# Patient Record
Sex: FEMALE | Race: WHITE | NOT HISPANIC OR LATINO | Employment: OTHER | URBAN - METROPOLITAN AREA
[De-identification: names, ages, dates, MRNs, and addresses within clinical notes are randomized per-mention and may not be internally consistent; named-entity substitution may affect disease eponyms.]

---

## 2017-01-03 DIAGNOSIS — J01.40 ACUTE PANSINUSITIS, RECURRENCE NOT SPECIFIED: ICD-10-CM

## 2017-01-03 NOTE — TELEPHONE ENCOUNTER
----- Message from Alejandro Cuba sent at 1/3/2017 10:45 AM CST -----  Contact: Pt [phone]  Patient would like to get medical advice.  Symptoms (please be specific):  Sore throat  How long has patient had these symptoms:  About 3 weeks  Pharmacy name and phone #:  [facility]  [phone] (Phone)  [phone] (Fax)  Any drug allergies:  none  Comments:

## 2017-01-04 NOTE — TELEPHONE ENCOUNTER
----- Message from Iqra Boogie sent at 1/4/2017 11:28 AM CST -----  Contact: self [phone] or cell [phone]  Pt spoke with Candis yesterday about a sore throat. She saw  2 weeks ago for this and asked if  would order meds but pharmacy said that nothing has been ordered yet. Please call pt. She has been advised that  is not in clinic today.

## 2017-01-05 RX ORDER — AZITHROMYCIN 250 MG/1
TABLET, FILM COATED ORAL
Qty: 6 TABLET | Refills: 0 | Status: SHIPPED | OUTPATIENT
Start: 2017-01-05 | End: 2017-01-16

## 2017-01-05 RX ORDER — PROMETHAZINE HYDROCHLORIDE AND DEXTROMETHORPHAN HYDROBROMIDE 6.25; 15 MG/5ML; MG/5ML
5 SYRUP ORAL EVERY 4 HOURS PRN
Qty: 240 ML | Refills: 0 | Status: SHIPPED | OUTPATIENT
Start: 2017-01-05 | End: 2017-01-15

## 2017-01-16 ENCOUNTER — OFFICE VISIT (OUTPATIENT)
Dept: OTOLARYNGOLOGY | Facility: CLINIC | Age: 67
End: 2017-01-16
Payer: MEDICARE

## 2017-01-16 VITALS — DIASTOLIC BLOOD PRESSURE: 61 MMHG | TEMPERATURE: 98 F | HEART RATE: 59 BPM | SYSTOLIC BLOOD PRESSURE: 125 MMHG

## 2017-01-16 DIAGNOSIS — Z87.19 HISTORY OF GASTROESOPHAGEAL REFLUX (GERD): ICD-10-CM

## 2017-01-16 DIAGNOSIS — R49.0 HOARSENESS: ICD-10-CM

## 2017-01-16 DIAGNOSIS — J02.9 SORE THROAT: Primary | ICD-10-CM

## 2017-01-16 PROCEDURE — 99213 OFFICE O/P EST LOW 20 MIN: CPT | Mod: PBBFAC | Performed by: OTOLARYNGOLOGY

## 2017-01-16 PROCEDURE — 31575 DIAGNOSTIC LARYNGOSCOPY: CPT | Mod: PBBFAC | Performed by: OTOLARYNGOLOGY

## 2017-01-16 PROCEDURE — 99999 PR PBB SHADOW E&M-EST. PATIENT-LVL III: CPT | Mod: PBBFAC,,, | Performed by: OTOLARYNGOLOGY

## 2017-01-16 PROCEDURE — 99213 OFFICE O/P EST LOW 20 MIN: CPT | Mod: 25,S$PBB,, | Performed by: OTOLARYNGOLOGY

## 2017-01-16 PROCEDURE — 31575 DIAGNOSTIC LARYNGOSCOPY: CPT | Mod: S$PBB,,, | Performed by: OTOLARYNGOLOGY

## 2017-01-16 NOTE — MR AVS SNAPSHOT
[facility] - Otorhinolaryngology  [address]  [facility] [address]  Phone: [phone]  Fax: [phone]                  Sheila Zarco   2017 4:00 PM   Office Visit    Description:  Female : 1950   Provider:  Hung Dumont III, MD   Department:  [facility] - Otorhinolaryngology           Diagnoses this Visit        Comments    Sore throat    -  Primary     History of gastroesophageal reflux (GERD)         Hoarseness                To Do List           Future Appointments        Provider Department Dept Phone    2017 3:00 PM Farhat Correa MD [place] - Internal Medicine [phone]      Goals (5 Years of Data)     None      [facility] On [facility]     [facility] On Call Nurse Care Line -  Assistance  Registered nurses in the [facility] On Call Center provide clinical advisement, health education, appointment booking, and other advisory services.  Call for this free service at [phone].             Medications           Message regarding Medications     Verify the changes and/or additions to your medication regime listed below are the same as discussed with your clinician today.  If any of these changes or additions are incorrect, please notify your healthcare provider.        STOP taking these medications     azithromycin (ZITHROMAX Z-LUPE) 250 MG tablet Take 2 tablets on day 1, then 1 tablet on days 2-5.           Verify that the below list of medications is an accurate representation of the medications you are currently taking.  If none reported, the list may be blank. If incorrect, please contact your healthcare provider. Carry this list with you in case of emergency.           Current Medications     albuterol (ACCUNEB) 0.63 mg/3 mL Nebu Take 3 mLs (0.63 mg total) by nebulization every 6 (six) hours as needed.    albuterol (VENTOLIN HFA) 90 mcg/actuation inhaler Inhale 2 puffs into the lungs every 6 (six) hours as needed for Wheezing.    alprazolam (XANAX) 0.25 MG tablet  Take 1 tablet (0.25 mg total) by mouth 2 (two) times daily. as needed for anxiety.    cyanocobalamin (VITAMIN B-12) 250 MCG tablet Take 250 mcg by mouth once daily.    desoximetasone (TOPICORT) 0.25 % cream Apply topically 2 (two) times daily.      escitalopram oxalate (LEXAPRO) 10 MG tablet Take 1 tablet (10 mg total) by mouth once daily. For anxiety.    fluticasone (FLONASE) 50 mcg/actuation nasal spray 2 sprays by Each Nare route once daily.    fluticasone-salmeterol 250-50 mcg/dose (ADVAIR) 250-50 mcg/dose diskus inhaler Inhale 1 puff into the lungs 2 (two) times daily.    ketoconazole (NIZORAL) 2 % cream AAA twice daily on nose prn flare    ketoconazole (NIZORAL) 2 % shampoo Wash hair with medicated shampoo at least 2x/week - let sit on scalp at least 5 minutes prior to rinsing    levothyroxine (SYNTHROID) 88 MCG tablet TAKE 1 TABLET BY MOUTH EVERY MORNING    naproxen (NAPROSYN) 500 MG tablet Take 1 tablet (500 mg total) by mouth 2 (two) times daily with meals.    pantoprazole (PROTONIX) 40 MG tablet TAKE 1 TABLET (40 MG TOTAL) BY MOUTH ONCE DAILY.    valacyclovir (VALTREX) 500 MG tablet TAKE ONE TABLET TWICE A DAY AS NEEDED FOR  OUTBREAK           Clinical Reference Information           Vital Signs - Last Recorded  Most recent update: 1/16/2017  4:26 PM by Violeta Balderas MA    BP Pulse Temp             125/61 (BP Location: Left arm, Patient Position: Sitting, BP Method: Automatic) (!) 59 97.6 °F (36.4 °C) (Tympanic)         Blood Pressure          Most Recent Value    BP  125/61      Allergies as of 1/16/2017     No Known Allergies      Immunizations Administered on Date of Encounter - 1/16/2017     None      Instructions    Endoscopy performed  Anti GERD measures may help  1 tsp Maalox + 1 tsp liquid benadryl swallowed slowly TID may help  Consider GI consultation pending course [phone]  Humidifier use during dry winter months may help  Consider laryngeal consultation with Dr. MOUNIKA Owen pending  course

## 2017-01-16 NOTE — PATIENT INSTRUCTIONS
Endoscopy performed  Anti GERD measures may help; PPI use encouraged  1 tsp Maalox + 1 tsp liquid benadryl swallowed slowly TID may help  Consider GI consultation pending course [phone]  Humidifier use during dry winter months may help  Consider laryngeal consultation with Dr. MOUNIKA Owen pending course

## 2017-02-03 RX ORDER — LEVOTHYROXINE SODIUM 88 UG/1
88 TABLET ORAL EVERY MORNING
Qty: 90 TABLET | Refills: 3 | Status: SHIPPED | OUTPATIENT
Start: 2017-02-03 | End: 2018-01-24 | Stop reason: SDUPTHER

## 2017-02-03 RX ORDER — ESCITALOPRAM OXALATE 10 MG/1
10 TABLET ORAL DAILY
Qty: 90 TABLET | Refills: 3 | Status: SHIPPED | OUTPATIENT
Start: 2017-02-03 | End: 2018-01-24 | Stop reason: SDUPTHER

## 2017-02-08 ENCOUNTER — TELEPHONE (OUTPATIENT)
Dept: INTERNAL MEDICINE | Facility: CLINIC | Age: 67
End: 2017-02-08

## 2017-02-08 DIAGNOSIS — E78.5 HYPERLIPIDEMIA, UNSPECIFIED HYPERLIPIDEMIA TYPE: ICD-10-CM

## 2017-02-08 DIAGNOSIS — I15.9 SECONDARY HYPERTENSION: Primary | ICD-10-CM

## 2017-02-08 DIAGNOSIS — E03.9 HYPOTHYROIDISM, UNSPECIFIED TYPE: ICD-10-CM

## 2017-02-08 NOTE — TELEPHONE ENCOUNTER
----- Message from Bettie Jimenez sent at 2/8/2017  8:58 AM CST -----  Contact: Self/[phone]  Pt would like to know if blood work is needed before her apt with  on 2/9. Please advise

## 2017-02-09 NOTE — TELEPHONE ENCOUNTER
----- Message from Carolin Leung sent at 2/9/2017  8:59 AM CST -----  Contact: Self. Call  [phone]  Doctor appointment and lab have been scheduled.  Please link lab orders to the lab appointment.  Date of doctor appointment:  2/21  Physical or EP:  Ep  Date of lab appointment:  2/14  Comments:

## 2017-02-14 ENCOUNTER — LAB VISIT (OUTPATIENT)
Dept: LAB | Facility: HOSPITAL | Age: 67
End: 2017-02-14
Attending: INTERNAL MEDICINE
Payer: MEDICARE

## 2017-02-14 DIAGNOSIS — I15.9 SECONDARY HYPERTENSION: ICD-10-CM

## 2017-02-14 DIAGNOSIS — E78.5 HYPERLIPIDEMIA, UNSPECIFIED HYPERLIPIDEMIA TYPE: ICD-10-CM

## 2017-02-14 DIAGNOSIS — E03.9 HYPOTHYROIDISM, UNSPECIFIED TYPE: ICD-10-CM

## 2017-02-14 LAB
ALBUMIN SERPL BCP-MCNC: 3.6 G/DL
ALP SERPL-CCNC: 70 U/L
ALT SERPL W/O P-5'-P-CCNC: 15 U/L
ANION GAP SERPL CALC-SCNC: 6 MMOL/L
AST SERPL-CCNC: 16 U/L
BASOPHILS # BLD AUTO: 0.04 K/UL
BASOPHILS NFR BLD: 0.6 %
BILIRUB SERPL-MCNC: 0.5 MG/DL
BUN SERPL-MCNC: 15 MG/DL
CALCIUM SERPL-MCNC: 9.2 MG/DL
CHLORIDE SERPL-SCNC: 111 MMOL/L
CHOLEST/HDLC SERPL: 2.6 {RATIO}
CO2 SERPL-SCNC: 24 MMOL/L
CREAT SERPL-MCNC: 0.8 MG/DL
DIFFERENTIAL METHOD: NORMAL
EOSINOPHIL # BLD AUTO: 0.3 K/UL
EOSINOPHIL NFR BLD: 4 %
ERYTHROCYTE [DISTWIDTH] IN BLOOD BY AUTOMATED COUNT: 13.7 %
EST. GFR  (AFRICAN AMERICAN): >60 ML/MIN/1.73 M^2
EST. GFR  (NON AFRICAN AMERICAN): >60 ML/MIN/1.73 M^2
GLUCOSE SERPL-MCNC: 112 MG/DL
HCT VFR BLD AUTO: 39.2 %
HDL/CHOLESTEROL RATIO: 37.8 %
HDLC SERPL-MCNC: 246 MG/DL
HDLC SERPL-MCNC: 93 MG/DL
HGB BLD-MCNC: 13 G/DL
LDLC SERPL CALC-MCNC: 131.8 MG/DL
LYMPHOCYTES # BLD AUTO: 2.4 K/UL
LYMPHOCYTES NFR BLD: 35.9 %
MCH RBC QN AUTO: 29.7 PG
MCHC RBC AUTO-ENTMCNC: 33.2 %
MCV RBC AUTO: 90 FL
MONOCYTES # BLD AUTO: 0.4 K/UL
MONOCYTES NFR BLD: 5.5 %
NEUTROPHILS # BLD AUTO: 3.6 K/UL
NEUTROPHILS NFR BLD: 53.9 %
NONHDLC SERPL-MCNC: 153 MG/DL
PLATELET # BLD AUTO: 339 K/UL
PMV BLD AUTO: 10 FL
POTASSIUM SERPL-SCNC: 4.5 MMOL/L
PROT SERPL-MCNC: 6.9 G/DL
RBC # BLD AUTO: 4.38 M/UL
SODIUM SERPL-SCNC: 141 MMOL/L
T4 FREE SERPL-MCNC: 1.15 NG/DL
TRIGL SERPL-MCNC: 106 MG/DL
TSH SERPL DL<=0.005 MIU/L-ACNC: 2.48 UIU/ML
WBC # BLD AUTO: 6.71 K/UL

## 2017-02-14 PROCEDURE — 80053 COMPREHEN METABOLIC PANEL: CPT

## 2017-02-14 PROCEDURE — 84439 ASSAY OF FREE THYROXINE: CPT

## 2017-02-14 PROCEDURE — 36415 COLL VENOUS BLD VENIPUNCTURE: CPT | Mod: PO

## 2017-02-14 PROCEDURE — 85025 COMPLETE CBC W/AUTO DIFF WBC: CPT

## 2017-02-14 PROCEDURE — 80061 LIPID PANEL: CPT

## 2017-02-14 PROCEDURE — 84443 ASSAY THYROID STIM HORMONE: CPT

## 2017-02-21 ENCOUNTER — OFFICE VISIT (OUTPATIENT)
Dept: INTERNAL MEDICINE | Facility: CLINIC | Age: 67
End: 2017-02-21
Payer: MEDICARE

## 2017-02-21 VITALS
BODY MASS INDEX: 26.25 KG/M2 | DIASTOLIC BLOOD PRESSURE: 70 MMHG | RESPIRATION RATE: 16 BRPM | TEMPERATURE: 98 F | HEART RATE: 64 BPM | SYSTOLIC BLOOD PRESSURE: 146 MMHG | WEIGHT: 165.13 LBS

## 2017-02-21 DIAGNOSIS — J45.20 MILD INTERMITTENT ASTHMA WITHOUT COMPLICATION: Chronic | ICD-10-CM

## 2017-02-21 DIAGNOSIS — K21.9 GASTROESOPHAGEAL REFLUX DISEASE, ESOPHAGITIS PRESENCE NOT SPECIFIED: Chronic | ICD-10-CM

## 2017-02-21 DIAGNOSIS — R10.11 RIGHT UPPER QUADRANT ABDOMINAL PAIN: Primary | ICD-10-CM

## 2017-02-21 DIAGNOSIS — E03.9 HYPOTHYROIDISM, UNSPECIFIED TYPE: Chronic | ICD-10-CM

## 2017-02-21 DIAGNOSIS — R73.09 ABNORMAL GLUCOSE: Chronic | ICD-10-CM

## 2017-02-21 DIAGNOSIS — F41.9 ANXIETY: Chronic | ICD-10-CM

## 2017-02-21 DIAGNOSIS — K76.0 NAFLD (NONALCOHOLIC FATTY LIVER DISEASE): Chronic | ICD-10-CM

## 2017-02-21 PROCEDURE — 99214 OFFICE O/P EST MOD 30 MIN: CPT | Mod: S$PBB,,, | Performed by: INTERNAL MEDICINE

## 2017-02-21 PROCEDURE — 99999 PR PBB SHADOW E&M-EST. PATIENT-LVL III: CPT | Mod: PBBFAC,,, | Performed by: INTERNAL MEDICINE

## 2017-02-21 PROCEDURE — 99213 OFFICE O/P EST LOW 20 MIN: CPT | Mod: PBBFAC,PO | Performed by: INTERNAL MEDICINE

## 2017-02-21 RX ORDER — VALACYCLOVIR HYDROCHLORIDE 500 MG/1
TABLET, FILM COATED ORAL
Qty: 12 TABLET | Refills: 3 | Status: SHIPPED | OUTPATIENT
Start: 2017-02-21 | End: 2019-03-21 | Stop reason: SDUPTHER

## 2017-02-21 RX ORDER — ALPRAZOLAM 0.25 MG/1
TABLET ORAL
Qty: 60 TABLET | Refills: 2 | Status: SHIPPED | OUTPATIENT
Start: 2017-02-21 | End: 2018-05-11

## 2017-02-21 NOTE — PROGRESS NOTES
Subjective:       Patient ID: Sheila Zarco is a 66 y.o. female.    Chief Complaint: Follow-up (6month ) and Abdominal Pain    HPI   The patient presents for follow-up of right upper quadrant abdominal pain and other medical conditions.  The patient has hypertension and reports blood pressure readings have been good on self-monitoring.  She has GERD which has been stable on Protonix therapy.  However, the patient continues to experience discomfort in the right upper quadrant and right flank area.  She describes it as a moderate aching pain.  The pain is not related to ingestion of food or physical activity.  She has not experienced any nausea, vomiting, melena, or gross rectal bleeding.  Bowel movements have been normal.  No dysuria or change in urinary frequency noted.  No gross hematuria is noted.  The patient has nonalcoholic fatty liver disease.  Review of Systems   Constitutional: Negative for activity change, appetite change, chills, fatigue, fever and unexpected weight change.   Respiratory: Negative for shortness of breath.    Cardiovascular: Negative for chest pain, palpitations and leg swelling.   Gastrointestinal: Positive for abdominal pain. Negative for abdominal distention, blood in stool and vomiting.   Genitourinary: Positive for flank pain. Negative for difficulty urinating, dysuria, frequency and hematuria.   Musculoskeletal: Negative for arthralgias and myalgias.   Skin: Negative for rash.   Neurological: Negative for dizziness and headaches.       Objective:      Physical Exam   Constitutional: She is oriented to person, place, and time. She appears well-developed and well-nourished. No distress.   HENT:   Head: Normocephalic and atraumatic.   Eyes: Conjunctivae are normal. No scleral icterus.   Neck: Normal range of motion. Neck supple.   Cardiovascular: Normal rate, regular rhythm, normal heart sounds and intact distal pulses.    No murmur heard.  Pulmonary/Chest: Effort normal and breath  sounds normal. No respiratory distress. She has no wheezes. She has no rales. She exhibits no tenderness.   Abdominal: Bowel sounds are normal. She exhibits no distension. There is no tenderness.   Musculoskeletal: Normal range of motion.   Negative straight leg raising test bilaterally.  No lumbar paraspinous muscle tenderness is present on palpation.  Hip range of motion is intact.  No CVA percussion tenderness.   Lymphadenopathy:     She has no cervical adenopathy.   Neurological: She is alert and oriented to person, place, and time. Coordination normal.   Skin: Skin is warm and dry. No rash noted.   Psychiatric: She has a normal mood and affect. Her behavior is normal.   Nursing note and vitals reviewed.      Assessment:       1. Right upper quadrant abdominal pain    2. Gastroesophageal reflux disease, esophagitis presence not specified    3. Mild intermittent asthma without complication    4. Hypothyroidism, unspecified type    5. Anxiety    6. Abnormal glucose    7. NAFLD (nonalcoholic fatty liver disease)        Plan:       Sheila was seen today for follow-up and abdominal pain.  CT scan of the abdomen with contrast will be obtained for further assessment of the liver, pancreas, and kidney in view of unexplained pain in the right upper quadrant and right flank areas.  Current therapy will be continued.  A routine follow-up in 6 months is recommended.    Diagnoses and all orders for this visit:    Right upper quadrant abdominal pain  -     CT Abdomen With Contrast; Future    Gastroesophageal reflux disease, esophagitis presence not specified    Mild intermittent asthma without complication    Hypothyroidism, unspecified type    Anxiety    Abnormal glucose    NAFLD (nonalcoholic fatty liver disease)    Other orders  -     valacyclovir (VALTREX) 500 MG tablet; TAKE ONE TABLET TWICE A DAY AS NEEDED FOR  OUTBREAK  -     alprazolam (XANAX) 0.25 MG tablet; Take 1-2 tabs po bid prn anxiety

## 2017-02-24 ENCOUNTER — HOSPITAL ENCOUNTER (OUTPATIENT)
Dept: RADIOLOGY | Facility: HOSPITAL | Age: 67
Discharge: HOME | End: 2017-02-24
Attending: INTERNAL MEDICINE
Payer: MEDICARE

## 2017-02-24 DIAGNOSIS — R10.11 RIGHT UPPER QUADRANT ABDOMINAL PAIN: ICD-10-CM

## 2017-02-24 PROCEDURE — 74160 CT ABDOMEN W/CONTRAST: CPT | Mod: 26,,, | Performed by: RADIOLOGY

## 2017-02-24 PROCEDURE — 74160 CT ABDOMEN W/CONTRAST: CPT | Mod: TC

## 2017-02-24 PROCEDURE — 25500020 PHARM REV CODE 255: Performed by: INTERNAL MEDICINE

## 2017-02-24 RX ADMIN — IOHEXOL 15 ML: 350 INJECTION, SOLUTION INTRAVENOUS at 10:02

## 2017-02-24 RX ADMIN — IOHEXOL 75 ML: 350 INJECTION, SOLUTION INTRAVENOUS at 11:02

## 2017-05-08 RX ORDER — ALBUTEROL SULFATE 90 UG/1
AEROSOL, METERED RESPIRATORY (INHALATION)
Qty: 18 INHALER | Refills: 1 | Status: SHIPPED | OUTPATIENT
Start: 2017-05-08 | End: 2018-09-17 | Stop reason: SDUPTHER

## 2017-05-24 ENCOUNTER — TELEPHONE (OUTPATIENT)
Dept: ORTHOPEDICS | Facility: CLINIC | Age: 67
End: 2017-05-24

## 2017-05-24 NOTE — TELEPHONE ENCOUNTER
----- Message from Servando Mathis sent at 5/24/2017 11:57 AM CDT -----  Contact: ANGELA BAPTISTE [[id number]]  X_  1st Request  _  2nd Request  _  3rd Request        Who: ANGELA BAPTISTE [[id number]]    Why: Patient's right shoulder is bothering her again. Patient would like to know if she can get another injection. Please call back to schedule.    What Number to Call Back: [phone] or [phone] (cell)    When to Expect a call back: (Before the end of the day)   -- if the call is after 12:00, the call back will be tomorrow.

## 2017-05-24 NOTE — TELEPHONE ENCOUNTER
I spoke with the patient and made her a follow up appointment. She was made aware of date, time and location.

## 2017-06-14 ENCOUNTER — OFFICE VISIT (OUTPATIENT)
Dept: ORTHOPEDICS | Facility: CLINIC | Age: 67
End: 2017-06-14
Attending: ORTHOPAEDIC SURGERY
Payer: MEDICARE

## 2017-06-14 VITALS — BODY MASS INDEX: 25.9 KG/M2 | HEIGHT: 67 IN | WEIGHT: 165 LBS

## 2017-06-14 DIAGNOSIS — M25.811 SHOULDER IMPINGEMENT, RIGHT: Primary | ICD-10-CM

## 2017-06-14 DIAGNOSIS — M75.41 ROTATOR CUFF IMPINGEMENT SYNDROME, RIGHT: ICD-10-CM

## 2017-06-14 PROCEDURE — 1125F AMNT PAIN NOTED PAIN PRSNT: CPT | Mod: ,,, | Performed by: ORTHOPAEDIC SURGERY

## 2017-06-14 PROCEDURE — 20610 DRAIN/INJ JOINT/BURSA W/O US: CPT | Mod: S$PBB,RT,, | Performed by: ORTHOPAEDIC SURGERY

## 2017-06-14 PROCEDURE — 99999 PR PBB SHADOW E&M-EST. PATIENT-LVL II: CPT | Mod: PBBFAC,,, | Performed by: ORTHOPAEDIC SURGERY

## 2017-06-14 PROCEDURE — 20610 DRAIN/INJ JOINT/BURSA W/O US: CPT | Mod: PBBFAC | Performed by: ORTHOPAEDIC SURGERY

## 2017-06-14 PROCEDURE — 99213 OFFICE O/P EST LOW 20 MIN: CPT | Mod: S$PBB,25,, | Performed by: ORTHOPAEDIC SURGERY

## 2017-06-14 PROCEDURE — 99212 OFFICE O/P EST SF 10 MIN: CPT | Mod: PBBFAC | Performed by: ORTHOPAEDIC SURGERY

## 2017-06-14 PROCEDURE — 1159F MED LIST DOCD IN RCRD: CPT | Mod: ,,, | Performed by: ORTHOPAEDIC SURGERY

## 2017-06-14 RX ORDER — TRAMADOL HYDROCHLORIDE 50 MG/1
50 TABLET ORAL EVERY 6 HOURS PRN
Qty: 30 TABLET | Refills: 1 | Status: SHIPPED | OUTPATIENT
Start: 2017-06-14 | End: 2017-06-24

## 2017-06-14 RX ORDER — TRIAMCINOLONE ACETONIDE 40 MG/ML
40 INJECTION, SUSPENSION INTRA-ARTICULAR; INTRAMUSCULAR
Status: COMPLETED | OUTPATIENT
Start: 2017-06-14 | End: 2017-06-14

## 2017-06-14 RX ADMIN — TRIAMCINOLONE ACETONIDE 40 MG: 40 INJECTION, SUSPENSION INTRA-ARTICULAR; INTRAMUSCULAR at 02:06

## 2017-06-14 NOTE — PROGRESS NOTES
HISTORY OF PRESENT ILLNESS:  Ms. Zarco in followup for a partial tear rotator   cuff, right shoulder, having a flare-up again.  Previously, we talked about   surgery, but this is not a good time, her  is having more serious medical   problems and she would like to wait a while.  The injections do help and she   would like to have another one today.    PHYSICAL EXAMINATION:  Right shoulder has a little bit of tenderness   anterolaterally.  Range of motion slightly decreased secondary to pain.    Positive impingement sign with abduction, internal rotation of the shoulder.  No   instability.  Rotator cuff strength is a little bit weak.  Sensation intact.    Neurologically intact.    IMPRESSION:  Partial tear rotator cuff, right shoulder.    PLAN:  After appropriate pause for timeout, right shoulder identified by the   patient.  Injection performed right shoulder with combination of Kenalog 40 mg,   2 mL Xylocaine, sterile technique.  No adverse reactions to the injection.  She   was instructed in light activities.  Advil or Motrin by mouth.  Follow up in 2-3   months or p.r.n.      JONNY  dd: 06/14/2017 14:39:46 (CDT)  td: 06/15/2017 11:58:49 (CDT)  Doc ID   #[id number]  Job ID #[id number]    CC:

## 2017-07-06 ENCOUNTER — OFFICE VISIT (OUTPATIENT)
Dept: PODIATRY | Facility: CLINIC | Age: 67
End: 2017-07-06
Payer: MEDICARE

## 2017-07-06 VITALS
HEART RATE: 73 BPM | HEIGHT: 67 IN | SYSTOLIC BLOOD PRESSURE: 136 MMHG | BODY MASS INDEX: 25.11 KG/M2 | RESPIRATION RATE: 18 BRPM | WEIGHT: 160 LBS | DIASTOLIC BLOOD PRESSURE: 82 MMHG

## 2017-07-06 DIAGNOSIS — L60.9 DISEASE OF NAIL: Primary | ICD-10-CM

## 2017-07-06 DIAGNOSIS — L60.1 ONYCHOLYSIS OF TOENAIL: ICD-10-CM

## 2017-07-06 LAB — KOH PREP SPEC: NORMAL

## 2017-07-06 PROCEDURE — 1159F MED LIST DOCD IN RCRD: CPT | Mod: ,,, | Performed by: PODIATRIST

## 2017-07-06 PROCEDURE — 1125F AMNT PAIN NOTED PAIN PRSNT: CPT | Mod: ,,, | Performed by: PODIATRIST

## 2017-07-06 PROCEDURE — 99999 PR PBB SHADOW E&M-EST. PATIENT-LVL III: CPT | Mod: PBBFAC,,, | Performed by: PODIATRIST

## 2017-07-06 PROCEDURE — 87107 FUNGI IDENTIFICATION MOLD: CPT

## 2017-07-06 PROCEDURE — 99213 OFFICE O/P EST LOW 20 MIN: CPT | Mod: PBBFAC | Performed by: PODIATRIST

## 2017-07-06 PROCEDURE — 87102 FUNGUS ISOLATION CULTURE: CPT

## 2017-07-06 PROCEDURE — 87210 SMEAR WET MOUNT SALINE/INK: CPT

## 2017-07-06 PROCEDURE — 99213 OFFICE O/P EST LOW 20 MIN: CPT | Mod: S$PBB,,, | Performed by: PODIATRIST

## 2017-07-06 NOTE — PROGRESS NOTES
"Subjective:      Patient ID: Sheila Zarco is a 66 y.o. female.    Chief Complaint: PCP (Farhat Correa MD 2/21/17); Nail Problem; and Toe Pain    Sheila is a 66 y.o. female who presents to the clinic complaining of painful loose toenail on the left foot.    Review of Systems   Constitution: Negative for chills, decreased appetite and fever.   Cardiovascular: Negative for leg swelling.   Skin: Positive for nail changes.   Musculoskeletal: Negative for arthritis, joint pain, joint swelling and myalgias.   Gastrointestinal: Negative for nausea and vomiting.   Neurological: Negative for loss of balance, numbness and paresthesias.           Objective:       Vitals:    07/06/17 1350   BP: 136/82   Pulse: 73   Resp: 18   Weight: 72.6 kg (160 lb)   Height: 5' 6.5" (1.689 m)   PainSc:   6   PainLoc: Toe        Physical Exam   Constitutional: She is oriented to person, place, and time. She appears well-developed and well-nourished.   Cardiovascular:   Pulses:       Dorsalis pedis pulses are 2+ on the right side, and 2+ on the left side.        Posterior tibial pulses are 2+ on the right side, and 2+ on the left side.   Musculoskeletal: She exhibits no edema or tenderness.        Right ankle: Normal.        Left ankle: Normal.        Right foot: There is no swelling, no crepitus and no deformity.        Left foot: There is no swelling, no crepitus and no deformity.   Adequate joint range of motion without pain, limitation, nor crepitation Bilateral feet and ankle joints. Muscle strength is 5/5 in all groups bilaterally.         Lymphadenopathy:   No palpable lymph nodes   Neurological: She is alert and oriented to person, place, and time. She has normal strength.   Skin: Skin is warm, dry and intact. No rash noted. No erythema. Nails show no clubbing.   Loosened L hallux nail distally No surrounding erythema, edema, malodor, nor drainage noted      Psychiatric: She has a normal mood and affect. Her behavior is " normal.             Assessment:       Encounter Diagnoses   Name Primary?    Disease of nail Yes    Onycholysis of toenail - Left Foot          Plan:       Sheila was seen today for pcp, nail problem and toe pain.    Diagnoses and all orders for this visit:    Disease of nail  -     CULTURE, FUNGUS  -     KOH prep    Onycholysis of toenail - Left Foot      I counseled the patient on her conditions, their implications and medical management.    Upon inspection, it was noted that the L hallux nail was distally detached from nail bed. With patients verbal permission this was trimmed back to the point of attachment at the  proximal nail margin w/o issues. No blood was drawn.  no open lesions noted to nail bed    The area was cleansed with alcohol prep pad. With patient's permission, the affected toenail was  aggressively reduced back to the proximal matrix region using sterile nail nippers to the soft tissue attachment to obtain nail specimen. Patient tolerated well. No blood was drawn. The specimen was placed in a sterile specimen container and appropriately label with patient confirmation

## 2017-07-31 RX ORDER — PANTOPRAZOLE SODIUM 40 MG/1
TABLET, DELAYED RELEASE ORAL
Qty: 90 TABLET | Refills: 3 | Status: SHIPPED | OUTPATIENT
Start: 2017-07-31 | End: 2018-08-02 | Stop reason: SDUPTHER

## 2017-08-09 LAB
FUNGUS SPEC CULT: NORMAL
FUNGUS SPEC CULT: NORMAL

## 2017-09-13 ENCOUNTER — OFFICE VISIT (OUTPATIENT)
Dept: ORTHOPEDICS | Facility: CLINIC | Age: 67
End: 2017-09-13
Attending: ORTHOPAEDIC SURGERY
Payer: MEDICARE

## 2017-09-13 VITALS — WEIGHT: 160 LBS | HEIGHT: 67 IN | BODY MASS INDEX: 25.11 KG/M2

## 2017-09-13 DIAGNOSIS — M75.41 ROTATOR CUFF IMPINGEMENT SYNDROME, RIGHT: Primary | ICD-10-CM

## 2017-09-13 DIAGNOSIS — M25.511 CHRONIC RIGHT SHOULDER PAIN: ICD-10-CM

## 2017-09-13 DIAGNOSIS — G89.29 CHRONIC RIGHT SHOULDER PAIN: ICD-10-CM

## 2017-09-13 PROCEDURE — 1159F MED LIST DOCD IN RCRD: CPT | Mod: ,,, | Performed by: ORTHOPAEDIC SURGERY

## 2017-09-13 PROCEDURE — 1126F AMNT PAIN NOTED NONE PRSNT: CPT | Mod: ,,, | Performed by: ORTHOPAEDIC SURGERY

## 2017-09-13 PROCEDURE — 20610 DRAIN/INJ JOINT/BURSA W/O US: CPT | Mod: PBBFAC | Performed by: ORTHOPAEDIC SURGERY

## 2017-09-13 PROCEDURE — 99213 OFFICE O/P EST LOW 20 MIN: CPT | Mod: PBBFAC | Performed by: ORTHOPAEDIC SURGERY

## 2017-09-13 PROCEDURE — 99999 PR PBB SHADOW E&M-EST. PATIENT-LVL III: CPT | Mod: PBBFAC,,, | Performed by: ORTHOPAEDIC SURGERY

## 2017-09-13 PROCEDURE — 99214 OFFICE O/P EST MOD 30 MIN: CPT | Mod: S$PBB,25,, | Performed by: ORTHOPAEDIC SURGERY

## 2017-09-13 PROCEDURE — 20610 DRAIN/INJ JOINT/BURSA W/O US: CPT | Mod: S$PBB,RT,, | Performed by: ORTHOPAEDIC SURGERY

## 2017-09-13 RX ORDER — NAPROXEN 500 MG/1
500 TABLET ORAL 2 TIMES DAILY WITH MEALS
Qty: 60 TABLET | Refills: 3 | Status: SHIPPED | OUTPATIENT
Start: 2017-09-13 | End: 2019-08-28 | Stop reason: SDUPTHER

## 2017-09-13 RX ORDER — TRIAMCINOLONE ACETONIDE 40 MG/ML
40 INJECTION, SUSPENSION INTRA-ARTICULAR; INTRAMUSCULAR
Status: COMPLETED | OUTPATIENT
Start: 2017-09-13 | End: 2017-09-13

## 2017-09-13 RX ADMIN — TRIAMCINOLONE ACETONIDE 40 MG: 40 INJECTION, SUSPENSION INTRA-ARTICULAR; INTRAMUSCULAR at 01:09

## 2017-09-13 NOTE — PROGRESS NOTES
CHIEF COMPLAINT:  Rotator cuff tear, right shoulder.    HISTORY OF PRESENT ILLNESS:  A 67-year-old female with ongoing symptoms of right   shoulder related to partial tear rotator cuff.  Her last MRI was a few years   ago and she has actually asked about having another MRI, but she wants to go   ahead and set up surgery in November to make sure she gets it in before the   holidays start, so I think we should set that up tentatively pending the results   of the MRI, although I do not think it will change the plan for repair.    PAST MEDICAL HISTORY:  Significant for asthma, cataracts, glaucoma and thyroid   disease.    PAST SURGICAL HISTORY:  Includes hysterectomy, gallbladder surgery, knee   surgery, bunionectomy and eye surgery.    FAMILY HISTORY:  Positive for liver disease, breast cancer and heart disease.    SOCIAL HISTORY:  The patient does not smoke, drinks about four beers per week.    REVIEW OF SYSTEMS:  Negative for fever, chills or rashes.    CURRENT MEDICATIONS:  Reviewed on chart.    ALLERGIES:  None.    PHYSICAL EXAMINATION:  GENERAL:  An elderly female, in no acute distress, alert and oriented x3.  HEENT:  Unremarkable.  LUNGS:  Clear to auscultation.  HEART:  Regular rate and rhythm.  ABDOMEN:  Soft and nontender.  EXTREMITIES:  Significant for the right shoulder demonstrating some tenderness   diffusely.  No bruising, no swelling.  Range of motion is limited secondary to   pain.  She does have a positive impingement sign, positive supraspinatus stress   test and some weakness of the rotator cuff.  No instability.  NEUROLOGIC:  Intact.    IMAGING:  Previous MRI reviewed demonstrates a partial tear of rotator cuff,   right shoulder.    IMPRESSION:  Partial versus complete tear of rotator cuff, right shoulder.    PLAN:  The patient would like to have an injection performed of the right   shoulder to get her through the next month or two.  After pause for timeout, she   identified the right shoulder, I  injected with a combination of Kenalog 40 mg   and 2 mL of Xylocaine, sterile technique.  She tolerated the procedure well.    We will also set up an MRI scan of the right shoulder and review this prior to   the surgery, which will tentatively be scheduled for November.  The risks and   benefits were explained to her today.  She understands.      JONNY  dd: 09/13/2017 13:43:04 (CDT)  td: 09/14/2017 00:42:27 (CDT)  Doc ID   #[id number]  Job ID #[id number]    CC:

## 2017-09-18 ENCOUNTER — HOSPITAL ENCOUNTER (OUTPATIENT)
Dept: RADIOLOGY | Facility: HOSPITAL | Age: 67
Discharge: HOME | End: 2017-09-18
Attending: ORTHOPAEDIC SURGERY
Payer: MEDICARE

## 2017-09-18 DIAGNOSIS — M75.41 ROTATOR CUFF IMPINGEMENT SYNDROME, RIGHT: ICD-10-CM

## 2017-09-18 PROCEDURE — 73221 MRI JOINT UPR EXTREM W/O DYE: CPT | Mod: TC,RT

## 2017-09-18 PROCEDURE — 73221 MRI JOINT UPR EXTREM W/O DYE: CPT | Mod: 26,RT,GC, | Performed by: RADIOLOGY

## 2017-09-27 ENCOUNTER — OFFICE VISIT (OUTPATIENT)
Dept: ORTHOPEDICS | Facility: CLINIC | Age: 67
End: 2017-09-27
Attending: ORTHOPAEDIC SURGERY
Payer: MEDICARE

## 2017-09-27 VITALS — BODY MASS INDEX: 25.11 KG/M2 | WEIGHT: 160 LBS | HEIGHT: 67 IN

## 2017-09-27 DIAGNOSIS — M75.41 ROTATOR CUFF IMPINGEMENT SYNDROME, RIGHT: Primary | ICD-10-CM

## 2017-09-27 PROCEDURE — 99999 PR PBB SHADOW E&M-EST. PATIENT-LVL III: CPT | Mod: PBBFAC,,, | Performed by: ORTHOPAEDIC SURGERY

## 2017-09-27 PROCEDURE — 99213 OFFICE O/P EST LOW 20 MIN: CPT | Mod: S$PBB,,, | Performed by: ORTHOPAEDIC SURGERY

## 2017-09-27 PROCEDURE — 1125F AMNT PAIN NOTED PAIN PRSNT: CPT | Mod: ,,, | Performed by: ORTHOPAEDIC SURGERY

## 2017-09-27 PROCEDURE — 1159F MED LIST DOCD IN RCRD: CPT | Mod: ,,, | Performed by: ORTHOPAEDIC SURGERY

## 2017-09-27 PROCEDURE — 99213 OFFICE O/P EST LOW 20 MIN: CPT | Mod: PBBFAC | Performed by: ORTHOPAEDIC SURGERY

## 2017-09-27 NOTE — PROGRESS NOTES
HISTORY OF PRESENT ILLNESS:  Ms. Zarco is in followup of right shoulder   impingement, recently had an MRI scan of the right shoulder.  The results of the   MRI showed that she does have a partial tear of the rotator cuff.  I went over   this with her today.    Clinically, she is better since the injection last visit.  We actually had   talked tentatively about surgery in November, but now since she is feeling   better, she would like to hold off on surgery, especially since she has some   other more pressing medical problems in the family.    PHYSICAL EXAMINATION:  RIGHT SHOULDER:  No tenderness, no swelling.  Range of motion good, mildly   positive impingement sign.    PLAN:  We will cancel surgery for now.  She would like to also consider therapy.    She had therapy a few years ago for the same problem.    I think this is reasonable.  I will order some therapy at [facility].  Follow up   in two months.      JONNY  dd: 09/27/2017 14:11:47 (CDT)  td: 09/28/2017 10:36:42 (CDT)  Doc ID   #[id number]  Job ID #[id number]    CC:

## 2017-10-11 RX ORDER — ALPRAZOLAM 0.5 MG/1
TABLET ORAL
Qty: 60 TABLET | Refills: 0 | Status: SHIPPED | OUTPATIENT
Start: 2017-10-11 | End: 2018-02-19 | Stop reason: SDUPTHER

## 2017-10-17 DIAGNOSIS — L65.9 ALOPECIA: ICD-10-CM

## 2017-10-18 RX ORDER — KETOCONAZOLE 20 MG/ML
SHAMPOO, SUSPENSION TOPICAL
Qty: 120 ML | Refills: 2 | Status: SHIPPED | OUTPATIENT
Start: 2017-10-18 | End: 2019-01-18 | Stop reason: SDUPTHER

## 2018-01-24 RX ORDER — LEVOTHYROXINE SODIUM 88 UG/1
88 TABLET ORAL EVERY MORNING
Qty: 90 TABLET | Refills: 1 | Status: SHIPPED | OUTPATIENT
Start: 2018-01-24 | End: 2018-08-02 | Stop reason: SDUPTHER

## 2018-01-24 RX ORDER — ESCITALOPRAM OXALATE 10 MG/1
10 TABLET ORAL DAILY
Qty: 90 TABLET | Refills: 1 | Status: SHIPPED | OUTPATIENT
Start: 2018-01-24 | End: 2018-05-11 | Stop reason: SDUPTHER

## 2018-02-15 ENCOUNTER — TELEPHONE (OUTPATIENT)
Dept: INTERNAL MEDICINE | Facility: CLINIC | Age: 68
End: 2018-02-15

## 2018-02-15 DIAGNOSIS — E03.8 OTHER SPECIFIED HYPOTHYROIDISM: ICD-10-CM

## 2018-02-15 DIAGNOSIS — E78.5 HYPERLIPIDEMIA, UNSPECIFIED HYPERLIPIDEMIA TYPE: ICD-10-CM

## 2018-02-15 DIAGNOSIS — R73.09 ABNORMAL GLUCOSE: ICD-10-CM

## 2018-02-15 DIAGNOSIS — Z12.39 BREAST CANCER SCREENING: Primary | ICD-10-CM

## 2018-02-15 NOTE — TELEPHONE ENCOUNTER
----- Message from Bridget Buenrostro sent at 2/15/2018  3:52 PM CST -----  Contact: [phone]  Patient would like to get a referral.  Does the patient already have the specialty clinic appointment scheduled:  no  If yes, what date is the appointment scheduled:     Referral to what specialty:  mammo  Reason (be specific):  annual  Does the patient want the referral with a specific physician:  no  Is this an [facility]:    Comments:

## 2018-02-16 ENCOUNTER — OFFICE VISIT (OUTPATIENT)
Dept: OPTOMETRY | Facility: CLINIC | Age: 68
End: 2018-02-16
Payer: MEDICARE

## 2018-02-16 DIAGNOSIS — H52.203 HYPEROPIA OF BOTH EYES WITH ASTIGMATISM AND PRESBYOPIA: ICD-10-CM

## 2018-02-16 DIAGNOSIS — Z98.890 S/P LASER IRIDOTOMY: ICD-10-CM

## 2018-02-16 DIAGNOSIS — H52.03 HYPEROPIA OF BOTH EYES WITH ASTIGMATISM AND PRESBYOPIA: ICD-10-CM

## 2018-02-16 DIAGNOSIS — H25.13 NUCLEAR SCLEROSIS OF BOTH EYES: Primary | ICD-10-CM

## 2018-02-16 DIAGNOSIS — H52.4 HYPEROPIA OF BOTH EYES WITH ASTIGMATISM AND PRESBYOPIA: ICD-10-CM

## 2018-02-16 PROCEDURE — 92015 DETERMINE REFRACTIVE STATE: CPT | Mod: ,,, | Performed by: OPTOMETRIST

## 2018-02-16 PROCEDURE — 99999 PR PBB SHADOW E&M-EST. PATIENT-LVL II: CPT | Mod: PBBFAC,,, | Performed by: OPTOMETRIST

## 2018-02-16 PROCEDURE — 92014 COMPRE OPH EXAM EST PT 1/>: CPT | Mod: S$PBB,,, | Performed by: OPTOMETRIST

## 2018-02-16 PROCEDURE — 99212 OFFICE O/P EST SF 10 MIN: CPT | Mod: PBBFAC,PO | Performed by: OPTOMETRIST

## 2018-02-16 NOTE — TELEPHONE ENCOUNTER
----- Message from Bridget Buenrostro sent at 2/15/2018  4:06 PM CST -----  Contact: patient  Doctor appointment and lab have been scheduled.  Please link lab orders to the lab appointment.  Date of doctor appointment:  4-30  Physical or EP:  physical  Date of lab appointment:  4-23  Comments:

## 2018-02-16 NOTE — PROGRESS NOTES
HPI     ISELA 10/2015  Distance and near not as clear. Glasses about 6 yrs. Old.    Had Rx from last time made, but didn't wear due to frame fit.    Last edited by Sheila Copeland on 2/16/2018  1:51 PM. (History)            Assessment /Plan     For exam results, see Encounter Report.    Nuclear sclerosis of both eyes    S/P laser iridotomy    Hyperopia of both eyes with astigmatism and presbyopia      1. Educated pt on presence of cataracts and effects on vision. No surgery at this time. Recheck in one year.  2. Patent OU, IOP fine.   3. Spec Rx given. Different lens options discussed with patient. RTC 1 year full exam.

## 2018-02-19 RX ORDER — ALPRAZOLAM 0.5 MG/1
TABLET ORAL
Qty: 60 TABLET | Refills: 2 | Status: SHIPPED | OUTPATIENT
Start: 2018-02-19 | End: 2019-03-29 | Stop reason: SDUPTHER

## 2018-04-23 ENCOUNTER — HOSPITAL ENCOUNTER (OUTPATIENT)
Dept: RADIOLOGY | Facility: HOSPITAL | Age: 68
Discharge: HOME | End: 2018-04-23
Attending: INTERNAL MEDICINE
Payer: MEDICARE

## 2018-04-23 VITALS — BODY MASS INDEX: 24.64 KG/M2 | WEIGHT: 157 LBS | HEIGHT: 67 IN

## 2018-04-23 DIAGNOSIS — Z12.39 BREAST CANCER SCREENING: ICD-10-CM

## 2018-04-23 PROCEDURE — 77063 BREAST TOMOSYNTHESIS BI: CPT | Mod: 26,,, | Performed by: RADIOLOGY

## 2018-04-23 PROCEDURE — 77067 SCR MAMMO BI INCL CAD: CPT | Mod: 26,,, | Performed by: RADIOLOGY

## 2018-04-23 PROCEDURE — 77067 SCR MAMMO BI INCL CAD: CPT | Mod: TC,PO

## 2018-04-25 ENCOUNTER — LAB VISIT (OUTPATIENT)
Dept: LAB | Facility: HOSPITAL | Age: 68
End: 2018-04-25
Attending: INTERNAL MEDICINE
Payer: MEDICARE

## 2018-04-25 DIAGNOSIS — R73.09 ABNORMAL GLUCOSE: ICD-10-CM

## 2018-04-25 DIAGNOSIS — E78.5 HYPERLIPIDEMIA, UNSPECIFIED HYPERLIPIDEMIA TYPE: ICD-10-CM

## 2018-04-25 DIAGNOSIS — E03.8 OTHER SPECIFIED HYPOTHYROIDISM: ICD-10-CM

## 2018-04-25 LAB
ALBUMIN SERPL BCP-MCNC: 3.8 G/DL
ALP SERPL-CCNC: 69 U/L
ALT SERPL W/O P-5'-P-CCNC: 11 U/L
ANION GAP SERPL CALC-SCNC: 8 MMOL/L
AST SERPL-CCNC: 16 U/L
BASOPHILS # BLD AUTO: 0.06 K/UL
BASOPHILS NFR BLD: 0.9 %
BILIRUB SERPL-MCNC: 0.9 MG/DL
BUN SERPL-MCNC: 12 MG/DL
CALCIUM SERPL-MCNC: 9.3 MG/DL
CHLORIDE SERPL-SCNC: 104 MMOL/L
CHOLEST SERPL-MCNC: 256 MG/DL
CHOLEST/HDLC SERPL: 2.5 {RATIO}
CO2 SERPL-SCNC: 28 MMOL/L
CREAT SERPL-MCNC: 0.9 MG/DL
DIFFERENTIAL METHOD: NORMAL
EOSINOPHIL # BLD AUTO: 0.2 K/UL
EOSINOPHIL NFR BLD: 3.5 %
ERYTHROCYTE [DISTWIDTH] IN BLOOD BY AUTOMATED COUNT: 13.3 %
EST. GFR  (AFRICAN AMERICAN): >60 ML/MIN/1.73 M^2
EST. GFR  (NON AFRICAN AMERICAN): >60 ML/MIN/1.73 M^2
GLUCOSE SERPL-MCNC: 97 MG/DL
HCT VFR BLD AUTO: 41.3 %
HDLC SERPL-MCNC: 103 MG/DL
HDLC SERPL: 40.2 %
HGB BLD-MCNC: 13.6 G/DL
IMM GRANULOCYTES # BLD AUTO: 0.01 K/UL
IMM GRANULOCYTES NFR BLD AUTO: 0.2 %
LDLC SERPL CALC-MCNC: 133.6 MG/DL
LYMPHOCYTES # BLD AUTO: 2.7 K/UL
LYMPHOCYTES NFR BLD: 41.8 %
MCH RBC QN AUTO: 29.4 PG
MCHC RBC AUTO-ENTMCNC: 32.9 G/DL
MCV RBC AUTO: 89 FL
MONOCYTES # BLD AUTO: 0.4 K/UL
MONOCYTES NFR BLD: 5.3 %
NEUTROPHILS # BLD AUTO: 3.2 K/UL
NEUTROPHILS NFR BLD: 48.3 %
NONHDLC SERPL-MCNC: 153 MG/DL
NRBC BLD-RTO: 0 /100 WBC
PLATELET # BLD AUTO: 345 K/UL
PMV BLD AUTO: 9.8 FL
POTASSIUM SERPL-SCNC: 4.6 MMOL/L
PROT SERPL-MCNC: 7.1 G/DL
RBC # BLD AUTO: 4.62 M/UL
SODIUM SERPL-SCNC: 140 MMOL/L
T4 FREE SERPL-MCNC: 1 NG/DL
TRIGL SERPL-MCNC: 97 MG/DL
TSH SERPL DL<=0.005 MIU/L-ACNC: 1.78 UIU/ML
WBC # BLD AUTO: 6.55 K/UL

## 2018-04-25 PROCEDURE — 80061 LIPID PANEL: CPT

## 2018-04-25 PROCEDURE — 84443 ASSAY THYROID STIM HORMONE: CPT

## 2018-04-25 PROCEDURE — 36415 COLL VENOUS BLD VENIPUNCTURE: CPT | Mod: PO

## 2018-04-25 PROCEDURE — 80053 COMPREHEN METABOLIC PANEL: CPT

## 2018-04-25 PROCEDURE — 84439 ASSAY OF FREE THYROXINE: CPT

## 2018-04-25 PROCEDURE — 85025 COMPLETE CBC W/AUTO DIFF WBC: CPT

## 2018-05-10 ENCOUNTER — OFFICE VISIT (OUTPATIENT)
Dept: DERMATOLOGY | Facility: CLINIC | Age: 68
End: 2018-05-10
Payer: MEDICARE

## 2018-05-10 DIAGNOSIS — D18.00 ANGIOMA: ICD-10-CM

## 2018-05-10 DIAGNOSIS — L73.8 SEBACEOUS GLAND HYPERPLASIA: ICD-10-CM

## 2018-05-10 DIAGNOSIS — L21.9 SEBORRHEIC DERMATITIS, UNSPECIFIED: Primary | ICD-10-CM

## 2018-05-10 DIAGNOSIS — L81.4 LENTIGO: ICD-10-CM

## 2018-05-10 DIAGNOSIS — L82.1 SK (SEBORRHEIC KERATOSIS): ICD-10-CM

## 2018-05-10 PROCEDURE — 99212 OFFICE O/P EST SF 10 MIN: CPT | Mod: PBBFAC,PO | Performed by: DERMATOLOGY

## 2018-05-10 PROCEDURE — 99214 OFFICE O/P EST MOD 30 MIN: CPT | Mod: S$PBB,,, | Performed by: DERMATOLOGY

## 2018-05-10 PROCEDURE — 99999 PR PBB SHADOW E&M-EST. PATIENT-LVL II: CPT | Mod: PBBFAC,,, | Performed by: DERMATOLOGY

## 2018-05-10 RX ORDER — FLUOCINONIDE TOPICAL SOLUTION USP, 0.05% 0.5 MG/ML
SOLUTION TOPICAL
Qty: 60 ML | Refills: 3 | Status: SHIPPED | OUTPATIENT
Start: 2018-05-10

## 2018-05-10 RX ORDER — TRIAMCINOLONE ACETONIDE 0.25 MG/G
CREAM TOPICAL
Qty: 45 G | Refills: 1 | Status: SHIPPED | OUTPATIENT
Start: 2018-05-10

## 2018-05-10 NOTE — PROGRESS NOTES
Subjective:       Patient ID:  Sheila Zarco is a 67 y.o. female who presents for   Chief Complaint   Patient presents with    Skin Check    Dry Skin    Lesion     Pt here today for a TBSE. Pt c/o dry and flaky skin on dace and scalp x a few years. Tx with ketoconazole cream and shampoo. Tx helps.  Pt c/o dark colored lesion on back x many years. No bleeding, pain or prev tx.       Dry Skin     Lesion         Review of Systems   Skin: Positive for itching, dry skin and activity-related sunscreen use. Negative for daily sunscreen use, tendency to form keloidal scars and recent sunburn.   Hematologic/Lymphatic: Bruises/bleeds easily.        Objective:    Physical Exam   Constitutional: She appears well-developed and well-nourished. No distress.   Neurological: She is alert and oriented to person, place, and time. She is not disoriented.   Psychiatric: She has a normal mood and affect.   Skin:   Areas Examined (abnormalities noted in diagram):   Scalp / Hair Palpated and Inspected  Head / Face Inspection Performed  Neck Inspection Performed  Chest / Axilla Inspection Performed  Abdomen Inspection Performed  Genitals / Buttocks / Groin Inspection Performed  Back Inspection Performed  RUE Inspected  LUE Inspection Performed  RLE Inspected  LLE Inspection Performed  Nails and Digits Inspection Performed                       Diagram Legend     Erythematous scaling macule/papule c/w actinic keratosis       Vascular papule c/w angioma      Pigmented verrucoid papule/plaque c/w seborrheic keratosis      Yellow umbilicated papule c/w sebaceous hyperplasia      Irregularly shaped tan macule c/w lentigo     1-2 mm smooth white papules consistent with Milia      Movable subcutaneous cyst with punctum c/w epidermal inclusion cyst      Subcutaneous movable cyst c/w pilar cyst      Firm pink to brown papule c/w dermatofibroma      Pedunculated fleshy papule(s) c/w skin tag(s)      Evenly pigmented macule c/w junctional  nevus     Mildly variegated pigmented, slightly irregular-bordered macule c/w mildly atypical nevus      Flesh colored to evenly pigmented papule c/w intradermal nevus       Pink pearly papule/plaque c/w basal cell carcinoma      Erythematous hyperkeratotic cursted plaque c/w SCC      Surgical scar with no sign of skin cancer recurrence      Open and closed comedones      Inflammatory papules and pustules      Verrucoid papule consistent consistent with wart     Erythematous eczematous patches and plaques     Dystrophic onycholytic nail with subungual debris c/w onychomycosis     Umbilicated papule    Erythematous-base heme-crusted tan verrucoid plaque consistent with inflamed seborrheic keratosis     Erythematous Silvery Scaling Plaque c/w Psoriasis     See annotation      Assessment / Plan:        Seborrheic dermatitis, unspecified  -     fluocinonide (LIDEX) 0.05 % external solution; AAA scalp qday prn itching, scaling  Dispense: 60 mL; Refill: 3  -     triamcinolone acetonide 0.025% (KENALOG) 0.025 % cream; AAA qd to face, ears prn flare  Dispense: 45 g; Refill: 1- mix with nizoral cream 2%   Ketoconazole 2% shampoo     Lentigo  This is a benign hyperpigmented sun induced lesion. Daily sun protection will reduce the number of new lesions. Treatment of these benign lesions are considered cosmetic.  The nature of sun-induced photo-aging and skin cancers is discussed.  Sun avoidance, protective clothing, and the use of 30-SPF sunscreens is advised. Observe closely for skin damage/changes, and call if such occurs.    SK (seborrheic keratosis)  These are benign inherited growths without a malignant potential. Reassurance given to patient. No treatment is necessary.     Angioma  These are benign vascular lesions that are inherited.  Treatment is not necessary.    Sebaceous gland hyperplasia  Reassurance given to patient. No treatment is necessary.   Treatment of benign, asymptomatic lesions may be considered  cosmetic.    Total body skin examination performed today including at least 12 points as noted in physical examination. No lesions suspicious for malignancy noted.             Follow-up in about 1 year (around 5/10/2019).

## 2018-05-11 ENCOUNTER — OFFICE VISIT (OUTPATIENT)
Dept: INTERNAL MEDICINE | Facility: CLINIC | Age: 68
End: 2018-05-11
Payer: MEDICARE

## 2018-05-11 VITALS
BODY MASS INDEX: 25.4 KG/M2 | WEIGHT: 161.81 LBS | TEMPERATURE: 98 F | OXYGEN SATURATION: 99 % | HEART RATE: 62 BPM | SYSTOLIC BLOOD PRESSURE: 123 MMHG | HEIGHT: 67 IN | DIASTOLIC BLOOD PRESSURE: 62 MMHG

## 2018-05-11 DIAGNOSIS — E78.5 HYPERLIPIDEMIA, UNSPECIFIED HYPERLIPIDEMIA TYPE: ICD-10-CM

## 2018-05-11 DIAGNOSIS — K76.0 NAFLD (NONALCOHOLIC FATTY LIVER DISEASE): Chronic | ICD-10-CM

## 2018-05-11 DIAGNOSIS — M75.41 ROTATOR CUFF IMPINGEMENT SYNDROME OF RIGHT SHOULDER: ICD-10-CM

## 2018-05-11 DIAGNOSIS — K21.9 GASTROESOPHAGEAL REFLUX DISEASE, ESOPHAGITIS PRESENCE NOT SPECIFIED: Chronic | ICD-10-CM

## 2018-05-11 DIAGNOSIS — J45.20 MILD INTERMITTENT ASTHMA WITHOUT COMPLICATION: Primary | Chronic | ICD-10-CM

## 2018-05-11 DIAGNOSIS — E03.9 HYPOTHYROIDISM, UNSPECIFIED TYPE: Chronic | ICD-10-CM

## 2018-05-11 DIAGNOSIS — F41.9 ANXIETY: Chronic | ICD-10-CM

## 2018-05-11 PROCEDURE — 99999 PR PBB SHADOW E&M-EST. PATIENT-LVL III: CPT | Mod: PBBFAC,,, | Performed by: INTERNAL MEDICINE

## 2018-05-11 PROCEDURE — 99213 OFFICE O/P EST LOW 20 MIN: CPT | Mod: PBBFAC,PO,25 | Performed by: INTERNAL MEDICINE

## 2018-05-11 PROCEDURE — 99214 OFFICE O/P EST MOD 30 MIN: CPT | Mod: S$PBB,,, | Performed by: INTERNAL MEDICINE

## 2018-05-11 PROCEDURE — G0009 ADMIN PNEUMOCOCCAL VACCINE: HCPCS | Mod: PBBFAC,PO

## 2018-05-11 RX ORDER — ESCITALOPRAM OXALATE 20 MG/1
20 TABLET ORAL DAILY
Qty: 90 TABLET | Refills: 3 | Status: SHIPPED | OUTPATIENT
Start: 2018-05-11 | End: 2019-05-18 | Stop reason: SDUPTHER

## 2018-05-11 NOTE — PROGRESS NOTES
Subjective:       Patient ID: Sheila Zarco is a 67 y.o. female.    Chief Complaint: Annual Exam    HPI   The patient presents for follow-up of multiple medical problems and annual physical examination.  Active medical conditions include asthma, hypothyroidism, GERD, fatty liver, and anxiety.  The patient's   in 2018.  She has had periods of anxiety and crying spells.  He is not having any suicidal thoughts.    Asthma has remained stable.  She is tolerating thyroid replacement therapy and remains asymptomatic.  Reflux symptoms have also remained stable.  She is known to have fatty liver.  We discussed obtaining an ultrasound of the abdomen for follow-up.  The patient is still noting right lateral flank pain and rib pain.  Review of Systems   Constitutional: Negative for activity change, appetite change, fatigue and unexpected weight change.   HENT: Positive for rhinorrhea.    Eyes: Negative for visual disturbance.   Respiratory: Negative for cough and shortness of breath.    Cardiovascular: Negative for chest pain, palpitations and leg swelling.   Gastrointestinal: Negative for abdominal pain, blood in stool, constipation and diarrhea.   Genitourinary: Negative for dysuria and hematuria.   Musculoskeletal: Positive for arthralgias. Negative for neck pain and neck stiffness.        Intermittent right shoulder pain secondary to a torn rotator cuff.  Transient shoulder bursitis has also been noted.   Skin: Negative for rash.   Neurological: Negative for dizziness, syncope and headaches.   Psychiatric/Behavioral: Positive for dysphoric mood. Negative for hallucinations, sleep disturbance and suicidal ideas. The patient is nervous/anxious.        Objective:      Physical Exam   Constitutional: She is oriented to person, place, and time. She appears well-developed and well-nourished. No distress.   HENT:   Head: Normocephalic and atraumatic.   Eyes: Conjunctivae and EOM are normal. No scleral  icterus.   Neck: Normal range of motion. Neck supple. No JVD present. No thyromegaly present.   Cardiovascular: Normal rate, regular rhythm, normal heart sounds and intact distal pulses.  Exam reveals no gallop and no friction rub.    No murmur heard.  Pulmonary/Chest: Effort normal and breath sounds normal. No respiratory distress. She has no wheezes. She has no rales.   Abdominal: Soft. Bowel sounds are normal. She exhibits no mass. There is no tenderness.   Musculoskeletal: Normal range of motion. She exhibits no tenderness.   Lymphadenopathy:     She has no cervical adenopathy.   Neurological: She is alert and oriented to person, place, and time.   Skin: Skin is warm and dry. No rash noted.   Psychiatric:   The patient becomes abdomen discussed with her 's death.  There is no suicidal or homicidal ideation.  No hallucinosis or delusions are noted.   Nursing note and vitals reviewed.      Results for orders placed or performed in visit on 04/25/18   CBC auto differential   Result Value Ref Range    WBC 6.55 3.90 - 12.70 K/uL    RBC 4.62 4.00 - 5.40 M/uL    Hemoglobin 13.6 12.0 - 16.0 g/dL    Hematocrit 41.3 37.0 - 48.5 %    MCV 89 82 - 98 fL    MCH 29.4 27.0 - 31.0 pg    MCHC 32.9 32.0 - 36.0 g/dL    RDW 13.3 11.5 - 14.5 %    Platelets 345 150 - 350 K/uL    MPV 9.8 9.2 - 12.9 fL    Immature Granulocytes 0.2 0.0 - 0.5 %    Gran # (ANC) 3.2 1.8 - 7.7 K/uL    Immature Grans (Abs) 0.01 0.00 - 0.04 K/uL    Lymph # 2.7 1.0 - 4.8 K/uL    Mono # 0.4 0.3 - 1.0 K/uL    Eos # 0.2 0.0 - 0.5 K/uL    Baso # 0.06 0.00 - 0.20 K/uL    nRBC 0 0 /100 WBC    Gran% 48.3 38.0 - 73.0 %    Lymph% 41.8 18.0 - 48.0 %    Mono% 5.3 4.0 - 15.0 %    Eosinophil% 3.5 0.0 - 8.0 %    Basophil% 0.9 0.0 - 1.9 %    Differential Method Automated    Comprehensive metabolic panel   Result Value Ref Range    Sodium 140 136 - 145 mmol/L    Potassium 4.6 3.5 - 5.1 mmol/L    Chloride 104 95 - 110 mmol/L    CO2 28 23 - 29 mmol/L    Glucose 97 70 -  110 mg/dL    BUN, Bld 12 8 - 23 mg/dL    Creatinine 0.9 0.5 - 1.4 mg/dL    Calcium 9.3 8.7 - 10.5 mg/dL    Total Protein 7.1 6.0 - 8.4 g/dL    Albumin 3.8 3.5 - 5.2 g/dL    Total Bilirubin 0.9 0.1 - 1.0 mg/dL    Alkaline Phosphatase 69 55 - 135 U/L    AST 16 10 - 40 U/L    ALT 11 10 - 44 U/L    Anion Gap 8 8 - 16 mmol/L    eGFR if African American >60.0 >60 mL/min/1.73 m^2    eGFR if non African American >60.0 >60 mL/min/1.73 m^2   Lipid panel   Result Value Ref Range    Cholesterol 256 (H) 120 - 199 mg/dL    Triglycerides 97 30 - 150 mg/dL     (H) 40 - 75 mg/dL    LDL Cholesterol 133.6 63.0 - 159.0 mg/dL    HDL/Chol Ratio 40.2 20.0 - 50.0 %    Total Cholesterol/HDL Ratio 2.5 2.0 - 5.0    Non-HDL Cholesterol 153 mg/dL   TSH   Result Value Ref Range    TSH 1.781 0.400 - 4.000 uIU/mL   T4, free   Result Value Ref Range    Free T4 1.00 0.71 - 1.51 ng/dL       Assessment:       1. Mild intermittent asthma without complication    2. Gastroesophageal reflux disease, esophagitis presence not specified    3. Hyperlipidemia, unspecified hyperlipidemia type    4. Hypothyroidism, unspecified type    5. NAFLD (nonalcoholic fatty liver disease)    6. Rotator cuff impingement syndrome of right shoulder    7. Anxiety        Plan:           Sheila was seen today for annual exam.  The dose of Lexapro will be increased to 20 mg daily for better control of mood changes.  Routine medications will be continued.  Prevnar-13 vaccine will be administered today.  An abdominal ultrasound will be ordered for follow-up of nonalcoholic fatty liver disease.  A prescription order for Shingrix will be given to the patient to take to her pharmacy.    Diagnoses and all orders for this visit:    Mild intermittent asthma without complication    Gastroesophageal reflux disease, esophagitis presence not specified    Hyperlipidemia, unspecified hyperlipidemia type    Hypothyroidism, unspecified type    NAFLD (nonalcoholic fatty liver  disease)  -     US Abdomen Complete; Future    Rotator cuff impingement syndrome of right shoulder    Anxiety    Other orders  -     escitalopram oxalate (LEXAPRO) 20 MG tablet; Take 1 tablet (20 mg total) by mouth once daily. For anxiety.  -     Pneumococcal Conjugate Vaccine (13 Valent) (IM)  -     varicella-zoster [id number], PF, (SHINGRIX, PF,) 50 mcg/0.5 mL injection; Inject 0.5 mLs into the muscle once.

## 2018-05-16 ENCOUNTER — HOSPITAL ENCOUNTER (OUTPATIENT)
Dept: RADIOLOGY | Facility: OTHER | Age: 68
Discharge: HOME | End: 2018-05-16
Attending: INTERNAL MEDICINE
Payer: MEDICARE

## 2018-05-16 DIAGNOSIS — K76.0 NAFLD (NONALCOHOLIC FATTY LIVER DISEASE): Chronic | ICD-10-CM

## 2018-05-16 PROCEDURE — 76700 US EXAM ABDOM COMPLETE: CPT | Mod: 26,,, | Performed by: INTERNAL MEDICINE

## 2018-05-16 PROCEDURE — 76700 US EXAM ABDOM COMPLETE: CPT | Mod: TC

## 2018-05-22 ENCOUNTER — TELEPHONE (OUTPATIENT)
Dept: INTERNAL MEDICINE | Facility: CLINIC | Age: 68
End: 2018-05-22

## 2018-05-22 NOTE — TELEPHONE ENCOUNTER
----- Message from Jenise Oconnell sent at 5/22/2018 11:54 AM CDT -----  Contact: self/[phone]  Patient would like to get test results    Name of test (lab, mammo, etc.):   ultrasound    Date of test:  05/16    Ordering provider: Dr Correa    Where was the test performed: [facility] ULTRASOUND OP     Would you prefer a response via [employer]?    Comments:

## 2018-05-23 NOTE — TELEPHONE ENCOUNTER
----- Message from Farhat Correa MD sent at 5/22/2018  6:41 PM CDT -----  The abdominal ultrasound still shows the presence of fatty liver.  No significant changes were noted when compared to prior studies from 2016 and 2017.  There is no evidence of liver tumors or cirrhosis.

## 2018-08-02 RX ORDER — PANTOPRAZOLE SODIUM 40 MG/1
TABLET, DELAYED RELEASE ORAL
Qty: 90 TABLET | Refills: 3 | Status: SHIPPED | OUTPATIENT
Start: 2018-08-02 | End: 2019-08-15 | Stop reason: SDUPTHER

## 2018-08-02 RX ORDER — ESCITALOPRAM OXALATE 10 MG/1
10 TABLET ORAL DAILY
Qty: 90 TABLET | Refills: 3 | Status: SHIPPED | OUTPATIENT
Start: 2018-08-02 | End: 2019-08-02

## 2018-08-02 RX ORDER — LEVOTHYROXINE SODIUM 88 UG/1
88 TABLET ORAL EVERY MORNING
Qty: 90 TABLET | Refills: 3 | Status: SHIPPED | OUTPATIENT
Start: 2018-08-02 | End: 2019-10-15 | Stop reason: SDUPTHER

## 2018-09-17 NOTE — TELEPHONE ENCOUNTER
"----- Message from Jessie Tej sent at 9/17/2018  3:50 PM CDT -----  Contact: call pt at [phone]    RX request - refill or new RX.  Is this a refill or new RX:  Refill   RX name and strength: albuterol (ACCUNEB) 0.63 mg/3 mL Nebu  Directions:   Is this a 30 day or 90 day RX:  30 day   Local pharmacy or mail order pharmacy:    Pharmacy name and phone # (DON'T enter "on file" or "in chart"): [id number] - [place], [place] - [address] [phone] (Phone)   Comments:        "

## 2018-09-19 RX ORDER — ALBUTEROL SULFATE 90 UG/1
AEROSOL, METERED RESPIRATORY (INHALATION)
Qty: 18 INHALER | Refills: 0 | Status: SHIPPED | OUTPATIENT
Start: 2018-09-19 | End: 2020-07-08 | Stop reason: CLARIF

## 2018-09-19 RX ORDER — ALBUTEROL SULFATE 0.63 MG/3ML
0.63 SOLUTION RESPIRATORY (INHALATION) EVERY 6 HOURS PRN
Qty: 100 VIAL | Refills: 2 | Status: SHIPPED | OUTPATIENT
Start: 2018-09-19 | End: 2018-09-19 | Stop reason: SDUPTHER

## 2018-09-19 RX ORDER — ALBUTEROL SULFATE 0.63 MG/3ML
0.63 SOLUTION RESPIRATORY (INHALATION) EVERY 6 HOURS PRN
Qty: 100 VIAL | Refills: 2 | Status: SHIPPED | OUTPATIENT
Start: 2018-09-19 | End: 2020-11-18 | Stop reason: SDUPTHER

## 2018-11-19 ENCOUNTER — PES CALL (OUTPATIENT)
Dept: ADMINISTRATIVE | Facility: CLINIC | Age: 68
End: 2018-11-19

## 2019-01-08 ENCOUNTER — PES CALL (OUTPATIENT)
Dept: ADMINISTRATIVE | Facility: CLINIC | Age: 69
End: 2019-01-08

## 2019-01-18 DIAGNOSIS — L65.9 ALOPECIA: ICD-10-CM

## 2019-01-23 RX ORDER — KETOCONAZOLE 20 MG/ML
SHAMPOO, SUSPENSION TOPICAL
Qty: 120 ML | Refills: 1 | Status: SHIPPED | OUTPATIENT
Start: 2019-01-23

## 2019-03-14 ENCOUNTER — TELEPHONE (OUTPATIENT)
Dept: INTERNAL MEDICINE | Facility: CLINIC | Age: 69
End: 2019-03-14

## 2019-03-14 DIAGNOSIS — E78.5 HYPERLIPIDEMIA, UNSPECIFIED HYPERLIPIDEMIA TYPE: Primary | ICD-10-CM

## 2019-03-14 DIAGNOSIS — E55.9 VITAMIN D DEFICIENCY: ICD-10-CM

## 2019-03-14 DIAGNOSIS — E03.9 HYPOTHYROIDISM, UNSPECIFIED TYPE: ICD-10-CM

## 2019-03-21 RX ORDER — VALACYCLOVIR HYDROCHLORIDE 500 MG/1
TABLET, FILM COATED ORAL
Qty: 12 TABLET | Refills: 3 | Status: SHIPPED | OUTPATIENT
Start: 2019-03-21 | End: 2020-09-03

## 2019-03-21 NOTE — TELEPHONE ENCOUNTER
"----- Message from Lydia Dee sent at 3/21/2019 10:38 AM CDT -----  Contact: Self [phone]  RX request - refill or new RX.  Is this a refill or new RX:  refill  RX name and strength: valacyclovir (VALTREX) 500 MG tablet  Directions:   Is this a 30 day or 90 day RX:  90 day   Local pharmacy or mail order pharmacy:  [facility]pharm[facility] #[id number] - [place] [address] [phone] (Phone)  [phone] (Fax)  Pharmacy name and phone # (DON'T enter "on file" or "in chart"):   Comments:  Pt states she is out of medication        "

## 2019-04-02 RX ORDER — ALPRAZOLAM 0.5 MG/1
TABLET ORAL
Qty: 60 TABLET | Refills: 2 | Status: SHIPPED | OUTPATIENT
Start: 2019-04-02 | End: 2020-01-06

## 2019-05-10 ENCOUNTER — TELEPHONE (OUTPATIENT)
Dept: INTERNAL MEDICINE | Facility: CLINIC | Age: 69
End: 2019-05-10

## 2019-05-10 DIAGNOSIS — Z12.39 BREAST CANCER SCREENING: Primary | ICD-10-CM

## 2019-05-10 NOTE — TELEPHONE ENCOUNTER
----- Message from Montserrat Rodriguez sent at 5/10/2019 12:04 PM CDT -----  Contact: self  home   Caller is requesting to schedule their annual screening mammogram appointment. Order is not listed in Epic.  Please enter order and contact patient to schedule.  Where would they like the mammogram performed?:  [facility]  Would the patient rather receive a phone call back or a response via [facility]?:   Call back  Additional information:

## 2019-05-14 ENCOUNTER — LAB VISIT (OUTPATIENT)
Dept: LAB | Facility: HOSPITAL | Age: 69
End: 2019-05-14
Attending: INTERNAL MEDICINE
Payer: MEDICARE

## 2019-05-14 DIAGNOSIS — E03.9 HYPOTHYROIDISM, UNSPECIFIED TYPE: ICD-10-CM

## 2019-05-14 DIAGNOSIS — E78.5 HYPERLIPIDEMIA, UNSPECIFIED HYPERLIPIDEMIA TYPE: ICD-10-CM

## 2019-05-14 DIAGNOSIS — E55.9 VITAMIN D DEFICIENCY: ICD-10-CM

## 2019-05-14 LAB
25(OH)D3+25(OH)D2 SERPL-MCNC: 21 NG/ML (ref 30–96)
ALBUMIN SERPL BCP-MCNC: 3.6 G/DL (ref 3.5–5.2)
ALP SERPL-CCNC: 71 U/L (ref 55–135)
ALT SERPL W/O P-5'-P-CCNC: 10 U/L (ref 10–44)
ANION GAP SERPL CALC-SCNC: 7 MMOL/L (ref 8–16)
AST SERPL-CCNC: 16 U/L (ref 10–40)
BASOPHILS # BLD AUTO: 0.08 K/UL (ref 0–0.2)
BASOPHILS NFR BLD: 1.2 % (ref 0–1.9)
BILIRUB SERPL-MCNC: 0.5 MG/DL (ref 0.1–1)
BUN SERPL-MCNC: 12 MG/DL (ref 8–23)
CALCIUM SERPL-MCNC: 10 MG/DL (ref 8.7–10.5)
CHLORIDE SERPL-SCNC: 109 MMOL/L (ref 95–110)
CHOLEST SERPL-MCNC: 278 MG/DL (ref 120–199)
CHOLEST/HDLC SERPL: 3.1 {RATIO} (ref 2–5)
CO2 SERPL-SCNC: 27 MMOL/L (ref 23–29)
CREAT SERPL-MCNC: 0.8 MG/DL (ref 0.5–1.4)
DIFFERENTIAL METHOD: ABNORMAL
EOSINOPHIL # BLD AUTO: 0.4 K/UL (ref 0–0.5)
EOSINOPHIL NFR BLD: 6.4 % (ref 0–8)
ERYTHROCYTE [DISTWIDTH] IN BLOOD BY AUTOMATED COUNT: 13.4 % (ref 11.5–14.5)
EST. GFR  (AFRICAN AMERICAN): >60 ML/MIN/1.73 M^2
EST. GFR  (NON AFRICAN AMERICAN): >60 ML/MIN/1.73 M^2
GLUCOSE SERPL-MCNC: 111 MG/DL (ref 70–110)
HCT VFR BLD AUTO: 41.8 % (ref 37–48.5)
HDLC SERPL-MCNC: 90 MG/DL (ref 40–75)
HDLC SERPL: 32.4 % (ref 20–50)
HGB BLD-MCNC: 13.2 G/DL (ref 12–16)
IMM GRANULOCYTES # BLD AUTO: 0.02 K/UL (ref 0–0.04)
IMM GRANULOCYTES NFR BLD AUTO: 0.3 % (ref 0–0.5)
LDLC SERPL CALC-MCNC: 157 MG/DL (ref 63–159)
LYMPHOCYTES # BLD AUTO: 2.3 K/UL (ref 1–4.8)
LYMPHOCYTES NFR BLD: 33.9 % (ref 18–48)
MCH RBC QN AUTO: 28.9 PG (ref 27–31)
MCHC RBC AUTO-ENTMCNC: 31.6 G/DL (ref 32–36)
MCV RBC AUTO: 92 FL (ref 82–98)
MONOCYTES # BLD AUTO: 0.4 K/UL (ref 0.3–1)
MONOCYTES NFR BLD: 5.4 % (ref 4–15)
NEUTROPHILS # BLD AUTO: 3.7 K/UL (ref 1.8–7.7)
NEUTROPHILS NFR BLD: 52.8 % (ref 38–73)
NONHDLC SERPL-MCNC: 188 MG/DL
NRBC BLD-RTO: 0 /100 WBC
PLATELET # BLD AUTO: 321 K/UL (ref 150–350)
PMV BLD AUTO: 9.9 FL (ref 9.2–12.9)
POTASSIUM SERPL-SCNC: 4.4 MMOL/L (ref 3.5–5.1)
PROT SERPL-MCNC: 7 G/DL (ref 6–8.4)
RBC # BLD AUTO: 4.57 M/UL (ref 4–5.4)
SODIUM SERPL-SCNC: 143 MMOL/L (ref 136–145)
T4 FREE SERPL-MCNC: 0.9 NG/DL (ref 0.71–1.51)
TRIGL SERPL-MCNC: 155 MG/DL (ref 30–150)
TSH SERPL DL<=0.005 MIU/L-ACNC: 2.67 UIU/ML (ref 0.4–4)
WBC # BLD AUTO: 6.9 K/UL (ref 3.9–12.7)

## 2019-05-14 PROCEDURE — 84439 ASSAY OF FREE THYROXINE: CPT

## 2019-05-14 PROCEDURE — 80061 LIPID PANEL: CPT

## 2019-05-14 PROCEDURE — 36415 COLL VENOUS BLD VENIPUNCTURE: CPT | Mod: PO

## 2019-05-14 PROCEDURE — 80053 COMPREHEN METABOLIC PANEL: CPT

## 2019-05-14 PROCEDURE — 84443 ASSAY THYROID STIM HORMONE: CPT

## 2019-05-14 PROCEDURE — 85025 COMPLETE CBC W/AUTO DIFF WBC: CPT

## 2019-05-14 PROCEDURE — 82306 VITAMIN D 25 HYDROXY: CPT

## 2019-05-19 RX ORDER — ESCITALOPRAM OXALATE 20 MG/1
20 TABLET ORAL DAILY
Qty: 90 TABLET | Refills: 2 | Status: SHIPPED | OUTPATIENT
Start: 2019-05-19 | End: 2020-02-17 | Stop reason: SDUPTHER

## 2019-05-21 ENCOUNTER — HOSPITAL ENCOUNTER (OUTPATIENT)
Dept: RADIOLOGY | Facility: HOSPITAL | Age: 69
Discharge: HOME | End: 2019-05-21
Attending: INTERNAL MEDICINE
Payer: MEDICARE

## 2019-05-21 ENCOUNTER — OFFICE VISIT (OUTPATIENT)
Dept: INTERNAL MEDICINE | Facility: CLINIC | Age: 69
End: 2019-05-21
Payer: MEDICARE

## 2019-05-21 ENCOUNTER — PES CALL (OUTPATIENT)
Dept: ADMINISTRATIVE | Facility: CLINIC | Age: 69
End: 2019-05-21

## 2019-05-21 VITALS — HEIGHT: 67 IN | BODY MASS INDEX: 25.11 KG/M2 | WEIGHT: 160 LBS

## 2019-05-21 VITALS
DIASTOLIC BLOOD PRESSURE: 60 MMHG | HEIGHT: 67 IN | TEMPERATURE: 98 F | HEART RATE: 61 BPM | RESPIRATION RATE: 19 BRPM | WEIGHT: 159.38 LBS | SYSTOLIC BLOOD PRESSURE: 104 MMHG | BODY MASS INDEX: 25.01 KG/M2

## 2019-05-21 DIAGNOSIS — F41.9 ANXIETY: Chronic | ICD-10-CM

## 2019-05-21 DIAGNOSIS — K21.9 GASTROESOPHAGEAL REFLUX DISEASE, ESOPHAGITIS PRESENCE NOT SPECIFIED: Chronic | ICD-10-CM

## 2019-05-21 DIAGNOSIS — K76.0 NAFLD (NONALCOHOLIC FATTY LIVER DISEASE): Chronic | ICD-10-CM

## 2019-05-21 DIAGNOSIS — J31.0 CHRONIC RHINITIS: ICD-10-CM

## 2019-05-21 DIAGNOSIS — E78.5 HYPERLIPIDEMIA, UNSPECIFIED HYPERLIPIDEMIA TYPE: ICD-10-CM

## 2019-05-21 DIAGNOSIS — E03.9 HYPOTHYROIDISM, UNSPECIFIED TYPE: Primary | Chronic | ICD-10-CM

## 2019-05-21 DIAGNOSIS — Z12.39 BREAST CANCER SCREENING: ICD-10-CM

## 2019-05-21 PROCEDURE — 99397 PER PM REEVAL EST PAT 65+ YR: CPT | Mod: S$PBB,,, | Performed by: INTERNAL MEDICINE

## 2019-05-21 PROCEDURE — 77063 BREAST TOMOSYNTHESIS BI: CPT | Mod: 26,,, | Performed by: RADIOLOGY

## 2019-05-21 PROCEDURE — G0009 ADMIN PNEUMOCOCCAL VACCINE: HCPCS | Mod: PBBFAC,PO

## 2019-05-21 PROCEDURE — 99397 PR PREVENTIVE VISIT,EST,65 & OVER: ICD-10-PCS | Mod: S$PBB,,, | Performed by: INTERNAL MEDICINE

## 2019-05-21 PROCEDURE — 77063 MAMMO DIGITAL SCREENING BILAT WITH TOMOSYNTHESIS_CAD: ICD-10-PCS | Mod: 26,,, | Performed by: RADIOLOGY

## 2019-05-21 PROCEDURE — 77067 SCR MAMMO BI INCL CAD: CPT | Mod: TC,PO

## 2019-05-21 PROCEDURE — 99999 PR PBB SHADOW E&M-EST. PATIENT-LVL III: CPT | Mod: PBBFAC,,, | Performed by: INTERNAL MEDICINE

## 2019-05-21 PROCEDURE — 77067 SCR MAMMO BI INCL CAD: CPT | Mod: 26,,, | Performed by: RADIOLOGY

## 2019-05-21 PROCEDURE — 77067 MAMMO DIGITAL SCREENING BILAT WITH TOMOSYNTHESIS_CAD: ICD-10-PCS | Mod: 26,,, | Performed by: RADIOLOGY

## 2019-05-21 PROCEDURE — 99213 OFFICE O/P EST LOW 20 MIN: CPT | Mod: PBBFAC,PO,25 | Performed by: INTERNAL MEDICINE

## 2019-05-21 PROCEDURE — 99999 PR PBB SHADOW E&M-EST. PATIENT-LVL III: ICD-10-PCS | Mod: PBBFAC,,, | Performed by: INTERNAL MEDICINE

## 2019-05-26 NOTE — PROGRESS NOTES
Subjective:       Patient ID: Sheila Zarco is a 68 y.o. female.    Chief Complaint: Annual Exam    HPI   The patient presents for annual physical examination.  She has generally been doing well.  Occasional allergy symptoms are noted manifested by rhinorrhea and itchy throat and ears.  She has been using Zyrtec without obtaining any relief.    Active medical conditions include dyslipidemia, hypothyroidism, abnormal blood glucose, vitamin-D deficiency, and chronic rhinitis.  She also has asthma, fatty liver, and anxiety.  She has not experienced any recent exacerbations of asthma.    No interval change in past medical history, family history, or social history since prior evaluations.    Immunization record was reviewed.  We discussed obtaining Pneumovax and the Shingrix shingles vaccine.    The patient had a colonoscopy on 11/14/2013.  Her examination was normal.  Follow-up screening examination in 10 years was recommended.    Review of Systems   Constitutional: Negative for fatigue, fever and unexpected weight change.   HENT: Positive for postnasal drip and rhinorrhea. Negative for congestion and sore throat.    Eyes: Negative for visual disturbance.   Respiratory: Negative for cough, chest tightness, shortness of breath and wheezing.    Cardiovascular: Negative for chest pain, palpitations and leg swelling.   Gastrointestinal: Negative for abdominal pain and blood in stool.   Genitourinary: Negative for dysuria, frequency and hematuria.   Musculoskeletal: Negative for arthralgias, back pain, joint swelling and myalgias.   Skin: Negative for rash.   Neurological: Negative for dizziness, syncope, weakness, numbness and headaches.   Psychiatric/Behavioral: Negative for sleep disturbance. The patient is not nervous/anxious.        Objective:      Physical Exam   Constitutional: She is oriented to person, place, and time. Vital signs are normal. She appears well-developed and well-nourished. No distress.   The  patient's weight has remained stable since 05/11/2018.   HENT:   Head: Normocephalic and atraumatic.   Right Ear: External ear normal.   Left Ear: External ear normal.   Nose: Nose normal.   Mouth/Throat: Oropharynx is clear and moist. No oropharyngeal exudate.   Eyes: Pupils are equal, round, and reactive to light. Conjunctivae and EOM are normal. No scleral icterus.   Neck: Normal range of motion. Neck supple. No JVD present. Carotid bruit is not present. No thyromegaly present.   Cardiovascular: Normal rate, regular rhythm, normal heart sounds, intact distal pulses and normal pulses. Exam reveals no gallop and no friction rub.   No murmur heard.  Pulmonary/Chest: Effort normal and breath sounds normal. No respiratory distress. She has no wheezes. She has no rales.   Abdominal: Soft. Bowel sounds are normal. She exhibits no abdominal bruit and no mass. There is no splenomegaly or hepatomegaly. There is no tenderness. No hernia.   Musculoskeletal: Normal range of motion. She exhibits no edema or tenderness.        Right shoulder: She exhibits no effusion and no deformity.   Lymphadenopathy:     She has no cervical adenopathy.     She has no axillary adenopathy.        Right: No supraclavicular adenopathy present.        Left: No supraclavicular adenopathy present.   Neurological: She is alert and oriented to person, place, and time. She has normal strength. No cranial nerve deficit.   Skin: Skin is warm and dry. No rash noted.   Psychiatric: She has a normal mood and affect. Her speech is normal and behavior is normal.   Nursing note and vitals reviewed.      Lab Visit on 05/14/2019   Component Date Value Ref Range Status    Specimen UA 05/14/2019 Urine, Clean Catch   Final    Color, UA 05/14/2019 Yellow  Yellow, Straw, Hilda Final    Appearance, UA 05/14/2019 Clear  Clear Final    pH, UA 05/14/2019 5.0  5.0 - 8.0 Final    Specific Gravity, UA 05/14/2019 1.015  1.005 - 1.030 Final    Protein, UA 05/14/2019  Negative  Negative Final    Comment: Recommend a 24 hour urine protein or a urine   protein/creatinine ratio if globulin induced proteinuria is  clinically suspected.      Glucose, UA 05/14/2019 Negative  Negative Final    Ketones, UA 05/14/2019 Negative  Negative Final    Bilirubin (UA) 05/14/2019 Negative  Negative Final    Occult Blood UA 05/14/2019 1+* Negative Final    Nitrite, UA 05/14/2019 Negative  Negative Final    Leukocytes, UA 05/14/2019 1+* Negative Final    RBC, UA 05/14/2019 8* 0 - 4 /hpf Final    WBC, UA 05/14/2019 2  0 - 5 /hpf Final    Bacteria 05/14/2019 Rare  None-Occ /hpf Final    Squam Epithel, UA 05/14/2019 5  /hpf Final    Microscopic Comment 05/14/2019 SEE COMMENT   Final    Comment: Other formed elements not mentioned in the report are not   present in the microscopic examination.      Lab Visit on 05/14/2019   Component Date Value Ref Range Status    WBC 05/14/2019 6.90  3.90 - 12.70 K/uL Final    RBC 05/14/2019 4.57  4.00 - 5.40 M/uL Final    Hemoglobin 05/14/2019 13.2  12.0 - 16.0 g/dL Final    Hematocrit 05/14/2019 41.8  37.0 - 48.5 % Final    Mean Corpuscular Volume 05/14/2019 92  82 - 98 fL Final    Mean Corpuscular Hemoglobin 05/14/2019 28.9  27.0 - 31.0 pg Final    Mean Corpuscular Hemoglobin Conc 05/14/2019 31.6* 32.0 - 36.0 g/dL Final    RDW 05/14/2019 13.4  11.5 - 14.5 % Final    Platelets 05/14/2019 321  150 - 350 K/uL Final    MPV 05/14/2019 9.9  9.2 - 12.9 fL Final    Immature Granulocytes 05/14/2019 0.3  0.0 - 0.5 % Final    Gran # (ANC) 05/14/2019 3.7  1.8 - 7.7 K/uL Final    Immature Grans (Abs) 05/14/2019 0.02  0.00 - 0.04 K/uL Final    Comment: Mild elevation in immature granulocytes is non specific and   can be seen in a variety of conditions including stress response,   acute inflammation, trauma and pregnancy. Correlation with other   laboratory and clinical findings is essential.      Lymph # 05/14/2019 2.3  1.0 - 4.8 K/uL Final    Mono #  05/14/2019 0.4  0.3 - 1.0 K/uL Final    Eos # 05/14/2019 0.4  0.0 - 0.5 K/uL Final    Baso # 05/14/2019 0.08  0.00 - 0.20 K/uL Final    nRBC 05/14/2019 0  0 /100 WBC Final    Gran% 05/14/2019 52.8  38.0 - 73.0 % Final    Lymph% 05/14/2019 33.9  18.0 - 48.0 % Final    Mono% 05/14/2019 5.4  4.0 - 15.0 % Final    Eosinophil% 05/14/2019 6.4  0.0 - 8.0 % Final    Basophil% 05/14/2019 1.2  0.0 - 1.9 % Final    Differential Method 05/14/2019 Automated   Final    Sodium 05/14/2019 143  136 - 145 mmol/L Final    Potassium 05/14/2019 4.4  3.5 - 5.1 mmol/L Final    Chloride 05/14/2019 109  95 - 110 mmol/L Final    CO2 05/14/2019 27  23 - 29 mmol/L Final    Glucose 05/14/2019 111* 70 - 110 mg/dL Final    BUN, Bld 05/14/2019 12  8 - 23 mg/dL Final    Creatinine 05/14/2019 0.8  0.5 - 1.4 mg/dL Final    Calcium 05/14/2019 10.0  8.7 - 10.5 mg/dL Final    Total Protein 05/14/2019 7.0  6.0 - 8.4 g/dL Final    Albumin 05/14/2019 3.6  3.5 - 5.2 g/dL Final    Total Bilirubin 05/14/2019 0.5  0.1 - 1.0 mg/dL Final    Comment: For infants and newborns, interpretation of results should be based  on gestational age, weight and in agreement with clinical  observations.  Premature Infant recommended reference ranges:  Up to 24 hours.............<8.0 mg/dL  Up to 48 hours............<12.0 mg/dL  3-5 days..................<15.0 mg/dL  6-29 days.................<15.0 mg/dL      Alkaline Phosphatase 05/14/2019 71  55 - 135 U/L Final    AST 05/14/2019 16  10 - 40 U/L Final    ALT 05/14/2019 10  10 - 44 U/L Final    Anion Gap 05/14/2019 7* 8 - 16 mmol/L Final    eGFR if African American 05/14/2019 >60.0  >60 mL/min/1.73 m^2 Final    eGFR if non African American 05/14/2019 >60.0  >60 mL/min/1.73 m^2 Final    Comment: Calculation used to obtain the estimated glomerular filtration  rate (eGFR) is the CKD-EPI equation.       Cholesterol 05/14/2019 278* 120 - 199 mg/dL Final    Comment: The National Cholesterol Education  Program (NCEP) has set the  following guidelines (reference ranges) for Cholesterol:  Optimal.....................<200 mg/dL  Borderline High.............200-239 mg/dL  High........................> or = 240 mg/dL      Triglycerides 05/14/2019 155* 30 - 150 mg/dL Final    Comment: The National Cholesterol Education Program (NCEP) has set the  following guidelines (reference values) for triglycerides:  Normal......................<150 mg/dL  Borderline High.............150-199 mg/dL  High........................200-499 mg/dL      HDL 05/14/2019 90* 40 - 75 mg/dL Final    Comment: The National Cholesterol Education Program (NCEP) has set the  following guidelines (reference values) for HDL Cholesterol:  Low...............<40 mg/dL  Optimal...........>60 mg/dL      LDL Cholesterol 05/14/2019 157.0  63.0 - 159.0 mg/dL Final    Comment: The National Cholesterol Education Program (NCEP) has set the  following guidelines (reference values) for LDL Cholesterol:  Optimal.......................<130 mg/dL  Borderline High...............130-159 mg/dL  High..........................160-189 mg/dL  Very High.....................>190 mg/dL      Hdl/Cholesterol Ratio 05/14/2019 32.4  20.0 - 50.0 % Final    Total Cholesterol/HDL Ratio 05/14/2019 3.1  2.0 - 5.0 Final    Non-HDL Cholesterol 05/14/2019 188  mg/dL Final    Comment: Risk category and Non-HDL cholesterol goals:  Coronary heart disease (CHD)or equivalent (10-year risk of CHD >20%):  Non-HDL cholesterol goal     <130 mg/dL  Two or more CHD risk factors and 10-year risk of CHD <= 20%:  Non-HDL cholesterol goal     <160 mg/dL  0 to 1 CHD risk factor:  Non-HDL cholesterol goal     <190 mg/dL      TSH 05/14/2019 2.674  0.400 - 4.000 uIU/mL Final    Free T4 05/14/2019 0.90  0.71 - 1.51 ng/dL Final    Vit D, 25-Hydroxy 05/14/2019 21* 30 - 96 ng/mL Final    Comment: Vitamin D deficiency.........<10 ng/mL                              Vitamin D insufficiency......10-29 ng/mL        Vitamin D sufficiency........> or equal to 30 ng/mL  Vitamin D toxicity............>100 ng/mL           Assessment:       1. Hypothyroidism, unspecified type    2. Hyperlipidemia, unspecified hyperlipidemia type    3. Anxiety    4. Gastroesophageal reflux disease, esophagitis presence not specified    5. NAFLD (nonalcoholic fatty liver disease)    6. Chronic rhinitis        Plan:       Sheila was seen today for annual exam.  Allegra was recommended for treatment of rhinitis symptoms.  Pneumovax will be administered today.  The patient was advised to take vitamin D and a dosage of 2000 units daily.  A bone density study will be obtained next year as discussed.  A prescription for the shingles vaccine was given to the patient to take to her pharmacy.  Annual follow-up is recommended.  Diagnoses and all orders for this visit:    Hypothyroidism, unspecified type    Hyperlipidemia, unspecified hyperlipidemia type    Anxiety    Gastroesophageal reflux disease, esophagitis presence not specified    NAFLD (nonalcoholic fatty liver disease)    Chronic rhinitis    Other orders  -     varicella-zoster [id number], PF, (SHINGRIX, PF,) 50 mcg/0.5 mL injection; Inject 0.5 mLs into the muscle once. for 1 dose  -     (In Office Administered) Pneumococcal Polysaccharide Vaccine (23 Valent) (SQ/IM)

## 2019-07-02 ENCOUNTER — OFFICE VISIT (OUTPATIENT)
Dept: OBSTETRICS AND GYNECOLOGY | Facility: CLINIC | Age: 69
End: 2019-07-02
Payer: MEDICARE

## 2019-07-02 VITALS
BODY MASS INDEX: 26.2 KG/M2 | SYSTOLIC BLOOD PRESSURE: 127 MMHG | DIASTOLIC BLOOD PRESSURE: 73 MMHG | WEIGHT: 163 LBS | HEIGHT: 66 IN

## 2019-07-02 DIAGNOSIS — Z01.419 ENCOUNTER FOR GYNECOLOGICAL EXAMINATION WITHOUT ABNORMAL FINDING: Primary | ICD-10-CM

## 2019-07-02 DIAGNOSIS — N90.60 VULVAR HYPERTROPHY: ICD-10-CM

## 2019-07-02 DIAGNOSIS — L29.2 VULVAR ITCHING: ICD-10-CM

## 2019-07-02 DIAGNOSIS — N95.2 VAGINAL ATROPHY: ICD-10-CM

## 2019-07-02 PROCEDURE — G0101 CA SCREEN;PELVIC/BREAST EXAM: HCPCS | Mod: S$PBB,,, | Performed by: OBSTETRICS & GYNECOLOGY

## 2019-07-02 PROCEDURE — G0101 PR CA SCREEN;PELVIC/BREAST EXAM: ICD-10-PCS | Mod: S$PBB,,, | Performed by: OBSTETRICS & GYNECOLOGY

## 2019-07-02 PROCEDURE — 99999 PR PBB SHADOW E&M-EST. PATIENT-LVL III: CPT | Mod: PBBFAC,,, | Performed by: OBSTETRICS & GYNECOLOGY

## 2019-07-02 PROCEDURE — 99213 OFFICE O/P EST LOW 20 MIN: CPT | Mod: PBBFAC,PN | Performed by: OBSTETRICS & GYNECOLOGY

## 2019-07-02 PROCEDURE — G0101 CA SCREEN;PELVIC/BREAST EXAM: HCPCS | Mod: PBBFAC,PN | Performed by: OBSTETRICS & GYNECOLOGY

## 2019-07-02 PROCEDURE — 99999 PR PBB SHADOW E&M-EST. PATIENT-LVL III: ICD-10-PCS | Mod: PBBFAC,,, | Performed by: OBSTETRICS & GYNECOLOGY

## 2019-07-02 RX ORDER — CLOBETASOL PROPIONATE 0.5 MG/G
CREAM TOPICAL 2 TIMES DAILY
Qty: 30 G | Refills: 3 | Status: SHIPPED | OUTPATIENT
Start: 2019-07-02 | End: 2020-11-06 | Stop reason: SDUPTHER

## 2019-07-02 NOTE — PROGRESS NOTES
CC: Well woman exam    Sheila Zarco is a 68 y.o. female  presents for a well woman exam. She has vulvar itching and a patch of white skin.       Past Medical History:   Diagnosis Date    Asthma     childhood    Baker's cyst     Cataract     Glaucoma     Thyroid disease      Past Surgical History:   Procedure Laterality Date    BUNIONECTOMY      COLONOSCOPY N/A 2013    Performed by Jeison Cavazos MD at [facility] (4TH FLR)    GALLBLADDER SURGERY      HYSTERECTOMY      KNEE CARTILAGE SURGERY      narrow angles eye surgery        Family History   Problem Relation Age of Onset    Heart disease Mother     Heart disease Father     Liver disease Brother     Breast cancer Sister     Breast cancer Daughter 43        bilateral mastectomy    No Known Problems Sister     No Known Problems Maternal Grandmother     No Known Problems Maternal Grandfather     No Known Problems Paternal Grandmother     No Known Problems Paternal Grandfather     No Known Problems Maternal Aunt     No Known Problems Maternal Uncle     No Known Problems Paternal Aunt     No Known Problems Paternal Uncle     Breast cancer Other     Colon cancer Neg Hx     Ovarian cancer Neg Hx     Melanoma Neg Hx     Lupus Neg Hx     Acne Neg Hx     Amblyopia Neg Hx     Blindness Neg Hx     Cancer Neg Hx     Cataracts Neg Hx     Diabetes Neg Hx     Glaucoma Neg Hx     Hypertension Neg Hx     Macular degeneration Neg Hx     Retinal detachment Neg Hx     Strabismus Neg Hx     Stroke Neg Hx     Thyroid disease Neg Hx      Social History     Tobacco Use    Smoking status: Never Smoker    Smokeless tobacco: Never Used   Substance Use Topics    Alcohol use: Yes     Alcohol/week: 4.8 oz     Types: 4 Glasses of wine, 4 Cans of beer per week     Comment: drinks 4 days a week- >1 drink    Drug use: No     OB History        3    Para   3    Term   3            AB        Living           SAB         "TAB        Ectopic        Multiple        Live Births                     /73   Ht 5' 6" (1.676 m)   Wt 73.9 kg (163 lb 0.5 oz)   BMI 26.31 kg/m²     ROS:  GENERAL: Denies weight gain or weight loss. Feeling well overall.   SKIN: Denies rash or lesions.   HEAD: Denies head injury or headache.   NODES: Denies enlarged lymph nodes.   CHEST: Denies chest pain or shortness of breath.   CARDIOVASCULAR: Denies palpitations or left sided chest pain.   ABDOMEN: No abdominal pain, constipation, diarrhea, nausea, vomiting or rectal bleeding.   URINARY: No frequency, dysuria, hematuria, or burning on urination.  REPRODUCTIVE: See HPI.   BREASTS: The patient performs breast self-examination and denies pain, lumps, or nipple discharge.   HEMATOLOGIC: No easy bruisability or excessive bleeding.   MUSCULOSKELETAL: Denies joint pain or swelling.   NEUROLOGIC: Denies syncope or weakness.   PSYCHIATRIC: Denies depression, anxiety or mood swings.    PE:   APPEARANCE: Well nourished, well developed, in no acute distress.  AFFECT: WNL, alert and oriented x 3.  SKIN: No acne or hirsutism.  NECK: Neck symmetric without masses or thyromegaly.  NODES: No inguinal, cervical, axillary or femoral lymph node enlargement.  CHEST: Good respiratory effort.   ABDOMEN: Soft. No tenderness or masses. No hepatosplenomegaly. No hernias.  BREASTS: Symmetrical, no skin changes or visible lesions. No palpable masses, nipple discharge bilaterally.  PELVIC: atrophic external female genitalia with patch of white around the superior vulvar and around the clitoral area.    Normal hair distribution. Adequate perineal body, normal urethral meatus. Vagina atrophic without lesions or discharge. No significant cystocele or rectocele. Bimanual exam shows uterus and cervix to be surgically absent. Adnexa without masses or tenderness.  RECTAL: Rectovaginal exam confirms above with normal sphincter tone, no masses.  EXTREMITIES: No edema.    Diagnosis      " ICD-10-CM ICD-9-CM    1. Encounter for gynecological examination without abnormal finding Z01.419 V72.31    2. Vulvar itching L29.2 698.1 clobetasol (TEMOVATE) 0.05 % cream   3. Vaginal atrophy N95.2 627.3 clobetasol (TEMOVATE) 0.05 % cream   4. Vulvar hypertrophy N90.60 624.3 clobetasol (TEMOVATE) 0.05 % cream           Patient was counseled today on A.C.S. Pap guidelines and recommendations for yearly pelvic exams, mammograms and monthly self breast exams; to see her PCP for other health maintenance.     Follow up if symptoms worsen or fail to improve.   Will follow up in a few weeks and consider vulvar bx if the area is still present.

## 2019-08-08 ENCOUNTER — PROCEDURE VISIT (OUTPATIENT)
Dept: OBSTETRICS AND GYNECOLOGY | Facility: CLINIC | Age: 69
End: 2019-08-08
Payer: MEDICARE

## 2019-08-08 VITALS
BODY MASS INDEX: 25.6 KG/M2 | SYSTOLIC BLOOD PRESSURE: 125 MMHG | HEIGHT: 66 IN | WEIGHT: 159.31 LBS | DIASTOLIC BLOOD PRESSURE: 76 MMHG

## 2019-08-08 DIAGNOSIS — L90.0 LICHEN SCLEROSUS: Primary | ICD-10-CM

## 2019-08-08 PROCEDURE — 99213 OFFICE O/P EST LOW 20 MIN: CPT | Mod: S$PBB,,, | Performed by: OBSTETRICS & GYNECOLOGY

## 2019-08-08 PROCEDURE — 99213 PR OFFICE/OUTPT VISIT, EST, LEVL III, 20-29 MIN: ICD-10-PCS | Mod: S$PBB,,, | Performed by: OBSTETRICS & GYNECOLOGY

## 2019-08-08 NOTE — PROCEDURES
Procedures     CC: follow up of vulvar lesion and possible BX    Sheila Zarco is a 69 y.o. female  presents for vulvar itching and vulvar skin white discolorization.   Since clobetasol the itching has improved and the sx are much better    Past Medical History:   Diagnosis Date    Asthma     childhood    Baker's cyst     Cataract     Glaucoma     Thyroid disease        Past Surgical History:   Procedure Laterality Date    BUNIONECTOMY      COLONOSCOPY N/A 2013    Performed by [facility] at [facility] (4TH FLR)    GALLBLADDER SURGERY      HYSTERECTOMY      KNEE CARTILAGE SURGERY      narrow angles eye surgery          OB History    Para Term  AB Living   3 3 3         SAB TAB Ectopic Multiple Live Births                  # Outcome Date GA Lbr Galo/2nd Weight Sex Delivery Anes PTL Lv   3 Term            2 Term            1 Term                Family History   Problem Relation Age of Onset    Heart disease Mother     Heart disease Father     Liver disease Brother     Breast cancer Sister     Breast cancer Daughter 43        bilateral mastectomy    No Known Problems Sister     No Known Problems Maternal Grandmother     No Known Problems Maternal Grandfather     No Known Problems Paternal Grandmother     No Known Problems Paternal Grandfather     No Known Problems Maternal Aunt     No Known Problems Maternal Uncle     No Known Problems Paternal Aunt     No Known Problems Paternal Uncle     Breast cancer Other     Colon cancer Neg Hx     Ovarian cancer Neg Hx     Melanoma Neg Hx     Lupus Neg Hx     Acne Neg Hx     Amblyopia Neg Hx     Blindness Neg Hx     Cancer Neg Hx     Cataracts Neg Hx     Diabetes Neg Hx     Glaucoma Neg Hx     Hypertension Neg Hx     Macular degeneration Neg Hx     Retinal detachment Neg Hx     Strabismus Neg Hx     Stroke Neg Hx     Thyroid disease Neg Hx        Social History     Tobacco Use    Smoking status:  "Never Smoker    Smokeless tobacco: Never Used   Substance Use Topics    Alcohol use: Yes     Alcohol/week: 4.8 oz     Types: 4 Glasses of wine, 4 Cans of beer per week     Comment: drinks 4 days a week- >1 drink    Drug use: No       /76   Ht 5' 6" (1.676 m)   Wt 72.3 kg (159 lb 4.5 oz)   BMI 25.71 kg/m²     ROS:  GENERAL: Denies weight gain or weight loss. Feeling well overall.   SKIN: Denies rash or lesions.   HEAD: Denies head injury or headache.   NODES: Denies enlarged lymph nodes.   CHEST: Denies chest pain or shortness of breath.   CARDIOVASCULAR: Denies palpitations or left sided chest pain.   ABDOMEN: No abdominal pain, constipation, diarrhea, nausea, vomiting or rectal bleeding.   URINARY: No frequency, dysuria, hematuria, or burning on urination.  REPRODUCTIVE: See HPI.   BREASTS: The patient performs breast self-examination and denies pain, lumps, or nipple discharge.   HEMATOLOGIC: No easy bruisability or excessive bleeding.  MUSCULOSKELETAL: Denies joint pain or swelling.   NEUROLOGIC: Denies syncope or weakness.   PSYCHIATRIC: Denies depression, anxiety or mood swings.    Physical Exam:    APPEARANCE: Well nourished, well developed, in no acute distress.  AFFECT: WNL, alert and oriented x 3  SKIN: No acne or hirsutism  NECK: Neck symmetric without masses or thyromegaly  NODES: No inguinal, cervical, axillary, or femoral lymph node enlargement  CHEST: Good respiratory effect  ABDOMEN: Soft.  No tenderness or masses.  No hepatosplenomegaly.  No hernias.  PELVIC: atrophic external genitalia with significant improvement of the white are around the superior vulvar and clitoris.  Minimal white skin. NONTENDER.  Normal hair distribution.  Adequate perineal body, normal urethral meatus.  Vagina dry  and well rugated without lesions or discharge.  Cervix removed.  No significant cystocele or rectocele.  Bimanual exam shows the intact vaginal cuff. Adnexa without masses or tenderness.  "   EXTREMITIES: No edema.      ASSESSMENT AND PLAN  1. Lichen sclerosus     CLINICAL improvement, no need for BX      Patient was counseled today on A.C.S. Pap guidelines and recommendations for yearly pelvic exams, mammograms and monthly self breast exams; to see her PCP for other health maintenance.     Precautions given  F/U PRN

## 2019-08-15 RX ORDER — PANTOPRAZOLE SODIUM 40 MG/1
TABLET, DELAYED RELEASE ORAL
Qty: 90 TABLET | Refills: 3 | Status: SHIPPED | OUTPATIENT
Start: 2019-08-15 | End: 2020-08-27

## 2019-08-20 ENCOUNTER — TELEPHONE (OUTPATIENT)
Dept: ORTHOPEDICS | Facility: CLINIC | Age: 69
End: 2019-08-20

## 2019-08-20 NOTE — TELEPHONE ENCOUNTER
Returned call, spoke with patient.  Offered an appointment for re-evaluation.  Last office visit was 9/2017.  Appointment scheduled for 8/27/19 at 1000 hours.  Accepted.  Confirmed date/time/location.

## 2019-08-20 NOTE — TELEPHONE ENCOUNTER
----- Message from Sadie Abarca sent at 2019  9:34 AM CDT -----  Contact: ANGELA BAPTISTE   Please refill the medication(s) listed below. The patient can be reached at this phone number once it is called into the pharmacy. [phone]    Medication #1: naproxen (NAPROSYN) 500 MG tablet    Medication #2    Preferred Pharmacy:   [facility]/PHARMACY #[id number] [place]  [address] [place] [address]    Additional: Pharmacy stated the Rx above , and tried to put in a request but have not received a response.

## 2019-08-27 ENCOUNTER — OFFICE VISIT (OUTPATIENT)
Dept: ORTHOPEDICS | Facility: CLINIC | Age: 69
End: 2019-08-27
Payer: MEDICARE

## 2019-08-27 ENCOUNTER — HOSPITAL ENCOUNTER (OUTPATIENT)
Dept: RADIOLOGY | Facility: HOSPITAL | Age: 69
Discharge: HOME | End: 2019-08-27
Attending: ORTHOPAEDIC SURGERY
Payer: MEDICARE

## 2019-08-27 VITALS — HEIGHT: 66 IN | BODY MASS INDEX: 25.61 KG/M2 | WEIGHT: 159.38 LBS

## 2019-08-27 DIAGNOSIS — S99.921A INJURY OF TOE ON RIGHT FOOT, INITIAL ENCOUNTER: ICD-10-CM

## 2019-08-27 DIAGNOSIS — S99.921A INJURY OF TOE ON RIGHT FOOT, INITIAL ENCOUNTER: Primary | ICD-10-CM

## 2019-08-27 DIAGNOSIS — M75.41 ROTATOR CUFF IMPINGEMENT SYNDROME OF RIGHT SHOULDER: ICD-10-CM

## 2019-08-27 PROCEDURE — 73620 XR FOOT 2 VIEW RIGHT: ICD-10-PCS | Mod: 26,RT,, | Performed by: RADIOLOGY

## 2019-08-27 PROCEDURE — 73620 X-RAY EXAM OF FOOT: CPT | Mod: TC,PN,RT

## 2019-08-27 PROCEDURE — 20610 PR DRAIN/INJECT LARGE JOINT/BURSA: ICD-10-PCS | Mod: S$PBB,RT,, | Performed by: ORTHOPAEDIC SURGERY

## 2019-08-27 PROCEDURE — 99999 PR PBB SHADOW E&M-EST. PATIENT-LVL III: ICD-10-PCS | Mod: PBBFAC,,, | Performed by: ORTHOPAEDIC SURGERY

## 2019-08-27 PROCEDURE — 99213 OFFICE O/P EST LOW 20 MIN: CPT | Mod: PBBFAC,25,PN | Performed by: ORTHOPAEDIC SURGERY

## 2019-08-27 PROCEDURE — 20610 DRAIN/INJ JOINT/BURSA W/O US: CPT | Mod: PBBFAC,PN | Performed by: ORTHOPAEDIC SURGERY

## 2019-08-27 PROCEDURE — 99213 PR OFFICE/OUTPT VISIT, EST, LEVL III, 20-29 MIN: ICD-10-PCS | Mod: S$PBB,25,, | Performed by: ORTHOPAEDIC SURGERY

## 2019-08-27 PROCEDURE — 99213 OFFICE O/P EST LOW 20 MIN: CPT | Mod: S$PBB,25,, | Performed by: ORTHOPAEDIC SURGERY

## 2019-08-27 PROCEDURE — 73620 X-RAY EXAM OF FOOT: CPT | Mod: 26,RT,, | Performed by: RADIOLOGY

## 2019-08-27 PROCEDURE — 20610 DRAIN/INJ JOINT/BURSA W/O US: CPT | Mod: S$PBB,RT,, | Performed by: ORTHOPAEDIC SURGERY

## 2019-08-27 PROCEDURE — 99999 PR PBB SHADOW E&M-EST. PATIENT-LVL III: CPT | Mod: PBBFAC,,, | Performed by: ORTHOPAEDIC SURGERY

## 2019-08-27 RX ORDER — TRIAMCINOLONE ACETONIDE 40 MG/ML
40 INJECTION, SUSPENSION INTRA-ARTICULAR; INTRAMUSCULAR
Status: COMPLETED | OUTPATIENT
Start: 2019-08-27 | End: 2019-08-27

## 2019-08-27 RX ADMIN — TRIAMCINOLONE ACETONIDE 40 MG: 40 INJECTION, SUSPENSION INTRA-ARTICULAR; INTRAMUSCULAR at 11:08

## 2019-08-27 NOTE — PROGRESS NOTES
Subjective:      Patient ID: Sheila Zarco is a 69 y.o. female.  Chief Complaint: Shoulder Pain (right rotator cuff)      HPI  Sheila Zarco is a  69 y.o. female presenting today for follow up of partial tear rotator cuff of the right shoulder.  She reports that she is having a flare-up again recently  Previously we had talked about surgery but she would like to reconsider surgery in the future but maybe have an injection today.  On an unrelated matter she jammed her right toe few days ago would like an x-ray on her right toe.    Review of patient's allergies indicates:  No Known Allergies      Current Outpatient Medications   Medication Sig Dispense Refill    albuterol (ACCUNEB) 0.63 mg/3 mL Nebu Take 3 mLs (0.63 mg total) by nebulization every 6 (six) hours as needed. 100 vial 2    albuterol (VENTOLIN HFA) 90 mcg/actuation inhaler Inhale 2 puffs into the lungs every 6 (six) hours as needed for Wheezing.      ALPRAZolam (XANAX) 0.5 MG tablet TAKE 1 TABLET BY MOUTH TWICE A DAY AS NEEDED FOR ANXIETY 60 tablet 2    clobetasol (TEMOVATE) 0.05 % cream Apply topically 2 (two) times daily. 30 g 3    cyanocobalamin (VITAMIN B-12) 250 MCG tablet Take 250 mcg by mouth once daily.      desoximetasone (TOPICORT) 0.25 % cream Apply topically 2 (two) times daily.        escitalopram oxalate (LEXAPRO) 20 MG tablet TAKE 1 TABLET (20 MG TOTAL) BY MOUTH ONCE DAILY. FOR ANXIETY. 90 tablet 2    fluocinonide (LIDEX) 0.05 % external solution AAA scalp qday prn itching, scaling 60 mL 3    ketoconazole (NIZORAL) 2 % cream AAA twice daily on nose prn flare 60 g 3    ketoconazole (NIZORAL) 2 % shampoo WASH HAIR W/ MEDICATED SHAMPOO AT LEAST 2X WEEK, LET SIT ON SCALP FOR ATLEAST 5MINUTES BEFORE RINSIN 120 mL 1    levothyroxine (SYNTHROID) 88 MCG tablet TAKE 1 TABLET (88 MCG TOTAL) BY MOUTH EVERY MORNING. 90 tablet 3    naproxen (NAPROSYN) 500 MG tablet Take 1 tablet (500 mg total) by mouth 2 (two) times daily with  "meals. 60 tablet 3    pantoprazole (PROTONIX) 40 MG tablet TAKE 1 TABLET (40 MG TOTAL) BY MOUTH ONCE DAILY. 90 tablet 3    triamcinolone acetonide 0.025% (KENALOG) 0.025 % cream AAA qd to face, ears prn flare 45 g 1    valACYclovir (VALTREX) 500 MG tablet TAKE ONE TABLET TWICE A DAY AS NEEDED FOR  OUTBREAK 12 tablet 3    VENTOLIN HFA 90 mcg/actuation inhaler INHALE 2 PUFFS INTO THE LUNGS EVERY 4 HOURS AS NEEDED FOR WHEEZING 18 Inhaler 0    fluticasone-salmeterol 250-50 mcg/dose (ADVAIR) 250-50 mcg/dose diskus inhaler Inhale 1 puff into the lungs 2 (two) times daily. 60 each 5     No current facility-administered medications for this visit.        Past Medical History:   Diagnosis Date    Asthma     childhood    Baker's cyst     Cataract     Glaucoma     Thyroid disease        Past Surgical History:   Procedure Laterality Date    BUNIONECTOMY      COLONOSCOPY N/A 11/14/2013    Performed [facility] MD at [facility] (4TH FLR)    GALLBLADDER SURGERY      HYSTERECTOMY      KNEE CARTILAGE SURGERY      narrow angles eye surgery          OBJECTIVE:   PHYSICAL EXAM:  Height: 5' 6" (167.6 cm) Weight: 72.3 kg (159 lb 6.3 oz)  Vitals:    08/27/19 1020   Weight: 72.3 kg (159 lb 6.3 oz)   Height: 5' 6" (1.676 m)   PainSc:   5   PainLoc: Shoulder     Ortho/SPM Exam  Examination right shoulder no tenderness no swelling  Full range of motion  Positive impingement sign  Positive supraspinatus stress test  Neurologic exam intact  Examination right foot demonstrates bruising swelling tenderness right 2nd toe clinical alignment looks good no open wound    RADIOGRAPHS:  AP and lateral x-ray right foot demonstrates with may be a hairline fracture through the distal tuft of the distal phalanx nondisplaced  Comments: I have personally reviewed the imaging and I agree with the above radiologist's report.    ASSESSMENT/PLAN:     IMPRESSION:  Partial tear rotator cuff right shoulder with flare up.  2.  Nondisplaced " fracture right 2nd toe    PLAN:  For the right 2nd toe routine bruise care including ice elevation and a hard-soled shoe Advil or Motrin by mouth  For the right shoulder she would like injection today.  After pause for time-out index injected right shoulder with combination Kenalog 40 mg 2 cc xylocaine sterile technique  She tolerated the procedure well without complication      FOLLOW UP:  2-3 months consider surgery in the future    Disclaimer: This note has been generated using voice-recognition software. There may be typographical errors that have been missed during proof-reading.

## 2019-08-28 ENCOUNTER — TELEPHONE (OUTPATIENT)
Dept: ORTHOPEDICS | Facility: CLINIC | Age: 69
End: 2019-08-28

## 2019-08-28 DIAGNOSIS — G89.29 CHRONIC RIGHT SHOULDER PAIN: ICD-10-CM

## 2019-08-28 DIAGNOSIS — M25.511 CHRONIC RIGHT SHOULDER PAIN: ICD-10-CM

## 2019-08-28 RX ORDER — HYDROCODONE BITARTRATE AND ACETAMINOPHEN 5; 325 MG/1; MG/1
1 TABLET ORAL EVERY 4 HOURS PRN
Qty: 20 TABLET | Refills: 0 | Status: SHIPPED | OUTPATIENT
Start: 2019-08-28 | End: 2019-09-07

## 2019-08-28 RX ORDER — NAPROXEN 500 MG/1
500 TABLET ORAL 2 TIMES DAILY WITH MEALS
Qty: 60 TABLET | Refills: 3 | Status: SHIPPED | OUTPATIENT
Start: 2019-08-28 | End: 2023-03-28 | Stop reason: ALTCHOICE

## 2019-08-28 NOTE — TELEPHONE ENCOUNTER
Spoke with patient.  Requesting pain medication due to injury or right 2nd toe.  Also requesting refill for naprosyn to be sent to pharmacy.  Information updated.

## 2019-08-28 NOTE — TELEPHONE ENCOUNTER
----- Message from Nataly Greenberg sent at 8/28/2019 12:50 PM CDT -----  No. [phone]   Please call in a pain medication and a refill on Naproxen 500mg.  [facility] at [address]

## 2019-10-15 RX ORDER — LEVOTHYROXINE SODIUM 88 UG/1
88 TABLET ORAL EVERY MORNING
Qty: 90 TABLET | Refills: 3 | Status: SHIPPED | OUTPATIENT
Start: 2019-10-15 | End: 2020-10-05

## 2019-12-19 ENCOUNTER — PATIENT MESSAGE (OUTPATIENT)
Dept: INTERNAL MEDICINE | Facility: CLINIC | Age: 69
End: 2019-12-19

## 2020-01-06 RX ORDER — ALPRAZOLAM 0.5 MG/1
TABLET ORAL
Qty: 60 TABLET | Refills: 0 | Status: SHIPPED | OUTPATIENT
Start: 2020-01-06 | End: 2020-04-07 | Stop reason: SDUPTHER

## 2020-01-15 ENCOUNTER — OFFICE VISIT (OUTPATIENT)
Dept: OPTOMETRY | Facility: CLINIC | Age: 70
End: 2020-01-15
Payer: MEDICARE

## 2020-01-15 DIAGNOSIS — H25.041 POSTERIOR SUBCAPSULAR AGE-RELATED CATARACT OF RIGHT EYE: ICD-10-CM

## 2020-01-15 DIAGNOSIS — Z98.890 S/P LASER IRIDOTOMY: ICD-10-CM

## 2020-01-15 DIAGNOSIS — H25.13 NUCLEAR SCLEROSIS OF BOTH EYES: Primary | ICD-10-CM

## 2020-01-15 PROCEDURE — 99212 OFFICE O/P EST SF 10 MIN: CPT | Mod: PBBFAC,PO | Performed by: OPTOMETRIST

## 2020-01-15 PROCEDURE — 99999 PR PBB SHADOW E&M-EST. PATIENT-LVL II: CPT | Mod: PBBFAC,,, | Performed by: OPTOMETRIST

## 2020-01-15 PROCEDURE — 92014 COMPRE OPH EXAM EST PT 1/>: CPT | Mod: S$PBB,,, | Performed by: OPTOMETRIST

## 2020-01-15 PROCEDURE — 99999 PR PBB SHADOW E&M-EST. PATIENT-LVL II: ICD-10-PCS | Mod: PBBFAC,,, | Performed by: OPTOMETRIST

## 2020-01-15 PROCEDURE — 92014 PR EYE EXAM, EST PATIENT,COMPREHESV: ICD-10-PCS | Mod: S$PBB,,, | Performed by: OPTOMETRIST

## 2020-01-15 NOTE — PROGRESS NOTES
HPI     ISELA 02/18  Glasses about 2 yrs. Old and distance is fuzzy.  Uses Systane   prn.    Last edited by Sheila Copeland on 1/15/2020  9:09 AM. (History)            Assessment /Plan     For exam results, see Encounter Report.    Nuclear sclerosis of both eyes  -     Ambulatory Referral to Ophthalmology    Posterior subcapsular age-related cataract of right eye    S/P laser iridotomy      1,2. Refer to Ophthalmology for cataract evaluation and possible removal. Pt requesting Dr. Jefferson. He did her laser PI.   3. IOP fine and PI patent.

## 2020-01-17 ENCOUNTER — OFFICE VISIT (OUTPATIENT)
Dept: INTERNAL MEDICINE | Facility: CLINIC | Age: 70
End: 2020-01-17
Payer: MEDICARE

## 2020-01-17 VITALS
HEIGHT: 66 IN | DIASTOLIC BLOOD PRESSURE: 73 MMHG | WEIGHT: 161.19 LBS | BODY MASS INDEX: 25.9 KG/M2 | HEART RATE: 68 BPM | RESPIRATION RATE: 20 BRPM | SYSTOLIC BLOOD PRESSURE: 134 MMHG | TEMPERATURE: 98 F

## 2020-01-17 DIAGNOSIS — B96.89 ACUTE BACTERIAL BRONCHITIS: Primary | ICD-10-CM

## 2020-01-17 DIAGNOSIS — J45.21 MILD INTERMITTENT ASTHMATIC BRONCHITIS WITH ACUTE EXACERBATION: ICD-10-CM

## 2020-01-17 DIAGNOSIS — J20.8 ACUTE BACTERIAL BRONCHITIS: Primary | ICD-10-CM

## 2020-01-17 PROCEDURE — 1159F PR MEDICATION LIST DOCUMENTED IN MEDICAL RECORD: ICD-10-PCS | Mod: ,,, | Performed by: INTERNAL MEDICINE

## 2020-01-17 PROCEDURE — 1126F AMNT PAIN NOTED NONE PRSNT: CPT | Mod: ,,, | Performed by: INTERNAL MEDICINE

## 2020-01-17 PROCEDURE — 99214 OFFICE O/P EST MOD 30 MIN: CPT | Mod: S$PBB,,, | Performed by: INTERNAL MEDICINE

## 2020-01-17 PROCEDURE — 1126F PR PAIN SEVERITY QUANTIFIED, NO PAIN PRESENT: ICD-10-PCS | Mod: ,,, | Performed by: INTERNAL MEDICINE

## 2020-01-17 PROCEDURE — 1159F MED LIST DOCD IN RCRD: CPT | Mod: ,,, | Performed by: INTERNAL MEDICINE

## 2020-01-17 PROCEDURE — 99214 PR OFFICE/OUTPT VISIT, EST, LEVL IV, 30-39 MIN: ICD-10-PCS | Mod: S$PBB,,, | Performed by: INTERNAL MEDICINE

## 2020-01-17 PROCEDURE — 99999 PR PBB SHADOW E&M-EST. PATIENT-LVL III: ICD-10-PCS | Mod: PBBFAC,,, | Performed by: INTERNAL MEDICINE

## 2020-01-17 PROCEDURE — 99213 OFFICE O/P EST LOW 20 MIN: CPT | Mod: PBBFAC,PO | Performed by: INTERNAL MEDICINE

## 2020-01-17 PROCEDURE — 99999 PR PBB SHADOW E&M-EST. PATIENT-LVL III: CPT | Mod: PBBFAC,,, | Performed by: INTERNAL MEDICINE

## 2020-01-17 RX ORDER — PREDNISONE 20 MG/1
TABLET ORAL
Qty: 10 TABLET | Refills: 0 | Status: SHIPPED | OUTPATIENT
Start: 2020-01-17 | End: 2020-03-02

## 2020-01-17 RX ORDER — AZITHROMYCIN 250 MG/1
TABLET, FILM COATED ORAL
Qty: 6 TABLET | Refills: 0 | Status: SHIPPED | OUTPATIENT
Start: 2020-01-17 | End: 2020-01-22

## 2020-01-17 NOTE — PROGRESS NOTES
History of present illness:  69-year-old lady patient of Dr. Correa with history of asthma, hypothyroidism in today with persistent respiratory symptomatology.  The patient has history of what sounds like mild intermittent asthma.  She states that about 6 weeks ago she developed URI and developed cough which has become productive.  Some wheezing.  No overt shortness of breath.  No fever no chills.  She is using her home albuterol nebulizer.  She does not use maintenance medication for her asthma.  No recent foreign travel.    Current medications:  Noted and reviewed in the electronic medical record medication list though as noted in HPI she does not use maintenance Advair inhaler.    Review of systems:  Constitutional:  No fever no chills no generalized body aches  HEENT:  Denies any sinus pressure or congestion.  No dysphagia.  Cardiovascular:  No chest pain or palpitations syncope.  Respiratory.  See HPI.  GI:  No nausea vomiting abdominal pain or diarrhea.    Past medical history, past surgical history, family medical history social history is are all noted and reviewed in the electronic medical record history sections.    Physical examination:  General:  Pleasant alert appropriately groomed lady in no acute distress.  Vital signs:  All noted and reviewed is normal.  HEENT:  Normocephalic.  Neck supple no masses no thyromegaly.  Lungs:  Clear to auscultation.  No use of accessory muscles of respiration and no significant prolongation of the expiratory phase.  Cardiovascular:  Regular rhythm no significant murmur.    Acute asthmatic bacterial bronchitis.    Plan:  She has used guaifenesin daily.  Prednisone 40 mg daily for 5 days.  Z-Douglas as prescribed.  Advised without return to baseline or any worsening symptoms.

## 2020-01-21 ENCOUNTER — TELEPHONE (OUTPATIENT)
Dept: OPHTHALMOLOGY | Facility: CLINIC | Age: 70
End: 2020-01-21

## 2020-01-21 NOTE — TELEPHONE ENCOUNTER
----- Message from Karlee Lees sent at 1/21/2020  1:06 PM CST -----  Pt believes the schedulers called to schedule an ppt for pt        Pt contact [phone] or [phone]

## 2020-02-11 ENCOUNTER — HOSPITAL ENCOUNTER (EMERGENCY)
Facility: HOSPITAL | Age: 70
Discharge: HOME | End: 2020-02-11
Attending: EMERGENCY MEDICINE
Payer: MEDICARE

## 2020-02-11 VITALS
TEMPERATURE: 98 F | WEIGHT: 159 LBS | SYSTOLIC BLOOD PRESSURE: 132 MMHG | OXYGEN SATURATION: 100 % | RESPIRATION RATE: 17 BRPM | BODY MASS INDEX: 25.55 KG/M2 | HEART RATE: 60 BPM | HEIGHT: 66 IN | DIASTOLIC BLOOD PRESSURE: 64 MMHG

## 2020-02-11 DIAGNOSIS — R42 DIZZINESS: ICD-10-CM

## 2020-02-11 LAB
ALBUMIN SERPL BCP-MCNC: 3.7 G/DL (ref 3.5–5.2)
ALP SERPL-CCNC: 73 U/L (ref 55–135)
ALT SERPL W/O P-5'-P-CCNC: 14 U/L (ref 10–44)
ANION GAP SERPL CALC-SCNC: 9 MMOL/L (ref 8–16)
AST SERPL-CCNC: 16 U/L (ref 10–40)
BASOPHILS # BLD AUTO: 0.08 K/UL (ref 0–0.2)
BASOPHILS NFR BLD: 1.3 % (ref 0–1.9)
BILIRUB SERPL-MCNC: 0.6 MG/DL (ref 0.1–1)
BUN SERPL-MCNC: 18 MG/DL (ref 8–23)
CALCIUM SERPL-MCNC: 9.5 MG/DL (ref 8.7–10.5)
CHLORIDE SERPL-SCNC: 112 MMOL/L (ref 95–110)
CO2 SERPL-SCNC: 19 MMOL/L (ref 23–29)
CREAT SERPL-MCNC: 0.7 MG/DL (ref 0.5–1.4)
DIFFERENTIAL METHOD: ABNORMAL
EOSINOPHIL # BLD AUTO: 0.4 K/UL (ref 0–0.5)
EOSINOPHIL NFR BLD: 5.5 % (ref 0–8)
ERYTHROCYTE [DISTWIDTH] IN BLOOD BY AUTOMATED COUNT: 12.9 % (ref 11.5–14.5)
EST. GFR  (AFRICAN AMERICAN): >60 ML/MIN/1.73 M^2
EST. GFR  (NON AFRICAN AMERICAN): >60 ML/MIN/1.73 M^2
GLUCOSE SERPL-MCNC: 98 MG/DL (ref 70–110)
HCT VFR BLD AUTO: 40.8 % (ref 37–48.5)
HGB BLD-MCNC: 12.9 G/DL (ref 12–16)
IMM GRANULOCYTES # BLD AUTO: 0.01 K/UL (ref 0–0.04)
IMM GRANULOCYTES NFR BLD AUTO: 0.2 % (ref 0–0.5)
LYMPHOCYTES # BLD AUTO: 2.2 K/UL (ref 1–4.8)
LYMPHOCYTES NFR BLD: 34.7 % (ref 18–48)
MCH RBC QN AUTO: 28.9 PG (ref 27–31)
MCHC RBC AUTO-ENTMCNC: 31.6 G/DL (ref 32–36)
MCV RBC AUTO: 92 FL (ref 82–98)
MONOCYTES # BLD AUTO: 0.4 K/UL (ref 0.3–1)
MONOCYTES NFR BLD: 6.6 % (ref 4–15)
NEUTROPHILS # BLD AUTO: 3.3 K/UL (ref 1.8–7.7)
NEUTROPHILS NFR BLD: 51.7 % (ref 38–73)
NRBC BLD-RTO: 0 /100 WBC
PLATELET # BLD AUTO: 270 K/UL (ref 150–350)
PMV BLD AUTO: 10.6 FL (ref 9.2–12.9)
POTASSIUM SERPL-SCNC: 4.4 MMOL/L (ref 3.5–5.1)
PROT SERPL-MCNC: 7.1 G/DL (ref 6–8.4)
RBC # BLD AUTO: 4.46 M/UL (ref 4–5.4)
SODIUM SERPL-SCNC: 140 MMOL/L (ref 136–145)
TSH SERPL DL<=0.005 MIU/L-ACNC: 1.82 UIU/ML (ref 0.4–4)
WBC # BLD AUTO: 6.39 K/UL (ref 3.9–12.7)

## 2020-02-11 PROCEDURE — 84443 ASSAY THYROID STIM HORMONE: CPT

## 2020-02-11 PROCEDURE — 96360 HYDRATION IV INFUSION INIT: CPT

## 2020-02-11 PROCEDURE — 63600175 PHARM REV CODE 636 W HCPCS: Performed by: PHYSICIAN ASSISTANT

## 2020-02-11 PROCEDURE — 80053 COMPREHEN METABOLIC PANEL: CPT

## 2020-02-11 PROCEDURE — 96361 HYDRATE IV INFUSION ADD-ON: CPT

## 2020-02-11 PROCEDURE — 93010 EKG 12-LEAD: ICD-10-PCS | Mod: ,,, | Performed by: INTERNAL MEDICINE

## 2020-02-11 PROCEDURE — 99284 EMERGENCY DEPT VISIT MOD MDM: CPT | Mod: 25

## 2020-02-11 PROCEDURE — 93005 ELECTROCARDIOGRAM TRACING: CPT

## 2020-02-11 PROCEDURE — 85025 COMPLETE CBC W/AUTO DIFF WBC: CPT

## 2020-02-11 PROCEDURE — 99285 EMERGENCY DEPT VISIT HI MDM: CPT | Mod: ,,, | Performed by: EMERGENCY MEDICINE

## 2020-02-11 PROCEDURE — 93010 ELECTROCARDIOGRAM REPORT: CPT | Mod: ,,, | Performed by: INTERNAL MEDICINE

## 2020-02-11 PROCEDURE — 99285 PR EMERGENCY DEPT VISIT,LEVEL V: ICD-10-PCS | Mod: ,,, | Performed by: EMERGENCY MEDICINE

## 2020-02-11 RX ORDER — MECLIZINE HYDROCHLORIDE 25 MG/1
25 TABLET ORAL 3 TIMES DAILY PRN
Qty: 15 TABLET | Refills: 0 | Status: SHIPPED | OUTPATIENT
Start: 2020-02-11

## 2020-02-11 RX ADMIN — SODIUM CHLORIDE 1000 ML: 0.9 INJECTION, SOLUTION INTRAVENOUS at 08:02

## 2020-02-11 NOTE — ED PROVIDER NOTES
"Encounter Date: 2/11/2020       History     Chief Complaint   Patient presents with    Dizziness     Pt c/o dizziness since yesterday.  States she had an episode of facial numbness yesterday and again today.       Patient is a 69 year old female with PMH of asthma, glaucoma, hypothyroidism who presents to the ED with complaints of episodes of dizziness and right sided facial tingling that began yesterday. She reports waking up yesterday morning around 9am and experiencing facial tingling to the right side of her cheek and jaw.  She reports when she tate to get out bed she felt dizzy, as though she was spinning.  She also endorses lightheadedness as well as brief episode of vision going "black." She denies LOC. This episode last about 5 minutes and she was at her baseline for the rest of the day. About 4:45 this morning, she felt a sharp right sided headache that woke her up from sleep. When she attempted to get out of bed, she again began to feel dizzy, as though she was spinning. She also endorses right sided facial tingling. She currently describes the headache as dull. She denies chest pain, visual changes, abdominal pain, N/V/D, dysuria. She reports chronic dyspnea on exertion however denies worsening SOB. Denies symptoms of this nature in the past. She reports only taking a xanax and aspirin this morning. This is the extent of the patient's complaints.         Review of patient's allergies indicates:  No Known Allergies  Past Medical History:   Diagnosis Date    Asthma     childhood    Baker's cyst     Cataract     Glaucoma     Thyroid disease      Past Surgical History:   Procedure Laterality Date    BUNIONECTOMY      GALLBLADDER SURGERY      HYSTERECTOMY      KNEE CARTILAGE SURGERY      narrow angles eye surgery        Family History   Problem Relation Age of Onset    Heart disease Mother     Heart disease Father     Liver disease Brother     Breast cancer Sister     Breast cancer Daughter 43 "        bilateral mastectomy    No Known Problems Sister     No Known Problems Maternal Grandmother     No Known Problems Maternal Grandfather     No Known Problems Paternal Grandmother     No Known Problems Paternal Grandfather     No Known Problems Maternal Aunt     No Known Problems Maternal Uncle     No Known Problems Paternal Aunt     No Known Problems Paternal Uncle     Breast cancer Other     Colon cancer Neg Hx     Ovarian cancer Neg Hx     Melanoma Neg Hx     Lupus Neg Hx     Acne Neg Hx     Amblyopia Neg Hx     Blindness Neg Hx     Cancer Neg Hx     Cataracts Neg Hx     Diabetes Neg Hx     Glaucoma Neg Hx     Hypertension Neg Hx     Macular degeneration Neg Hx     Retinal detachment Neg Hx     Strabismus Neg Hx     Stroke Neg Hx     Thyroid disease Neg Hx      Social History     Tobacco Use    Smoking status: Never Smoker    Smokeless tobacco: Never Used   Substance Use Topics    Alcohol use: Yes     Alcohol/week: 8.0 standard drinks     Types: 4 Glasses of wine, 4 Cans of beer per week     Comment: drinks 4 days a week- >1 drink    Drug use: No     Review of Systems   Constitutional: Negative for chills and fever.   HENT: Negative for congestion.    Eyes: Negative for pain.   Respiratory: Positive for shortness of breath.    Cardiovascular: Negative for chest pain.   Gastrointestinal: Negative for abdominal pain, diarrhea, nausea and vomiting.   Genitourinary: Negative for dysuria.   Musculoskeletal: Negative for neck pain.   Skin: Negative for rash.   Neurological: Positive for dizziness, light-headedness and headaches. Negative for weakness.       Physical Exam     Initial Vitals [02/11/20 0732]   BP Pulse Resp Temp SpO2   (!) 143/63 64 16 97.7 °F (36.5 °C) 95 %      MAP       --         Physical Exam    Nursing note and vitals reviewed.  Constitutional: She appears well-developed and well-nourished. She is not diaphoretic. No distress.   HENT:   Head: Normocephalic and  atraumatic.   Right Ear: External ear normal.   Left Ear: External ear normal.   Mouth/Throat: Oropharynx is clear and moist.   Eyes: Conjunctivae and EOM are normal. Pupils are equal, round, and reactive to light.   Neck: Normal range of motion. Neck supple.   Cardiovascular: Normal rate, regular rhythm, normal heart sounds and intact distal pulses. Exam reveals no gallop and no friction rub.    No murmur heard.  Pulmonary/Chest: Breath sounds normal. She has no wheezes. She has no rhonchi. She has no rales.   Abdominal: Soft. Bowel sounds are normal. There is no tenderness.   Musculoskeletal: Normal range of motion.   Neurological: She is alert and oriented to person, place, and time. She has normal strength. No cranial nerve deficit or sensory deficit. She displays a negative Romberg sign. Coordination normal. GCS score is 15. GCS eye subscore is 4. GCS verbal subscore is 5. GCS motor subscore is 6.   Negative finger to nose, rapid alternating movements.    Skin: Skin is warm and dry. Capillary refill takes less than 2 seconds.   Psychiatric: She has a normal mood and affect. Her behavior is normal. Judgment and thought content normal.         ED Course   Procedures  Labs Reviewed   CBC W/ AUTO DIFFERENTIAL - Abnormal; Notable for the following components:       Result Value    Mean Corpuscular Hemoglobin Conc 31.6 (*)     All other components within normal limits   COMPREHENSIVE METABOLIC PANEL - Abnormal; Notable for the following components:    Chloride 112 (*)     CO2 19 (*)     All other components within normal limits   TSH   TSH    Narrative:     Add on per Dr order# [id number] order# [id number] TSH      EKG Readings: (Independently Interpreted)   Initial Reading: No STEMI. Rhythm: Sinus Bradycardia. Heart Rate: 58. Axis: Normal.     ECG Results          EKG 12-lead (In process)  Result time 02/11/20 09:05:20    In process by Interface, Lab In [facility] (02/11/20 09:05:20)                 Narrative:    Test  Reason : R42,    Vent. Rate : 058 BPM     Atrial Rate : 058 BPM     P-R Int : 154 ms          QRS Dur : 080 ms      QT Int : 472 ms       P-R-T Axes : 056 056 051 degrees     QTc Int : 463 ms    Sinus bradycardia  Otherwise normal ECG  When compared with ECG of 28-OCT-2015 09:01,  Previous ECG has undetermined rhythm, needs review    Referred By: [email or web address]   SELF           Confirmed By:                             Imaging Results          MRI Brain Without Contrast (Final result)  Result time 02/11/20 12:11:58    Final result by Papito Patel MD (02/11/20 12:11:58)                 Impression:      No acute intracranial abnormality.      Electronically signed by: Papito Patel MD  Date:    02/11/2020  Time:    12:11             Narrative:    EXAMINATION:  MRI BRAIN WITHOUT CONTRAST    CLINICAL HISTORY:  Stroke;    TECHNIQUE:  Multiplanar multisequence MR imaging of the brain was performed without contrast.    COMPARISON:  None.    FINDINGS:  There is no midline shift, hydrocephalus or mass effect.  There are a few scattered foci of T2/FLAIR signal abnormality within the supratentorial white matter suggesting mild chronic microvascular ischemic changes.  Otherwise, the brain parenchyma is normal in signal and contour.  No evidence of a space-occupying mass or abnormal extra-axial fluid collection.  No evidence of intracranial hemorrhage.  No diffusion restriction to suggest recent infarction.  Structures at the craniocervical junction and sellar region show no significant abnormalities.  Mild mucosal membrane thickening in the ethmoid maxillary sinuses.  Mastoid air cells demonstrate trace fluid.  Major intracranial flow voids are present.  Orbits show no significant abnormalities.                                 Medical Decision Making:   History:   Old Medical Records: I decided to obtain old medical records.  Initial Assessment:   Emergent evaluation of a 69-year-old female who presents the emergency department  with complaints dizziness, right-sided facial tingling, lightheadedness, headache that began yesterday.  She describes 1 episode that occurred yesterday that lasted for about 5 min as well as an episode that occurred this morning.  Patient is afebrile, hemodynamically stable, and nontoxic appearing.  Will order labs, EKG, MRI, fluids, and continue to monitor.  Differential Diagnosis:   DDX includes but is not limited to TIA, CVA, SAH, anemia.   Independently Interpreted Test(s):   I have ordered and independently interpreted EKG Reading(s) - see prior notes  Clinical Tests:   Lab Tests: Ordered and Reviewed  Radiological Study: Ordered and Reviewed  Medical Tests: Ordered and Reviewed  ED Management:  She currently denies any dizziness while lying on exam bed.   CBC reveals no leukocytosis, H/H stable.  CMP reveals no severe electrolyte abnormalities.   TSH 1.819.   MRI brain reveals no acute intracranial abnormalities.   Ambulatory referral to Neurology placed. Patient is given clinic contact information as well as prescription for meclizine.   All questions are answered.   The patient was instructed to follow up with Neurology or to return to the emergency department for worsening symptoms. The treatment plan was discussed with the patient who demonstrated understanding and comfort with plan. The patient's history, physical exam, and plan of care was discussed with and agreed upon with my supervising physician.                 Attending Attestation:     Physician Attestation Statement for NP/PA:   I have conducted a face to face encounter with this patient in addition to the NP/PA, due to Medical Complexity    Other NP/PA Attestation Additions:      Medical Decision Making: Vague dizziness with some right facial numbness that does not follow an entire cranial nerve distribution.  MRI does not show any acute stroke.  Will discharge with Neurology follow-up                 ED Course as of Feb 11 1909 Tue Feb 11,  2020 0852 Hemoglobin: 12.9 [DC]   0853 WBC: 6.39 [DC]   0853 Platelets: 270 [DC]      ED Course User Index  [DC] Tj Infante MD                Clinical Impression:     1. Dizziness            Disposition:   Disposition: Discharged  Condition: Stable                     Krista Teague PA-C  02/11/20 1910       Tj Infante MD  02/12/20 7902

## 2020-02-11 NOTE — ED NOTES
PT resting comfortably watching TV. Daughter at bedside. PT remains on cardiac, bp and o2 monitor. RR 18 even and unlabored. PT aware still waiting for MRI.

## 2020-02-11 NOTE — ED NOTES
PT resting in bed on cardiac, bp and o2 monitor. RR 18 even and unlabored pt aware waiting for MRI. Daughter at bedside.

## 2020-02-11 NOTE — ED TRIAGE NOTES
Pt reports waking up yesterday around 9 am with dizziness and numbness to the right side of face that has resolved. Pt also reports she has a sharp pain that woke her out of sleep. Pt reports head pain resolved at this time. Pt reports she has slight numbness to right side of face.

## 2020-02-13 ENCOUNTER — CLINICAL SUPPORT (OUTPATIENT)
Dept: AUDIOLOGY | Facility: CLINIC | Age: 70
End: 2020-02-13
Payer: MEDICARE

## 2020-02-13 ENCOUNTER — OFFICE VISIT (OUTPATIENT)
Dept: OTOLARYNGOLOGY | Facility: CLINIC | Age: 70
End: 2020-02-13
Payer: MEDICARE

## 2020-02-13 VITALS
HEIGHT: 66 IN | BODY MASS INDEX: 25.54 KG/M2 | WEIGHT: 158.94 LBS | DIASTOLIC BLOOD PRESSURE: 67 MMHG | HEART RATE: 63 BPM | SYSTOLIC BLOOD PRESSURE: 149 MMHG

## 2020-02-13 DIAGNOSIS — H90.A22 SENSORINEURAL HEARING LOSS (SNHL) OF LEFT EAR WITH RESTRICTED HEARING OF RIGHT EAR: Primary | ICD-10-CM

## 2020-02-13 DIAGNOSIS — R60.9 PAROTID SWELLING: ICD-10-CM

## 2020-02-13 DIAGNOSIS — H69.92 NEGATIVE MIDDLE EAR PRESSURE OF LEFT EAR: Primary | ICD-10-CM

## 2020-02-13 DIAGNOSIS — H91.91 HIGH-FREQUENCY HEARING LOSS OF RIGHT EAR: ICD-10-CM

## 2020-02-13 DIAGNOSIS — H92.01 RIGHT EAR PAIN: ICD-10-CM

## 2020-02-13 DIAGNOSIS — R20.2 FACIAL TINGLING SENSATION: ICD-10-CM

## 2020-02-13 DIAGNOSIS — R42 DIZZINESS: ICD-10-CM

## 2020-02-13 PROCEDURE — 99999 PR PBB SHADOW E&M-EST. PATIENT-LVL IV: CPT | Mod: PBBFAC,,, | Performed by: OTOLARYNGOLOGY

## 2020-02-13 PROCEDURE — 92504 EAR MICROSCOPY EXAMINATION: CPT | Mod: PBBFAC | Performed by: OTOLARYNGOLOGY

## 2020-02-13 PROCEDURE — 99214 OFFICE O/P EST MOD 30 MIN: CPT | Mod: PBBFAC,25 | Performed by: OTOLARYNGOLOGY

## 2020-02-13 PROCEDURE — 92504 PR EAR MICROSCOPY EXAMINATION: ICD-10-PCS | Mod: S$PBB,,, | Performed by: OTOLARYNGOLOGY

## 2020-02-13 PROCEDURE — 92557 COMPREHENSIVE HEARING TEST: CPT | Mod: PBBFAC | Performed by: AUDIOLOGIST

## 2020-02-13 PROCEDURE — 99202 OFFICE O/P NEW SF 15 MIN: CPT | Mod: 25,S$PBB,, | Performed by: OTOLARYNGOLOGY

## 2020-02-13 PROCEDURE — 99202 PR OFFICE/OUTPT VISIT, NEW, LEVL II, 15-29 MIN: ICD-10-PCS | Mod: 25,S$PBB,, | Performed by: OTOLARYNGOLOGY

## 2020-02-13 PROCEDURE — 69210 REMOVE IMPACTED EAR WAX UNI: CPT | Mod: PBBFAC | Performed by: OTOLARYNGOLOGY

## 2020-02-13 PROCEDURE — 92567 TYMPANOMETRY: CPT | Mod: PBBFAC | Performed by: AUDIOLOGIST

## 2020-02-13 PROCEDURE — 92504 EAR MICROSCOPY EXAMINATION: CPT | Mod: S$PBB,,, | Performed by: OTOLARYNGOLOGY

## 2020-02-13 PROCEDURE — 99999 PR PBB SHADOW E&M-EST. PATIENT-LVL IV: ICD-10-PCS | Mod: PBBFAC,,, | Performed by: OTOLARYNGOLOGY

## 2020-02-13 NOTE — PROGRESS NOTES
Sheila Zarco was seen today in the clinic for an audiologic evaluation.  Patients main complaint was dizziness that started earlier this week.  Ms. Zarco reported that she woke up with numbness to her face on the right side in addition to dizziness on Tuesday and went to the ER where they were unable to determine the cause. Patient was referred to neurology but cannot be seen until June. Patient stated that yesterday she woke up with right ear pain and decided to make an ENT appointment. Ms. Zarco reported that typically lying on her right side will invoke her dizziness but denied true vertigo.    Tympanometry revealed Type A in the right ear and Type A in the left ear.  Audiogram results revealed normal hearing (250-4000 Hz) to a mild to moderate high frequency hearing loss in the right ear and normal hearing (250 Hz) to a mild sensorineural hearing loss (500-6000 Hz) sloping to a severe high frequency sensorineural hearing loss (8000 Hz) in the left ear.  Speech reception thresholds were noted at 15 dB in the right ear and 20 dB in the left ear.  Speech discrimination scores were 92% in the right ear and 92% in the left ear.    Recommendations:  1. Otologic evaluation  2. Annual audiogram  3. Noise protection when in noise

## 2020-02-13 NOTE — PATIENT INSTRUCTIONS
Audiometry reviewed; results compared to previous  Gentle middle ear insufflation techniques encouraged; E.T. Literature provided  [place] VNG to be completed; scheduled; vertigo literature provided  Written Rx for meclizine 25 mg # 25/ one refill; take q 6 hours prn vertigo  Neurology consultation encouraged  [phone]  Hydration encourage re: right parotid function

## 2020-02-13 NOTE — PROGRESS NOTES
Subjective:       Patient ID: Sheila Zarco is a 69 y.o. female.    Chief Complaint: No chief complaint on file.    HPI: Ms. Zarco is a 69-year-old  female was evaluated in treated in our ED 02/11/2020 for dizziness episodes and right-sided facial tingling which had begun the day before.  She reported waking up around 9:00 a.m. experiencing facial tingling to the right side of her cheek and jaw.  She tate out of bed and felt dizzy;  She described a  spinning sensation and severe imbalance.  She denied loss of consciousness.   She subsequently felt a sharp right-sided headache that woke her from sleep when she attempted to get out of bed she was spinning ( according to EMR notes) .  She reported chronic dyspnea on exertion.    An EKG indicated sinus bradycardia.  An MRI scan of the brain without contrast indicated mild mucosal membrane thickening in the ethmoid and maxillary sinuses with trace fluid in the mastoid air cells.    She was diagnosed with dizziness she was encouraged to complete an neurology consultation which was scheduled many months from now( June?) . She gained an appointment with me in 48 hr.  She was prescribed #15  Meclizine pills, 25 mg.  She has taken a few of these with some improvement in symptoms.    She is worried she will run out of the meclizine pills prior to her neurology consultation appointment.  She thought she fell better initially; however,  Yesterday, she started to feel worse with overall dizziness symptoms, necessitating the visit today.  She indicates dissipation of her original right otalgia symptoms and right facial tingling symptoms; she now indicates swelling and pain/discomfort of the right parotid area which was noticed by her sister.      Past Medical History:   Diagnosis Date    Asthma     childhood    Baker's cyst     Cataract     Glaucoma     Thyroid disease      Past Surgical History:   Procedure Laterality Date    BUNIONECTOMY      GALLBLADDER  SURGERY      HYSTERECTOMY      KNEE CARTILAGE SURGERY      narrow angles eye surgery        Current Outpatient Medications on File Prior to Visit   Medication Sig Dispense Refill    albuterol (ACCUNEB) 0.63 mg/3 mL Nebu Take 3 mLs (0.63 mg total) by nebulization every 6 (six) hours as needed. 100 vial 2    albuterol (VENTOLIN HFA) 90 mcg/actuation inhaler Inhale 2 puffs into the lungs every 6 (six) hours as needed for Wheezing.      ALPRAZolam (XANAX) 0.5 MG tablet TAKE 1 TABLET BY MOUTH TWICE A DAY AS NEEDED FOR ANXIETY 60 tablet 0    clobetasol (TEMOVATE) 0.05 % cream Apply topically 2 (two) times daily. 30 g 3    cyanocobalamin (VITAMIN B-12) 250 MCG tablet Take 250 mcg by mouth once daily.      desoximetasone (TOPICORT) 0.25 % cream Apply topically 2 (two) times daily.        escitalopram oxalate (LEXAPRO) 20 MG tablet TAKE 1 TABLET (20 MG TOTAL) BY MOUTH ONCE DAILY. FOR ANXIETY. 90 tablet 2    fluocinonide (LIDEX) 0.05 % external solution AAA scalp qday prn itching, scaling 60 mL 3    FLUZONE HIGH-DOSE 2019-20, PF, 180 mcg/0.5 mL Syrg TO BE ADMINISTERED BY PHARMACIST FOR IMMUNIZATION      ketoconazole (NIZORAL) 2 % cream AAA twice daily on nose prn flare 60 g 3    ketoconazole (NIZORAL) 2 % shampoo WASH HAIR W/ MEDICATED SHAMPOO AT LEAST 2X WEEK, LET SIT ON SCALP FOR ATLEAST 5MINUTES BEFORE RINSIN 120 mL 1    levothyroxine (SYNTHROID) 88 MCG tablet TAKE 1 TABLET (88 MCG TOTAL) BY MOUTH EVERY MORNING. 90 tablet 3    meclizine (ANTIVERT) 25 mg tablet Take 1 tablet (25 mg total) by mouth 3 (three) times daily as needed for Dizziness. 15 tablet 0    naproxen (NAPROSYN) 500 MG tablet Take 1 tablet (500 mg total) by mouth 2 (two) times daily with meals. 60 tablet 3    pantoprazole (PROTONIX) 40 MG tablet TAKE 1 TABLET (40 MG TOTAL) BY MOUTH ONCE DAILY. 90 tablet 3    predniSONE (DELTASONE) 20 MG tablet 2 po qd 10 tablet 0    triamcinolone acetonide 0.025% (KENALOG) 0.025 % cream AAA qd to face,  ears prn flare 45 g 1    valACYclovir (VALTREX) 500 MG tablet TAKE ONE TABLET TWICE A DAY AS NEEDED FOR  OUTBREAK 12 tablet 3    VENTOLIN HFA 90 mcg/actuation inhaler INHALE 2 PUFFS INTO THE LUNGS EVERY 4 HOURS AS NEEDED FOR WHEEZING 18 Inhaler 0    fluticasone-salmeterol 250-50 mcg/dose (ADVAIR) 250-50 mcg/dose diskus inhaler Inhale 1 puff into the lungs 2 (two) times daily. 60 each 5     No current facility-administered medications on file prior to visit.      Review of Systems      The patient completed an audiometric study performed by the [facility] audiology service today.  The study is duplicated below and the results are reviewed with her in detail and compared to those of a previous study.  Objective:     she  Blood pressure 149/67 pulse 63 weight 158 lb  General:  Alert and oriented lady in no acute distress; there is some mild swelling of the right sided tail of  parotid area without skin erythema, rash, cellulitis or abscess.  Both ears were examined under the microscope in the micro procedure room  Physical Exam   HENT:   Head:       Ears:        Assessment:       1. Negative middle ear pressure of left ear    2. Dizziness    3. Parotid swelling, right tail area    4. Facial tingling sensation    5. Right ear pain        Plan:     Audiometry reviewed; results compared to previous  Gentle middle ear insufflation techniques encouraged; E.T. Literature provided  [place] VNG to be completed; scheduled; non-localizing vestibular abnormality which is incompletely compensated  Consider [place] VNG testing pending course; call to schedule prn  Trial of [place] head exercises may help  Consider vestibular therapy pending course; call to schedule prn  Vertigo literature provided  Written Rx for meclizine 25 mg # 25/ one refill; take q 6 hours prn vertigo  Neurology consultation encouraged  [phone]  Hydration encourage re: right parotid function

## 2020-02-17 RX ORDER — ESCITALOPRAM OXALATE 20 MG/1
20 TABLET ORAL DAILY
Qty: 90 TABLET | Refills: 2 | Status: SHIPPED | OUTPATIENT
Start: 2020-02-17 | End: 2020-12-22

## 2020-02-18 ENCOUNTER — TELEPHONE (OUTPATIENT)
Dept: NEUROLOGY | Facility: CLINIC | Age: 70
End: 2020-02-18

## 2020-02-18 NOTE — TELEPHONE ENCOUNTER
----- Message from Priscilla Hernandez sent at 2/18/2020 11:03 AM CST -----  Contact: Self [phone] or [phone]  Pt states can she be seen sooner than Nisha states she feeling really bad she is currently on the waiting list. Please call back to discuss

## 2020-02-19 ENCOUNTER — PATIENT MESSAGE (OUTPATIENT)
Dept: INTERNAL MEDICINE | Facility: CLINIC | Age: 70
End: 2020-02-19

## 2020-02-19 ENCOUNTER — OFFICE VISIT (OUTPATIENT)
Dept: INTERNAL MEDICINE | Facility: CLINIC | Age: 70
End: 2020-02-19
Payer: MEDICARE

## 2020-02-19 VITALS
WEIGHT: 158.94 LBS | RESPIRATION RATE: 18 BRPM | TEMPERATURE: 98 F | DIASTOLIC BLOOD PRESSURE: 60 MMHG | BODY MASS INDEX: 25.54 KG/M2 | HEART RATE: 74 BPM | SYSTOLIC BLOOD PRESSURE: 124 MMHG | HEIGHT: 66 IN

## 2020-02-19 DIAGNOSIS — R42 DIZZINESS: ICD-10-CM

## 2020-02-19 DIAGNOSIS — R42 VERTIGO: Primary | ICD-10-CM

## 2020-02-19 DIAGNOSIS — I77.89 OTHER SPECIFIED DISORDERS OF ARTERIES AND ARTERIOLES: ICD-10-CM

## 2020-02-19 PROCEDURE — 99214 OFFICE O/P EST MOD 30 MIN: CPT | Mod: S$PBB,,, | Performed by: INTERNAL MEDICINE

## 2020-02-19 PROCEDURE — 99999 PR PBB SHADOW E&M-EST. PATIENT-LVL III: CPT | Mod: PBBFAC,,, | Performed by: INTERNAL MEDICINE

## 2020-02-19 PROCEDURE — 99999 PR PBB SHADOW E&M-EST. PATIENT-LVL III: ICD-10-PCS | Mod: PBBFAC,,, | Performed by: INTERNAL MEDICINE

## 2020-02-19 PROCEDURE — 99214 PR OFFICE/OUTPT VISIT, EST, LEVL IV, 30-39 MIN: ICD-10-PCS | Mod: S$PBB,,, | Performed by: INTERNAL MEDICINE

## 2020-02-19 PROCEDURE — 99213 OFFICE O/P EST LOW 20 MIN: CPT | Mod: PBBFAC,PO | Performed by: INTERNAL MEDICINE

## 2020-02-19 NOTE — PROGRESS NOTES
Subjective:       Patient ID: Sheila Zarco is a 69 y.o. female.    Chief Complaint: Follow-up (ED, numbess to right side of face ); Dizziness; and Headache    HPI   Pt here for ED f/u on 2/11/20 for dizziness a/w facial numbness. MRI of the brain came back normal and labs/EKG were stable. Her facial numbness has resolved but she is still experiencing intermittent dizziness like the room is spinning which is slowly improving. Pt has already been evaluated by ENT and started on Meclizine which has not helped much. It was recommended for her to see Neuro.   Review of Systems   Constitutional: Negative for activity change, appetite change, chills, diaphoresis, fatigue, fever and unexpected weight change.   HENT: Negative for postnasal drip, rhinorrhea, sinus pressure, sneezing, sore throat, trouble swallowing and voice change.    Respiratory: Negative for cough, shortness of breath and wheezing.    Cardiovascular: Negative for chest pain, palpitations and leg swelling.   Gastrointestinal: Negative for abdominal pain, blood in stool, constipation, diarrhea, nausea and vomiting.   Genitourinary: Negative for dysuria.   Musculoskeletal: Negative for arthralgias and myalgias.   Skin: Negative for rash and wound.   Allergic/Immunologic: Negative for environmental allergies and food allergies.   Neurological: Positive for dizziness. Negative for tremors, seizures, syncope, facial asymmetry, speech difficulty, weakness, light-headedness, numbness and headaches.   Hematological: Negative for adenopathy. Does not bruise/bleed easily.       Objective:      Physical Exam   Constitutional: She is oriented to person, place, and time. She appears well-developed and well-nourished. No distress.   HENT:   Head: Normocephalic and atraumatic.   Right Ear: External ear normal.   Left Ear: External ear normal.   Nose: Nose normal.   Mouth/Throat: Oropharynx is clear and moist. No oropharyngeal exudate.   Eyes: Pupils are equal,  round, and reactive to light. Conjunctivae and EOM are normal. Right eye exhibits no discharge. Left eye exhibits no discharge. No scleral icterus.   Neck: Neck supple. No JVD present.   Cardiovascular: Normal rate, regular rhythm, normal heart sounds and intact distal pulses.   Pulmonary/Chest: Effort normal and breath sounds normal. No respiratory distress. She has no wheezes. She has no rales.   Abdominal: Soft. Bowel sounds are normal. There is no tenderness.   Musculoskeletal: She exhibits no edema.   Lymphadenopathy:     She has no cervical adenopathy.   Neurological: She is alert and oriented to person, place, and time.   Skin: Skin is warm and dry. No rash noted. She is not diaphoretic. No pallor.       Assessment:       1. Vertigo    2. Dizziness    3. Other specified disorders of arteries and arterioles         Plan:    1. Urgent referral to Neuro       Check Carotid US        Continue Meclizine PRN

## 2020-02-19 NOTE — TELEPHONE ENCOUNTER
ELIZABET  See portal message    Spoke with pt.  Offered appt today with Dr Aguirre.    She will call back to confirm.  Needs to get transportation.

## 2020-02-20 ENCOUNTER — CLINICAL SUPPORT (OUTPATIENT)
Dept: CARDIOLOGY | Facility: CLINIC | Age: 70
End: 2020-02-20
Attending: INTERNAL MEDICINE
Payer: MEDICARE

## 2020-02-20 DIAGNOSIS — R42 DIZZINESS: ICD-10-CM

## 2020-02-20 DIAGNOSIS — R42 VERTIGO: ICD-10-CM

## 2020-02-20 DIAGNOSIS — I77.89 OTHER SPECIFIED DISORDERS OF ARTERIES AND ARTERIOLES: ICD-10-CM

## 2020-02-20 LAB
LEFT ARM DIASTOLIC BLOOD PRESSURE: 50 MMHG
LEFT ARM SYSTOLIC BLOOD PRESSURE: 115 MMHG
LEFT CBA DIAS: 6 CM/S
LEFT CBA SYS: 51 CM/S
LEFT CCA DIST DIAS: 9 CM/S
LEFT CCA DIST SYS: 60 CM/S
LEFT CCA MID DIAS: 9 CM/S
LEFT CCA MID SYS: 68 CM/S
LEFT CCA PROX DIAS: 10 CM/S
LEFT CCA PROX SYS: 75 CM/S
LEFT ECA DIAS: 4 CM/S
LEFT ECA SYS: 50 CM/S
LEFT ICA DIST DIAS: 16 CM/S
LEFT ICA DIST SYS: 87 CM/S
LEFT ICA MID DIAS: 12 CM/S
LEFT ICA MID SYS: 72 CM/S
LEFT ICA PROX DIAS: 7 CM/S
LEFT ICA PROX SYS: 47 CM/S
LEFT VERTEBRAL DIAS: 8 CM/S
LEFT VERTEBRAL SYS: 44 CM/S
OHS CV CAROTID RIGHT ICA EDV HIGHEST: 16
OHS CV CAROTID ULTRASOUND LEFT ICA/CCA RATIO: 1.16
OHS CV CAROTID ULTRASOUND RIGHT ICA/CCA RATIO: 1.69
OHS CV PV CAROTID LEFT HIGHEST CCA: 75
OHS CV PV CAROTID LEFT HIGHEST ICA: 87
OHS CV PV CAROTID RIGHT HIGHEST CCA: 72
OHS CV PV CAROTID RIGHT HIGHEST ICA: 122
OHS CV US CAROTID LEFT HIGHEST EDV: 16
RIGHT ARM DIASTOLIC BLOOD PRESSURE: 50 MMHG
RIGHT ARM SYSTOLIC BLOOD PRESSURE: 120 MMHG
RIGHT CBA DIAS: 5 CM/S
RIGHT CBA SYS: 39 CM/S
RIGHT CCA DIST DIAS: 7 CM/S
RIGHT CCA DIST SYS: 41 CM/S
RIGHT CCA MID DIAS: 7 CM/S
RIGHT CCA MID SYS: 63 CM/S
RIGHT CCA PROX DIAS: 7 CM/S
RIGHT CCA PROX SYS: 72 CM/S
RIGHT ECA DIAS: 8 CM/S
RIGHT ECA SYS: 72 CM/S
RIGHT ICA DIST DIAS: 16 CM/S
RIGHT ICA DIST SYS: 121 CM/S
RIGHT ICA MID DIAS: 13 CM/S
RIGHT ICA MID SYS: 122 CM/S
RIGHT ICA PROX DIAS: 6 CM/S
RIGHT ICA PROX SYS: 45 CM/S
RIGHT VERTEBRAL DIAS: 7 CM/S
RIGHT VERTEBRAL SYS: 31 CM/S

## 2020-02-20 PROCEDURE — 93880 EXTRACRANIAL BILAT STUDY: CPT | Mod: PBBFAC,PO | Performed by: INTERNAL MEDICINE

## 2020-02-20 PROCEDURE — 93880 CV US DOPPLER CAROTID (CUPID ONLY): ICD-10-PCS | Mod: 26,S$PBB,, | Performed by: INTERNAL MEDICINE

## 2020-03-02 ENCOUNTER — LAB VISIT (OUTPATIENT)
Dept: LAB | Facility: HOSPITAL | Age: 70
End: 2020-03-02
Attending: PSYCHIATRY & NEUROLOGY
Payer: MEDICARE

## 2020-03-02 ENCOUNTER — OFFICE VISIT (OUTPATIENT)
Dept: NEUROLOGY | Facility: CLINIC | Age: 70
End: 2020-03-02
Payer: MEDICARE

## 2020-03-02 VITALS
HEIGHT: 66 IN | DIASTOLIC BLOOD PRESSURE: 59 MMHG | BODY MASS INDEX: 25.79 KG/M2 | SYSTOLIC BLOOD PRESSURE: 137 MMHG | HEART RATE: 65 BPM | WEIGHT: 160.5 LBS

## 2020-03-02 DIAGNOSIS — R42 LIGHTHEADEDNESS: ICD-10-CM

## 2020-03-02 DIAGNOSIS — R42 LIGHTHEADEDNESS: Primary | ICD-10-CM

## 2020-03-02 DIAGNOSIS — R42 DIZZINESS: ICD-10-CM

## 2020-03-02 LAB
CREAT SERPL-MCNC: 0.9 MG/DL (ref 0.5–1.4)
CRP SERPL-MCNC: 2.3 MG/L (ref 0–8.2)
ERYTHROCYTE [SEDIMENTATION RATE] IN BLOOD BY WESTERGREN METHOD: 9 MM/HR (ref 0–20)
EST. GFR  (AFRICAN AMERICAN): >60 ML/MIN/1.73 M^2
EST. GFR  (NON AFRICAN AMERICAN): >60 ML/MIN/1.73 M^2

## 2020-03-02 PROCEDURE — 36415 COLL VENOUS BLD VENIPUNCTURE: CPT

## 2020-03-02 PROCEDURE — 86140 C-REACTIVE PROTEIN: CPT

## 2020-03-02 PROCEDURE — 99204 OFFICE O/P NEW MOD 45 MIN: CPT | Mod: S$PBB,,, | Performed by: PSYCHIATRY & NEUROLOGY

## 2020-03-02 PROCEDURE — 99999 PR PBB SHADOW E&M-EST. PATIENT-LVL V: ICD-10-PCS | Mod: PBBFAC,,, | Performed by: PSYCHIATRY & NEUROLOGY

## 2020-03-02 PROCEDURE — 82565 ASSAY OF CREATININE: CPT

## 2020-03-02 PROCEDURE — 99999 PR PBB SHADOW E&M-EST. PATIENT-LVL V: CPT | Mod: PBBFAC,,, | Performed by: PSYCHIATRY & NEUROLOGY

## 2020-03-02 PROCEDURE — 99215 OFFICE O/P EST HI 40 MIN: CPT | Mod: PBBFAC | Performed by: PSYCHIATRY & NEUROLOGY

## 2020-03-02 PROCEDURE — 99204 PR OFFICE/OUTPT VISIT, NEW, LEVL IV, 45-59 MIN: ICD-10-PCS | Mod: S$PBB,,, | Performed by: PSYCHIATRY & NEUROLOGY

## 2020-03-02 PROCEDURE — 85652 RBC SED RATE AUTOMATED: CPT

## 2020-03-02 NOTE — PROGRESS NOTES
Neurology Consult Note    Chief Complaint: dizziness    HPI:   Sheila Zarco is a 69 y.o. female with medical conditions as outlined below who presents for further evaluation of lightheadedness. She states 3 weeks ago, she got up to use the bathroom and suddenly felt lightheadedness and off balance. She denies change in speech, change in vision, focal weakness or inability to walk on her own. She went to the ED at that time and an MRI brain was done which was negative for infarct. She states the lightheadedness and feeling off balance is worse with walking, but she feels it when sitting also. She states the lightheadedness is constant and she feels lightheaded and off balance when walking at times. She denies bowel or bladder problems or recent falls. She denies weakness in her extremities. She states she has a 5/10 aching headache. She denies associated photophobia, phonophobia, nausea or vomiting. She admits to recent history of bronchitis and nasal congestion. She denies numbness or tingling in her hands or feet. She has no further complaints.    Past Medical History:  Past Medical History:   Diagnosis Date    Asthma     childhood    Baker's cyst     Cataract     Glaucoma     Thyroid disease        Past Surgical History:  Past Surgical History:   Procedure Laterality Date    BUNIONECTOMY      GALLBLADDER SURGERY      HYSTERECTOMY      KNEE CARTILAGE SURGERY      narrow angles eye surgery          Social History:  Social History     Socioeconomic History    Marital status:      Spouse name: Not on file    Number of children: Not on file    Years of education: Not on file    Highest education level: Not on file   Occupational History    Not on file   Social Needs    Financial resource strain: Not on file    Food insecurity:     Worry: Not on file     Inability: Not on file    Transportation needs:     Medical: Not on file     Non-medical: Not on file   Tobacco Use    Smoking status:  Never Smoker    Smokeless tobacco: Never Used   Substance and Sexual Activity    Alcohol use: Yes     Alcohol/week: 8.0 standard drinks     Types: 4 Glasses of wine, 4 Cans of beer per week     Comment: drinks 4 days a week- >1 drink    Drug use: No    Sexual activity: Yes   Lifestyle    Physical activity:     Days per week: Not on file     Minutes per session: Not on file    Stress: Not on file   Relationships    Social connections:     Talks on phone: Not on file     Gets together: Not on file     Attends Restorationist service: Not on file     Active member of club or organization: Not on file     Attends meetings of clubs or organizations: Not on file     Relationship status: Not on file   Other Topics Concern    Are you pregnant or think you may be? No    Breast-feeding No   Social History Narrative    Not on file       Family History:  Family History   Problem Relation Age of Onset    Heart disease Mother     Heart disease Father     Liver disease Brother     Breast cancer Sister     Breast cancer Daughter 43        bilateral mastectomy    No Known Problems Sister     No Known Problems Maternal Grandmother     No Known Problems Maternal Grandfather     No Known Problems Paternal Grandmother     No Known Problems Paternal Grandfather     No Known Problems Maternal Aunt     No Known Problems Maternal Uncle     No Known Problems Paternal Aunt     No Known Problems Paternal Uncle     Breast cancer Other     Colon cancer Neg Hx     Ovarian cancer Neg Hx     Melanoma Neg Hx     Lupus Neg Hx     Acne Neg Hx     Amblyopia Neg Hx     Blindness Neg Hx     Cancer Neg Hx     Cataracts Neg Hx     Diabetes Neg Hx     Glaucoma Neg Hx     Hypertension Neg Hx     Macular degeneration Neg Hx     Retinal detachment Neg Hx     Strabismus Neg Hx     Stroke Neg Hx     Thyroid disease Neg Hx        Medications:  Current Outpatient Medications   Medication Sig Dispense Refill    albuterol  (ACCUNEB) 0.63 mg/3 mL Nebu Take 3 mLs (0.63 mg total) by nebulization every 6 (six) hours as needed. 100 vial 2    albuterol (VENTOLIN HFA) 90 mcg/actuation inhaler Inhale 2 puffs into the lungs every 6 (six) hours as needed for Wheezing.      ALPRAZolam (XANAX) 0.5 MG tablet TAKE 1 TABLET BY MOUTH TWICE A DAY AS NEEDED FOR ANXIETY 60 tablet 0    clobetasol (TEMOVATE) 0.05 % cream Apply topically 2 (two) times daily. 30 g 3    desoximetasone (TOPICORT) 0.25 % cream Apply topically 2 (two) times daily.        escitalopram oxalate (LEXAPRO) 20 MG tablet Take 1 tablet (20 mg total) by mouth once daily. For anxiety. 90 tablet 2    fluocinonide (LIDEX) 0.05 % external solution AAA scalp qday prn itching, scaling 60 mL 3    FLUZONE HIGH-DOSE 2019-20, PF, 180 mcg/0.5 mL Syrg TO BE ADMINISTERED BY PHARMACIST FOR IMMUNIZATION      ketoconazole (NIZORAL) 2 % cream AAA twice daily on nose prn flare 60 g 3    ketoconazole (NIZORAL) 2 % shampoo WASH HAIR W/ MEDICATED SHAMPOO AT LEAST 2X WEEK, LET SIT ON SCALP FOR ATLEAST 5MINUTES BEFORE RINSIN 120 mL 1    levothyroxine (SYNTHROID) 88 MCG tablet TAKE 1 TABLET (88 MCG TOTAL) BY MOUTH EVERY MORNING. 90 tablet 3    meclizine (ANTIVERT) 25 mg tablet Take 1 tablet (25 mg total) by mouth 3 (three) times daily as needed for Dizziness. 15 tablet 0    naproxen (NAPROSYN) 500 MG tablet Take 1 tablet (500 mg total) by mouth 2 (two) times daily with meals. 60 tablet 3    pantoprazole (PROTONIX) 40 MG tablet TAKE 1 TABLET (40 MG TOTAL) BY MOUTH ONCE DAILY. 90 tablet 3    triamcinolone acetonide 0.025% (KENALOG) 0.025 % cream AAA qd to face, ears prn flare 45 g 1    valACYclovir (VALTREX) 500 MG tablet TAKE ONE TABLET TWICE A DAY AS NEEDED FOR  OUTBREAK 12 tablet 3    VENTOLIN HFA 90 mcg/actuation inhaler INHALE 2 PUFFS INTO THE LUNGS EVERY 4 HOURS AS NEEDED FOR WHEEZING 18 Inhaler 0    fluticasone-salmeterol 250-50 mcg/dose (ADVAIR) 250-50 mcg/dose diskus inhaler Inhale 1  puff into the lungs 2 (two) times daily. 60 each 5     No current facility-administered medications for this visit.        Allergies:  Review of patient's allergies indicates:  No Known Allergies    ROS:  A 12 point review of system was negative aside from pertinent positives and negatives as outlined above.    Physical Exam  Vitals:    03/02/20 0932   BP: (!) 137/59   Pulse: 65       General: well nourished, well developed  Eyes: no scleral icterus   Nose: nasal turbinates intact  Neck: supple, ROM intact  Skin: no rash or ecchymosis  Joints: no swelling or erythema  Cardiac: regular rate and rhythm  Lungs: clear to auscultation bilaterally    Neuro:  Mental status: AAO x 3, no dysarthria, no aphasia, communicating appropriately  CN: PERRL, EOMI, VFF, V1-V3 sensation intact, no facial asymmetry, hearing grossly intact, tongue midline  Motor:   RUE 5/5  RLE 5/5  LUE 5/5  LLE 5/5    Normal bulk and tone    Reflexes:1+ throughout, toes equivocal bilaterally  Sensory: intact to light touch throughout  Coordination: no dysmetria on FTN  Gait: hesitant, but steady    Prior Imaging/Labs:  Reviewed      Assessment and Plan:    69 y.o. female with lightheadedness of unclear etiology, given mild to moderate headache with nasal congestion could be related to sinuses. Will obtain further workup to help identify cause. She denies loss of consciousness      1. Dizziness  - CTA Head and Neck (xpd); Future  Continue to follow with ENT, lightheadedness and headache possibly related to sinuses    2. Lightheadedness  - Tilt table; Future  - Ambulatory referral/consult to Cardiology; Future  Will check esr and crp given temporal throbbing headache, but low clinical suspicion for temporal arteritis at this time          Patient was advised to notify me for worsening symptoms. I will see patient back in 1 month or sooner if necessary.     Thank you for this consultation.    Ashley Alaniz DO  [facility]  [address] [facility]  [facility] [address]  [place] [address]  [phone]

## 2020-03-03 ENCOUNTER — CLINICAL SUPPORT (OUTPATIENT)
Dept: AUDIOLOGY | Facility: CLINIC | Age: 70
End: 2020-03-03
Payer: MEDICARE

## 2020-03-03 DIAGNOSIS — H81.8X9 OTHER DISORDERS OF VESTIBULAR FUNCTION, UNSPECIFIED EAR: Primary | ICD-10-CM

## 2020-03-03 PROCEDURE — 92542 PR POSITIONAL NYSTAGMUS TEST: ICD-10-PCS | Mod: 26,59,S$PBB, | Performed by: AUDIOLOGIST

## 2020-03-03 PROCEDURE — 92541 SPONTANEOUS NYSTAGMUS TEST: CPT | Mod: 26,S$PBB,, | Performed by: AUDIOLOGIST

## 2020-03-03 PROCEDURE — 92541 PR SPONTANEOUS NYSTAGMUS TEST: ICD-10-PCS | Mod: 26,S$PBB,, | Performed by: AUDIOLOGIST

## 2020-03-03 PROCEDURE — 92541 SPONTANEOUS NYSTAGMUS TEST: CPT | Mod: PBBFAC,59 | Performed by: AUDIOLOGIST

## 2020-03-03 PROCEDURE — 92542 POSITIONAL NYSTAGMUS TEST: CPT | Mod: PBBFAC | Performed by: AUDIOLOGIST

## 2020-03-03 PROCEDURE — 92542 POSITIONAL NYSTAGMUS TEST: CPT | Mod: 26,59,S$PBB, | Performed by: AUDIOLOGIST

## 2020-03-03 NOTE — PROGRESS NOTES
[place]-Hallpike Evaluation    Referring physician:  Dr. Dumont    69 y.o. female complains of dizziness, lightheadedness and imbalance.  Symptoms are provoked by quick head turns, arising from bed, reclining into bed, rolling over in bed, bending over, looking up, and rising from a sitting to a standing position and have been recurring over the past 3 weeks.  Ms. Zarco stated that when she has an episode she typically feels off-balance all day.      Spontaneous nystagmus was absent.    The head-hanging left Hallpike revealed <clinically signficant, non-fatiguing> nystagmus: 4 d/s right-beating in the supine position.    The head-hanging right Hallpike revealed <clinically significant, non-fatiguing> nystagmus: 4 d/s left-beating in the supine position.    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    Recommend: 1. A trial with formal vestibular/balance rehab due to complaint of constant imbalance.  2. A trial with Cawthorne exercises to help reduce subjective symptoms.  A printed copy of these exercises was provided to Ms. Zarco at today's appointment.  3. Consider complete VNG testing if symptoms persist.

## 2020-03-03 NOTE — Clinical Note
Hi Dr. Dumont,Please see the results of today's [facility] evaluation.  Please let me know if I can provide any additional information.Pierre,Ricki Scott, CCC-A

## 2020-03-04 ENCOUNTER — PATIENT MESSAGE (OUTPATIENT)
Dept: ELECTROPHYSIOLOGY | Facility: CLINIC | Age: 70
End: 2020-03-04

## 2020-03-04 ENCOUNTER — DOCUMENTATION ONLY (OUTPATIENT)
Dept: ELECTROPHYSIOLOGY | Facility: CLINIC | Age: 70
End: 2020-03-04

## 2020-03-04 ENCOUNTER — TELEPHONE (OUTPATIENT)
Dept: ELECTROPHYSIOLOGY | Facility: CLINIC | Age: 70
End: 2020-03-04

## 2020-03-04 NOTE — TELEPHONE ENCOUNTER
Scheduled----- Message from Nica Nunn RN sent at 3/2/2020  2:37 PM CST -----      ----- Message -----  From: Shakira Nettles MA  Sent: 3/2/2020   1:03 PM CST  To: Nica Nunn RN    Patient needs Tilt table can you please set her up.    KAYCEE Rosenberg

## 2020-03-04 NOTE — TELEPHONE ENCOUNTER
TILT TABLE TEST EDUCATION CHECKLIST    3/27/2020 @ 0930 am  REPORT TO CARDIOLOGY WAITING ROOM ON 3RD FLOOR OF HOSPITAL  (DO NOT REPORT TO CLINIC)  If you park in the Parking Garage:  Take elevators to the 2nd floor  Walk up ramp and turn right by [address] Ele[address]  Take elevator to the 3rd floor  Upon exiting the elevator, turn away from the clinic areas  Walk long ridley around to front of hospital to area with windows overlooking [facility]  Check in at Reception Desk    DO NOT EAT OR DRINK ANYTHING AFTER MIDNIGHT ON THE NIGHT BEFORE YOUR TEST    YOU SHOULD TAKE YOUR USUAL MORNING MEDICATIONS WITH A SIP OF WATER    YOU WILL NEED SOMEONE TO DRIVE YOU HOME AFTER YOUR TEST    THE ABOVE INSTRUCTIONS WERE GIVEN TO THE PATIENT VERBALLY AND THEY VERBALIZED UNDERSTANDING. THEY DO NOT REQUIRE ANY SPECIAL NEEDS AND DO NOT HAVE ANY LEARNING BARRIERS.     Any need to reschedule or cancel procedures, or any questions regarding your procedures should be addressed directly with the Arrhythmia Department Nurses at the following phone number: [phone]

## 2020-03-05 ENCOUNTER — PATIENT MESSAGE (OUTPATIENT)
Dept: NEUROLOGY | Facility: CLINIC | Age: 70
End: 2020-03-05

## 2020-03-05 ENCOUNTER — HOSPITAL ENCOUNTER (OUTPATIENT)
Dept: RADIOLOGY | Facility: HOSPITAL | Age: 70
Discharge: HOME | End: 2020-03-05
Attending: PSYCHIATRY & NEUROLOGY
Payer: MEDICARE

## 2020-03-05 ENCOUNTER — OFFICE VISIT (OUTPATIENT)
Dept: CARDIOLOGY | Facility: CLINIC | Age: 70
End: 2020-03-05
Payer: MEDICARE

## 2020-03-05 VITALS
DIASTOLIC BLOOD PRESSURE: 63 MMHG | HEIGHT: 66 IN | SYSTOLIC BLOOD PRESSURE: 134 MMHG | WEIGHT: 164 LBS | BODY MASS INDEX: 26.36 KG/M2 | HEART RATE: 69 BPM

## 2020-03-05 DIAGNOSIS — R42 LIGHTHEADEDNESS: ICD-10-CM

## 2020-03-05 DIAGNOSIS — R42 DIZZINESS: ICD-10-CM

## 2020-03-05 PROCEDURE — 70496 CT ANGIOGRAPHY HEAD: CPT | Mod: TC

## 2020-03-05 PROCEDURE — 99999 PR PBB SHADOW E&M-EST. PATIENT-LVL V: CPT | Mod: PBBFAC,GC,, | Performed by: INTERNAL MEDICINE

## 2020-03-05 PROCEDURE — 70496 CT ANGIOGRAPHY HEAD: CPT | Mod: 26,,, | Performed by: RADIOLOGY

## 2020-03-05 PROCEDURE — 99215 OFFICE O/P EST HI 40 MIN: CPT | Mod: PBBFAC,25 | Performed by: INTERNAL MEDICINE

## 2020-03-05 PROCEDURE — 70498 CTA HEAD AND NECK (XPD): ICD-10-PCS | Mod: 26,,, | Performed by: RADIOLOGY

## 2020-03-05 PROCEDURE — 99204 OFFICE O/P NEW MOD 45 MIN: CPT | Mod: S$PBB,GC,, | Performed by: INTERNAL MEDICINE

## 2020-03-05 PROCEDURE — 99999 PR PBB SHADOW E&M-EST. PATIENT-LVL V: ICD-10-PCS | Mod: PBBFAC,GC,, | Performed by: INTERNAL MEDICINE

## 2020-03-05 PROCEDURE — 70498 CT ANGIOGRAPHY NECK: CPT | Mod: 26,,, | Performed by: RADIOLOGY

## 2020-03-05 PROCEDURE — 25500020 PHARM REV CODE 255: Performed by: PSYCHIATRY & NEUROLOGY

## 2020-03-05 PROCEDURE — 70496 CTA HEAD AND NECK (XPD): ICD-10-PCS | Mod: 26,,, | Performed by: RADIOLOGY

## 2020-03-05 PROCEDURE — 99204 PR OFFICE/OUTPT VISIT, NEW, LEVL IV, 45-59 MIN: ICD-10-PCS | Mod: S$PBB,GC,, | Performed by: INTERNAL MEDICINE

## 2020-03-05 RX ADMIN — IOHEXOL 100 ML: 350 INJECTION, SOLUTION INTRAVENOUS at 02:03

## 2020-03-05 NOTE — PROGRESS NOTES
I have personally taken the history and examined this patient and agree with the Fellow's note as stated above.    Her sx are now CV related.

## 2020-03-05 NOTE — PROGRESS NOTES
PCP - Farhat Correa MD  Subjective:     Sheila Zarco is a 69 y.o. y.o. female who is here today for dizziness evaluation.    PMHx:  - Hypothyroidism  - HLD  - Anxiety  - GERD  - NAFLD    Patient was seen by neurology a few days prior for dizziness. Tilt table test and CTA of H/N ordered and referred to cardiology for further evaluation. CTA head negative, CTA neck mild stenosis of origin of left vertebral artery, and <50% lesion of proximal ICA. Patient reports dizziness for the past month that occurs mostly with certain head movements, but unable to clearly elaborate. Denies syncope. Occasional palpitations with anxiety that improves with Xanax. She was seen by ENT earlier today with plans for Sabine Hallpike maneuvers and conservative measures for now.    Cardiac studies:  SPECT 2015: no evidence of ischemia  Carotid u/s 2020: no obstructive lesions  ECG 2/2020: sinus bradycardia. Otherwise, normal ECG.      History:     Social History     Tobacco Use    Smoking status: Never Smoker    Smokeless tobacco: Never Used   Substance Use Topics    Alcohol use: Yes     Alcohol/week: 8.0 standard drinks     Types: 4 Glasses of wine, 4 Cans of beer per week     Comment: drinks 4 days a week- >1 drink     Family History   Problem Relation Age of Onset    Heart disease Mother     Heart disease Father     Liver disease Brother     Breast cancer Sister     Breast cancer Daughter 43        bilateral mastectomy    No Known Problems Sister     No Known Problems Maternal Grandmother     No Known Problems Maternal Grandfather     No Known Problems Paternal Grandmother     No Known Problems Paternal Grandfather     No Known Problems Maternal Aunt     No Known Problems Maternal Uncle     No Known Problems Paternal Aunt     No Known Problems Paternal Uncle     Breast cancer Other     Colon cancer Neg Hx     Ovarian cancer Neg Hx     Melanoma Neg Hx     Lupus Neg Hx     Acne Neg Hx     Amblyopia Neg Hx      Blindness Neg Hx     Cancer Neg Hx     Cataracts Neg Hx     Diabetes Neg Hx     Glaucoma Neg Hx     Hypertension Neg Hx     Macular degeneration Neg Hx     Retinal detachment Neg Hx     Strabismus Neg Hx     Stroke Neg Hx     Thyroid disease Neg Hx        Meds:   Review of patient's allergies indicates:  No Known Allergies    Current Outpatient Medications:     albuterol (ACCUNEB) 0.63 mg/3 mL Nebu, Take 3 mLs (0.63 mg total) by nebulization every 6 (six) hours as needed., Disp: 100 vial, Rfl: 2    albuterol (VENTOLIN HFA) 90 mcg/actuation inhaler, Inhale 2 puffs into the lungs every 6 (six) hours as needed for Wheezing., Disp: , Rfl:     ALPRAZolam (XANAX) 0.5 MG tablet, TAKE 1 TABLET BY MOUTH TWICE A DAY AS NEEDED FOR ANXIETY, Disp: 60 tablet, Rfl: 0    clobetasol (TEMOVATE) 0.05 % cream, Apply topically 2 (two) times daily., Disp: 30 g, Rfl: 3    desoximetasone (TOPICORT) 0.25 % cream, Apply topically 2 (two) times daily.  , Disp: , Rfl:     escitalopram oxalate (LEXAPRO) 20 MG tablet, Take 1 tablet (20 mg total) by mouth once daily. For anxiety., Disp: 90 tablet, Rfl: 2    fluocinonide (LIDEX) 0.05 % external solution, AAA scalp qday prn itching, scaling, Disp: 60 mL, Rfl: 3    fluticasone-salmeterol 250-50 mcg/dose (ADVAIR) 250-50 mcg/dose diskus inhaler, Inhale 1 puff into the lungs 2 (two) times daily., Disp: 60 each, Rfl: 5    FLUZONE HIGH-DOSE 2019-20, PF, 180 mcg/0.5 mL Syrg, TO BE ADMINISTERED BY PHARMACIST FOR IMMUNIZATION, Disp: , Rfl:     ketoconazole (NIZORAL) 2 % cream, AAA twice daily on nose prn flare, Disp: 60 g, Rfl: 3    ketoconazole (NIZORAL) 2 % shampoo, WASH HAIR W/ MEDICATED SHAMPOO AT LEAST 2X WEEK, LET SIT ON SCALP FOR ATLEAST 5MINUTES BEFORE RINSIN, Disp: 120 mL, Rfl: 1    levothyroxine (SYNTHROID) 88 MCG tablet, TAKE 1 TABLET (88 MCG TOTAL) BY MOUTH EVERY MORNING., Disp: 90 tablet, Rfl: 3    meclizine (ANTIVERT) 25 mg tablet, Take 1 tablet (25 mg total) by  mouth 3 (three) times daily as needed for Dizziness., Disp: 15 tablet, Rfl: 0    naproxen (NAPROSYN) 500 MG tablet, Take 1 tablet (500 mg total) by mouth 2 (two) times daily with meals., Disp: 60 tablet, Rfl: 3    pantoprazole (PROTONIX) 40 MG tablet, TAKE 1 TABLET (40 MG TOTAL) BY MOUTH ONCE DAILY., Disp: 90 tablet, Rfl: 3    triamcinolone acetonide 0.025% (KENALOG) 0.025 % cream, AAA qd to face, ears prn flare, Disp: 45 g, Rfl: 1    valACYclovir (VALTREX) 500 MG tablet, TAKE ONE TABLET TWICE A DAY AS NEEDED FOR  OUTBREAK, Disp: 12 tablet, Rfl: 3    VENTOLIN HFA 90 mcg/actuation inhaler, INHALE 2 PUFFS INTO THE LUNGS EVERY 4 HOURS AS NEEDED FOR WHEEZING, Disp: 18 Inhaler, Rfl: 0    Constitution: Negative for fever or chills. Negative for weight loss or gain.   HENT: Negative for sore throat or headaches. Negative for rhinorrhea.  Eyes: Negative for blurred or double vision.   Cardiovascular: See above  Pulmonary: Negative for SOB. Negative for cough.   Gastrointestinal: Negative for abdominal pain. Negative for nausea/ vomiting. Negative for diarrhea.   : Negative for dysuria.   Neurological: Negative for focal weakness or sensory changes.    Objective:   There were no vitals taken for this visit.    General: NAD. AAO.  HENT: No scleral icterus. Extraocular movements intact.  Neck: No JVD  Cardiovascular: Regular heart rate and rhythm. S1/S2 appreciated. No gallops, rubs, or murmurs. 2+ carotid/radial/femoral/DP/PT pulses bilaterally.  Abdomen: Nontender, Nondistended. No hepatosplenomegaly appreciated.  Respiratory: CTAB. No increased work of breathing.  Extremities: Warm. No edema.  Skin: no ulceration or wounds present    Labs:     Lab Results   Component Value Date     02/11/2020    K 4.4 02/11/2020     (H) 02/11/2020    CO2 19 (L) 02/11/2020    BUN 18 02/11/2020    CREATININE 0.9 03/02/2020    ANIONGAP 9 02/11/2020     Lab Results   Component Value Date    HGBA1C 5.4 01/09/2015     No  results found for: BNP, BNPTRIAGEBLO    Lab Results   Component Value Date    WBC 6.39 02/11/2020    HGB 12.9 02/11/2020    HCT 40.8 02/11/2020     02/11/2020    GRAN 3.3 02/11/2020    GRAN 51.7 02/11/2020     Lab Results   Component Value Date    CHOL 278 (H) 05/14/2019    HDL 90 (H) 05/14/2019    LDLCALC 157.0 05/14/2019    TRIG 155 (H) 05/14/2019       Lab Results   Component Value Date     02/11/2020    K 4.4 02/11/2020     (H) 02/11/2020    CO2 19 (L) 02/11/2020    BUN 18 02/11/2020    CREATININE 0.9 03/02/2020    ANIONGAP 9 02/11/2020     Lab Results   Component Value Date    HGBA1C 5.4 01/09/2015     No results found for: BNP, BNPTRIAGEBLO Lab Results   Component Value Date    WBC 6.39 02/11/2020    HGB 12.9 02/11/2020    HCT 40.8 02/11/2020     02/11/2020    GRAN 3.3 02/11/2020    GRAN 51.7 02/11/2020     Lab Results   Component Value Date    CHOL 278 (H) 05/14/2019    HDL 90 (H) 05/14/2019    LDLCALC 157.0 05/14/2019    TRIG 155 (H) 05/14/2019            Assessment & Plan:     Sheila Zarco is a 69 y.o. y.o. female who is here today for dizziness which is clinically most consistent with vertigo. Orthostatics negative although patient denies symptoms during orthostatic testing. Imagining without evidence of major narrowing of great vessels or branches. Agree with tilt table testing to assess for provocation of symptoms. No further cardiovascular workup indicated otherwise at this time.     Follow up SONIA Farr M.D.  Pager #: [phone]  [facility] PGY-IV

## 2020-03-09 ENCOUNTER — PATIENT MESSAGE (OUTPATIENT)
Dept: NEUROLOGY | Facility: CLINIC | Age: 70
End: 2020-03-09

## 2020-03-10 DIAGNOSIS — I65.09 VERTEBRAL ARTERY STENOSIS, UNSPECIFIED LATERALITY: Primary | ICD-10-CM

## 2020-03-17 ENCOUNTER — PATIENT MESSAGE (OUTPATIENT)
Dept: INTERNAL MEDICINE | Facility: CLINIC | Age: 70
End: 2020-03-17

## 2020-03-18 ENCOUNTER — PATIENT MESSAGE (OUTPATIENT)
Dept: INTERNAL MEDICINE | Facility: CLINIC | Age: 70
End: 2020-03-18

## 2020-03-18 ENCOUNTER — TELEPHONE (OUTPATIENT)
Dept: ELECTROPHYSIOLOGY | Facility: CLINIC | Age: 70
End: 2020-03-18

## 2020-03-18 RX ORDER — AZITHROMYCIN 250 MG/1
TABLET, FILM COATED ORAL
Qty: 6 TABLET | Refills: 0 | Status: SHIPPED | OUTPATIENT
Start: 2020-03-18 | End: 2020-05-22 | Stop reason: ALTCHOICE

## 2020-03-18 RX ORDER — PREDNISONE 20 MG/1
TABLET ORAL
Qty: 30 TABLET | Refills: 0 | Status: SHIPPED | OUTPATIENT
Start: 2020-03-18 | End: 2020-05-22 | Stop reason: ALTCHOICE

## 2020-03-18 NOTE — TELEPHONE ENCOUNTER
Spoke with patient to cancel tilt table test scheduled on 3/27/2020. I will call back when schedule reopens. Patient verbalizes understanding.

## 2020-03-31 ENCOUNTER — TELEPHONE (OUTPATIENT)
Dept: NEUROLOGY | Facility: CLINIC | Age: 70
End: 2020-03-31

## 2020-04-03 ENCOUNTER — TELEPHONE (OUTPATIENT)
Dept: NEUROLOGY | Facility: CLINIC | Age: 70
End: 2020-04-03

## 2020-04-06 ENCOUNTER — PATIENT MESSAGE (OUTPATIENT)
Dept: NEUROLOGY | Facility: CLINIC | Age: 70
End: 2020-04-06

## 2020-04-06 ENCOUNTER — OFFICE VISIT (OUTPATIENT)
Dept: NEUROLOGY | Facility: CLINIC | Age: 70
End: 2020-04-06
Payer: MEDICARE

## 2020-04-06 DIAGNOSIS — R42 LIGHTHEADEDNESS: Primary | ICD-10-CM

## 2020-04-06 DIAGNOSIS — I77.1 SUBCLAVIAN ARTERY STENOSIS: ICD-10-CM

## 2020-04-06 PROCEDURE — 99214 PR OFFICE/OUTPT VISIT, EST, LEVL IV, 30-39 MIN: ICD-10-PCS | Mod: 95,,, | Performed by: PSYCHIATRY & NEUROLOGY

## 2020-04-06 PROCEDURE — 99214 OFFICE O/P EST MOD 30 MIN: CPT | Mod: 95,,, | Performed by: PSYCHIATRY & NEUROLOGY

## 2020-04-06 NOTE — PROGRESS NOTES
Neurology Follow Up Note    Chief Complaint: follow up lightheadedness    Interval History:  Since last visit, patient states her lightheadedness has improved and resolved. She also states her headaches have greatly improved. She was seen by ENT and cardiology. Cardiology agreed with tilt table test, but this has been canceled for the time being. She had a CTA head and neck which showed atherosclerotic plaquing of the arch and the origin the great vessels as well as mild irregularity and narrowing of the left vertebral artery at the origin with subclavian artery concerning for mild stenosis. She has been referred to vascular surgery for this, but has not had an appointment set up as of yet. She denies pain in her arm. Her CTA head and neck also showed a few nodules and she was advised to confer with her PCP regarding further management. She has no further complaints.    Initial Visit 3/2/20:   Sheila Zarco is a 69 y.o. female with medical conditions as outlined below who presents for further evaluation of lightheadedness. She states 3 weeks ago, she got up to use the bathroom and suddenly felt lightheadedness and off balance. She denies change in speech, change in vision, focal weakness or inability to walk on her own. She went to the ED at that time and an MRI brain was done which was negative for infarct. She states the lightheadedness and feeling off balance is worse with walking, but she feels it when sitting also. She states the lightheadedness is constant and she feels lightheaded and off balance when walking at times. She denies bowel or bladder problems or recent falls. She denies weakness in her extremities. She states she has a 5/10 aching headache. She denies associated photophobia, phonophobia, nausea or vomiting. She admits to recent history of bronchitis and nasal congestion. She denies numbness or tingling in her hands or feet. She has no further complaints.     Past Medical History:  Past  Medical History:   Diagnosis Date    Asthma     childhood    Baker's cyst     Cataract     Glaucoma     Thyroid disease        Past Surgical History:  Past Surgical History:   Procedure Laterality Date    BUNIONECTOMY      GALLBLADDER SURGERY      HYSTERECTOMY      KNEE CARTILAGE SURGERY      narrow angles eye surgery          Social History:  Social History     Socioeconomic History    Marital status:      Spouse name: Not on file    Number of children: Not on file    Years of education: Not on file    Highest education level: Not on file   Occupational History    Not on file   Social Needs    Financial resource strain: Not on file    Food insecurity:     Worry: Not on file     Inability: Not on file    Transportation needs:     Medical: Not on file     Non-medical: Not on file   Tobacco Use    Smoking status: Never Smoker    Smokeless tobacco: Never Used   Substance and Sexual Activity    Alcohol use: Yes     Alcohol/week: 8.0 standard drinks     Types: 4 Glasses of wine, 4 Cans of beer per week     Comment: drinks 4 days a week- >1 drink    Drug use: No    Sexual activity: Yes   Lifestyle    Physical activity:     Days per week: Not on file     Minutes per session: Not on file    Stress: Not on file   Relationships    Social connections:     Talks on phone: Not on file     Gets together: Not on file     Attends Anabaptism service: Not on file     Active member of club or organization: Not on file     Attends meetings of clubs or organizations: Not on file     Relationship status: Not on file   Other Topics Concern    Are you pregnant or think you may be? No    Breast-feeding No   Social History Narrative    Not on file       Family History:  Family History   Problem Relation Age of Onset    Heart disease Mother     Heart disease Father     Liver disease Brother     Breast cancer Sister     Breast cancer Daughter 43        bilateral mastectomy    No Known Problems Sister      No Known Problems Maternal Grandmother     No Known Problems Maternal Grandfather     No Known Problems Paternal Grandmother     No Known Problems Paternal Grandfather     No Known Problems Maternal Aunt     No Known Problems Maternal Uncle     No Known Problems Paternal Aunt     No Known Problems Paternal Uncle     Breast cancer Other     Colon cancer Neg Hx     Ovarian cancer Neg Hx     Melanoma Neg Hx     Lupus Neg Hx     Acne Neg Hx     Amblyopia Neg Hx     Blindness Neg Hx     Cancer Neg Hx     Cataracts Neg Hx     Diabetes Neg Hx     Glaucoma Neg Hx     Hypertension Neg Hx     Macular degeneration Neg Hx     Retinal detachment Neg Hx     Strabismus Neg Hx     Stroke Neg Hx     Thyroid disease Neg Hx        Medications:  Current Outpatient Medications   Medication Sig Dispense Refill    albuterol (ACCUNEB) 0.63 mg/3 mL Nebu Take 3 mLs (0.63 mg total) by nebulization every 6 (six) hours as needed. 100 vial 2    albuterol (VENTOLIN HFA) 90 mcg/actuation inhaler Inhale 2 puffs into the lungs every 6 (six) hours as needed for Wheezing.      ALPRAZolam (XANAX) 0.5 MG tablet TAKE 1 TABLET BY MOUTH TWICE A DAY AS NEEDED FOR ANXIETY 60 tablet 0    azithromycin (ZITHROMAX Z-LUPE) 250 MG tablet Take 2 tabs po on day 1; then 1 po daily until completed. 6 tablet 0    clobetasol (TEMOVATE) 0.05 % cream Apply topically 2 (two) times daily. 30 g 3    desoximetasone (TOPICORT) 0.25 % cream Apply topically 2 (two) times daily.        escitalopram oxalate (LEXAPRO) 20 MG tablet Take 1 tablet (20 mg total) by mouth once daily. For anxiety. 90 tablet 2    fluocinonide (LIDEX) 0.05 % external solution AAA scalp qday prn itching, scaling 60 mL 3    fluticasone-salmeterol 250-50 mcg/dose (ADVAIR) 250-50 mcg/dose diskus inhaler Inhale 1 puff into the lungs 2 (two) times daily. 60 each 5    FLUZONE HIGH-DOSE 2019-20, PF, 180 mcg/0.5 mL Syrg TO BE ADMINISTERED BY PHARMACIST FOR IMMUNIZATION       ketoconazole (NIZORAL) 2 % cream AAA twice daily on nose prn flare 60 g 3    ketoconazole (NIZORAL) 2 % shampoo WASH HAIR W/ MEDICATED SHAMPOO AT LEAST 2X WEEK, LET SIT ON SCALP FOR ATLEAST 5MINUTES BEFORE RINSIN 120 mL 1    levothyroxine (SYNTHROID) 88 MCG tablet TAKE 1 TABLET (88 MCG TOTAL) BY MOUTH EVERY MORNING. 90 tablet 3    meclizine (ANTIVERT) 25 mg tablet Take 1 tablet (25 mg total) by mouth 3 (three) times daily as needed for Dizziness. 15 tablet 0    naproxen (NAPROSYN) 500 MG tablet Take 1 tablet (500 mg total) by mouth 2 (two) times daily with meals. 60 tablet 3    pantoprazole (PROTONIX) 40 MG tablet TAKE 1 TABLET (40 MG TOTAL) BY MOUTH ONCE DAILY. 90 tablet 3    predniSONE (DELTASONE) 20 MG tablet Take 3 tabs daily x 5 days; 2 tabs daily x 5 days; the 1 tab daily until completed. 30 tablet 0    triamcinolone acetonide 0.025% (KENALOG) 0.025 % cream AAA qd to face, ears prn flare 45 g 1    valACYclovir (VALTREX) 500 MG tablet TAKE ONE TABLET TWICE A DAY AS NEEDED FOR  OUTBREAK 12 tablet 3    VENTOLIN HFA 90 mcg/actuation inhaler INHALE 2 PUFFS INTO THE LUNGS EVERY 4 HOURS AS NEEDED FOR WHEEZING 18 Inhaler 0     No current facility-administered medications for this visit.        Allergies:  Review of patient's allergies indicates:  No Known Allergies    ROS:  A 12 point review of system was negative aside from pertinent positives and negatives as outlined above.    Physical Exam  General: well nourished, well developed  Eyes: no scleral icterus   Neck: ROM intact  Skin: no obvious rashes     Neuro:  Limited exam as patient seen via virtual visit  Neuro:  Mental status: AAO x 3, no dysarthria, no aphasia, communicating appropriately  CN: EOMI, VFF, no gross facial asymmetry, hearing grossly intact, tongue midline  Motor:   Moves all extremities at least 4/5  Coordination: no dysmetria on FTN  Sensation: exam limited due to virtual visit  Gait: steady    Prior  Imaging/Labs:  Reviewed      Assessment and Plan:    69 y.o. female with lightheadedness resolved since last visit, possibly due to BPPV vs sinus problem. Headaches have also improved.    1. Lightheadedness  Resolved  Will pursue tilt table if symptoms return    2. Subclavian stenosis  Mild stenosis seen on CTA   Referral to vascular surgery, will have staff set this up for patient            Patient was advised to notify me for worsening symptoms. I will see patient back in 4-6 weeks or sooner if necessary.     The patient location is: home  The chief complaint leading to consultation is: follow up lightheadedness  Visit type: Virtual visit with synchronous audio and video  Total time spent with patient: 15 minutes  Each patient to whom he or she provides medical services by telemedicine is:  (1) informed of the relationship between the physician and patient and the respective role of any other health care provider with respect to management of the patient; and (2) notified that he or she may decline to receive medical services by telemedicine and may withdraw from such care at any time.      Ashley Alaniz DO  [facility]  [address] [facility] [address]  [place] [id number] [phone]

## 2020-04-07 ENCOUNTER — PATIENT MESSAGE (OUTPATIENT)
Dept: INTERNAL MEDICINE | Facility: CLINIC | Age: 70
End: 2020-04-07

## 2020-04-08 RX ORDER — ALPRAZOLAM 0.5 MG/1
0.5 TABLET ORAL 2 TIMES DAILY
Qty: 60 TABLET | Refills: 0 | Status: SHIPPED | OUTPATIENT
Start: 2020-04-08 | End: 2020-10-21

## 2020-05-01 ENCOUNTER — TELEPHONE (OUTPATIENT)
Dept: INTERNAL MEDICINE | Facility: CLINIC | Age: 70
End: 2020-05-01

## 2020-05-01 DIAGNOSIS — R79.9 ABNORMAL FINDING OF BLOOD CHEMISTRY, UNSPECIFIED: ICD-10-CM

## 2020-05-01 DIAGNOSIS — E55.9 VITAMIN D DEFICIENCY: ICD-10-CM

## 2020-05-01 DIAGNOSIS — Z12.31 BREAST CANCER SCREENING BY MAMMOGRAM: ICD-10-CM

## 2020-05-01 DIAGNOSIS — E78.5 HYPERLIPIDEMIA, UNSPECIFIED HYPERLIPIDEMIA TYPE: ICD-10-CM

## 2020-05-01 DIAGNOSIS — E03.9 HYPOTHYROIDISM, UNSPECIFIED TYPE: Primary | ICD-10-CM

## 2020-05-01 NOTE — TELEPHONE ENCOUNTER
----- Message from Bettie Jimenez sent at 5/1/2020  2:01 PM CDT -----  Doctor appointment and lab have been scheduled.  Please link lab orders to the lab appointment.  Date of doctor appointment:  5/19  Date of lab appointment:  5/11  Physical or EP: epp  Comments:

## 2020-05-11 ENCOUNTER — TELEPHONE (OUTPATIENT)
Dept: NEUROLOGY | Facility: CLINIC | Age: 70
End: 2020-05-11

## 2020-05-12 ENCOUNTER — LAB VISIT (OUTPATIENT)
Dept: LAB | Facility: HOSPITAL | Age: 70
End: 2020-05-12
Attending: INTERNAL MEDICINE
Payer: MEDICARE

## 2020-05-12 DIAGNOSIS — E55.9 VITAMIN D DEFICIENCY: ICD-10-CM

## 2020-05-12 DIAGNOSIS — E03.9 HYPOTHYROIDISM, UNSPECIFIED TYPE: ICD-10-CM

## 2020-05-12 DIAGNOSIS — R79.9 ABNORMAL FINDING OF BLOOD CHEMISTRY, UNSPECIFIED: ICD-10-CM

## 2020-05-12 DIAGNOSIS — E78.5 HYPERLIPIDEMIA, UNSPECIFIED HYPERLIPIDEMIA TYPE: ICD-10-CM

## 2020-05-12 LAB
ALBUMIN SERPL BCP-MCNC: 4 G/DL (ref 3.5–5.2)
ALP SERPL-CCNC: 65 U/L (ref 55–135)
ALT SERPL W/O P-5'-P-CCNC: 12 U/L (ref 10–44)
ANION GAP SERPL CALC-SCNC: 8 MMOL/L (ref 8–16)
AST SERPL-CCNC: 18 U/L (ref 10–40)
BASOPHILS # BLD AUTO: 0.08 K/UL (ref 0–0.2)
BASOPHILS NFR BLD: 1.1 % (ref 0–1.9)
BILIRUB SERPL-MCNC: 0.6 MG/DL (ref 0.1–1)
BUN SERPL-MCNC: 17 MG/DL (ref 8–23)
CALCIUM SERPL-MCNC: 9.8 MG/DL (ref 8.7–10.5)
CHLORIDE SERPL-SCNC: 105 MMOL/L (ref 95–110)
CHOLEST SERPL-MCNC: 270 MG/DL (ref 120–199)
CHOLEST/HDLC SERPL: 2.6 {RATIO} (ref 2–5)
CO2 SERPL-SCNC: 27 MMOL/L (ref 23–29)
CREAT SERPL-MCNC: 0.9 MG/DL (ref 0.5–1.4)
DIFFERENTIAL METHOD: ABNORMAL
EOSINOPHIL # BLD AUTO: 0.3 K/UL (ref 0–0.5)
EOSINOPHIL NFR BLD: 3.7 % (ref 0–8)
ERYTHROCYTE [DISTWIDTH] IN BLOOD BY AUTOMATED COUNT: 13.6 % (ref 11.5–14.5)
EST. GFR  (AFRICAN AMERICAN): >60 ML/MIN/1.73 M^2
EST. GFR  (NON AFRICAN AMERICAN): >60 ML/MIN/1.73 M^2
GLUCOSE SERPL-MCNC: 96 MG/DL (ref 70–110)
HCT VFR BLD AUTO: 41.4 % (ref 37–48.5)
HDLC SERPL-MCNC: 104 MG/DL (ref 40–75)
HDLC SERPL: 38.5 % (ref 20–50)
HGB BLD-MCNC: 12.7 G/DL (ref 12–16)
IMM GRANULOCYTES # BLD AUTO: 0.03 K/UL (ref 0–0.04)
IMM GRANULOCYTES NFR BLD AUTO: 0.4 % (ref 0–0.5)
LDLC SERPL CALC-MCNC: 135 MG/DL (ref 63–159)
LYMPHOCYTES # BLD AUTO: 2.8 K/UL (ref 1–4.8)
LYMPHOCYTES NFR BLD: 36.3 % (ref 18–48)
MCH RBC QN AUTO: 28.9 PG (ref 27–31)
MCHC RBC AUTO-ENTMCNC: 30.7 G/DL (ref 32–36)
MCV RBC AUTO: 94 FL (ref 82–98)
MONOCYTES # BLD AUTO: 0.4 K/UL (ref 0.3–1)
MONOCYTES NFR BLD: 5.5 % (ref 4–15)
NEUTROPHILS # BLD AUTO: 4 K/UL (ref 1.8–7.7)
NEUTROPHILS NFR BLD: 53 % (ref 38–73)
NONHDLC SERPL-MCNC: 166 MG/DL
NRBC BLD-RTO: 0 /100 WBC
PLATELET # BLD AUTO: 319 K/UL (ref 150–350)
PMV BLD AUTO: 10 FL (ref 9.2–12.9)
POTASSIUM SERPL-SCNC: 4.1 MMOL/L (ref 3.5–5.1)
PROT SERPL-MCNC: 7.3 G/DL (ref 6–8.4)
RBC # BLD AUTO: 4.39 M/UL (ref 4–5.4)
SODIUM SERPL-SCNC: 140 MMOL/L (ref 136–145)
T4 FREE SERPL-MCNC: 1 NG/DL (ref 0.71–1.51)
TRIGL SERPL-MCNC: 155 MG/DL (ref 30–150)
TSH SERPL DL<=0.005 MIU/L-ACNC: 3.03 UIU/ML (ref 0.4–4)
WBC # BLD AUTO: 7.57 K/UL (ref 3.9–12.7)

## 2020-05-12 PROCEDURE — 36415 COLL VENOUS BLD VENIPUNCTURE: CPT | Mod: PO

## 2020-05-12 PROCEDURE — 84443 ASSAY THYROID STIM HORMONE: CPT

## 2020-05-12 PROCEDURE — 80053 COMPREHEN METABOLIC PANEL: CPT

## 2020-05-12 PROCEDURE — 84439 ASSAY OF FREE THYROXINE: CPT

## 2020-05-12 PROCEDURE — 85025 COMPLETE CBC W/AUTO DIFF WBC: CPT

## 2020-05-12 PROCEDURE — 82306 VITAMIN D 25 HYDROXY: CPT

## 2020-05-12 PROCEDURE — 83036 HEMOGLOBIN GLYCOSYLATED A1C: CPT

## 2020-05-12 PROCEDURE — 80061 LIPID PANEL: CPT

## 2020-05-13 LAB
25(OH)D3+25(OH)D2 SERPL-MCNC: 40 NG/ML (ref 30–96)
ESTIMATED AVG GLUCOSE: 105 MG/DL (ref 68–131)
HBA1C MFR BLD HPLC: 5.3 % (ref 4–5.6)

## 2020-05-14 ENCOUNTER — INITIAL CONSULT (OUTPATIENT)
Dept: VASCULAR SURGERY | Facility: CLINIC | Age: 70
End: 2020-05-14
Payer: MEDICARE

## 2020-05-14 VITALS
DIASTOLIC BLOOD PRESSURE: 64 MMHG | SYSTOLIC BLOOD PRESSURE: 110 MMHG | HEIGHT: 66 IN | WEIGHT: 161.38 LBS | BODY MASS INDEX: 25.94 KG/M2

## 2020-05-14 DIAGNOSIS — I65.09 VERTEBRAL ARTERY STENOSIS, UNSPECIFIED LATERALITY: Primary | ICD-10-CM

## 2020-05-14 DIAGNOSIS — I65.22 CAROTID STENOSIS, ASYMPTOMATIC, LEFT: ICD-10-CM

## 2020-05-14 PROCEDURE — 99999 PR PBB SHADOW E&M-EST. PATIENT-LVL III: ICD-10-PCS | Mod: PBBFAC,,, | Performed by: SURGERY

## 2020-05-14 PROCEDURE — 99204 PR OFFICE/OUTPT VISIT, NEW, LEVL IV, 45-59 MIN: ICD-10-PCS | Mod: S$PBB,,, | Performed by: SURGERY

## 2020-05-14 PROCEDURE — 99213 OFFICE O/P EST LOW 20 MIN: CPT | Mod: PBBFAC | Performed by: SURGERY

## 2020-05-14 PROCEDURE — 99999 PR PBB SHADOW E&M-EST. PATIENT-LVL III: CPT | Mod: PBBFAC,,, | Performed by: SURGERY

## 2020-05-14 PROCEDURE — 99204 OFFICE O/P NEW MOD 45 MIN: CPT | Mod: S$PBB,,, | Performed by: SURGERY

## 2020-05-14 NOTE — LETTER
May 14, 2020      Ashley Alaniz, DO  [address] [facility]  [address]  [facility] [address]           [facility]  [address]  [facility] [address]  Phone: [phone]  Fax: [phone]          Patient: Sheila Zarco   MR Number: [id number]   YOB: 1950   Date of Visit: 5/14/2020       Dear Dr. Ashley Alaniz:    Thank you for referring Sheila Zarco to me for evaluation. Attached you will find relevant portions of my assessment and plan of care.    If you have questions, please do not hesitate to call me. I look forward to following Sheila Zarco along with you.    Sincerely,    Jeison Sweeney MD    Enclosure  CC:  No Recipients    If you would like to receive this communication electronically, please contact [email or web address] or [phone] to request more information on [facility] access.    For providers and/or their staff who would like to refer a patient to [facility], please contact us through our one-stop-shop provider referral line, [facility], at [phone].    If you feel you have received this communication in error or would no longer like to receive these types of communications, please e-mail [email or web address]

## 2020-05-14 NOTE — PATIENT INSTRUCTIONS
Carotid Artery Problems: Blockage     A healthy carotid artery lets blood flow easily to the brain.   The blood carries oxygen and nutrients throughout the body. The carotid arteries are large blood vessels that carry blood to the brain. Certain health problems can make the inside of the carotid arteries narrow and rough. Over time, this damage increases the chances of having a stroke. A stroke is a sudden loss of brain function.   Open carotid arteries  In a healthy carotid artery, the inside of the artery is open. The lining of the artery wall is also smooth. This lets blood flow freely from the heart to the brain. The brain gets all the oxygen and nutrients it needs to function well.  Narrowed carotid arteries  Health factors, such as high blood pressure, high cholesterol, smoking, and diabetes, can damage artery walls and make them rough. This allows cholesterol and other particles in the blood to stick to the artery walls and form plaque or fatty deposits. As the plaque builds up, it can narrow the artery. Blood may also collect on the plaque and form blood clots.  How a stroke happens     Emboli can enter the bloodstream and travel to the brain. Brain tissue is damaged when emboli block arteries in the brain.   A stroke can happen when plaque in the carotid artery ruptures. This can allow small pieces of plaque to break off into the bloodstream. At the same time, rupture can make more blood clots. The pieces of plaque and tiny blood clots or emboli can flow in the blood until they get stuck in a small blood vessel in the brain. This blocks blood flow to part of the brain and causes a stroke.  Date Last Reviewed: 6/8/2015  © 6726-4760 [employer]. [address] [place] [address], [place] [address]. All rights reserved. This information is not intended as a substitute for professional medical care. Always follow your healthcare professional's instructions.

## 2020-05-14 NOTE — PROGRESS NOTES
Jeison Sweeney MD, RPVI                       [facility]                     05/14/2020    HPI:  Sheila Zarco is a 69 y.o. female with   Patient Active Problem List   Diagnosis    Rotator cuff impingement syndrome    Residual stage angle-closure glaucoma    Senile nuclear sclerosis - Both Eyes    GERD (gastroesophageal reflux disease)    Hypothyroidism    Anxiety    Hyperlipidemia    NAFLD (nonalcoholic fatty liver disease)    being managed by PCP and specialists who is here today for evaluation of vertebral artery occlusive disease.  Patient has been previously diagnosed with mild 20-39% carotid stenosis.  States 2/2020 she developed dizziness when standing to go to restroom and dizziness persisted for 3 weeks constantly, lightheaded.  Room spun for 1 day.  R face numbness for 2 days.  No history of TIA.  No history of CVA.  No amaurosis fugax.  No other lateralizing symptoms.  Symptoms resolved March 2020.  She took Meclizine around this time also.  No prior carotid surgery or stenting.  No prior neck radiation or neck surgery.  Does ambulate on own without functional limitation.  No chest pain and shortness of breath.  She is not on daily Aspirin.  No Plavix.   No statin.  BP is well controlled.  Dizziness not due to use of extremity.    no MI  Tobacco use: no  Anticoagulation: no    Past Medical History:   Diagnosis Date    Asthma     childhood    Baker's cyst     Cataract     Glaucoma     Thyroid disease      Past Surgical History:   Procedure Laterality Date    BUNIONECTOMY      GALLBLADDER SURGERY      HYSTERECTOMY      KNEE CARTILAGE SURGERY      narrow angles eye surgery        Family History   Problem Relation Age of Onset    Heart disease Mother     Heart disease Father     Liver disease Brother     Breast cancer Sister     Breast cancer Daughter 43        bilateral mastectomy    No Known Problems Sister     No Known Problems Maternal  Grandmother     No Known Problems Maternal Grandfather     No Known Problems Paternal Grandmother     No Known Problems Paternal Grandfather     No Known Problems Maternal Aunt     No Known Problems Maternal Uncle     No Known Problems Paternal Aunt     No Known Problems Paternal Uncle     Breast cancer Other     Colon cancer Neg Hx     Ovarian cancer Neg Hx     Melanoma Neg Hx     Lupus Neg Hx     Acne Neg Hx     Amblyopia Neg Hx     Blindness Neg Hx     Cancer Neg Hx     Cataracts Neg Hx     Diabetes Neg Hx     Glaucoma Neg Hx     Hypertension Neg Hx     Macular degeneration Neg Hx     Retinal detachment Neg Hx     Strabismus Neg Hx     Stroke Neg Hx     Thyroid disease Neg Hx      Social History     Socioeconomic History    Marital status:      Spouse name: Not on file    Number of children: Not on file    Years of education: Not on file    Highest education level: Not on file   Occupational History    Not on file   Social Needs    Financial resource strain: Not on file    Food insecurity:     Worry: Not on file     Inability: Not on file    Transportation needs:     Medical: Not on file     Non-medical: Not on file   Tobacco Use    Smoking status: Never Smoker    Smokeless tobacco: Never Used   Substance and Sexual Activity    Alcohol use: Yes     Alcohol/week: 8.0 standard drinks     Types: 4 Glasses of wine, 4 Cans of beer per week     Comment: drinks 4 days a week- >1 drink    Drug use: No    Sexual activity: Yes   Lifestyle    Physical activity:     Days per week: Not on file     Minutes per session: Not on file    Stress: Not on file   Relationships    Social connections:     Talks on phone: Not on file     Gets together: Not on file     Attends Hoahaoism service: Not on file     Active member of club or organization: Not on file     Attends meetings of clubs or organizations: Not on file     Relationship status: Not on file   Other Topics Concern    Are you  pregnant or think you may be? No    Breast-feeding No   Social History Narrative    Not on file       Current Outpatient Medications:     escitalopram oxalate (LEXAPRO) 20 MG tablet, Take 1 tablet (20 mg total) by mouth once daily. For anxiety., Disp: 90 tablet, Rfl: 2    levothyroxine (SYNTHROID) 88 MCG tablet, TAKE 1 TABLET (88 MCG TOTAL) BY MOUTH EVERY MORNING., Disp: 90 tablet, Rfl: 3    pantoprazole (PROTONIX) 40 MG tablet, TAKE 1 TABLET (40 MG TOTAL) BY MOUTH ONCE DAILY., Disp: 90 tablet, Rfl: 3    albuterol (ACCUNEB) 0.63 mg/3 mL Nebu, Take 3 mLs (0.63 mg total) by nebulization every 6 (six) hours as needed. (Patient not taking: Reported on 5/14/2020), Disp: 100 vial, Rfl: 2    albuterol (VENTOLIN HFA) 90 mcg/actuation inhaler, Inhale 2 puffs into the lungs every 6 (six) hours as needed for Wheezing., Disp: , Rfl:     ALPRAZolam (XANAX) 0.5 MG tablet, Take 1 tablet (0.5 mg total) by mouth 2 (two) times daily. (Patient not taking: Reported on 5/14/2020), Disp: 60 tablet, Rfl: 0    azithromycin (ZITHROMAX Z-LUPE) 250 MG tablet, Take 2 tabs po on day 1; then 1 po daily until completed. (Patient not taking: Reported on 5/14/2020), Disp: 6 tablet, Rfl: 0    clobetasol (TEMOVATE) 0.05 % cream, Apply topically 2 (two) times daily. (Patient not taking: Reported on 5/14/2020), Disp: 30 g, Rfl: 3    desoximetasone (TOPICORT) 0.25 % cream, Apply topically 2 (two) times daily.  , Disp: , Rfl:     fluocinonide (LIDEX) 0.05 % external solution, AAA scalp qday prn itching, scaling (Patient not taking: Reported on 5/14/2020), Disp: 60 mL, Rfl: 3    fluticasone-salmeterol 250-50 mcg/dose (ADVAIR) 250-50 mcg/dose diskus inhaler, Inhale 1 puff into the lungs 2 (two) times daily., Disp: 60 each, Rfl: 5    FLUZONE HIGH-DOSE 2019-20, PF, 180 mcg/0.5 mL Syrg, TO BE ADMINISTERED BY PHARMACIST FOR IMMUNIZATION, Disp: , Rfl:     ketoconazole (NIZORAL) 2 % cream, AAA twice daily on nose prn flare (Patient not taking:  Reported on 5/14/2020), Disp: 60 g, Rfl: 3    ketoconazole (NIZORAL) 2 % shampoo, WASH HAIR W/ MEDICATED SHAMPOO AT LEAST 2X WEEK, LET SIT ON SCALP FOR ATLEAST 5MINUTES BEFORE RINSIN (Patient not taking: Reported on 5/14/2020), Disp: 120 mL, Rfl: 1    meclizine (ANTIVERT) 25 mg tablet, Take 1 tablet (25 mg total) by mouth 3 (three) times daily as needed for Dizziness. (Patient not taking: Reported on 5/14/2020), Disp: 15 tablet, Rfl: 0    naproxen (NAPROSYN) 500 MG tablet, Take 1 tablet (500 mg total) by mouth 2 (two) times daily with meals. (Patient not taking: Reported on 5/14/2020), Disp: 60 tablet, Rfl: 3    predniSONE (DELTASONE) 20 MG tablet, Take 3 tabs daily x 5 days; 2 tabs daily x 5 days; the 1 tab daily until completed. (Patient not taking: Reported on 5/14/2020), Disp: 30 tablet, Rfl: 0    triamcinolone acetonide 0.025% (KENALOG) 0.025 % cream, AAA qd to face, ears prn flare (Patient not taking: Reported on 5/14/2020), Disp: 45 g, Rfl: 1    valACYclovir (VALTREX) 500 MG tablet, TAKE ONE TABLET TWICE A DAY AS NEEDED FOR  OUTBREAK (Patient not taking: Reported on 5/14/2020), Disp: 12 tablet, Rfl: 3    VENTOLIN HFA 90 mcg/actuation inhaler, INHALE 2 PUFFS INTO THE LUNGS EVERY 4 HOURS AS NEEDED FOR WHEEZING (Patient not taking: Reported on 5/14/2020), Disp: 18 Inhaler, Rfl: 0    REVIEW OF SYSTEMS:  General: No fevers or chills; ENT: No sore throat; Allergy and Immunology: no persistent infections; Hematological and Lymphatic: No history of bleeding or easy bruising; Endocrine: negative; Respiratory: no cough, shortness of breath, or wheezing; Cardiovascular: no chest pain or dyspnea on exertion; Gastrointestinal: no abdominal pain/back, change in bowel habits, or bloody stools; Genito-Urinary: no dysuria, trouble voiding, or hematuria; Musculoskeletal: negative; Neurological: No TIA or stroke symptoms; Psychiatric: no nervousness, anxiety or depression.    PHYSICAL EXAM:                General  appearance:  Alert, well-appearing, and in no distress.  Oriented to person, place, and time                    Neurological: Normal speech, no focal findings noted; CN II - XII grossly intact. All extremities with sensation to light touch.  Tongue  midline. EOMI           Musculoskeletal: Digits/nail without cyanosis/clubbing.  Strength 5/5 all extremities.                    Neck: Supple, no significant adenopathy, no carotid bruit can be auscultated                  Chest:  Clear to auscultation, no wheezes, rales or rhonchi, symmetric air entry. No use of accessory muscles               Cardiac: Normal rate and regular rhythm, S1 and S2 normal            Abdomen: Soft, nontender, nondistended, no masses or organomegaly, no hernia     No rebound tenderness noted; bowel sounds normal     Pulsatile aortic mass is non palpable.     No groin adenopathy      Extremities:2+ radial bilaterally     no extremity edema    Skin: no tissue loss    LAB RESULTS:  No results found for: CBC  Lab Results   Component Value Date    LABPROT 9.6 09/08/2010    INR 0.9 09/08/2010     Lab Results   Component Value Date     05/12/2020    K 4.1 05/12/2020     05/12/2020    CO2 27 05/12/2020    GLU 96 05/12/2020    BUN 17 05/12/2020    CREATININE 0.9 05/12/2020    CALCIUM 9.8 05/12/2020    ANIONGAP 8 05/12/2020    EGFRNONAA >60.0 05/12/2020     Lab Results   Component Value Date    WBC 7.57 05/12/2020    RBC 4.39 05/12/2020    HGB 12.7 05/12/2020    HCT 41.4 05/12/2020    MCV 94 05/12/2020    MCH 28.9 05/12/2020    MCHC 30.7 (L) 05/12/2020    RDW 13.6 05/12/2020     05/12/2020    MPV 10.0 05/12/2020    GRAN 4.0 05/12/2020    GRAN 53.0 05/12/2020    LYMPH 2.8 05/12/2020    LYMPH 36.3 05/12/2020    MONO 0.4 05/12/2020    MONO 5.5 05/12/2020    EOS 0.3 05/12/2020    BASO 0.08 05/12/2020    EOSINOPHIL 3.7 05/12/2020    BASOPHIL 1.1 05/12/2020    DIFFMETHOD Automated 05/12/2020     .  Lab Results   Component Value Date     HGBA1C 5.3 05/12/2020       IMAGING:  All pertinent imaging has been reviewed and interpreted independently.    Carotid US 2/2020:   1. Right carotid ultrasound shows 20-39% internal carotid artery stenosis.  Antegrade vertebral artery flow.  2. Left carotid ultrasound shows 0-19% internal carotid artery stenosis.  Antegrade vertebral artery flow.    IMP/PLAN:  69 y.o. female with   Patient Active Problem List   Diagnosis    Rotator cuff impingement syndrome    Residual stage angle-closure glaucoma    Senile nuclear sclerosis - Both Eyes    GERD (gastroesophageal reflux disease)    Hypothyroidism    Anxiety    Hyperlipidemia    NAFLD (nonalcoholic fatty liver disease)    being managed by PCP and specialists who is here today for evaluation of vertebral and carotid artery stenosis.    -Left vertebral artery mild stenosis on CTA, antegrade flow on US without elevated velocity, subclavian artery patent without significant stenosis - rec daily ASA  -Cholesterol mgmt by Dr. Correa  -Mild R carotid artery stenosis - recommend best medical therapy with ASA, statin and BP control  -Heart healthy lifestyle  -Exercise  -Meclizine prn per ENT recs  -RTC 1 year for routine surveillance with carotid US     I spent 15 minutes evaluating this patient and greater than 50% of the time was spent counseling, coordinator care and discussing the plan of care.  All questions were answered and patient stated understanding with agreement with the above treatment plan.    Jeison Sweeney MD [facility]  Vascular and Endovascular Surgery

## 2020-05-15 ENCOUNTER — OFFICE VISIT (OUTPATIENT)
Dept: NEUROLOGY | Facility: CLINIC | Age: 70
End: 2020-05-15
Payer: MEDICARE

## 2020-05-15 DIAGNOSIS — R42 LIGHTHEADEDNESS: Primary | ICD-10-CM

## 2020-05-15 PROCEDURE — 99213 PR OFFICE/OUTPT VISIT, EST, LEVL III, 20-29 MIN: ICD-10-PCS | Mod: 95,,, | Performed by: PSYCHIATRY & NEUROLOGY

## 2020-05-15 PROCEDURE — 99213 OFFICE O/P EST LOW 20 MIN: CPT | Mod: 95,,, | Performed by: PSYCHIATRY & NEUROLOGY

## 2020-05-17 NOTE — PROGRESS NOTES
Neurology Follow Up Note    Chief Complaint: follow up lightheadedness    Interval History:  Since last visit, patient states she has no further lightheadedness or headaches. 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 She has no further complaints.    Last Visit 4/6/20:  Since last visit, patient states her lightheadedness has improved and resolved. She also states her headaches have greatly improved. She was seen by ENT and cardiology. Cardiology agreed with tilt table test, but this has been canceled for the time being. She had a CTA head and neck which showed atherosclerotic plaquing of the arch and the origin the great vessels as well as mild irregularity and narrowing of the left vertebral artery at the origin with subclavian artery concerning for mild stenosis. She has been referred to vascular surgery for this, but has not had an appointment set up as of yet. She denies pain in her arm. Her CTA head and neck also showed a few nodules and she was advised to confer with her PCP regarding further management. She has no further complaints.     Initial Visit 3/2/20:   Sheila Zarco is a 69 y.o. female with medical conditions as outlined below who presents for further evaluation of lightheadedness. She states 3 weeks ago, she got up to use the bathroom and suddenly felt lightheadedness and off balance. She denies change in speech, change in vision, focal weakness or inability to walk on her own. She went to the ED at that time and an MRI brain was done which was negative for infarct. She states the lightheadedness and feeling off balance is worse with walking, but she feels it when sitting also. She states the lightheadedness is constant and she feels lightheaded and  off balance when walking at times. She denies bowel or bladder problems or recent falls. She denies weakness in her extremities. She states she has a 5/10 aching headache. She denies associated photophobia, phonophobia, nausea or vomiting. She admits to recent history of bronchitis and nasal congestion. She denies numbness or tingling in her hands or feet. She has no further complaints.    Past Medical History:  Past Medical History:   Diagnosis Date    Asthma     childhood    Baker's cyst     Cataract     Glaucoma     Thyroid disease        Past Surgical History:  Past Surgical History:   Procedure Laterality Date    BUNIONECTOMY      GALLBLADDER SURGERY      HYSTERECTOMY      KNEE CARTILAGE SURGERY      narrow angles eye surgery          Social History:  Social History     Socioeconomic History    Marital status:      Spouse name: Not on file    Number of children: Not on file    Years of education: Not on file    Highest education level: Not on file   Occupational History    Not on file   Social Needs    Financial resource strain: Not on file    Food insecurity:     Worry: Not on file     Inability: Not on file    Transportation needs:     Medical: Not on file     Non-medical: Not on file   Tobacco Use    Smoking status: Never Smoker    Smokeless tobacco: Never Used   Substance and Sexual Activity    Alcohol use: Yes     Alcohol/week: 8.0 standard drinks     Types: 4 Glasses of wine, 4 Cans of beer per week     Comment: drinks 4 days a week- >1 drink    Drug use: No    Sexual activity: Yes   Lifestyle    Physical activity:     Days per week: Not on file     Minutes per session: Not on file    Stress: Not on file   Relationships    Social connections:     Talks on phone: Not on file     Gets together: Not on file     Attends Jewish service: Not on file     Active member of club or organization: Not on file     Attends meetings of clubs or organizations: Not on file      Relationship status: Not on file   Other Topics Concern    Are you pregnant or think you may be? No    Breast-feeding No   Social History Narrative    Not on file       Family History:  Family History   Problem Relation Age of Onset    Heart disease Mother     Heart disease Father     Liver disease Brother     Breast cancer Sister     Breast cancer Daughter 43        bilateral mastectomy    No Known Problems Sister     No Known Problems Maternal Grandmother     No Known Problems Maternal Grandfather     No Known Problems Paternal Grandmother     No Known Problems Paternal Grandfather     No Known Problems Maternal Aunt     No Known Problems Maternal Uncle     No Known Problems Paternal Aunt     No Known Problems Paternal Uncle     Breast cancer Other     Colon cancer Neg Hx     Ovarian cancer Neg Hx     Melanoma Neg Hx     Lupus Neg Hx     Acne Neg Hx     Amblyopia Neg Hx     Blindness Neg Hx     Cancer Neg Hx     Cataracts Neg Hx     Diabetes Neg Hx     Glaucoma Neg Hx     Hypertension Neg Hx     Macular degeneration Neg Hx     Retinal detachment Neg Hx     Strabismus Neg Hx     Stroke Neg Hx     Thyroid disease Neg Hx        Medications:  Current Outpatient Medications   Medication Sig Dispense Refill    albuterol (ACCUNEB) 0.63 mg/3 mL Nebu Take 3 mLs (0.63 mg total) by nebulization every 6 (six) hours as needed. (Patient not taking: Reported on 5/14/2020) 100 vial 2    albuterol (VENTOLIN HFA) 90 mcg/actuation inhaler Inhale 2 puffs into the lungs every 6 (six) hours as needed for Wheezing.      ALPRAZolam (XANAX) 0.5 MG tablet Take 1 tablet (0.5 mg total) by mouth 2 (two) times daily. (Patient not taking: Reported on 5/14/2020) 60 tablet 0    azithromycin (ZITHROMAX Z-LUPE) 250 MG tablet Take 2 tabs po on day 1; then 1 po daily until completed. (Patient not taking: Reported on 5/14/2020) 6 tablet 0    clobetasol (TEMOVATE) 0.05 % cream Apply topically 2 (two) times daily.  (Patient not taking: Reported on 5/14/2020) 30 g 3    desoximetasone (TOPICORT) 0.25 % cream Apply topically 2 (two) times daily.        escitalopram oxalate (LEXAPRO) 20 MG tablet Take 1 tablet (20 mg total) by mouth once daily. For anxiety. 90 tablet 2    fluocinonide (LIDEX) 0.05 % external solution AAA scalp qday prn itching, scaling (Patient not taking: Reported on 5/14/2020) 60 mL 3    fluticasone-salmeterol 250-50 mcg/dose (ADVAIR) 250-50 mcg/dose diskus inhaler Inhale 1 puff into the lungs 2 (two) times daily. 60 each 5    FLUZONE HIGH-DOSE 2019-20, PF, 180 mcg/0.5 mL Syrg TO BE ADMINISTERED BY PHARMACIST FOR IMMUNIZATION      ketoconazole (NIZORAL) 2 % cream AAA twice daily on nose prn flare (Patient not taking: Reported on 5/14/2020) 60 g 3    ketoconazole (NIZORAL) 2 % shampoo WASH HAIR W/ MEDICATED SHAMPOO AT LEAST 2X WEEK, LET SIT ON SCALP FOR ATLEAST 5MINUTES BEFORE RINSIN (Patient not taking: Reported on 5/14/2020) 120 mL 1    levothyroxine (SYNTHROID) 88 MCG tablet TAKE 1 TABLET (88 MCG TOTAL) BY MOUTH EVERY MORNING. 90 tablet 3    meclizine (ANTIVERT) 25 mg tablet Take 1 tablet (25 mg total) by mouth 3 (three) times daily as needed for Dizziness. (Patient not taking: Reported on 5/14/2020) 15 tablet 0    naproxen (NAPROSYN) 500 MG tablet Take 1 tablet (500 mg total) by mouth 2 (two) times daily with meals. (Patient not taking: Reported on 5/14/2020) 60 tablet 3    pantoprazole (PROTONIX) 40 MG tablet TAKE 1 TABLET (40 MG TOTAL) BY MOUTH ONCE DAILY. 90 tablet 3    predniSONE (DELTASONE) 20 MG tablet Take 3 tabs daily x 5 days; 2 tabs daily x 5 days; the 1 tab daily until completed. (Patient not taking: Reported on 5/14/2020) 30 tablet 0    triamcinolone acetonide 0.025% (KENALOG) 0.025 % cream AAA qd to face, ears prn flare (Patient not taking: Reported on 5/14/2020) 45 g 1    valACYclovir (VALTREX) 500 MG tablet TAKE ONE TABLET TWICE A DAY AS NEEDED FOR  OUTBREAK (Patient not taking:  Reported on 5/14/2020) 12 tablet 3    VENTOLIN HFA 90 mcg/actuation inhaler INHALE 2 PUFFS INTO THE LUNGS EVERY 4 HOURS AS NEEDED FOR WHEEZING (Patient not taking: Reported on 5/14/2020) 18 Inhaler 0     No current facility-administered medications for this visit.        Allergies:  Review of patient's allergies indicates:  No Known Allergies    ROS:  A 12 point review of system was negative aside from pertinent positives and negatives as outlined above.    Physical Exam  General: well nourished, well developed  Eyes: no scleral icterus   Neck: ROM intact  Skin: no obvious rashes     Neuro:  Limited exam as patient seen via virtual visit  Neuro:  Mental status: AAO x 3, no dysarthria, no aphasia, communicating appropriately  CN: EOMI, VFF, no gross facial asymmetry, hearing grossly intact, tongue midline  Motor:   Moves all extremities at least 4/5  Coordination: no dysmetria on FTN  Sensation: exam limited due to virtual visit  Gait: steady    Prior Imaging/Labs:  Reviewed      Assessment and Plan:    69 y.o. female with lightheadedness and headaches resolved since last visit.    1. Lightheadedness  Resolved  Can consider tilt table test in future if symptoms recur    She was made aware that I will be moving out of state later this month. She was advised to notify our office for worsening symptoms. Will have staff set patient up to see Dr. Sanchez in 3-4 months for reevaluation as she would like to establish care with a neurologist closer to her home.      The patient location is: home  The chief complaint leading to consultation is: follow up lightheadedness  Visit type: Virtual visit with synchronous audio and video  Total time spent with patient: 15 minutes  Each patient to whom he or she provides medical services by telemedicine is:  (1) informed of the relationship between the physician and patient and the respective role of any other health care provider with respect to management of the patient; and (2)  notified that he or she may decline to receive medical services by telemedicine and may withdraw from such care at any time.      Ashley Alaniz DO  [facility]  [address] [facility] [address]  [place], [place] [id number] [phone]

## 2020-05-19 ENCOUNTER — OFFICE VISIT (OUTPATIENT)
Dept: INTERNAL MEDICINE | Facility: CLINIC | Age: 70
End: 2020-05-19
Payer: MEDICARE

## 2020-05-19 VITALS
WEIGHT: 156.06 LBS | DIASTOLIC BLOOD PRESSURE: 66 MMHG | OXYGEN SATURATION: 95 % | HEIGHT: 66 IN | BODY MASS INDEX: 25.08 KG/M2 | TEMPERATURE: 98 F | RESPIRATION RATE: 14 BRPM | SYSTOLIC BLOOD PRESSURE: 124 MMHG | HEART RATE: 68 BPM

## 2020-05-19 DIAGNOSIS — J45.21 MILD INTERMITTENT ASTHMATIC BRONCHITIS WITH ACUTE EXACERBATION: Primary | ICD-10-CM

## 2020-05-19 DIAGNOSIS — K76.0 NAFLD (NONALCOHOLIC FATTY LIVER DISEASE): ICD-10-CM

## 2020-05-19 DIAGNOSIS — E03.9 HYPOTHYROIDISM, UNSPECIFIED TYPE: ICD-10-CM

## 2020-05-19 DIAGNOSIS — K21.9 GASTROESOPHAGEAL REFLUX DISEASE, ESOPHAGITIS PRESENCE NOT SPECIFIED: ICD-10-CM

## 2020-05-19 DIAGNOSIS — E55.9 VITAMIN D DEFICIENCY: ICD-10-CM

## 2020-05-19 DIAGNOSIS — J31.0 CHRONIC RHINITIS: ICD-10-CM

## 2020-05-19 DIAGNOSIS — R91.8 MULTIPLE LUNG NODULES ON CT: ICD-10-CM

## 2020-05-19 DIAGNOSIS — F41.9 ANXIETY: ICD-10-CM

## 2020-05-19 DIAGNOSIS — E78.5 HYPERLIPIDEMIA, UNSPECIFIED HYPERLIPIDEMIA TYPE: ICD-10-CM

## 2020-05-19 DIAGNOSIS — R91.1 LUNG NODULE: ICD-10-CM

## 2020-05-19 PROCEDURE — 99214 OFFICE O/P EST MOD 30 MIN: CPT | Mod: PBBFAC,PO | Performed by: INTERNAL MEDICINE

## 2020-05-19 PROCEDURE — 99214 OFFICE O/P EST MOD 30 MIN: CPT | Mod: S$PBB,,, | Performed by: INTERNAL MEDICINE

## 2020-05-19 PROCEDURE — 99214 PR OFFICE/OUTPT VISIT, EST, LEVL IV, 30-39 MIN: ICD-10-PCS | Mod: S$PBB,,, | Performed by: INTERNAL MEDICINE

## 2020-05-19 PROCEDURE — 99999 PR PBB SHADOW E&M-EST. PATIENT-LVL IV: CPT | Mod: PBBFAC,,, | Performed by: INTERNAL MEDICINE

## 2020-05-19 PROCEDURE — 99999 PR PBB SHADOW E&M-EST. PATIENT-LVL IV: ICD-10-PCS | Mod: PBBFAC,,, | Performed by: INTERNAL MEDICINE

## 2020-05-19 RX ORDER — ATORVASTATIN CALCIUM 20 MG/1
20 TABLET, FILM COATED ORAL DAILY
Qty: 90 TABLET | Refills: 3 | Status: SHIPPED | OUTPATIENT
Start: 2020-05-19 | End: 2021-05-21

## 2020-05-19 NOTE — PROGRESS NOTES
Subjective:       Patient ID: Sheila Zarco is a 69 y.o. female.    Chief Complaint: Annual Exam    HPI   The patient presents for annual physical examination.  Active medical conditions vertebral artery stenosis hypothyroidism GERD, anxiety, asthma, glaucoma, and pulmonary nodules.  In 02/2020 the patient experienced an episode of dizziness facial numbness which ultimately resolved.  She was evaluated her neurologist Dr. Dubose.  The patient was diagnosed to have mild vertebral artery stenosis.  Vascular Surgery  consultation was obtained.  Observation was recommended in the patient will follow-up in 1 year.  She states she was noted to have a 30% right carotid artery stenosis.  Incidental note was made of subcentimeter lung nodules at the apex of the left lung.  A follow-up CT in 12 months is recommended.    The patient reports her asthma has been stable.  Reflux symptoms have been controlled.  Occasional symptoms are present.  Low-dose Xanax has been helpful.    Immunization record was reviewed.    Screening tests were reviewed.  The patient will be due for next colonoscopy in 2023.    No interval change in past medical history, family history, or social history since prior evaluations.    Review of Systems   Constitutional: Negative for fatigue, fever and unexpected weight change.   HENT: Negative for congestion, postnasal drip, rhinorrhea and sore throat.    Eyes: Negative for visual disturbance.   Respiratory: Negative for cough, chest tightness, shortness of breath and wheezing.    Cardiovascular: Negative for chest pain, palpitations and leg swelling.   Gastrointestinal: Negative for abdominal pain and blood in stool.   Genitourinary: Negative for dysuria, frequency and hematuria.   Musculoskeletal: Negative for arthralgias, back pain, joint swelling and myalgias.   Skin: Negative for rash.   Neurological: Negative for dizziness, syncope, weakness, numbness and headaches.   Psychiatric/Behavioral:  Negative for sleep disturbance. The patient is nervous/anxious.        Objective:      Physical Exam   Constitutional: She is oriented to person, place, and time. Vital signs are normal. She appears well-developed and well-nourished. No distress.   HENT:   Head: Normocephalic and atraumatic.   Right Ear: External ear normal.   Left Ear: External ear normal.   Nose: Nose normal.   Mouth/Throat: Oropharynx is clear and moist. No oropharyngeal exudate.   Eyes: Pupils are equal, round, and reactive to light. Conjunctivae and EOM are normal. No scleral icterus.   Neck: Normal range of motion. Neck supple. No JVD present. Carotid bruit is not present. No thyromegaly present.   Cardiovascular: Normal rate, regular rhythm, normal heart sounds, intact distal pulses and normal pulses. Exam reveals no gallop and no friction rub.   No murmur heard.  Pulmonary/Chest: Effort normal and breath sounds normal. No respiratory distress. She has no wheezes. She has no rales.   Abdominal: Soft. Bowel sounds are normal. She exhibits no abdominal bruit and no mass. There is no splenomegaly or hepatomegaly. There is no tenderness. No hernia.   Musculoskeletal: Normal range of motion. She exhibits no edema or tenderness.        Right shoulder: She exhibits no effusion and no deformity.   Lymphadenopathy:     She has no cervical adenopathy.     She has no axillary adenopathy.        Right: No supraclavicular adenopathy present.        Left: No supraclavicular adenopathy present.   Neurological: She is alert and oriented to person, place, and time. She has normal strength. No cranial nerve deficit.   Skin: Skin is warm and dry. No rash noted.   Psychiatric: She has a normal mood and affect. Her speech is normal and behavior is normal.   Nursing note and vitals reviewed.      Results for orders placed or performed in visit on 05/12/20   Urinalysis   Result Value Ref Range    Specimen UA Urine, Unspecified     Color, UA Yellow Yellow, Straw,  Hilda    Appearance, UA Clear Clear    pH, UA 5.0 5.0 - 8.0    Specific Gravity, UA 1.015 1.005 - 1.030    Protein, UA Negative Negative    Glucose, UA Negative Negative    Ketones, UA Negative Negative    Bilirubin (UA) Negative Negative    Occult Blood UA 1+ (A) Negative    Nitrite, UA Negative Negative    Leukocytes, UA Trace (A) Negative   Urinalysis Microscopic   Result Value Ref Range    RBC, UA 1 0 - 4 /hpf    WBC, UA 1 0 - 5 /hpf    Bacteria Rare None-Occ /hpf    Squam Epithel, UA 1 /hpf    Microscopic Comment SEE COMMENT      Lab Visit on 05/12/2020   Component Date Value Ref Range Status    Specimen UA 05/12/2020 Urine, Unspecified   Final    Color, UA 05/12/2020 Yellow  Yellow, Straw, Hilda Final    Appearance, UA 05/12/2020 Clear  Clear Final    pH, UA 05/12/2020 5.0  5.0 - 8.0 Final    Specific Gravity, UA 05/12/2020 1.015  1.005 - 1.030 Final    Protein, UA 05/12/2020 Negative  Negative Final    Comment: Recommend a 24 hour urine protein or a urine   protein/creatinine ratio if globulin induced proteinuria is  clinically suspected.      Glucose, UA 05/12/2020 Negative  Negative Final    Ketones, UA 05/12/2020 Negative  Negative Final    Bilirubin (UA) 05/12/2020 Negative  Negative Final    Occult Blood UA 05/12/2020 1+* Negative Final    Nitrite, UA 05/12/2020 Negative  Negative Final    Leukocytes, UA 05/12/2020 Trace* Negative Final    RBC, UA 05/12/2020 1  0 - 4 /hpf Final    WBC, UA 05/12/2020 1  0 - 5 /hpf Final    Bacteria 05/12/2020 Rare  None-Occ /hpf Final    Squam Epithel, UA 05/12/2020 1  /hpf Final    Microscopic Comment 05/12/2020 SEE COMMENT   Final    Comment: Other formed elements not mentioned in the report are not   present in the microscopic examination.      Lab Visit on 05/12/2020   Component Date Value Ref Range Status    Sodium 05/12/2020 140  136 - 145 mmol/L Final    Potassium 05/12/2020 4.1  3.5 - 5.1 mmol/L Final    Chloride 05/12/2020 105  95 - 110 mmol/L  Final    CO2 05/12/2020 27  23 - 29 mmol/L Final    Glucose 05/12/2020 96  70 - 110 mg/dL Final    BUN, Bld 05/12/2020 17  8 - 23 mg/dL Final    Creatinine 05/12/2020 0.9  0.5 - 1.4 mg/dL Final    Calcium 05/12/2020 9.8  8.7 - 10.5 mg/dL Final    Total Protein 05/12/2020 7.3  6.0 - 8.4 g/dL Final    Albumin 05/12/2020 4.0  3.5 - 5.2 g/dL Final    Total Bilirubin 05/12/2020 0.6  0.1 - 1.0 mg/dL Final    Comment: For infants and newborns, interpretation of results should be based  on gestational age, weight and in agreement with clinical  observations.  Premature Infant recommended reference ranges:  Up to 24 hours.............<8.0 mg/dL  Up to 48 hours............<12.0 mg/dL  3-5 days..................<15.0 mg/dL  6-29 days.................<15.0 mg/dL      Alkaline Phosphatase 05/12/2020 65  55 - 135 U/L Final    AST 05/12/2020 18  10 - 40 U/L Final    ALT 05/12/2020 12  10 - 44 U/L Final    Anion Gap 05/12/2020 8  8 - 16 mmol/L Final    eGFR if African American 05/12/2020 >60.0  >60 mL/min/1.73 m^2 Final    eGFR if non African American 05/12/2020 >60.0  >60 mL/min/1.73 m^2 Final    Comment: Calculation used to obtain the estimated glomerular filtration  rate (eGFR) is the CKD-EPI equation.       Cholesterol 05/12/2020 270* 120 - 199 mg/dL Final    Comment: The National Cholesterol Education Program (NCEP) has set the  following guidelines (reference ranges) for Cholesterol:  Optimal.....................<200 mg/dL  Borderline High.............200-239 mg/dL  High........................> or = 240 mg/dL      Triglycerides 05/12/2020 155* 30 - 150 mg/dL Final    Comment: The National Cholesterol Education Program (NCEP) has set the  following guidelines (reference values) for triglycerides:  Normal......................<150 mg/dL  Borderline High.............150-199 mg/dL  High........................200-499 mg/dL      HDL 05/12/2020 104* 40 - 75 mg/dL Final    Comment: The National Cholesterol  Education Program (NCEP) has set the  following guidelines (reference values) for HDL Cholesterol:  Low...............<40 mg/dL  Optimal...........>60 mg/dL      LDL Cholesterol 05/12/2020 135.0  63.0 - 159.0 mg/dL Final    Comment: The National Cholesterol Education Program (NCEP) has set the  following guidelines (reference values) for LDL Cholesterol:  Optimal.......................<130 mg/dL  Borderline High...............130-159 mg/dL  High..........................160-189 mg/dL  Very High.....................>190 mg/dL      Hdl/Cholesterol Ratio 05/12/2020 38.5  20.0 - 50.0 % Final    Total Cholesterol/HDL Ratio 05/12/2020 2.6  2.0 - 5.0 Final    Non-HDL Cholesterol 05/12/2020 166  mg/dL Final    Comment: Risk category and Non-HDL cholesterol goals:  Coronary heart disease (CHD)or equivalent (10-year risk of CHD >20%):  Non-HDL cholesterol goal     <130 mg/dL  Two or more CHD risk factors and 10-year risk of CHD <= 20%:  Non-HDL cholesterol goal     <160 mg/dL  0 to 1 CHD risk factor:  Non-HDL cholesterol goal     <190 mg/dL      WBC 05/12/2020 7.57  3.90 - 12.70 K/uL Final    RBC 05/12/2020 4.39  4.00 - 5.40 M/uL Final    Hemoglobin 05/12/2020 12.7  12.0 - 16.0 g/dL Final    Hematocrit 05/12/2020 41.4  37.0 - 48.5 % Final    Mean Corpuscular Volume 05/12/2020 94  82 - 98 fL Final    Mean Corpuscular Hemoglobin 05/12/2020 28.9  27.0 - 31.0 pg Final    Mean Corpuscular Hemoglobin Conc 05/12/2020 30.7* 32.0 - 36.0 g/dL Final    RDW 05/12/2020 13.6  11.5 - 14.5 % Final    Platelets 05/12/2020 319  150 - 350 K/uL Final    MPV 05/12/2020 10.0  9.2 - 12.9 fL Final    Immature Granulocytes 05/12/2020 0.4  0.0 - 0.5 % Final    Gran # (ANC) 05/12/2020 4.0  1.8 - 7.7 K/uL Final    Immature Grans (Abs) 05/12/2020 0.03  0.00 - 0.04 K/uL Final    Comment: Mild elevation in immature granulocytes is non specific and   can be seen in a variety of conditions including stress response,   acute inflammation,  trauma and pregnancy. Correlation with other   laboratory and clinical findings is essential.      Lymph # 05/12/2020 2.8  1.0 - 4.8 K/uL Final    Mono # 05/12/2020 0.4  0.3 - 1.0 K/uL Final    Eos # 05/12/2020 0.3  0.0 - 0.5 K/uL Final    Baso # 05/12/2020 0.08  0.00 - 0.20 K/uL Final    nRBC 05/12/2020 0  0 /100 WBC Final    Gran% 05/12/2020 53.0  38.0 - 73.0 % Final    Lymph% 05/12/2020 36.3  18.0 - 48.0 % Final    Mono% 05/12/2020 5.5  4.0 - 15.0 % Final    Eosinophil% 05/12/2020 3.7  0.0 - 8.0 % Final    Basophil% 05/12/2020 1.1  0.0 - 1.9 % Final    Differential Method 05/12/2020 Automated   Final    Free T4 05/12/2020 1.00  0.71 - 1.51 ng/dL Final    TSH 05/12/2020 3.031  0.400 - 4.000 uIU/mL Final    Vit D, 25-Hydroxy 05/12/2020 40  30 - 96 ng/mL Final    Comment: Vitamin D deficiency.........<10 ng/mL                              Vitamin D insufficiency......10-29 ng/mL       Vitamin D sufficiency........> or equal to 30 ng/mL  Vitamin D toxicity............>100 ng/mL      Hemoglobin A1C 05/12/2020 5.3  4.0 - 5.6 % Final    Comment: ADA Screening Guidelines:  5.7-6.4%  Consistent with prediabetes  >or=6.5%  Consistent with diabetes  High levels of fetal hemoglobin interfere with the HbA1C  assay. Heterozygous hemoglobin variants (HbS, HgC, etc)do  not significantly interfere with this assay.   However, presence of multiple variants may affect accuracy.      Estimated Avg Glucose 05/12/2020 105  68 - 131 mg/dL Final         Assessment:       1. Mild intermittent asthmatic bronchitis with acute exacerbation    2. Hypothyroidism, unspecified type    3. Hyperlipidemia, unspecified hyperlipidemia type    4. Vitamin D deficiency    5. Anxiety    6. Gastroesophageal reflux disease, esophagitis presence not specified    7. NAFLD (nonalcoholic fatty liver disease)    8. Chronic rhinitis        Plan:       Sheila was seen today for annual exam.  Atorvastatin will be ordered.  Sent X will be  renewed.  Prescription order for the shingles vaccine will be given to the patient to take to her pharmacy.  Bone density study is recommended.  The patient will decide when she wishes to take that.  A noncontrast CT scan of the chest will be obtained in March 2021 for follow-up of left upper lung nodules.  Laboratory studies will be repeated in 3 months.    Diagnoses and all orders for this visit:    Mild intermittent asthmatic bronchitis with acute exacerbation    Hypothyroidism, unspecified type    Hyperlipidemia, unspecified hyperlipidemia type  -     Comprehensive metabolic panel; Future  -     Lipid Panel; Future    Vitamin D deficiency    Anxiety    Gastroesophageal reflux disease, esophagitis presence not specified    NAFLD (nonalcoholic fatty liver disease)    Chronic rhinitis    Multiple lung nodules on CT  -     CT Chest Without Contrast; Future    Lung nodule  -     CT Chest Without Contrast; Future    Other orders  -     atorvastatin (LIPITOR) 20 MG tablet; Take 1 tablet (20 mg total) by mouth once daily. For cholesterol control.  -     varicella-zoster [id number], PF, (SHINGRIX, PF,) 50 mcg/0.5 mL injection; Inject 0.5 mLs into the muscle once. for 1 dose

## 2020-05-22 ENCOUNTER — TELEPHONE (OUTPATIENT)
Dept: INTERNAL MEDICINE | Facility: CLINIC | Age: 70
End: 2020-05-22

## 2020-05-22 ENCOUNTER — HOSPITAL ENCOUNTER (OUTPATIENT)
Dept: RADIOLOGY | Facility: HOSPITAL | Age: 70
Discharge: HOME | End: 2020-05-22
Attending: INTERNAL MEDICINE
Payer: MEDICARE

## 2020-05-22 DIAGNOSIS — Z12.31 BREAST CANCER SCREENING BY MAMMOGRAM: ICD-10-CM

## 2020-05-22 PROCEDURE — 77067 SCR MAMMO BI INCL CAD: CPT | Mod: 26,,, | Performed by: RADIOLOGY

## 2020-05-22 PROCEDURE — 77067 SCR MAMMO BI INCL CAD: CPT | Mod: TC,PO

## 2020-05-22 PROCEDURE — 77067 MAMMO DIGITAL SCREENING BILAT WITH TOMOSYNTHESIS_CAD: ICD-10-PCS | Mod: 26,,, | Performed by: RADIOLOGY

## 2020-05-22 PROCEDURE — 77063 BREAST TOMOSYNTHESIS BI: CPT | Mod: 26,,, | Performed by: RADIOLOGY

## 2020-05-22 PROCEDURE — 77063 MAMMO DIGITAL SCREENING BILAT WITH TOMOSYNTHESIS_CAD: ICD-10-PCS | Mod: 26,,, | Performed by: RADIOLOGY

## 2020-05-22 RX ORDER — LEVOCETIRIZINE DIHYDROCHLORIDE 5 MG/1
5 TABLET, FILM COATED ORAL NIGHTLY
Qty: 30 TABLET | Refills: 4 | Status: SHIPPED | OUTPATIENT
Start: 2020-05-22 | End: 2021-09-17

## 2020-05-22 RX ORDER — METHYLPREDNISOLONE 4 MG/1
TABLET ORAL
Qty: 1 PACKAGE | Refills: 0 | Status: SHIPPED | OUTPATIENT
Start: 2020-05-22 | End: 2020-06-15

## 2020-05-22 RX ORDER — FLUTICASONE PROPIONATE 50 MCG
2 SPRAY, SUSPENSION (ML) NASAL DAILY
Qty: 16 G | Refills: 4 | Status: SHIPPED | OUTPATIENT
Start: 2020-05-22 | End: 2020-06-21

## 2020-05-22 NOTE — TELEPHONE ENCOUNTER
----- Message from Raine Chandler sent at 5/22/2020 10:04 AM CDT -----  Contact: self  [phone]   Would like to get medical advice.  Symptoms (please be specific): sinus with pain/pressure under her eyes and pain in her teeth  How long has patient had these symptoms:  since 5/20  Pharmacy name and phone #:  [id number] - [place], [place] - [address] [phone] (Phone)  [phone] (Fax)  Any drug allergies (copy from chart):   See chart   Would the patient rather a call back or a response via [facility]?:  Call back  Comments:

## 2020-05-25 ENCOUNTER — TELEPHONE (OUTPATIENT)
Dept: RADIOLOGY | Facility: HOSPITAL | Age: 70
End: 2020-05-25

## 2020-05-25 NOTE — TELEPHONE ENCOUNTER
Spoke with patient and explained mammogram findings.Patient expressed understanding of results. Patient scheduled abnormal mammogram follow up appointment at [facility] [place] on 5/29/2020.

## 2020-05-29 ENCOUNTER — HOSPITAL ENCOUNTER (OUTPATIENT)
Dept: RADIOLOGY | Facility: HOSPITAL | Age: 70
Discharge: HOME | End: 2020-05-29
Attending: INTERNAL MEDICINE
Payer: MEDICARE

## 2020-05-29 DIAGNOSIS — R92.8 ABNORMAL MAMMOGRAM: ICD-10-CM

## 2020-05-29 PROCEDURE — 77061 MAMMO DIGITAL DIAGNOSTIC LEFT WITH TOMOSYNTHESIS_CAD: ICD-10-PCS | Mod: 26,LT,, | Performed by: RADIOLOGY

## 2020-05-29 PROCEDURE — 77065 DX MAMMO INCL CAD UNI: CPT | Mod: 26,LT,, | Performed by: RADIOLOGY

## 2020-05-29 PROCEDURE — 77061 BREAST TOMOSYNTHESIS UNI: CPT | Mod: 26,LT,, | Performed by: RADIOLOGY

## 2020-05-29 PROCEDURE — 77065 DX MAMMO INCL CAD UNI: CPT | Mod: TC,PO,LT

## 2020-05-29 PROCEDURE — 77061 BREAST TOMOSYNTHESIS UNI: CPT | Mod: TC,PO,LT

## 2020-05-29 PROCEDURE — 77065 MAMMO DIGITAL DIAGNOSTIC LEFT WITH TOMOSYNTHESIS_CAD: ICD-10-PCS | Mod: 26,LT,, | Performed by: RADIOLOGY

## 2020-06-01 ENCOUNTER — TELEPHONE (OUTPATIENT)
Dept: RADIOLOGY | Facility: HOSPITAL | Age: 70
End: 2020-06-01

## 2020-06-01 NOTE — TELEPHONE ENCOUNTER
Spoke with patient. Reviewed breast biopsy procedure and reviewed instructions for breast biopsy. Patient expressed understanding and all questions were answered. Provided patient with my phone number to call for any further concerns or questions.   Patient scheduled breast biopsy at the [facility] on 6/8/2020.

## 2020-06-08 ENCOUNTER — HOSPITAL ENCOUNTER (OUTPATIENT)
Dept: RADIOLOGY | Facility: HOSPITAL | Age: 70
Discharge: HOME | End: 2020-06-08
Attending: INTERNAL MEDICINE
Payer: MEDICARE

## 2020-06-08 DIAGNOSIS — R92.8 ABNORMAL MAMMOGRAM: ICD-10-CM

## 2020-06-08 PROCEDURE — 88341 IMHCHEM/IMCYTCHM EA ADD ANTB: CPT | Mod: 26,,, | Performed by: PATHOLOGY

## 2020-06-08 PROCEDURE — 19081 MAMMO BREAST STEREOTACTIC BREAST BIOPSY LEFT: ICD-10-PCS | Mod: LT,,, | Performed by: RADIOLOGY

## 2020-06-08 PROCEDURE — 88342 CHG IMMUNOCYTOCHEMISTRY: ICD-10-PCS | Mod: 26,,, | Performed by: PATHOLOGY

## 2020-06-08 PROCEDURE — 19081 BX BREAST 1ST LESION STRTCTC: CPT | Mod: LT,,, | Performed by: RADIOLOGY

## 2020-06-08 PROCEDURE — 88341 IMHCHEM/IMCYTCHM EA ADD ANTB: CPT | Performed by: PATHOLOGY

## 2020-06-08 PROCEDURE — 88342 IMHCHEM/IMCYTCHM 1ST ANTB: CPT | Mod: 26,,, | Performed by: PATHOLOGY

## 2020-06-08 PROCEDURE — 88307 PR  SURG PATH,LEVEL V: ICD-10-PCS | Mod: 26,,, | Performed by: PATHOLOGY

## 2020-06-08 PROCEDURE — 88342 IMHCHEM/IMCYTCHM 1ST ANTB: CPT | Mod: 59 | Performed by: PATHOLOGY

## 2020-06-08 PROCEDURE — 88305 TISSUE EXAM BY PATHOLOGIST: CPT | Performed by: PATHOLOGY

## 2020-06-08 PROCEDURE — 25000003 PHARM REV CODE 250: Mod: PO | Performed by: INTERNAL MEDICINE

## 2020-06-08 PROCEDURE — 88360 TUMOR IMMUNOHISTOCHEM/MANUAL: CPT | Performed by: PATHOLOGY

## 2020-06-08 PROCEDURE — 88307 TISSUE EXAM BY PATHOLOGIST: CPT | Mod: 26,,, | Performed by: PATHOLOGY

## 2020-06-08 PROCEDURE — 27200939 MAMMO BREAST STEREOTACTIC BREAST BIOPSY LEFT: Mod: PO

## 2020-06-08 PROCEDURE — 88341 PR IHC OR ICC EACH ADD'L SINGLE ANTIBODY  STAINPR: ICD-10-PCS | Mod: 26,,, | Performed by: PATHOLOGY

## 2020-06-08 RX ORDER — LIDOCAINE HYDROCHLORIDE AND EPINEPHRINE 20; 10 MG/ML; UG/ML
20 INJECTION, SOLUTION INFILTRATION; PERINEURAL ONCE
Status: COMPLETED | OUTPATIENT
Start: 2020-06-08 | End: 2020-06-08

## 2020-06-08 RX ORDER — LIDOCAINE HYDROCHLORIDE 10 MG/ML
5 INJECTION, SOLUTION EPIDURAL; INFILTRATION; INTRACAUDAL; PERINEURAL ONCE
Status: COMPLETED | OUTPATIENT
Start: 2020-06-08 | End: 2020-06-08

## 2020-06-08 RX ORDER — LIDOCAINE HYDROCHLORIDE 10 MG/ML
1 INJECTION, SOLUTION EPIDURAL; INFILTRATION; INTRACAUDAL; PERINEURAL ONCE
Status: DISCONTINUED | OUTPATIENT
Start: 2020-06-08 | End: 2020-06-09 | Stop reason: HOSPADM

## 2020-06-08 RX ORDER — LIDOCAINE HYDROCHLORIDE AND EPINEPHRINE 20; 10 MG/ML; UG/ML
20 INJECTION, SOLUTION INFILTRATION; PERINEURAL ONCE
Status: DISCONTINUED | OUTPATIENT
Start: 2020-06-08 | End: 2020-06-09 | Stop reason: HOSPADM

## 2020-06-08 RX ADMIN — LIDOCAINE HYDROCHLORIDE 3 ML: 10 INJECTION, SOLUTION EPIDURAL; INFILTRATION; INTRACAUDAL; PERINEURAL at 03:06

## 2020-06-08 RX ADMIN — LIDOCAINE HYDROCHLORIDE,EPINEPHRINE BITARTRATE 20 ML: 20; .01 INJECTION, SOLUTION INFILTRATION; PERINEURAL at 03:06

## 2020-06-12 ENCOUNTER — TELEPHONE (OUTPATIENT)
Dept: SURGERY | Facility: CLINIC | Age: 70
End: 2020-06-12

## 2020-06-12 ENCOUNTER — DOCUMENTATION ONLY (OUTPATIENT)
Dept: SURGERY | Facility: CLINIC | Age: 70
End: 2020-06-12

## 2020-06-12 LAB
FINAL PATHOLOGIC DIAGNOSIS: NORMAL
GROSS: NORMAL
Lab: NORMAL

## 2020-06-12 NOTE — NURSING
Called Patient with results of breast biopsy from 6/8/2020.  Explained that the biopsy showed DCIS . Discussed what this means and that the next step is to meet with a breast surgeon. An appt was made for 6/15/2020 with Dr. Alvarez.  Reviewed location of breast center. Patient verbalized understanding.  E mailed appt and educational information to patient.  Oncology Navigation   Intake  Date of Diagnosis: 06/08/20  Cancer Type: Breast  MD Assigned: Dr. Alvarez  Internal / External Referral: Internal  Initial Nurse Navigator Contact: 06/12/20  Diagnosis to Initial Contact Timeline (days): 4 days  Contact Method: Phone  Date Worked: 06/12/20  First Appointment Available: 06/15/20  Appointment Date: 06/15/20  Schedule to Appointment Timeline (days): 3  First Available Date vs. Scheduled Date (days): 0     Treatment        Procedures: Biopsy  Biopsy Schedule Date: 06/08/20       ER: Negative  [place]: Negative     Acuity      Follow Up  No follow-ups on file.

## 2020-06-14 ENCOUNTER — PATIENT OUTREACH (OUTPATIENT)
Dept: ADMINISTRATIVE | Facility: OTHER | Age: 70
End: 2020-06-14

## 2020-06-15 ENCOUNTER — DOCUMENTATION ONLY (OUTPATIENT)
Dept: HEMATOLOGY/ONCOLOGY | Facility: CLINIC | Age: 70
End: 2020-06-15

## 2020-06-15 ENCOUNTER — OFFICE VISIT (OUTPATIENT)
Dept: SURGERY | Facility: CLINIC | Age: 70
End: 2020-06-15
Payer: MEDICARE

## 2020-06-15 VITALS
SYSTOLIC BLOOD PRESSURE: 165 MMHG | HEART RATE: 61 BPM | BODY MASS INDEX: 24.49 KG/M2 | WEIGHT: 156.06 LBS | HEIGHT: 67 IN | DIASTOLIC BLOOD PRESSURE: 74 MMHG

## 2020-06-15 DIAGNOSIS — D05.12 DUCTAL CARCINOMA IN SITU (DCIS) OF LEFT BREAST: Primary | ICD-10-CM

## 2020-06-15 DIAGNOSIS — Z01.818 PREOP TESTING: ICD-10-CM

## 2020-06-15 DIAGNOSIS — Z17.1 MALIGNANT NEOPLASM OF LEFT BREAST IN FEMALE, ESTROGEN RECEPTOR NEGATIVE, UNSPECIFIED SITE OF BREAST: ICD-10-CM

## 2020-06-15 DIAGNOSIS — C50.912 MALIGNANT NEOPLASM OF LEFT BREAST IN FEMALE, ESTROGEN RECEPTOR NEGATIVE, UNSPECIFIED SITE OF BREAST: ICD-10-CM

## 2020-06-15 PROCEDURE — 99205 PR OFFICE/OUTPT VISIT, NEW, LEVL V, 60-74 MIN: ICD-10-PCS | Mod: S$PBB,,, | Performed by: SURGERY

## 2020-06-15 PROCEDURE — 99999 PR PBB SHADOW E&M-EST. PATIENT-LVL V: ICD-10-PCS | Mod: PBBFAC,,, | Performed by: SURGERY

## 2020-06-15 PROCEDURE — 99215 OFFICE O/P EST HI 40 MIN: CPT | Mod: PBBFAC | Performed by: SURGERY

## 2020-06-15 PROCEDURE — 99205 OFFICE O/P NEW HI 60 MIN: CPT | Mod: S$PBB,,, | Performed by: SURGERY

## 2020-06-15 PROCEDURE — 99999 PR PBB SHADOW E&M-EST. PATIENT-LVL V: CPT | Mod: PBBFAC,,, | Performed by: SURGERY

## 2020-06-15 NOTE — LETTER
June 24, 2020      Brigitte Lowery MD  [address] [facility] [address]  [address]  [facility] [address]           [facility]  [address], [facility] [address]  [facility] [address]  Phone: [phone]  Fax: [phone]          Patient: Sheila Zarco   MR Number: [id number]   YOB: 1950   Date of Visit: 6/15/2020       Dear Dr. Brigitte Lowery:    Thank you for referring Sheila Zarco to me for evaluation. Attached you will find relevant portions of my assessment and plan of care.    If you have questions, please do not hesitate to call me. I look forward to following Sheila Zarco along with you.    Sincerely,    Kayleen Alvarez MD    Enclosure  CC:  No Recipients    If you would like to receive this communication electronically, please contact [email or web address] or [phone] to request more information on [facility] access.    For providers and/or their staff who would like to refer a patient to [facility], please contact us through our one-stop-shop provider referral line, [facility], at [phone].    If you feel you have received this communication in error or would no longer like to receive these types of communications, please e-mail [email or web address]

## 2020-06-15 NOTE — NURSING
Oncology Navigation   Intake  Date of Diagnosis: 06/08/20  Cancer Type: Breast  MD Assigned: Dr. Alvarez  Internal / External Referral: Internal  Initial Nurse Navigator Contact: 06/12/20  Diagnosis to Initial Contact Timeline (days): 4 days  Contact Method: Phone  Date Worked: 06/12/20  First Appointment Available: 06/15/20  Appointment Date: 06/15/20  Schedule to Appointment Timeline (days): 3  First Available Date vs. Scheduled Date (days): 0     Treatment  Current Status: Staging work-up       Procedures: MRI  Biopsy Schedule Date: 06/08/20  MRI Schedule Date: 06/18/20       ER: Negative  AR: Negative     Acuity      Follow Up  No follow-ups on file.

## 2020-06-15 NOTE — PROGRESS NOTES
New Breast Cancer  History and Physical  [facility]    REFERRING PROVIDER: No referring provider defined for this encounter.    CHIEF COMPLAINT: left breast DCIS    Subjective:      Sheila Zarco is a 69 y.o. postmenopausal female referred for evaluation of recently diagnosed carcinoma of the left breast. The patient was initially underwent screening mammogram, which identified calcifications. Follow-up diagnostic mammogram showed 4 mm of calcifications at 6 OC position. 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   She denies bilateral breast pain, lumps, discharge, discoloration, dimpling, or axillary lump.     Patient does routinely do self breast exams.  Patient denies a personal history of breast cancer.    Findings at that time were the following:   Tumor size: 4 mm  Estrogen Receptor: -   Progesterone Receptor: -     GYN History:  Age of menarche was 16. Age of menopause was 54. Patient denies hormonal therapy. Patient is . Age of first live birth was 20. Patient did not breast feed.    FAMILY History:  Daughter - breast cancer (dx at 44, ILC, s/p B mastectomy)  Sister 1- breast cancer (dx in late 40s, DCIS, s/p lumpectomy)   Sister 2 - lung cancer  Sister 3 - lymphoma  Father - renal mass (unknown type)    Past Medical History:   Diagnosis Date    Asthma     childhood    Baker's cyst     Cataract     Glaucoma     Thyroid disease      Past Surgical History:   Procedure Laterality Date    BUNIONECTOMY      GALLBLADDER SURGERY      HYSTERECTOMY      KNEE CARTILAGE SURGERY      narrow angles eye surgery        Current Outpatient Medications on File Prior to Visit   Medication Sig Dispense Refill    albuterol (ACCUNEB) 0.63 mg/3 mL Nebu Take 3 mLs (0.63 mg total) by nebulization every 6 (six) hours as needed. 100 vial 2    albuterol (VENTOLIN HFA) 90 mcg/actuation inhaler Inhale 2 puffs into the lungs every 6  (six) hours as needed for Wheezing.      ALPRAZolam (XANAX) 0.5 MG tablet Take 1 tablet (0.5 mg total) by mouth 2 (two) times daily. 60 tablet 0    atorvastatin (LIPITOR) 20 MG tablet Take 1 tablet (20 mg total) by mouth once daily. For cholesterol control. 90 tablet 3    clobetasol (TEMOVATE) 0.05 % cream Apply topically 2 (two) times daily. 30 g 3    desoximetasone (TOPICORT) 0.25 % cream Apply topically 2 (two) times daily.        escitalopram oxalate (LEXAPRO) 20 MG tablet Take 1 tablet (20 mg total) by mouth once daily. For anxiety. 90 tablet 2    fluocinonide (LIDEX) 0.05 % external solution AAA scalp qday prn itching, scaling 60 mL 3    fluticasone propionate (FLONASE) 50 mcg/actuation nasal spray 2 sprays (100 mcg total) by Each Nostril route once daily. 16 g 4    fluticasone-salmeterol 250-50 mcg/dose (ADVAIR) 250-50 mcg/dose diskus inhaler Inhale 1 puff into the lungs 2 (two) times daily. 60 each 5    FLUZONE HIGH-DOSE 2019-20, PF, 180 mcg/0.5 mL Syrg TO BE ADMINISTERED BY PHARMACIST FOR IMMUNIZATION      ketoconazole (NIZORAL) 2 % cream AAA twice daily on nose prn flare 60 g 3    ketoconazole (NIZORAL) 2 % shampoo WASH HAIR W/ MEDICATED SHAMPOO AT LEAST 2X WEEK, LET SIT ON SCALP FOR ATLEAST 5MINUTES BEFORE RINSIN 120 mL 1    levocetirizine (XYZAL) 5 MG tablet Take 1 tablet (5 mg total) by mouth every evening. For sinus allergy symptoms 30 tablet 4    levothyroxine (SYNTHROID) 88 MCG tablet TAKE 1 TABLET (88 MCG TOTAL) BY MOUTH EVERY MORNING. 90 tablet 3    meclizine (ANTIVERT) 25 mg tablet Take 1 tablet (25 mg total) by mouth 3 (three) times daily as needed for Dizziness. 15 tablet 0    methylPREDNISolone (MEDROL DOSEPACK) 4 mg tablet use as directed 1 Package 0    naproxen (NAPROSYN) 500 MG tablet Take 1 tablet (500 mg total) by mouth 2 (two) times daily with meals. 60 tablet 3    pantoprazole (PROTONIX) 40 MG tablet TAKE 1 TABLET (40 MG TOTAL) BY MOUTH ONCE DAILY. 90 tablet 3     triamcinolone acetonide 0.025% (KENALOG) 0.025 % cream AAA qd to face, ears prn flare 45 g 1    valACYclovir (VALTREX) 500 MG tablet TAKE ONE TABLET TWICE A DAY AS NEEDED FOR  OUTBREAK 12 tablet 3    VENTOLIN HFA 90 mcg/actuation inhaler INHALE 2 PUFFS INTO THE LUNGS EVERY 4 HOURS AS NEEDED FOR WHEEZING 18 Inhaler 0     No current facility-administered medications on file prior to visit.      Social History     Socioeconomic History    Marital status:      Spouse name: Not on file    Number of children: Not on file    Years of education: Not on file    Highest education level: Not on file   Occupational History    Not on file   Social Needs    Financial resource strain: Not very hard    Food insecurity     Worry: Never true     Inability: Never true    Transportation needs     Medical: No     Non-medical: No   Tobacco Use    Smoking status: Never Smoker    Smokeless tobacco: Never Used   Substance and Sexual Activity    Alcohol use: Yes     Alcohol/week: 8.0 standard drinks     Types: 4 Glasses of wine, 4 Cans of beer per week     Frequency: 2-4 times a month     Drinks per session: 3 or 4     Binge frequency: Less than monthly     Comment: drinks 4 days a week- >1 drink    Drug use: No    Sexual activity: Yes   Lifestyle    Physical activity     Days per week: 0 days     Minutes per session: Not on file    Stress: Only a little   Relationships    Social connections     Talks on phone: More than three times a week     Gets together: Once a week     Attends Druze service: Not on file     Active member of club or organization: No     Attends meetings of clubs or organizations: Never     Relationship status:    Other Topics Concern    Are you pregnant or think you may be? No    Breast-feeding No   Social History Narrative    Not on file     Family History   Problem Relation Age of Onset    Heart disease Mother     Heart disease Father     Liver disease Brother     Breast  cancer Sister     Breast cancer Daughter 43        bilateral mastectomy    No Known Problems Sister     No Known Problems Maternal Grandmother     No Known Problems Maternal Grandfather     No Known Problems Paternal Grandmother     No Known Problems Paternal Grandfather     No Known Problems Maternal Aunt     No Known Problems Maternal Uncle     No Known Problems Paternal Aunt     No Known Problems Paternal Uncle     Breast cancer Other     Colon cancer Neg Hx     Ovarian cancer Neg Hx     Melanoma Neg Hx     Lupus Neg Hx     Acne Neg Hx     Amblyopia Neg Hx     Blindness Neg Hx     Cancer Neg Hx     Cataracts Neg Hx     Diabetes Neg Hx     Glaucoma Neg Hx     Hypertension Neg Hx     Macular degeneration Neg Hx     Retinal detachment Neg Hx     Strabismus Neg Hx     Stroke Neg Hx     Thyroid disease Neg Hx         Review of Systems  Review of Systems   Constitutional: Negative for chills and fever.   HENT: Negative for hearing loss and voice change.    Eyes: Negative for discharge and redness.   Respiratory: Negative for cough and shortness of breath.    Cardiovascular: Negative for chest pain.   Gastrointestinal: Negative for abdominal pain, diarrhea, nausea and vomiting.   Genitourinary: Negative for difficulty urinating and hematuria.   Musculoskeletal: Negative for gait problem.   Skin: Negative for pallor.   Neurological: Positive for dizziness (dx with L vertebral a stenosis). Negative for weakness.   Psychiatric/Behavioral: Negative for behavioral problems.        Objective:   PHYSICAL EXAM:  There were no vitals taken for this visit.    Physical Exam   Constitutional: She is oriented to person, place, and time. She appears well-developed and well-nourished. No distress.   HENT:   Head: Normocephalic and atraumatic.   Eyes: Conjunctivae and EOM are normal.   Neck: Normal range of motion.   Cardiovascular: Normal rate and regular rhythm.    Pulmonary/Chest: No respiratory distress.  Right breast exhibits no inverted nipple, no mass, no nipple discharge, no skin change and no tenderness. Left breast exhibits skin change and tenderness. Left breast exhibits no inverted nipple, no mass and no nipple discharge. Breasts are symmetrical.   Moderately amount of ecchymosis inferior to nipple L breast, soft  Mild tenderness to palpation  No axillary LAD   Abdominal: Soft. She exhibits no distension. There is no abdominal tenderness.   Musculoskeletal: Normal range of motion.   Lymphadenopathy:     She has no cervical adenopathy.   Neurological: She is alert and oriented to person, place, and time.   Skin: Skin is warm and dry.     Psychiatric: She has a normal mood and affect. Her behavior is normal.         Radiology review: Images personally reviewed by me in the clinic.   Screening MMG 5/22/20  Findings:  This procedure was performed using tomosynthesis. Computer-aided detection was utilized in the interpretation of this examination.  The breasts are heterogeneously dense, which may obscure small masses.      Left  There are amorphous calcifications in a grouped distribution seen in the left breast at 6 o'clock in the middle depth.      Right  There is no evidence of suspicious masses, calcifications, or other abnormal findings in the right breast.     Impression:  Left  Calcifications: Left breast calcifications at the middle 6 o'clock position. Assessment: 0 - Incomplete. Special Views: Magnification View is recommended.      Right  There is no mammographic evidence of malignancy in the right breast.     BI-RADS Category:   Overall: 0 - Incomplete: Needs Additional Imaging Evaluation     Recommendation:  Diagnostic mammogram including magnification views is recommended.     Your estimated lifetime risk of breast cancer (to age 85) based on Tyrer-Cuzick risk assessment model is Tyrer-Cuzick: 16.65 %. According to the American Cancer Society, patients with a lifetime breast cancer risk of 20% or  higher might benefit from supplemental screening tests.    Diagnostic MMG 5/29/20  Findings:  This procedure was performed using tomosynthesis. Computer-aided detection was utilized in the interpretation of this examination.  The left breast is heterogeneously dense, which may obscure small masses.     There are amorphous calcifications in a grouped distribution seen in the left breast at 6 o'clock in the middle depth. Maximum diameter of cluster = 4 mm.     Impression:  Left  Calcifications: Left breast 4 mm calcifications at the middle 6 o'clock position. Assessment: 4 - Suspicious finding. Biopsy is recommended.      BI-RADS Category:   Overall: 4 - Suspicious    Pathology  1. Left breast with calcifications (middle 6:00 position), stereotactic biopsy:  - Ductal carcinoma in situ (DCIS) with apocrine features: High-grade, cribriform with central necrosis  and associated microcalcifications  - 4 mm in greatest linear dimension  - Negative for evidence of invasive carcinoma    Assessment:      Sheila Zarco is a 69 y.o. postmenopausal female with recently diagnosed ductal carcinoma in situ of the left breast.      Plan:    Options for management were discussed with the patient and her family. We reviewed the existing data noting the equivalency of breast conserving surgery with radiation therapy and mastectomy. We also reviewed the guidelines of the National Comprehensive Cancer Network for DCIS.  We discussed the need for lumpectomy margins to be negative for carcinoma and the necessity for postoperative radiation therapy after breast conservation in most cases. In the setting of mastectomy, delayed or immediate reconstruction options are available and were discussed.  We discussed the chance of upstaging to an invasive carcinoma at the time of surgery, potentially requiring more surgery (such as SLNB) if this occurs.    The need for SLNB depends on tumor biology as well as size and location of the DCIS.  If  in the upper outer quadrant, it is difficult to map to the axillary nodes so SLNB is usually performed. The possibility of a failed or false negative sentinel lymph node biopsy and the potential need for complete lymphadenectomy for a failed or positive sentinel lymph node biopsy were fully discussed.    In the setting of lumpectomy, radiation therapy would be recommended majority of the time.  The duration and treatment side effects were discussed with the patient.  This will coordinated with the radiation oncologist pending final pathology.    We also discussed the role of systemic therapy in the treatment of DCIS with endocrine therapy if the DCIS is ER and/or AK positive.  We discussed that this is based on tumor biology and kayleen status and will be determined based on final pathology.  We discussed that if the DCIS is hormone positive, endocrine therapy may be recommended and its use can reduce the risk of recurrence. Side effects of treatment were briefly discussed. We also discussed that chemotherapy is not recommended in the setting of DCIS.     - Order B Breast MRI  - Order genetics  - Plan for L breast partial mastectomy with seed (no SLNB)  - F/u information will be provided postop    Patient was educated on DCIS, receptors, wire localization lumpectomy, mastectomy, sentinel lymph node mapping and biopsy, axillary lymph node dissection, reconstruction, breast prosthesis with post-mastectomy bra and radiation therapy. Patient [facility]  All her questions were answered.    Total time spent with the patient: 60 minutes.  45 minutes of face to face consultation and 15 minutes of chart review and coordination of care.

## 2020-06-15 NOTE — PROGRESS NOTES
Nurse Navigator Note:     Met with patient during her consult with Dr. Alvarez.  Patient and I reviewed the information she discussed with Dr. Alvarez, including treatment options, diagnosis, and future plans for workup. Patient and I went through the new patient binder, explained some of the information and why it is provided.     Also offered patient consults with our other specialty clinics: Dr. Horn for gynecological health during treatment, our breast physical therapy department for pre-op and post-operative assessments, Dr. Bejarano for psychological support, and Angelique Reina for nutritional counseling. Explained to patient that all of these support services are completely optional. Discussed that physical therapy may call patient to offer pre-op appt, and what that appt would entail.     Patient was given a copy of her appointments, Dr. Alvarez's card, and my card. Encouraged her to call me if she has any questions or concerns or would like to schedule any additional appointments. Verbalized understanding of all information.

## 2020-06-15 NOTE — H&P (VIEW-ONLY)
New Breast Cancer  History and Physical  [facility]    REFERRING PROVIDER: No referring provider defined for this encounter.    CHIEF COMPLAINT: left breast DCIS    Subjective:      Sheila Zarco is a 69 y.o. postmenopausal female referred for evaluation of recently diagnosed carcinoma of the left breast. The patient was initially underwent screening mammogram, which identified calcifications. Follow-up diagnostic mammogram showed 4 mm of calcifications at 6 OC position. 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   She denies bilateral breast pain, lumps, discharge, discoloration, dimpling, or axillary lump.     Patient does routinely do self breast exams.  Patient denies a personal history of breast cancer.    Findings at that time were the following:   Tumor size: 4 mm  Estrogen Receptor: -   Progesterone Receptor: -     GYN History:  Age of menarche was 16. Age of menopause was 54. Patient denies hormonal therapy. Patient is . Age of first live birth was 20. Patient did not breast feed.    FAMILY History:  Daughter - breast cancer (dx at 44, ILC, s/p B mastectomy)  Sister 1- breast cancer (dx in late 40s, DCIS, s/p lumpectomy)   Sister 2 - lung cancer  Sister 3 - lymphoma  Father - renal mass (unknown type)    Past Medical History:   Diagnosis Date    Asthma     childhood    Baker's cyst     Cataract     Glaucoma     Thyroid disease      Past Surgical History:   Procedure Laterality Date    BUNIONECTOMY      GALLBLADDER SURGERY      HYSTERECTOMY      KNEE CARTILAGE SURGERY      narrow angles eye surgery        Current Outpatient Medications on File Prior to Visit   Medication Sig Dispense Refill    albuterol (ACCUNEB) 0.63 mg/3 mL Nebu Take 3 mLs (0.63 mg total) by nebulization every 6 (six) hours as needed. 100 vial 2    albuterol (VENTOLIN HFA) 90 mcg/actuation inhaler Inhale 2 puffs into the lungs every 6  (six) hours as needed for Wheezing.      ALPRAZolam (XANAX) 0.5 MG tablet Take 1 tablet (0.5 mg total) by mouth 2 (two) times daily. 60 tablet 0    atorvastatin (LIPITOR) 20 MG tablet Take 1 tablet (20 mg total) by mouth once daily. For cholesterol control. 90 tablet 3    clobetasol (TEMOVATE) 0.05 % cream Apply topically 2 (two) times daily. 30 g 3    desoximetasone (TOPICORT) 0.25 % cream Apply topically 2 (two) times daily.        escitalopram oxalate (LEXAPRO) 20 MG tablet Take 1 tablet (20 mg total) by mouth once daily. For anxiety. 90 tablet 2    fluocinonide (LIDEX) 0.05 % external solution AAA scalp qday prn itching, scaling 60 mL 3    fluticasone propionate (FLONASE) 50 mcg/actuation nasal spray 2 sprays (100 mcg total) by Each Nostril route once daily. 16 g 4    fluticasone-salmeterol 250-50 mcg/dose (ADVAIR) 250-50 mcg/dose diskus inhaler Inhale 1 puff into the lungs 2 (two) times daily. 60 each 5    FLUZONE HIGH-DOSE 2019-20, PF, 180 mcg/0.5 mL Syrg TO BE ADMINISTERED BY PHARMACIST FOR IMMUNIZATION      ketoconazole (NIZORAL) 2 % cream AAA twice daily on nose prn flare 60 g 3    ketoconazole (NIZORAL) 2 % shampoo WASH HAIR W/ MEDICATED SHAMPOO AT LEAST 2X WEEK, LET SIT ON SCALP FOR ATLEAST 5MINUTES BEFORE RINSIN 120 mL 1    levocetirizine (XYZAL) 5 MG tablet Take 1 tablet (5 mg total) by mouth every evening. For sinus allergy symptoms 30 tablet 4    levothyroxine (SYNTHROID) 88 MCG tablet TAKE 1 TABLET (88 MCG TOTAL) BY MOUTH EVERY MORNING. 90 tablet 3    meclizine (ANTIVERT) 25 mg tablet Take 1 tablet (25 mg total) by mouth 3 (three) times daily as needed for Dizziness. 15 tablet 0    methylPREDNISolone (MEDROL DOSEPACK) 4 mg tablet use as directed 1 Package 0    naproxen (NAPROSYN) 500 MG tablet Take 1 tablet (500 mg total) by mouth 2 (two) times daily with meals. 60 tablet 3    pantoprazole (PROTONIX) 40 MG tablet TAKE 1 TABLET (40 MG TOTAL) BY MOUTH ONCE DAILY. 90 tablet 3     triamcinolone acetonide 0.025% (KENALOG) 0.025 % cream AAA qd to face, ears prn flare 45 g 1    valACYclovir (VALTREX) 500 MG tablet TAKE ONE TABLET TWICE A DAY AS NEEDED FOR  OUTBREAK 12 tablet 3    VENTOLIN HFA 90 mcg/actuation inhaler INHALE 2 PUFFS INTO THE LUNGS EVERY 4 HOURS AS NEEDED FOR WHEEZING 18 Inhaler 0     No current facility-administered medications on file prior to visit.      Social History     Socioeconomic History    Marital status:      Spouse name: Not on file    Number of children: Not on file    Years of education: Not on file    Highest education level: Not on file   Occupational History    Not on file   Social Needs    Financial resource strain: Not very hard    Food insecurity     Worry: Never true     Inability: Never true    Transportation needs     Medical: No     Non-medical: No   Tobacco Use    Smoking status: Never Smoker    Smokeless tobacco: Never Used   Substance and Sexual Activity    Alcohol use: Yes     Alcohol/week: 8.0 standard drinks     Types: 4 Glasses of wine, 4 Cans of beer per week     Frequency: 2-4 times a month     Drinks per session: 3 or 4     Binge frequency: Less than monthly     Comment: drinks 4 days a week- >1 drink    Drug use: No    Sexual activity: Yes   Lifestyle    Physical activity     Days per week: 0 days     Minutes per session: Not on file    Stress: Only a little   Relationships    Social connections     Talks on phone: More than three times a week     Gets together: Once a week     Attends Alevism service: Not on file     Active member of club or organization: No     Attends meetings of clubs or organizations: Never     Relationship status:    Other Topics Concern    Are you pregnant or think you may be? No    Breast-feeding No   Social History Narrative    Not on file     Family History   Problem Relation Age of Onset    Heart disease Mother     Heart disease Father     Liver disease Brother     Breast  cancer Sister     Breast cancer Daughter 43        bilateral mastectomy    No Known Problems Sister     No Known Problems Maternal Grandmother     No Known Problems Maternal Grandfather     No Known Problems Paternal Grandmother     No Known Problems Paternal Grandfather     No Known Problems Maternal Aunt     No Known Problems Maternal Uncle     No Known Problems Paternal Aunt     No Known Problems Paternal Uncle     Breast cancer Other     Colon cancer Neg Hx     Ovarian cancer Neg Hx     Melanoma Neg Hx     Lupus Neg Hx     Acne Neg Hx     Amblyopia Neg Hx     Blindness Neg Hx     Cancer Neg Hx     Cataracts Neg Hx     Diabetes Neg Hx     Glaucoma Neg Hx     Hypertension Neg Hx     Macular degeneration Neg Hx     Retinal detachment Neg Hx     Strabismus Neg Hx     Stroke Neg Hx     Thyroid disease Neg Hx         Review of Systems  Review of Systems   Constitutional: Negative for chills and fever.   HENT: Negative for hearing loss and voice change.    Eyes: Negative for discharge and redness.   Respiratory: Negative for cough and shortness of breath.    Cardiovascular: Negative for chest pain.   Gastrointestinal: Negative for abdominal pain, diarrhea, nausea and vomiting.   Genitourinary: Negative for difficulty urinating and hematuria.   Musculoskeletal: Negative for gait problem.   Skin: Negative for pallor.   Neurological: Positive for dizziness (dx with L vertebral a stenosis). Negative for weakness.   Psychiatric/Behavioral: Negative for behavioral problems.        Objective:   PHYSICAL EXAM:  There were no vitals taken for this visit.    Physical Exam   Constitutional: She is oriented to person, place, and time. She appears well-developed and well-nourished. No distress.   HENT:   Head: Normocephalic and atraumatic.   Eyes: Conjunctivae and EOM are normal.   Neck: Normal range of motion.   Cardiovascular: Normal rate and regular rhythm.    Pulmonary/Chest: No respiratory distress.  Right breast exhibits no inverted nipple, no mass, no nipple discharge, no skin change and no tenderness. Left breast exhibits skin change and tenderness. Left breast exhibits no inverted nipple, no mass and no nipple discharge. Breasts are symmetrical.   Moderately amount of ecchymosis inferior to nipple L breast, soft  Mild tenderness to palpation  No axillary LAD   Abdominal: Soft. She exhibits no distension. There is no abdominal tenderness.   Musculoskeletal: Normal range of motion.   Lymphadenopathy:     She has no cervical adenopathy.   Neurological: She is alert and oriented to person, place, and time.   Skin: Skin is warm and dry.     Psychiatric: She has a normal mood and affect. Her behavior is normal.         Radiology review: Images personally reviewed by me in the clinic.   Screening MMG 5/22/20  Findings:  This procedure was performed using tomosynthesis. Computer-aided detection was utilized in the interpretation of this examination.  The breasts are heterogeneously dense, which may obscure small masses.      Left  There are amorphous calcifications in a grouped distribution seen in the left breast at 6 o'clock in the middle depth.      Right  There is no evidence of suspicious masses, calcifications, or other abnormal findings in the right breast.     Impression:  Left  Calcifications: Left breast calcifications at the middle 6 o'clock position. Assessment: 0 - Incomplete. Special Views: Magnification View is recommended.      Right  There is no mammographic evidence of malignancy in the right breast.     BI-RADS Category:   Overall: 0 - Incomplete: Needs Additional Imaging Evaluation     Recommendation:  Diagnostic mammogram including magnification views is recommended.     Your estimated lifetime risk of breast cancer (to age 85) based on Tyrer-Cuzick risk assessment model is Tyrer-Cuzick: 16.65 %. According to the American Cancer Society, patients with a lifetime breast cancer risk of 20% or  higher might benefit from supplemental screening tests.    Diagnostic MMG 5/29/20  Findings:  This procedure was performed using tomosynthesis. Computer-aided detection was utilized in the interpretation of this examination.  The left breast is heterogeneously dense, which may obscure small masses.     There are amorphous calcifications in a grouped distribution seen in the left breast at 6 o'clock in the middle depth. Maximum diameter of cluster = 4 mm.     Impression:  Left  Calcifications: Left breast 4 mm calcifications at the middle 6 o'clock position. Assessment: 4 - Suspicious finding. Biopsy is recommended.      BI-RADS Category:   Overall: 4 - Suspicious    Pathology  1. Left breast with calcifications (middle 6:00 position), stereotactic biopsy:  - Ductal carcinoma in situ (DCIS) with apocrine features: High-grade, cribriform with central necrosis  and associated microcalcifications  - 4 mm in greatest linear dimension  - Negative for evidence of invasive carcinoma    Assessment:      Sheila Zarco is a 69 y.o. postmenopausal female with recently diagnosed ductal carcinoma in situ of the left breast.      Plan:    Options for management were discussed with the patient and her family. We reviewed the existing data noting the equivalency of breast conserving surgery with radiation therapy and mastectomy. We also reviewed the guidelines of the National Comprehensive Cancer Network for DCIS.  We discussed the need for lumpectomy margins to be negative for carcinoma and the necessity for postoperative radiation therapy after breast conservation in most cases. In the setting of mastectomy, delayed or immediate reconstruction options are available and were discussed.  We discussed the chance of upstaging to an invasive carcinoma at the time of surgery, potentially requiring more surgery (such as SLNB) if this occurs.    The need for SLNB depends on tumor biology as well as size and location of the DCIS.  If  in the upper outer quadrant, it is difficult to map to the axillary nodes so SLNB is usually performed. The possibility of a failed or false negative sentinel lymph node biopsy and the potential need for complete lymphadenectomy for a failed or positive sentinel lymph node biopsy were fully discussed.    In the setting of lumpectomy, radiation therapy would be recommended majority of the time.  The duration and treatment side effects were discussed with the patient.  This will coordinated with the radiation oncologist pending final pathology.    We also discussed the role of systemic therapy in the treatment of DCIS with endocrine therapy if the DCIS is ER and/or KS positive.  We discussed that this is based on tumor biology and kayleen status and will be determined based on final pathology.  We discussed that if the DCIS is hormone positive, endocrine therapy may be recommended and its use can reduce the risk of recurrence. Side effects of treatment were briefly discussed. We also discussed that chemotherapy is not recommended in the setting of DCIS.     - Order B Breast MRI  - Order genetics  - Plan for L breast partial mastectomy with seed (no SLNB)  - F/u information will be provided postop    Patient was educated on DCIS, receptors, wire localization lumpectomy, mastectomy, sentinel lymph node mapping and biopsy, axillary lymph node dissection, reconstruction, breast prosthesis with post-mastectomy bra and radiation therapy. Patient was given [facility] information [facility].  All her questions were answered.    Total time spent with the patient: 60 minutes.  45 minutes of face to face consultation and 15 minutes of chart review and coordination of care.

## 2020-06-17 ENCOUNTER — TELEPHONE (OUTPATIENT)
Dept: OBSTETRICS AND GYNECOLOGY | Facility: CLINIC | Age: 70
End: 2020-06-17

## 2020-06-17 NOTE — TELEPHONE ENCOUNTER
Called patient. No answer. Left voice message for patient to call the office.     Patient does not have a cervix.

## 2020-06-17 NOTE — TELEPHONE ENCOUNTER
----- Message from Dm Perez LPN sent at 6/16/2020  5:22 PM CDT -----    ----- Message -----  From: Marcela Bernard  Sent: 6/16/2020   2:43 PM CDT  To: [facility] Staff    Name of Who is Calling:ANGELA BAPTISTE       What is the request in detail: Would like to speak to staff in regards to having a hysterectomy in 2010 and wanted to know if she still has her cervix and and should still have a cervical cancer screening since she just was diagnosed with breat cancer. Please advise.       Can the clinic reply by [facility]: No      What Number to Call Back if not in [facility]: [phone]

## 2020-06-18 ENCOUNTER — HOSPITAL ENCOUNTER (OUTPATIENT)
Dept: RADIOLOGY | Facility: HOSPITAL | Age: 70
Discharge: HOME | End: 2020-06-18
Attending: SURGERY
Payer: MEDICARE

## 2020-06-18 DIAGNOSIS — D05.12 DUCTAL CARCINOMA IN SITU (DCIS) OF LEFT BREAST: ICD-10-CM

## 2020-06-18 PROCEDURE — 77049 MRI BREAST W/WO CONTRAST, W/CAD, BILATERAL: ICD-10-PCS | Mod: 26,,, | Performed by: RADIOLOGY

## 2020-06-18 PROCEDURE — A9577 INJ MULTIHANCE: HCPCS | Performed by: SURGERY

## 2020-06-18 PROCEDURE — 25500020 PHARM REV CODE 255: Performed by: SURGERY

## 2020-06-18 PROCEDURE — 77049 MRI BREAST C-+ W/CAD BI: CPT | Mod: 26,,, | Performed by: RADIOLOGY

## 2020-06-18 PROCEDURE — C8937 CAD BREAST MRI: HCPCS | Mod: TC

## 2020-06-18 RX ADMIN — GADOBENATE DIMEGLUMINE 15 ML: 529 INJECTION, SOLUTION INTRAVENOUS at 03:06

## 2020-06-24 ENCOUNTER — OFFICE VISIT (OUTPATIENT)
Dept: OPHTHALMOLOGY | Facility: CLINIC | Age: 70
End: 2020-06-24
Payer: MEDICARE

## 2020-06-24 ENCOUNTER — TELEPHONE (OUTPATIENT)
Dept: OPHTHALMOLOGY | Facility: CLINIC | Age: 70
End: 2020-06-24

## 2020-06-24 DIAGNOSIS — H25.041 POSTERIOR SUBCAPSULAR AGE-RELATED CATARACT OF RIGHT EYE: ICD-10-CM

## 2020-06-24 DIAGNOSIS — H40.249 RESIDUAL STAGE ANGLE-CLOSURE GLAUCOMA, UNSPECIFIED LATERALITY: ICD-10-CM

## 2020-06-24 DIAGNOSIS — H25.10 NUCLEAR SENILE CATARACT, UNSPECIFIED LATERALITY: Primary | ICD-10-CM

## 2020-06-24 DIAGNOSIS — H25.11 NUCLEAR SCLEROTIC CATARACT OF RIGHT EYE: Primary | ICD-10-CM

## 2020-06-24 DIAGNOSIS — Z13.9 SCREENING PROCEDURE: ICD-10-CM

## 2020-06-24 PROCEDURE — 92136 OPHTHALMIC BIOMETRY: CPT | Mod: PBBFAC,RT | Performed by: OPHTHALMOLOGY

## 2020-06-24 PROCEDURE — 92014 PR EYE EXAM, EST PATIENT,COMPREHESV: ICD-10-PCS | Mod: S$PBB,,, | Performed by: OPHTHALMOLOGY

## 2020-06-24 PROCEDURE — 99213 OFFICE O/P EST LOW 20 MIN: CPT | Mod: PBBFAC | Performed by: OPHTHALMOLOGY

## 2020-06-24 PROCEDURE — 99999 PR PBB SHADOW E&M-EST. PATIENT-LVL III: CPT | Mod: PBBFAC,,, | Performed by: OPHTHALMOLOGY

## 2020-06-24 PROCEDURE — 92136 IOL MASTER - OD - RIGHT EYE: ICD-10-PCS | Mod: 26,S$PBB,RT, | Performed by: OPHTHALMOLOGY

## 2020-06-24 PROCEDURE — 99999 PR PBB SHADOW E&M-EST. PATIENT-LVL III: ICD-10-PCS | Mod: PBBFAC,,, | Performed by: OPHTHALMOLOGY

## 2020-06-24 PROCEDURE — 92014 COMPRE OPH EXAM EST PT 1/>: CPT | Mod: S$PBB,,, | Performed by: OPHTHALMOLOGY

## 2020-06-24 NOTE — PROGRESS NOTES
HPI     68 yo female here for continued eye care as well as an evaluation of   cataracts OU.     Pt c/o blurred VA OU- OD>OS for the past 7 months. No pain.     Ocular Meds:   Systane PRN OU    Last edited by Amanda Paulino on 2020  1:50 PM. (History)            Assessment /Plan     For exam results, see Encounter Report.    Nuclear senile cataract, unspecified laterality  -     IOL Master - OD - Right Eye    Residual stage angle-closure glaucoma, unspecified laterality    Posterior subcapsular age-related cataract of right eye          Dr Rushing      Hx Narrow Angles  SP LPI OU JDN      NSC OU  PSC OD    Cataract right and left Eyes: Discussed options including observation, glasses & surgery with patient with good understanding. Patient wishes to proceed with:  observation Glasses Surgery Right then consider Left     Cataract Surgery Consent: Patient with a visually significant cataract with difficulties of ADLs, reading, driving, night vision, glare.  Discussed with patient options, risks and benefits, expectations of cataract surgery with questions and answers to facilitate discussion.  Discussed lens options and patient understands that glasses may be required for optimal vision for distance and/or near vision after cataract surgery.  The patient  voice good understanding and patient wishes to proceed with surgery.  The patient will likely benefit from surgery and patient signed consent for Right Eye.    IOL choice:       AL   22.95 OD         22.95 OS     PCB00  119.3  24.0 -0.54  23.5 -0.26    Triple drops      -->   3/2018 / Grieving  --> Recent Breast Ca --> planning lumpectomy end       Plan  RTC for CE IOL OD then consider CE IOL OS  RTC sooner PRN with good understanding

## 2020-06-29 NOTE — PROGRESS NOTES
Assessment /Plan     For exam results, see Encounter Report.    Status post cataract extraction and insertion of intraocular lens of right eye    Residual stage angle-closure glaucoma, unspecified laterality    Nuclear sclerotic cataract of left eye          Dr Rushing      Hx Narrow Angles  SP LPI OU JDN      NSC OS  CE PRN  Glare       Cataract right and left Eyes: Discussed options including observation, glasses & surgery with patient with good understanding. Patient wishes to proceed with:  observation Glasses Surgery Right then consider Left         Post-op cataract surgery right Eye, POD 1: Patient doing well, reviewed post-op instructions handout with limited activity & eye care instructions, eye drop therapy and endophthalmitis precautions. Will continue topical antibiotic eye drops 4x  a day and steroid eye drops 4x  a day in the surgery eye. The patient voice good understanding and will return as scheduled or sooner as needed.    Vig QID  PF 1% QID      IOL choice:       AL   22.95 OD         22.95 OS     PCB00  119.3  24.0 -0.54  23.5 -0.26      -->   3/2018 / Grieving  --> Recent Breast Ca --> planning lumpectomy end         Plan  RTC 1 week p CE IOL OD then consider CE IOL OS --> glare OS  RTC sooner PRN with good understanding

## 2020-07-01 ENCOUNTER — TELEPHONE (OUTPATIENT)
Dept: SURGERY | Facility: CLINIC | Age: 70
End: 2020-07-01

## 2020-07-01 NOTE — TELEPHONE ENCOUNTER
Spoke with patient regarding results of [facility] genetic testing, results negative, patient verbalized understanding, all questions answered at this time, copy of results mailed to patient, pt to call [facility] counselor at [phone] with any further questions.

## 2020-07-06 ENCOUNTER — CLINICAL SUPPORT (OUTPATIENT)
Dept: URGENT CARE | Facility: CLINIC | Age: 70
End: 2020-07-06
Payer: MEDICARE

## 2020-07-06 VITALS — TEMPERATURE: 98 F | HEART RATE: 74 BPM | OXYGEN SATURATION: 98 %

## 2020-07-06 DIAGNOSIS — Z13.9 SCREENING PROCEDURE: ICD-10-CM

## 2020-07-06 PROCEDURE — U0003 INFECTIOUS AGENT DETECTION BY NUCLEIC ACID (DNA OR RNA); SEVERE ACUTE RESPIRATORY SYNDROME CORONAVIRUS 2 (SARS-COV-2) (CORONAVIRUS DISEASE [COVID-19]), AMPLIFIED PROBE TECHNIQUE, MAKING USE OF HIGH THROUGHPUT TECHNOLOGIES AS DESCRIBED BY CMS-2020-01-R: HCPCS

## 2020-07-07 LAB — SARS-COV-2 RNA RESP QL NAA+PROBE: NOT DETECTED

## 2020-07-08 RX ORDER — ASPIRIN 81 MG/1
81 TABLET ORAL EVERY MORNING
COMMUNITY

## 2020-07-08 RX ORDER — CHOLECALCIFEROL (VITAMIN D3) 25 MCG
1000 TABLET ORAL EVERY MORNING
COMMUNITY
End: 2020-07-21

## 2020-07-08 NOTE — PRE-PROCEDURE INSTRUCTIONS
PREOP INSTRUCTIONS:No solid food ,milk or milk products for 8 hours prior to procedure.Clear liquids are allowed up to 2 hours before procedure.Clear liquids are:water,apple juice,gatorade & powerade.Shower instructions as well as directions to the [facility] were given.Patient encouraged to wear loose fitting,comfortable clothing.Medication instructions for pm prior to and am of procedure reviewed.Instructed patient to avoid taking vitamins,supplements,aspirin and ibuprofen the morning of surgery. Patient stated an understanding.Patient instructed to take temperature the night before surgery as well as the morning of surgery and to notify [facility] at [phone] if it is 100.4 or above.Patient also informed of the current visitor policy and advised patient that one visitor may accompany them into the hospital and wait (socially distanced) until a member of the medical team provides an update at the conclusion of the procedure.When they enter the hospital both patient and visitor will have their temperature checked.All visitors are asked to arrive with a mask and to keep their mask on throughout the visit.    Covid test completed 7/6/2020-Negative    Patient denies any side effects or issues with anesthesia or sedation.

## 2020-07-09 ENCOUNTER — HOSPITAL ENCOUNTER (OUTPATIENT)
Facility: HOSPITAL | Age: 70
Discharge: HOME | End: 2020-07-09
Attending: OPHTHALMOLOGY | Admitting: OPHTHALMOLOGY
Payer: MEDICARE

## 2020-07-09 ENCOUNTER — ANESTHESIA (OUTPATIENT)
Dept: SURGERY | Facility: HOSPITAL | Age: 70
End: 2020-07-09
Payer: MEDICARE

## 2020-07-09 ENCOUNTER — ANESTHESIA EVENT (OUTPATIENT)
Dept: SURGERY | Facility: HOSPITAL | Age: 70
End: 2020-07-09
Payer: MEDICARE

## 2020-07-09 VITALS
SYSTOLIC BLOOD PRESSURE: 110 MMHG | HEIGHT: 67 IN | OXYGEN SATURATION: 95 % | RESPIRATION RATE: 16 BRPM | HEART RATE: 50 BPM | TEMPERATURE: 97 F | WEIGHT: 159 LBS | DIASTOLIC BLOOD PRESSURE: 55 MMHG | BODY MASS INDEX: 24.96 KG/M2

## 2020-07-09 DIAGNOSIS — H25.11 NUCLEAR SENILE CATARACT OF RIGHT EYE: Primary | ICD-10-CM

## 2020-07-09 DIAGNOSIS — H25.10 SENILE NUCLEAR SCLEROSIS: ICD-10-CM

## 2020-07-09 PROCEDURE — V2632 POST CHMBR INTRAOCULAR LENS: HCPCS | Performed by: OPHTHALMOLOGY

## 2020-07-09 PROCEDURE — 37000008 HC ANESTHESIA 1ST 15 MINUTES: Performed by: OPHTHALMOLOGY

## 2020-07-09 PROCEDURE — 25000003 PHARM REV CODE 250: Performed by: OPHTHALMOLOGY

## 2020-07-09 PROCEDURE — 66984 XCAPSL CTRC RMVL W/O ECP: CPT | Mod: RT,,, | Performed by: OPHTHALMOLOGY

## 2020-07-09 PROCEDURE — 71000015 HC POSTOP RECOV 1ST HR: Performed by: OPHTHALMOLOGY

## 2020-07-09 PROCEDURE — 25000003 PHARM REV CODE 250

## 2020-07-09 PROCEDURE — D9220A PRA ANESTHESIA: ICD-10-PCS | Mod: ANES,,, | Performed by: ANESTHESIOLOGY

## 2020-07-09 PROCEDURE — D9220A PRA ANESTHESIA: Mod: ANES,,, | Performed by: ANESTHESIOLOGY

## 2020-07-09 PROCEDURE — D9220A PRA ANESTHESIA: Mod: CRNA,,, | Performed by: NURSE ANESTHETIST, CERTIFIED REGISTERED

## 2020-07-09 PROCEDURE — D9220A PRA ANESTHESIA: ICD-10-PCS | Mod: CRNA,,, | Performed by: NURSE ANESTHETIST, CERTIFIED REGISTERED

## 2020-07-09 PROCEDURE — 63600175 PHARM REV CODE 636 W HCPCS: Performed by: NURSE ANESTHETIST, CERTIFIED REGISTERED

## 2020-07-09 PROCEDURE — 71000044 HC DOSC ROUTINE RECOVERY FIRST HOUR: Performed by: OPHTHALMOLOGY

## 2020-07-09 PROCEDURE — 37000009 HC ANESTHESIA EA ADD 15 MINS: Performed by: OPHTHALMOLOGY

## 2020-07-09 PROCEDURE — 66984 PR REMOVAL, CATARACT, W/INSRT INTRAOC LENS, W/O ENDO CYCLO: ICD-10-PCS | Mod: RT,,, | Performed by: OPHTHALMOLOGY

## 2020-07-09 PROCEDURE — 36000707: Performed by: OPHTHALMOLOGY

## 2020-07-09 PROCEDURE — 63600175 PHARM REV CODE 636 W HCPCS: Performed by: OPHTHALMOLOGY

## 2020-07-09 PROCEDURE — 36000706: Performed by: OPHTHALMOLOGY

## 2020-07-09 DEVICE — LENS IOL ITEC PRELOAD 24.0D: Type: IMPLANTABLE DEVICE | Site: EYE | Status: FUNCTIONAL

## 2020-07-09 RX ORDER — TROPICAMIDE 10 MG/ML
1 SOLUTION/ DROPS OPHTHALMIC
Status: DISPENSED | OUTPATIENT
Start: 2020-07-09

## 2020-07-09 RX ORDER — MOXIFLOXACIN 5 MG/ML
SOLUTION/ DROPS OPHTHALMIC
Status: DISCONTINUED
Start: 2020-07-09 | End: 2020-07-09 | Stop reason: HOSPADM

## 2020-07-09 RX ORDER — CYCLOPENTOLATE HYDROCHLORIDE 10 MG/ML
1 SOLUTION/ DROPS OPHTHALMIC
Status: DISPENSED | OUTPATIENT
Start: 2020-07-09

## 2020-07-09 RX ORDER — MOXIFLOXACIN 5 MG/ML
SOLUTION/ DROPS OPHTHALMIC
Status: DISCONTINUED | OUTPATIENT
Start: 2020-07-09 | End: 2020-07-09 | Stop reason: HOSPADM

## 2020-07-09 RX ORDER — SODIUM CHLORIDE 0.9 % (FLUSH) 0.9 %
10 SYRINGE (ML) INJECTION
Status: DISCONTINUED | OUTPATIENT
Start: 2020-07-09 | End: 2020-07-09 | Stop reason: HOSPADM

## 2020-07-09 RX ORDER — LIDOCAINE HYDROCHLORIDE 10 MG/ML
INJECTION, SOLUTION EPIDURAL; INFILTRATION; INTRACAUDAL; PERINEURAL
Status: DISCONTINUED
Start: 2020-07-09 | End: 2020-07-09 | Stop reason: HOSPADM

## 2020-07-09 RX ORDER — FENTANYL CITRATE 50 UG/ML
INJECTION, SOLUTION INTRAMUSCULAR; INTRAVENOUS
Status: DISCONTINUED | OUTPATIENT
Start: 2020-07-09 | End: 2020-07-09

## 2020-07-09 RX ORDER — LIDOCAINE HYDROCHLORIDE 40 MG/ML
1 INJECTION, SOLUTION RETROBULBAR
Status: ACTIVE | OUTPATIENT
Start: 2020-07-09 | End: 2020-07-09

## 2020-07-09 RX ORDER — PREDNISOLONE ACETATE 10 MG/ML
SUSPENSION/ DROPS OPHTHALMIC
Status: DISCONTINUED
Start: 2020-07-09 | End: 2020-07-09 | Stop reason: HOSPADM

## 2020-07-09 RX ORDER — PREDNISOLONE ACETATE 10 MG/ML
SUSPENSION/ DROPS OPHTHALMIC
Status: DISCONTINUED | OUTPATIENT
Start: 2020-07-09 | End: 2020-07-09 | Stop reason: HOSPADM

## 2020-07-09 RX ORDER — LIDOCAINE HYDROCHLORIDE 40 MG/ML
INJECTION, SOLUTION RETROBULBAR
Status: DISCONTINUED | OUTPATIENT
Start: 2020-07-09 | End: 2020-07-09 | Stop reason: HOSPADM

## 2020-07-09 RX ORDER — MIDAZOLAM HYDROCHLORIDE 1 MG/ML
INJECTION, SOLUTION INTRAMUSCULAR; INTRAVENOUS
Status: DISCONTINUED | OUTPATIENT
Start: 2020-07-09 | End: 2020-07-09

## 2020-07-09 RX ORDER — PHENYLEPHRINE HYDROCHLORIDE 25 MG/ML
1 SOLUTION/ DROPS OPHTHALMIC
Status: DISPENSED | OUTPATIENT
Start: 2020-07-09

## 2020-07-09 RX ORDER — LIDOCAINE HYDROCHLORIDE 40 MG/ML
INJECTION, SOLUTION RETROBULBAR
Status: DISCONTINUED
Start: 2020-07-09 | End: 2020-07-09 | Stop reason: HOSPADM

## 2020-07-09 RX ORDER — LIDOCAINE HYDROCHLORIDE 10 MG/ML
INJECTION, SOLUTION EPIDURAL; INFILTRATION; INTRACAUDAL; PERINEURAL
Status: DISCONTINUED | OUTPATIENT
Start: 2020-07-09 | End: 2020-07-09 | Stop reason: HOSPADM

## 2020-07-09 RX ORDER — SODIUM CHLORIDE 9 MG/ML
INJECTION, SOLUTION INTRAVENOUS CONTINUOUS
Status: ACTIVE | OUTPATIENT
Start: 2020-07-09

## 2020-07-09 RX ORDER — ACETAMINOPHEN 325 MG/1
650 TABLET ORAL EVERY 4 HOURS PRN
Status: DISCONTINUED | OUTPATIENT
Start: 2020-07-09 | End: 2020-07-09 | Stop reason: HOSPADM

## 2020-07-09 RX ADMIN — CYCLOPENTOLATE HYDROCHLORIDE 1 DROP: 10 SOLUTION/ DROPS OPHTHALMIC at 06:07

## 2020-07-09 RX ADMIN — MIDAZOLAM HYDROCHLORIDE 2 MG: 1 INJECTION, SOLUTION INTRAMUSCULAR; INTRAVENOUS at 07:07

## 2020-07-09 RX ADMIN — TROPICAMIDE 1 DROP: 10 SOLUTION/ DROPS OPHTHALMIC at 06:07

## 2020-07-09 RX ADMIN — SODIUM CHLORIDE 1000 ML: 0.9 INJECTION, SOLUTION INTRAVENOUS at 06:07

## 2020-07-09 RX ADMIN — FENTANYL CITRATE 25 MCG: 50 INJECTION, SOLUTION INTRAMUSCULAR; INTRAVENOUS at 07:07

## 2020-07-09 RX ADMIN — PHENYLEPHRINE HYDROCHLORIDE 1 DROP: 25 SOLUTION/ DROPS OPHTHALMIC at 06:07

## 2020-07-09 NOTE — TRANSFER OF CARE
"Anesthesia Transfer of Care Note    Patient: Sheila Zarco    Procedure(s) Performed: Procedure(s) (LRB):  PHACOEMULSIFICATION, CATARACT (Right)  INSERTION, IOL PROSTHESIS (Right)    Patient location: PACU    Anesthesia Type: general    Transport from OR: Transported from OR on room air with adequate spontaneous ventilation    Post pain: adequate analgesia    Post assessment: no apparent anesthetic complications and tolerated procedure well    Post vital signs: stable    Level of consciousness: awake, alert and oriented    Nausea/Vomiting: no nausea/vomiting    Complications: none    Transfer of care protocol was followed      Last vitals:   Visit Vitals  /60 (BP Location: Left arm, Patient Position: Lying)   Pulse 60   Temp 36.7 °C (98.1 °F) (Oral)   Resp 16   Ht 5' 6.5" (1.689 m)   Wt 72.1 kg (159 lb)   SpO2 98%   BMI 25.28 kg/m²     "

## 2020-07-09 NOTE — DISCHARGE INSTRUCTIONS
Discharge Instructions for Cataract Surgery  A surgeon removed the cloudy lens in your eye and replaced it with a clear man-made lens. Be sure to have an adult family member or friend drive you home after surgery. Heres what you can expect following surgery and tips for a healthy recovery.  It is normal to have the following:  · Bruised or bloodshot eye for 7 days  · Itching and mild discomfort for several days  · Some fluid discharge  · Sensitivity to light  · Scratchy, sandlike feeling in the eye for 2 weeks  · Feeling tired, especially during the first 24 hours  Activity level  · Do not drive for 2 days or as instructed by your doctor.  · Do not drink alcohol for at least 24 hours.  · Avoid bending at the waist to  objects or lifting anything heavy for 2 days.  · Relax for the first 24 hours after surgery. Watching TV and reading are OK and wont harm your eye.  Eye protection  · Do not rub or press on your eye.  · Sleep on your back or on your unoperated side for 2 nights.  · If instructed, wear a bandage over your eye for 2 days and 2 nights.  · If instructed, wear a shield to protect your eye for 2 days and 2 nights.  Using eyedrops  You may be given special eyedrops or ointment. Here is one way to use eyedrops:  · Tilt your head back.  · Pull your bottom eyelid down.  · Squeeze one drop into your eye. Do not touch your eye with the bottle tip.  · Close your eyes for a few seconds.  · If you need more than one drop, wait a few minutes before adding the next one.   Call your doctor right away if you have any of the following:  · Bleeding or discharge from the eye  · Your vision suddenly becomes worse  · Pain medicine you are told to take does not ease your pain  · Nausea or vomiting  · Chills or fever over 100.4°F (39.1°C)   Date Last Reviewed: 5/30/2015  © 2900-9498 [employer]. [address] [place] [address], [place] [address]. All rights reserved. This information is not intended as a  substitute for professional medical care. Always follow your healthcare professional's instructions.

## 2020-07-09 NOTE — ANESTHESIA PREPROCEDURE EVALUATION
07/09/2020  Sheila Zarco is a 69 y.o., female.  Patient Active Problem List   Diagnosis    Rotator cuff impingement syndrome    Residual stage angle-closure glaucoma    Senile nuclear sclerosis - Both Eyes    GERD (gastroesophageal reflux disease)    Hypothyroidism    Anxiety    Hyperlipidemia    NAFLD (nonalcoholic fatty liver disease)    Posterior subcapsular age-related cataract of right eye     No current facility-administered medications on file prior to encounter.      Current Outpatient Medications on File Prior to Encounter   Medication Sig Dispense Refill    aspirin (ECOTRIN) 81 MG EC tablet Take 81 mg by mouth every morning.      atorvastatin (LIPITOR) 20 MG tablet Take 1 tablet (20 mg total) by mouth once daily. For cholesterol control. (Patient taking differently: Take 20 mg by mouth every morning. For cholesterol control.) 90 tablet 3    escitalopram oxalate (LEXAPRO) 20 MG tablet Take 1 tablet (20 mg total) by mouth once daily. For anxiety. (Patient taking differently: Take 20 mg by mouth every morning. For anxiety.) 90 tablet 2    levothyroxine (SYNTHROID) 88 MCG tablet TAKE 1 TABLET (88 MCG TOTAL) BY MOUTH EVERY MORNING. 90 tablet 3    pantoprazole (PROTONIX) 40 MG tablet TAKE 1 TABLET (40 MG TOTAL) BY MOUTH ONCE DAILY. (Patient taking differently: Take 40 mg by mouth every morning. ) 90 tablet 3    vitamin D (VITAMIN D3) 1000 units Tab Take 1,000 Units by mouth every morning. Patient takes 2000 units every morning      albuterol (ACCUNEB) 0.63 mg/3 mL Nebu Take 3 mLs (0.63 mg total) by nebulization every 6 (six) hours as needed. 100 vial 2    albuterol (VENTOLIN HFA) 90 mcg/actuation inhaler Inhale 2 puffs into the lungs every 6 (six) hours as needed for Wheezing.      ALPRAZolam (XANAX) 0.5 MG tablet Take 1 tablet (0.5 mg total) by mouth 2 (two) times daily. (Patient  taking differently: Take 0.5 mg by mouth 2 (two) times daily as needed. ) 60 tablet 0    clobetasol (TEMOVATE) 0.05 % cream Apply topically 2 (two) times daily. 30 g 3    desoximetasone (TOPICORT) 0.25 % cream Apply topically 2 (two) times daily.        fluocinonide (LIDEX) 0.05 % external solution AAA scalp qday prn itching, scaling 60 mL 3    fluticasone-salmeterol 250-50 mcg/dose (ADVAIR) 250-50 mcg/dose diskus inhaler Inhale 1 puff into the lungs 2 (two) times daily. 60 each 5    FLUZONE HIGH-DOSE 2019-20, PF, 180 mcg/0.5 mL Syrg TO BE ADMINISTERED BY PHARMACIST FOR IMMUNIZATION      ketoconazole (NIZORAL) 2 % cream AAA twice daily on nose prn flare 60 g 3    ketoconazole (NIZORAL) 2 % shampoo WASH HAIR W/ MEDICATED SHAMPOO AT LEAST 2X WEEK, LET SIT ON SCALP FOR ATLEAST 5MINUTES BEFORE RINSIN 120 mL 1    levocetirizine (XYZAL) 5 MG tablet Take 1 tablet (5 mg total) by mouth every evening. For sinus allergy symptoms 30 tablet 4    meclizine (ANTIVERT) 25 mg tablet Take 1 tablet (25 mg total) by mouth 3 (three) times daily as needed for Dizziness. 15 tablet 0    naproxen (NAPROSYN) 500 MG tablet Take 1 tablet (500 mg total) by mouth 2 (two) times daily with meals. 60 tablet 3    triamcinolone acetonide 0.025% (KENALOG) 0.025 % cream AAA qd to face, ears prn flare 45 g 1    valACYclovir (VALTREX) 500 MG tablet TAKE ONE TABLET TWICE A DAY AS NEEDED FOR  OUTBREAK 12 tablet 3       Anesthesia Evaluation    I have reviewed the Patient Summary Reports.    I have reviewed the Nursing Notes.    I have reviewed the Medications.     Review of Systems  Anesthesia Hx:  Denies Family Hx of Anesthesia complications.   Denies Personal Hx of Anesthesia complications.   Cardiovascular:  Cardiovascular Normal Exercise tolerance: good     Pulmonary:   Asthma    Renal/:  Renal/ Normal     Hepatic/GI:   GERD Liver Disease,    Musculoskeletal:  Musculoskeletal Normal    Neurological:  Neurology Normal    Endocrine:    Hypothyroidism        Physical Exam  General:  Well nourished    Airway/Jaw/Neck:  Airway Findings: Mouth Opening: Normal Tongue: Normal  General Airway Assessment: Adult  Mallampati: III  TM Distance: Normal, at least 6 cm        Eyes/Ears/Nose:  EYES/EARS/NOSE FINDINGS: Normal   Dental:  DENTAL FINDINGS: Normal   Chest/Lungs:  Chest/Lungs Clear    Heart/Vascular:  Heart Findings: Normal    Abdomen:  Abdomen Findings: Normal    Musculoskeletal:  Musculoskeletal Findings: Normal    Mental Status:  Mental Status Findings: Normal        Anesthesia Plan  Type of Anesthesia, risks & benefits discussed:  Anesthesia Type:  MAC  Patient's Preference:   Intra-op Monitoring Plan: standard ASA monitors  Intra-op Monitoring Plan Comments:   Post Op Pain Control Plan: IV/PO Opioids PRN  Post Op Pain Control Plan Comments:   Induction:   IV  Beta Blocker:  Patient is not currently on a Beta-Blocker (No further documentation required).       Informed Consent: Patient understands risks and agrees with Anesthesia plan.  Questions answered. Anesthesia consent signed with patient.  ASA Score: 2     Day of Surgery Review of History & Physical: I have interviewed and examined the patient. I have reviewed the patient's H&P dated:  There are no significant changes.          Ready For Surgery From Anesthesia Perspective.

## 2020-07-09 NOTE — OP NOTE
Operative Date:  07/09/2020    Discharge Date:  07/09/2020    Discharge Patient Home      Report Title: Operative Note      SURGEON: Lucas Jefferson MD    ASSISTANT: None    PREOPERATIVE DIAGNOSIS: Visually significant NSC / PSC cataract, Right eye      POSTOPERATIVE DIAGNOSIS: Visually-significant NSC / PSC cataract, Right eye      PROCEDURE PERFORMED: Phacoemulsification of the cataract with posterior chamber intraocular lens Right eye       ANESTHESIA: Topical with MAC    COMPLICATIONS: None.    ESTIMATED BLOOD LOSS: Minimal.    INDICATIONS FOR PROCEDURE:   The patient is a pleasant 69 year old  woman with increasing difficulties with activities of daily living secondary to a dense visually significant cataract in the Right eye.  Discussions have been carried out with this patient concerning the options to surgery, risks, benefits and expectations.  The patient voiced good understanding and wished to proceed with the above procedure.    PROCEDURE IN DETAIL: The patient was brought to the operating room and received topical anesthetic to the eye and then was prepped and draped in the usual sterile fashion.  The Right eye was first entered at the 10:30 oclock paracentesis site, followed by intracameral lidocaine and Viscoat material.  The eye was then reentered at the primary surgical site followed by continuous capsulotomy, hydrodissection and phacoemulsification of the cataract.  Residual cortical material was removed using automated irrigation-aspiration technique.  Healon was injected into the posterior chamber and an JOSE GUADALUPE PCB00 24.0 diopter lens was placed in the bag without difficulty.  Residual Healon was removed using automated irrigation-aspiration technique.  The eye was re-pressurized using BSS solution and both the paracentesis site and the primary surgical site were demonstrated to be watertight at the end of the case with Weck--Elizabeth manipulation. A collagen shield soaked in Vigamox and Pred  Forte eye drops was placed on the cornea.  The eye was closed, patched and a Hopkins shield placed. The patient was taken to the recovery room in good and stable condition.  The patient tolerated the procedure well.  The patient was instructed to refrain from any heavy lifting, bending, stooping or straining activities, discharged home and to follow-up in the morning for routine postoperative care with Dr. Lucas Jefferson.

## 2020-07-09 NOTE — ANESTHESIA POSTPROCEDURE EVALUATION
Anesthesia Post Evaluation    Patient: Sheila Zarco    Procedure(s) Performed: Procedure(s) (LRB):  PHACOEMULSIFICATION, CATARACT (Right)  INSERTION, IOL PROSTHESIS (Right)    Final Anesthesia Type: MAC    Patient location during evaluation: PACU  Patient participation: Yes- Able to Participate  Level of consciousness: awake and alert  Post-procedure vital signs: reviewed and stable  Pain management: adequate  Airway patency: patent    PONV status at discharge: No PONV  Anesthetic complications: no      Cardiovascular status: stable  Respiratory status: spontaneous ventilation and room air  Hydration status: euvolemic  Follow-up not needed.          Vitals Value Taken Time   /59 07/09/20 0756   Temp 36.5 °C (97.7 °F) 07/09/20 0755   Pulse 57 07/09/20 0759   Resp 16 07/09/20 0755   SpO2 100 % 07/09/20 0759   Vitals shown include unvalidated device data.      No case tracking events are documented in the log.      Pain/Paddy Score: No data recorded

## 2020-07-10 ENCOUNTER — OFFICE VISIT (OUTPATIENT)
Dept: OPHTHALMOLOGY | Facility: CLINIC | Age: 70
End: 2020-07-10
Payer: MEDICARE

## 2020-07-10 DIAGNOSIS — H40.249 RESIDUAL STAGE ANGLE-CLOSURE GLAUCOMA, UNSPECIFIED LATERALITY: ICD-10-CM

## 2020-07-10 DIAGNOSIS — Z98.41 STATUS POST CATARACT EXTRACTION AND INSERTION OF INTRAOCULAR LENS OF RIGHT EYE: Primary | ICD-10-CM

## 2020-07-10 DIAGNOSIS — Z96.1 STATUS POST CATARACT EXTRACTION AND INSERTION OF INTRAOCULAR LENS OF RIGHT EYE: Primary | ICD-10-CM

## 2020-07-10 DIAGNOSIS — H25.12 NUCLEAR SCLEROTIC CATARACT OF LEFT EYE: ICD-10-CM

## 2020-07-10 PROBLEM — H25.041 POSTERIOR SUBCAPSULAR AGE-RELATED CATARACT OF RIGHT EYE: Status: RESOLVED | Noted: 2020-06-24 | Resolved: 2020-07-10

## 2020-07-10 PROCEDURE — 99213 OFFICE O/P EST LOW 20 MIN: CPT | Mod: PBBFAC | Performed by: OPHTHALMOLOGY

## 2020-07-10 PROCEDURE — 99024 PR POST-OP FOLLOW-UP VISIT: ICD-10-PCS | Mod: POP,,, | Performed by: OPHTHALMOLOGY

## 2020-07-10 PROCEDURE — 99024 POSTOP FOLLOW-UP VISIT: CPT | Mod: POP,,, | Performed by: OPHTHALMOLOGY

## 2020-07-10 PROCEDURE — 99999 PR PBB SHADOW E&M-EST. PATIENT-LVL III: ICD-10-PCS | Mod: PBBFAC,,, | Performed by: OPHTHALMOLOGY

## 2020-07-10 PROCEDURE — 99999 PR PBB SHADOW E&M-EST. PATIENT-LVL III: CPT | Mod: PBBFAC,,, | Performed by: OPHTHALMOLOGY

## 2020-07-14 NOTE — PROGRESS NOTES
Assessment /Plan     For exam results, see Encounter Report.    Status post cataract extraction and insertion of intraocular lens of right eye    Residual stage angle-closure glaucoma, unspecified laterality    Nuclear sclerotic cataract of left eye          Dr Rushing      Hx Narrow Angles  SP LPI OU JDN      NSC OS  CE PRN  Glare       Cataract right and left Eyes: Discussed options including observation, glasses & surgery with patient with good understanding. Patient wishes to proceed with:  observation Glasses Surgery Right then consider Left         Post-op cataract surgery right Eye, POW 1: Patient doing well, reviewed post-op instructions handout & eye care instructions, eye drop therapy and endophthalmitis precautions. OK to stop topical antibiotic eye drops.  She also describes symptoms of eye itching, of which the OD improved with steroid use. The patient voice good understanding and will return as scheduled or sooner as needed.    Stop Vig QID  PF 1% QID for 1 more week then taper weekly      IOL choice:       AL   22.95 OD         22.95 OS     PCB00  119.3  24.0 -0.54  23.5 -0.26      -->   3/2018 / Grieving  --> Recent Breast Ca --> planning lumpectomy end of July        Plan  RTC 4 week p CE IOL OD with MR/glare/cat eval OS  Start pataday qd OU for allergic conjunctivitis   With bothersome disparity between the two eyes, consider CE IOL OS --> glare OS  RTC sooner PRN with good understanding

## 2020-07-17 ENCOUNTER — OFFICE VISIT (OUTPATIENT)
Dept: OPHTHALMOLOGY | Facility: CLINIC | Age: 70
End: 2020-07-17
Payer: MEDICARE

## 2020-07-17 DIAGNOSIS — Z98.41 STATUS POST CATARACT EXTRACTION AND INSERTION OF INTRAOCULAR LENS OF RIGHT EYE: Primary | ICD-10-CM

## 2020-07-17 DIAGNOSIS — H25.12 NUCLEAR SCLEROTIC CATARACT OF LEFT EYE: ICD-10-CM

## 2020-07-17 DIAGNOSIS — H40.249 RESIDUAL STAGE ANGLE-CLOSURE GLAUCOMA, UNSPECIFIED LATERALITY: ICD-10-CM

## 2020-07-17 DIAGNOSIS — H57.9 ITCHY EYES: ICD-10-CM

## 2020-07-17 DIAGNOSIS — Z96.1 STATUS POST CATARACT EXTRACTION AND INSERTION OF INTRAOCULAR LENS OF RIGHT EYE: Primary | ICD-10-CM

## 2020-07-17 PROCEDURE — 99214 OFFICE O/P EST MOD 30 MIN: CPT | Mod: PBBFAC | Performed by: OPHTHALMOLOGY

## 2020-07-17 PROCEDURE — 99999 PR PBB SHADOW E&M-EST. PATIENT-LVL IV: ICD-10-PCS | Mod: PBBFAC,,, | Performed by: OPHTHALMOLOGY

## 2020-07-17 PROCEDURE — 99999 PR PBB SHADOW E&M-EST. PATIENT-LVL IV: CPT | Mod: PBBFAC,,, | Performed by: OPHTHALMOLOGY

## 2020-07-17 PROCEDURE — 99024 POSTOP FOLLOW-UP VISIT: CPT | Mod: POP,,, | Performed by: OPHTHALMOLOGY

## 2020-07-17 PROCEDURE — 99024 PR POST-OP FOLLOW-UP VISIT: ICD-10-PCS | Mod: POP,,, | Performed by: OPHTHALMOLOGY

## 2020-07-17 RX ORDER — OLOPATADINE HYDROCHLORIDE 1 MG/ML
1 SOLUTION/ DROPS OPHTHALMIC 2 TIMES DAILY
COMMUNITY
End: 2020-07-17 | Stop reason: SDUPTHER

## 2020-07-17 RX ORDER — PREDNISOLONE ACETATE 10 MG/ML
1 SUSPENSION/ DROPS OPHTHALMIC 4 TIMES DAILY
COMMUNITY
End: 2020-07-17 | Stop reason: SDUPTHER

## 2020-07-17 RX ORDER — OLOPATADINE HYDROCHLORIDE 1 MG/ML
1 SOLUTION/ DROPS OPHTHALMIC 2 TIMES DAILY
Qty: 5 ML | Refills: 4 | Status: SHIPPED | OUTPATIENT
Start: 2020-07-17 | End: 2023-05-11

## 2020-07-17 RX ORDER — PREDNISOLONE ACETATE 10 MG/ML
1 SUSPENSION/ DROPS OPHTHALMIC 4 TIMES DAILY
Qty: 10 ML | Refills: 1 | Status: SHIPPED | OUTPATIENT
Start: 2020-07-17 | End: 2021-01-04 | Stop reason: SDUPTHER

## 2020-07-21 ENCOUNTER — HOSPITAL ENCOUNTER (OUTPATIENT)
Dept: PREADMISSION TESTING | Facility: OTHER | Age: 70
Discharge: HOME | End: 2020-07-21
Attending: SURGERY
Payer: MEDICARE

## 2020-07-21 ENCOUNTER — ANESTHESIA EVENT (OUTPATIENT)
Dept: SURGERY | Facility: OTHER | Age: 70
End: 2020-07-21
Payer: MEDICARE

## 2020-07-21 VITALS
DIASTOLIC BLOOD PRESSURE: 88 MMHG | HEART RATE: 78 BPM | HEIGHT: 66 IN | BODY MASS INDEX: 25.55 KG/M2 | TEMPERATURE: 98 F | WEIGHT: 159 LBS | SYSTOLIC BLOOD PRESSURE: 120 MMHG | OXYGEN SATURATION: 99 %

## 2020-07-21 DIAGNOSIS — Z01.818 PREOP TESTING: ICD-10-CM

## 2020-07-21 PROCEDURE — U0003 INFECTIOUS AGENT DETECTION BY NUCLEIC ACID (DNA OR RNA); SEVERE ACUTE RESPIRATORY SYNDROME CORONAVIRUS 2 (SARS-COV-2) (CORONAVIRUS DISEASE [COVID-19]), AMPLIFIED PROBE TECHNIQUE, MAKING USE OF HIGH THROUGHPUT TECHNOLOGIES AS DESCRIBED BY CMS-2020-01-R: HCPCS

## 2020-07-21 RX ORDER — ACETAMINOPHEN 500 MG
1000 TABLET ORAL
Status: CANCELLED | OUTPATIENT
Start: 2020-07-21 | End: 2020-07-21

## 2020-07-21 RX ORDER — SODIUM CHLORIDE, SODIUM LACTATE, POTASSIUM CHLORIDE, CALCIUM CHLORIDE 600; 310; 30; 20 MG/100ML; MG/100ML; MG/100ML; MG/100ML
INJECTION, SOLUTION INTRAVENOUS CONTINUOUS
Status: CANCELLED | OUTPATIENT
Start: 2020-07-21

## 2020-07-21 RX ORDER — ALBUTEROL SULFATE 0.83 MG/ML
2.5 SOLUTION RESPIRATORY (INHALATION)
Status: CANCELLED | OUTPATIENT
Start: 2020-07-21 | End: 2020-07-21

## 2020-07-21 RX ORDER — LIDOCAINE HYDROCHLORIDE 10 MG/ML
0.5 INJECTION, SOLUTION EPIDURAL; INFILTRATION; INTRACAUDAL; PERINEURAL ONCE
Status: CANCELLED | OUTPATIENT
Start: 2020-07-21 | End: 2020-07-21

## 2020-07-21 RX ORDER — CHOLECALCIFEROL (VITAMIN D3) 25 MCG
TABLET ORAL DAILY
COMMUNITY

## 2020-07-21 NOTE — DISCHARGE INSTRUCTIONS
Information to Prepare you for your Surgery    PRE-ADMIT TESTING -  [phone]    [address]  [place] [facility]          Your surgery has been scheduled at [facility]. We are pleased to have the opportunity to serve you. For Further Information please call [phone].    On the day of surgery please report to the Information Desk on the 1st floor.    · CONTACT YOUR PHYSICIAN'S OFFICE THE DAY PRIOR TO YOUR SURGERY TO OBTAIN YOUR ARRIVAL TIME.     · The evening before surgery do not eat anything after 9 p.m. ( this includes hard candy, chewing gum and mints).  You may only have GATORADE, POWERADE AND WATER  from 9 p.m. until you leave your home.   DO NOT DRINK ANY LIQUIDS ON THE WAY TO THE HOSPITAL.      SPECIAL MEDICATION INSTRUCTIONS: TAKE medications checked off by the Anesthesiologist on your Medication List.    Angiogram Patients: Take medications as instructed by your physician, including aspirin.     Surgery Patients:    If you take ASPIRIN - Your PHYSICIAN/SURGEON will need to inform you IF/OR when you need to stop taking aspirin prior to your surgery.     Do Not take any medications containing IBUPROFEN.  Do Not Wear any make-up or dark nail polish   (especially eye make-up) to surgery. If you come to surgery with makeup on you will be required to remove the makeup or nail polish.    Do not shave your surgical area at least 5 days prior to your surgery. The surgical prep will be performed at the hospital according to Infection Control regulations.    Leave all valuables at home.   Do Not wear any jewelry or watches, including any metal in body piercings. Jewelry must be removed prior to coming to the hospital.  There is a possibility that rings that are unable to be removed may be cut off if they are on the surgical extremity.    Contact Lens must be removed before surgery. Either do not wear the contact lens or bring a case and solution for  storage.  Please bring a container for eyeglasses or dentures as required.  Bring any paperwork your physician has provided, such as consent forms,  history and physicals, doctor's orders, etc.   Bring comfortable clothes that are loose fitting to wear upon discharge. Take into consideration the type of surgery being performed.  Maintain your diet as advised per your physician the day prior to surgery.      Adequate rest the night before surgery is advised.   Park in the Parking lot behind the hospital or in the [facility] across the street from the parking lot. Parking is complimentary.  If you will be discharged the same day as your procedure, please arrange for a responsible adult to drive you home or to accompany you if traveling by taxi.   YOU WILL NOT BE PERMITTED TO DRIVE OR TO LEAVE THE HOSPITAL ALONE AFTER SURGERY.   If you are being discharged the same day, it is strongly recommended that you arrange for someone to remain with you for the first 24 hrs following your surgery.    The Surgeon will speak to your family/visitor after your surgery regarding the outcome of your surgery and post op care.  The Surgeon may speak to you after your surgery, but there is a possibility you may not remember the details.  Please check with your family members regarding the conversation with the Surgeon.    We strongly recommend whoever is bringing you home be present for discharge instructions.  This will ensure a thorough understanding for your post op home care.    ALL CHILDREN MUST ALWAYS BE ACCOMPANIED BY AN ADULT.    Visitors-Refer to current Visitor policy handouts.    Thank you for your cooperation.  The Staff of [facility].                Bathing Instructions with Hibiclens     Shower the evening before and morning of your procedure with Hibiclens:   Wash your face with water and your regular face wash/soap   Apply Hibiclens directly on your skin or on a wet washcloth and wash  gently. When showering: Move away from the shower stream when applying Hibiclens to avoid rinsing off too soon.   Rinse thoroughly with warm water   Do not dilute Hibiclens         Dry off as usual, do not use any deodorant, powder, body lotions, perfume, after shave or cologne.

## 2020-07-21 NOTE — ANESTHESIA PREPROCEDURE EVALUATION
07/21/2020  Sheila Zarco is a 69 y.o., female.    Anesthesia Evaluation    I have reviewed the Patient Summary Reports.    I have reviewed the Nursing Notes. I have reviewed the NPO Status.   I have reviewed the Medications.     Review of Systems  Anesthesia Hx:  Denies Family Hx of Anesthesia complications.   Denies Personal Hx of Anesthesia complications.   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, started on lipitor and baby asa   Endocrine:   Hypothyroidism    Dermatological:  Skin Normal    Psych:  Psychiatric Normal           Physical Exam  General:  Well nourished    Airway/Jaw/Neck:  Airway Findings: Mouth Opening: Normal Tongue: Normal  General Airway Assessment: Adult  Mallampati: III  TM Distance: Normal, at least 6 cm         Dental:  DENTAL FINDINGS: Normal        Mental Status:  Mental Status Findings:  Cooperative, Alert and Oriented         Anesthesia Plan  Type of Anesthesia, risks & benefits discussed:  Anesthesia Type:  general  Patient's Preference:   Intra-op Monitoring Plan: standard ASA monitors  Intra-op Monitoring Plan Comments:   Post Op Pain Control Plan: per primary service following discharge from PACU and multimodal analgesia  Post Op Pain Control Plan Comments:   Induction:   IV  Beta Blocker:         Informed Consent: Patient understands risks and agrees with Anesthesia plan.  Questions answered. Anesthesia consent signed with patient.  ASA Score: 3     Day of Surgery  Review of History & Physical:    H&P update referred to the surgeon.     Anesthesia Plan Notes: IV propofol for local injection by surgeon    Recent labs in epic reviewed, WNL, EKG SB 58        Ready For Surgery From Anesthesia Perspective.

## 2020-07-22 ENCOUNTER — HOSPITAL ENCOUNTER (OUTPATIENT)
Dept: RADIOLOGY | Facility: HOSPITAL | Age: 70
Discharge: HOME | End: 2020-07-22
Attending: SURGERY
Payer: MEDICARE

## 2020-07-22 DIAGNOSIS — D05.12 DUCTAL CARCINOMA IN SITU (DCIS) OF LEFT BREAST: ICD-10-CM

## 2020-07-22 LAB — SARS-COV-2 RNA RESP QL NAA+PROBE: NOT DETECTED

## 2020-07-22 PROCEDURE — 25000003 PHARM REV CODE 250: Mod: PO | Performed by: SURGERY

## 2020-07-22 PROCEDURE — 19281 PERQ DEVICE BREAST 1ST IMAG: CPT | Mod: LT,,, | Performed by: RADIOLOGY

## 2020-07-22 PROCEDURE — A4648 IMPLANTABLE TISSUE MARKER: HCPCS | Mod: PO

## 2020-07-22 PROCEDURE — 19281 MAMMO BREAST RADAR REFLECTOR LOC W/MAMMO GUIDANCE, 1ST LESION, LEFT: ICD-10-PCS | Mod: LT,,, | Performed by: RADIOLOGY

## 2020-07-22 RX ORDER — LIDOCAINE HYDROCHLORIDE 20 MG/ML
10 INJECTION, SOLUTION INFILTRATION; PERINEURAL ONCE
Status: COMPLETED | OUTPATIENT
Start: 2020-07-22 | End: 2020-07-22

## 2020-07-22 RX ADMIN — LIDOCAINE HYDROCHLORIDE 10 ML: 20 INJECTION, SOLUTION INFILTRATION; PERINEURAL at 09:07

## 2020-07-23 ENCOUNTER — TELEPHONE (OUTPATIENT)
Dept: SURGERY | Facility: CLINIC | Age: 70
End: 2020-07-23

## 2020-07-23 DIAGNOSIS — D05.12 DUCTAL CARCINOMA IN SITU (DCIS) OF LEFT BREAST: Primary | ICD-10-CM

## 2020-07-23 DIAGNOSIS — D05.12 BREAST NEOPLASM, TIS (DCIS), LEFT: ICD-10-CM

## 2020-07-23 RX ORDER — SODIUM CHLORIDE 9 MG/ML
INJECTION, SOLUTION INTRAVENOUS CONTINUOUS
Status: CANCELLED | OUTPATIENT
Start: 2020-07-23

## 2020-07-23 NOTE — TELEPHONE ENCOUNTER
Spoke with pt regarding surgery, pt advised to arrive to [facility] at 0530 for 0700 surgery, pt verbalized understanding, pre-op education reinforced, all questions answered at this time, pt given reassurance

## 2020-07-24 ENCOUNTER — HOSPITAL ENCOUNTER (OUTPATIENT)
Dept: RADIOLOGY | Facility: OTHER | Age: 70
Discharge: HOME | End: 2020-07-24
Attending: SURGERY
Payer: MEDICARE

## 2020-07-24 ENCOUNTER — ANESTHESIA (OUTPATIENT)
Dept: SURGERY | Facility: OTHER | Age: 70
End: 2020-07-24
Payer: MEDICARE

## 2020-07-24 ENCOUNTER — HOSPITAL ENCOUNTER (OUTPATIENT)
Facility: OTHER | Age: 70
Discharge: HOME | End: 2020-07-24
Attending: SURGERY | Admitting: SURGERY
Payer: MEDICARE

## 2020-07-24 VITALS
DIASTOLIC BLOOD PRESSURE: 56 MMHG | RESPIRATION RATE: 18 BRPM | HEART RATE: 62 BPM | WEIGHT: 159 LBS | SYSTOLIC BLOOD PRESSURE: 119 MMHG | OXYGEN SATURATION: 94 % | TEMPERATURE: 98 F | BODY MASS INDEX: 25.66 KG/M2

## 2020-07-24 DIAGNOSIS — D05.12 DUCTAL CARCINOMA IN SITU (DCIS) OF LEFT BREAST: ICD-10-CM

## 2020-07-24 DIAGNOSIS — D05.12 BREAST NEOPLASM, TIS (DCIS), LEFT: ICD-10-CM

## 2020-07-24 PROCEDURE — 88341 PR IHC OR ICC EACH ADD'L SINGLE ANTIBODY  STAINPR: ICD-10-PCS | Mod: 26,,, | Performed by: PATHOLOGY

## 2020-07-24 PROCEDURE — C1894 INTRO/SHEATH, NON-LASER: HCPCS | Performed by: SURGERY

## 2020-07-24 PROCEDURE — 19301 PR MASTECTOMY, PARTIAL: ICD-10-PCS | Mod: LT,,, | Performed by: SURGERY

## 2020-07-24 PROCEDURE — 63600175 PHARM REV CODE 636 W HCPCS: Performed by: ANESTHESIOLOGY

## 2020-07-24 PROCEDURE — 76098 X-RAY EXAM SURGICAL SPECIMEN: CPT | Mod: TC

## 2020-07-24 PROCEDURE — 25000003 PHARM REV CODE 250: Performed by: SPECIALIST

## 2020-07-24 PROCEDURE — 63600175 PHARM REV CODE 636 W HCPCS: Performed by: SURGERY

## 2020-07-24 PROCEDURE — 88341 IMHCHEM/IMCYTCHM EA ADD ANTB: CPT | Mod: 26,,, | Performed by: PATHOLOGY

## 2020-07-24 PROCEDURE — 37000009 HC ANESTHESIA EA ADD 15 MINS: Performed by: SURGERY

## 2020-07-24 PROCEDURE — 88307 PR  SURG PATH,LEVEL V: ICD-10-PCS | Mod: 26,,, | Performed by: PATHOLOGY

## 2020-07-24 PROCEDURE — 25000003 PHARM REV CODE 250: Performed by: ANESTHESIOLOGY

## 2020-07-24 PROCEDURE — 88342 IMHCHEM/IMCYTCHM 1ST ANTB: CPT | Mod: 26,,, | Performed by: PATHOLOGY

## 2020-07-24 PROCEDURE — 76098 X-RAY EXAM SURGICAL SPECIMEN: CPT | Mod: 26,,, | Performed by: RADIOLOGY

## 2020-07-24 PROCEDURE — 88307 TISSUE EXAM BY PATHOLOGIST: CPT | Mod: 26,,, | Performed by: PATHOLOGY

## 2020-07-24 PROCEDURE — 36000707: Performed by: SURGERY

## 2020-07-24 PROCEDURE — 25000242 PHARM REV CODE 250 ALT 637 W/ HCPCS: Performed by: ANESTHESIOLOGY

## 2020-07-24 PROCEDURE — 94640 AIRWAY INHALATION TREATMENT: CPT

## 2020-07-24 PROCEDURE — 88307 TISSUE EXAM BY PATHOLOGIST: CPT | Mod: 59 | Performed by: PATHOLOGY

## 2020-07-24 PROCEDURE — 37000008 HC ANESTHESIA 1ST 15 MINUTES: Performed by: SURGERY

## 2020-07-24 PROCEDURE — 76098 MAMMO BREAST SPECIMEN: ICD-10-PCS | Mod: 26,,, | Performed by: RADIOLOGY

## 2020-07-24 PROCEDURE — 25000003 PHARM REV CODE 250: Performed by: NURSE ANESTHETIST, CERTIFIED REGISTERED

## 2020-07-24 PROCEDURE — 63600175 PHARM REV CODE 636 W HCPCS: Performed by: NURSE ANESTHETIST, CERTIFIED REGISTERED

## 2020-07-24 PROCEDURE — 71000016 HC POSTOP RECOV ADDL HR: Performed by: SURGERY

## 2020-07-24 PROCEDURE — 71000015 HC POSTOP RECOV 1ST HR: Performed by: SURGERY

## 2020-07-24 PROCEDURE — 88342 CHG IMMUNOCYTOCHEMISTRY: ICD-10-PCS | Mod: 26,,, | Performed by: PATHOLOGY

## 2020-07-24 PROCEDURE — 36000706: Performed by: SURGERY

## 2020-07-24 PROCEDURE — 94761 N-INVAS EAR/PLS OXIMETRY MLT: CPT

## 2020-07-24 PROCEDURE — 19301 PARTIAL MASTECTOMY: CPT | Mod: LT,,, | Performed by: SURGERY

## 2020-07-24 PROCEDURE — 25000003 PHARM REV CODE 250: Performed by: SURGERY

## 2020-07-24 RX ORDER — CEFAZOLIN SODIUM 1 G/3ML
2 INJECTION, POWDER, FOR SOLUTION INTRAMUSCULAR; INTRAVENOUS
Status: COMPLETED | OUTPATIENT
Start: 2020-07-24 | End: 2020-07-24

## 2020-07-24 RX ORDER — HYDROCODONE BITARTRATE AND ACETAMINOPHEN 5; 325 MG/1; MG/1
1 TABLET ORAL EVERY 4 HOURS PRN
Status: DISCONTINUED | OUTPATIENT
Start: 2020-07-24 | End: 2020-07-24 | Stop reason: HOSPADM

## 2020-07-24 RX ORDER — SODIUM CHLORIDE 9 MG/ML
INJECTION, SOLUTION INTRAVENOUS CONTINUOUS
Status: DISCONTINUED | OUTPATIENT
Start: 2020-07-24 | End: 2020-07-24 | Stop reason: HOSPADM

## 2020-07-24 RX ORDER — LIDOCAINE HCL/PF 100 MG/5ML
SYRINGE (ML) INTRAVENOUS
Status: DISCONTINUED | OUTPATIENT
Start: 2020-07-24 | End: 2020-07-24

## 2020-07-24 RX ORDER — OXYCODONE HYDROCHLORIDE 5 MG/1
5 TABLET ORAL ONCE
Status: COMPLETED | OUTPATIENT
Start: 2020-07-24 | End: 2020-07-24

## 2020-07-24 RX ORDER — BUPIVACAINE HYDROCHLORIDE 2.5 MG/ML
INJECTION, SOLUTION EPIDURAL; INFILTRATION; INTRACAUDAL
Status: DISCONTINUED | OUTPATIENT
Start: 2020-07-24 | End: 2020-07-24 | Stop reason: HOSPADM

## 2020-07-24 RX ORDER — HYDROCODONE BITARTRATE AND ACETAMINOPHEN 5; 325 MG/1; MG/1
1 TABLET ORAL EVERY 6 HOURS PRN
Qty: 5 TABLET | Refills: 0 | Status: SHIPPED | OUTPATIENT
Start: 2020-07-24 | End: 2022-03-25

## 2020-07-24 RX ORDER — ALBUTEROL SULFATE 0.83 MG/ML
2.5 SOLUTION RESPIRATORY (INHALATION)
Status: COMPLETED | OUTPATIENT
Start: 2020-07-24 | End: 2020-07-24

## 2020-07-24 RX ORDER — LIDOCAINE HYDROCHLORIDE 10 MG/ML
0.5 INJECTION, SOLUTION EPIDURAL; INFILTRATION; INTRACAUDAL; PERINEURAL ONCE
Status: DISCONTINUED | OUTPATIENT
Start: 2020-07-24 | End: 2020-07-24 | Stop reason: HOSPADM

## 2020-07-24 RX ORDER — MIDAZOLAM HYDROCHLORIDE 1 MG/ML
INJECTION INTRAMUSCULAR; INTRAVENOUS
Status: DISCONTINUED | OUTPATIENT
Start: 2020-07-24 | End: 2020-07-24

## 2020-07-24 RX ORDER — SODIUM CHLORIDE, SODIUM LACTATE, POTASSIUM CHLORIDE, CALCIUM CHLORIDE 600; 310; 30; 20 MG/100ML; MG/100ML; MG/100ML; MG/100ML
INJECTION, SOLUTION INTRAVENOUS CONTINUOUS
Status: DISCONTINUED | OUTPATIENT
Start: 2020-07-24 | End: 2020-07-24 | Stop reason: HOSPADM

## 2020-07-24 RX ORDER — ONDANSETRON HYDROCHLORIDE 2 MG/ML
INJECTION, SOLUTION INTRAMUSCULAR; INTRAVENOUS
Status: DISCONTINUED | OUTPATIENT
Start: 2020-07-24 | End: 2020-07-24

## 2020-07-24 RX ORDER — PROPOFOL 10 MG/ML
VIAL (ML) INTRAVENOUS
Status: DISCONTINUED | OUTPATIENT
Start: 2020-07-24 | End: 2020-07-24

## 2020-07-24 RX ORDER — PHENYLEPHRINE HYDROCHLORIDE 10 MG/ML
INJECTION INTRAVENOUS
Status: DISCONTINUED | OUTPATIENT
Start: 2020-07-24 | End: 2020-07-24

## 2020-07-24 RX ORDER — FENTANYL CITRATE 50 UG/ML
INJECTION, SOLUTION INTRAMUSCULAR; INTRAVENOUS
Status: DISCONTINUED | OUTPATIENT
Start: 2020-07-24 | End: 2020-07-24

## 2020-07-24 RX ORDER — ACETAMINOPHEN 500 MG
1000 TABLET ORAL
Status: COMPLETED | OUTPATIENT
Start: 2020-07-24 | End: 2020-07-24

## 2020-07-24 RX ORDER — PROPOFOL 10 MG/ML
VIAL (ML) INTRAVENOUS CONTINUOUS PRN
Status: DISCONTINUED | OUTPATIENT
Start: 2020-07-24 | End: 2020-07-24

## 2020-07-24 RX ORDER — ONDANSETRON 8 MG/1
8 TABLET, ORALLY DISINTEGRATING ORAL EVERY 8 HOURS PRN
Status: DISCONTINUED | OUTPATIENT
Start: 2020-07-24 | End: 2020-07-24 | Stop reason: HOSPADM

## 2020-07-24 RX ADMIN — CEFAZOLIN 2 G: 330 INJECTION, POWDER, FOR SOLUTION INTRAMUSCULAR; INTRAVENOUS at 07:07

## 2020-07-24 RX ADMIN — ONDANSETRON 4 MG: 2 INJECTION, SOLUTION INTRAMUSCULAR; INTRAVENOUS at 07:07

## 2020-07-24 RX ADMIN — MIDAZOLAM HYDROCHLORIDE 2 MG: 1 INJECTION, SOLUTION INTRAMUSCULAR; INTRAVENOUS at 06:07

## 2020-07-24 RX ADMIN — OXYCODONE 5 MG: 5 TABLET ORAL at 08:07

## 2020-07-24 RX ADMIN — PROPOFOL 75 MCG/KG/MIN: 10 INJECTION, EMULSION INTRAVENOUS at 07:07

## 2020-07-24 RX ADMIN — PROPOFOL 50 MG: 10 INJECTION, EMULSION INTRAVENOUS at 07:07

## 2020-07-24 RX ADMIN — FENTANYL CITRATE 50 MCG: 50 INJECTION, SOLUTION INTRAMUSCULAR; INTRAVENOUS at 07:07

## 2020-07-24 RX ADMIN — SODIUM CHLORIDE, SODIUM LACTATE, POTASSIUM CHLORIDE, AND CALCIUM CHLORIDE: 600; 310; 30; 20 INJECTION, SOLUTION INTRAVENOUS at 06:07

## 2020-07-24 RX ADMIN — LIDOCAINE HYDROCHLORIDE 50 MG: 20 INJECTION, SOLUTION INTRAVENOUS at 07:07

## 2020-07-24 RX ADMIN — PHENYLEPHRINE HYDROCHLORIDE 100 MCG: 10 INJECTION INTRAVENOUS at 07:07

## 2020-07-24 RX ADMIN — ACETAMINOPHEN 1000 MG: 500 TABLET, FILM COATED ORAL at 06:07

## 2020-07-24 RX ADMIN — ALBUTEROL SULFATE 2.5 MG: 2.5 SOLUTION RESPIRATORY (INHALATION) at 05:07

## 2020-07-24 NOTE — ANESTHESIA POSTPROCEDURE EVALUATION
Anesthesia Post Evaluation    Patient: Sheila Zarco    Procedure(s) Performed: Procedure(s) (LRB):  MASTECTOMY, PARTIAL-Left with Radiological Marker (Left)    Final Anesthesia Type: general    Patient location during evaluation: [facility]  Patient participation: Yes- Able to Participate  Level of consciousness: awake and alert and awake  Post-procedure vital signs: reviewed and stable  Pain management: adequate  Airway patency: patent    PONV status at discharge: No PONV  Anesthetic complications: no      Cardiovascular status: blood pressure returned to baseline, hemodynamically stable and stable  Respiratory status: spontaneous ventilation, unassisted and room air  Hydration status: euvolemic  Follow-up not needed.          Vitals Value Taken Time   /60 07/24/20 0629   Temp 36.6 °C (97.9 °F) 07/24/20 0628   Pulse 67 07/24/20 0628   Resp 18 07/24/20 0628   SpO2 97 % 07/24/20 0628         No case tracking events are documented in the log.      Pain/Paddy Score: Pain Rating Prior to Med Admin: 0 (7/24/2020  6:19 AM)

## 2020-07-24 NOTE — PLAN OF CARE
Sheila Zarco has met all discharge criteria from Phase II. Vital Signs are stable, ambulating  without difficulty. Discharge instructions given, patient verbalized understanding. Discharged from facility via wheelchair in stable condition.

## 2020-07-24 NOTE — OP NOTE
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  The reflector was placed in the lower outer quadrant of the left breast. The localization films were reviewed.    She was then brought to the Operating Room and placed in the supine position. Anesthesia with local/monitored anesthesia care with sedation was administered.  The left breast, anterior chest, arm and axilla were then prepped and draped in a sterile fashion.     Next, we turned our attention to the left breast. Local anesthetic was injected into the area.  An incision was made in the periareolar position of the left breast. The specimen was dissected circumferentially around the cancer using the reflector and probe as a guide.  We did not  dissect all the way down to the underlying pectoralis fascia. The localization lumpectomy specimen was inked on the back table using green ink inferiorly, blue ink superiorly, orange ink laterally, yellow ink anteriorly, black ink posteriorly, and the red ink medially.  It was then fixed with acetic acid and submitted for specimen radiograph, which confirmed the reflector, clip and area of interest within the specimen. Given the appearance and location of the lesion, additional margins were taken including medial and inferior/posterior.       Within the lumpectomy cavity, hemostasis was achieved with cautery. The wound was irrigated until clear. There was no evidence of bleeding. It was closed in multiple layers with deep dermal and subcutaneous interrupted Vicryl sutures and a running 4-0 Monocryl subcuticular skin  closure.    Dermabond was applied. Sterile fluff gauze was placed and a post-procedure bra was placed. She tolerated the procedure well without complication and was turned over to Anesthesia for transport to the recovery area in a satisfactory condition. All specimens were sent to Pathology for permanent sectioning.    ESTIMATED BLOOD LOSS: 5ml    COMPLICATIONS: none    DISPOSITION:  PACU--hemodynamically stable    ATTESTATION:  I performed the procedure.

## 2020-07-24 NOTE — DISCHARGE INSTRUCTIONS
POSTOPERATIVE INSTRUCTIONS FOLLOWING BIOPSY OR LUMPECTOMY    The following are post-operative instructions that will help you to recover from your surgery.  Please read over these instructions carefully and contact us if we can answer any of your questions or concerns.    Dressing/breast binder (surgi-bra)  A surgical bra may be placed around your chest after your surgery.  If you are given the bra, please wear it as close to 24 hours a day as possible until your post-operative clinic appointment.  If the elastic around the bra irritates your skin, you may wear a soft t-shirt underneath the bra.  You may go without wearing the bra long enough to bath, to launder and dry the bra.  If the bra is extremely uncomfortable, you may wear a supportive sports bra instead after 2 days.  You may shower after immediately.  Do not take a tub bath and do not soak the surgical site. Please do not remove the dermabond (glue) that is overlying the incision.    Activity   You should be able to return to your regular activities 2 days after your surgery.  However, do not engage in strenuous activities in which you use your upper body such as:  golf, tennis, aerobics, washing windows, raking the yard, mopping, vacuuming, heavy lifting (e.g children) until you are seen for your follow-up appointment in clinic.    Medication for pain  You may find that over the counter pain medications may be sufficient for your pain.  You will be given a prescription for pain medication for more severe pain.  You should not drive or operate machinery while taking these.  Please take narcotics with food.  Narcotics can cause, or worse, constipation.  You will need to increase your fluid intake, eat high fiber foods (such as fruits and bran) and make sure that you are up and walking. You may need to take an over the counter stool softener for constipation.    Please report the following:   Temperature greater than 101 degrees   Discharge or bad odor  from the wound   Excessive bleeding, such as bloody dressing or extreme bruising   Redness at incision and/or drain sites   Swelling or buildup of fluid around incision     Additional information  I will see you approximately 2 weeks following your surgery.  If this follow-up appointment has not been made, please call the office.    If you have any questions or problems, please call my office or my nurse.    Dr. Kayleen Angulo, RN  [phone]    After hours and on weekends, you may call the main [facility] line at [phone] and ask to have the general surgery resident paged or have me paged.      Anesthesia: Monitored Anesthesia Care (MAC)    Anesthesia Safety  · Have an adult family member or friend drive you home after the procedure.  · For the first 24 hours after your surgery:  ¨ Do not drive or use heavy equipment.  ¨ Do not make important decisions or sign documents.  ¨ Avoid alcohol.  ¨ Have someone stay with you, if possible. They can watch for problems and help keep you safe.

## 2020-07-24 NOTE — H&P
New Breast Cancer  History and Physical  [facility]     REFERRING PROVIDER: No referring provider defined for this encounter.     CHIEF COMPLAINT: left breast DCIS     Subjective:       Sheila Zarco is a 69 y.o. postmenopausal female referred for evaluation of recently diagnosed carcinoma of the left breast. The patient was initially underwent screening mammogram, which identified calcifications. Follow-up diagnostic mammogram showed 4 mm of calcifications at 6 OC position. 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   She denies bilateral breast pain, lumps, discharge, discoloration, dimpling, or axillary lump.      Patient does routinely do self breast exams.  Patient denies a personal history of breast cancer.     Findings at that time were the following:   Tumor size: 4 mm  Estrogen Receptor: -   Progesterone Receptor: -      GYN History:  Age of menarche was 16. Age of menopause was 54. Patient denies hormonal therapy. Patient is . Age of first live birth was 20. Patient did not breast feed.     FAMILY History:  Daughter - breast cancer (dx at 44, ILC, s/p B mastectomy)  Sister 1- breast cancer (dx in late 40s, DCIS, s/p lumpectomy)   Sister 2 - lung cancer  Sister 3 - lymphoma  Father - renal mass (unknown type)          Past Medical History:   Diagnosis Date    Asthma       childhood    Baker's cyst      Cataract      Glaucoma      Thyroid disease             Past Surgical History:   Procedure Laterality Date    BUNIONECTOMY        GALLBLADDER SURGERY        HYSTERECTOMY        KNEE CARTILAGE SURGERY        narrow angles eye surgery                 Current Outpatient Medications on File Prior to Visit   Medication Sig Dispense Refill    albuterol (ACCUNEB) 0.63 mg/3 mL Nebu Take 3 mLs (0.63 mg total) by nebulization every 6 (six) hours as needed. 100 vial 2    albuterol (VENTOLIN HFA) 90 mcg/actuation inhaler  Inhale 2 puffs into the lungs every 6 (six) hours as needed for Wheezing.        ALPRAZolam (XANAX) 0.5 MG tablet Take 1 tablet (0.5 mg total) by mouth 2 (two) times daily. 60 tablet 0    atorvastatin (LIPITOR) 20 MG tablet Take 1 tablet (20 mg total) by mouth once daily. For cholesterol control. 90 tablet 3    clobetasol (TEMOVATE) 0.05 % cream Apply topically 2 (two) times daily. 30 g 3    desoximetasone (TOPICORT) 0.25 % cream Apply topically 2 (two) times daily.          escitalopram oxalate (LEXAPRO) 20 MG tablet Take 1 tablet (20 mg total) by mouth once daily. For anxiety. 90 tablet 2    fluocinonide (LIDEX) 0.05 % external solution AAA scalp qday prn itching, scaling 60 mL 3    fluticasone propionate (FLONASE) 50 mcg/actuation nasal spray 2 sprays (100 mcg total) by Each Nostril route once daily. 16 g 4    fluticasone-salmeterol 250-50 mcg/dose (ADVAIR) 250-50 mcg/dose diskus inhaler Inhale 1 puff into the lungs 2 (two) times daily. 60 each 5    FLUZONE HIGH-DOSE 2019-20, PF, 180 mcg/0.5 mL Syrg TO BE ADMINISTERED BY PHARMACIST FOR IMMUNIZATION        ketoconazole (NIZORAL) 2 % cream AAA twice daily on nose prn flare 60 g 3    ketoconazole (NIZORAL) 2 % shampoo WASH HAIR W/ MEDICATED SHAMPOO AT LEAST 2X WEEK, LET SIT ON SCALP FOR ATLEAST 5MINUTES BEFORE RINSIN 120 mL 1    levocetirizine (XYZAL) 5 MG tablet Take 1 tablet (5 mg total) by mouth every evening. For sinus allergy symptoms 30 tablet 4    levothyroxine (SYNTHROID) 88 MCG tablet TAKE 1 TABLET (88 MCG TOTAL) BY MOUTH EVERY MORNING. 90 tablet 3    meclizine (ANTIVERT) 25 mg tablet Take 1 tablet (25 mg total) by mouth 3 (three) times daily as needed for Dizziness. 15 tablet 0    methylPREDNISolone (MEDROL DOSEPACK) 4 mg tablet use as directed 1 Package 0    naproxen (NAPROSYN) 500 MG tablet Take 1 tablet (500 mg total) by mouth 2 (two) times daily with meals. 60 tablet 3    pantoprazole (PROTONIX) 40 MG tablet TAKE 1 TABLET (40 MG TOTAL)  BY MOUTH ONCE DAILY. 90 tablet 3    triamcinolone acetonide 0.025% (KENALOG) 0.025 % cream AAA qd to face, ears prn flare 45 g 1    valACYclovir (VALTREX) 500 MG tablet TAKE ONE TABLET TWICE A DAY AS NEEDED FOR  OUTBREAK 12 tablet 3    VENTOLIN HFA 90 mcg/actuation inhaler INHALE 2 PUFFS INTO THE LUNGS EVERY 4 HOURS AS NEEDED FOR WHEEZING 18 Inhaler 0      No current facility-administered medications on file prior to visit.      Social History               Socioeconomic History    Marital status:        Spouse name: Not on file    Number of children: Not on file    Years of education: Not on file    Highest education level: Not on file   Occupational History    Not on file   Social Needs    Financial resource strain: Not very hard    Food insecurity       Worry: Never true       Inability: Never true    Transportation needs       Medical: No       Non-medical: No   Tobacco Use    Smoking status: Never Smoker    Smokeless tobacco: Never Used   Substance and Sexual Activity    Alcohol use: Yes       Alcohol/week: 8.0 standard drinks       Types: 4 Glasses of wine, 4 Cans of beer per week       Frequency: 2-4 times a month       Drinks per session: 3 or 4       Binge frequency: Less than monthly       Comment: drinks 4 days a week- >1 drink    Drug use: No    Sexual activity: Yes   Lifestyle    Physical activity       Days per week: 0 days       Minutes per session: Not on file    Stress: Only a little   Relationships    Social connections       Talks on phone: More than three times a week       Gets together: Once a week       Attends [employer] service: Not on file       Active member of club or organization: No       Attends meetings of clubs or organizations: Never       Relationship status:    Other Topics Concern    Are you pregnant or think you may be? No    Breast-feeding No   Social History Narrative    Not on file              Family History   Problem Relation Age of Onset     Heart disease Mother      Heart disease Father      Liver disease Brother      Breast cancer Sister      Breast cancer Daughter 43         bilateral mastectomy    No Known Problems Sister      No Known Problems Maternal Grandmother      No Known Problems Maternal Grandfather      No Known Problems Paternal Grandmother      No Known Problems Paternal Grandfather      No Known Problems Maternal Aunt      No Known Problems Maternal Uncle      No Known Problems Paternal Aunt      No Known Problems Paternal Uncle      Breast cancer Other      Colon cancer Neg Hx      Ovarian cancer Neg Hx      Melanoma Neg Hx      Lupus Neg Hx      Acne Neg Hx      Amblyopia Neg Hx      Blindness Neg Hx      Cancer Neg Hx      Cataracts Neg Hx      Diabetes Neg Hx      Glaucoma Neg Hx      Hypertension Neg Hx      Macular degeneration Neg Hx      Retinal detachment Neg Hx      Strabismus Neg Hx      Stroke Neg Hx      Thyroid disease Neg Hx           Review of Systems  Review of Systems   Constitutional: Negative for chills and fever.   HENT: Negative for hearing loss and voice change.    Eyes: Negative for discharge and redness.   Respiratory: Negative for cough and shortness of breath.    Cardiovascular: Negative for chest pain.   Gastrointestinal: Negative for abdominal pain, diarrhea, nausea and vomiting.   Genitourinary: Negative for difficulty urinating and hematuria.   Musculoskeletal: Negative for gait problem.   Skin: Negative for pallor.   Neurological: Positive for dizziness (dx with L vertebral a stenosis). Negative for weakness.   Psychiatric/Behavioral: Negative for behavioral problems.         Objective:   PHYSICAL EXAM:  /60   Pulse 67   Temp 97.9 °F (36.6 °C) (Skin)   Resp 18   Wt 72.1 kg (159 lb)   SpO2 97%   Breastfeeding No   BMI 25.66 kg/m²     Physical Exam   Constitutional: She is oriented to person, place, and time. She appears well-developed and well-nourished. No  distress.   HENT:   Head: Normocephalic and atraumatic.   Eyes: Conjunctivae and EOM are normal.   Neck: Normal range of motion.   Cardiovascular: Normal rate and regular rhythm.    Pulmonary/Chest: No respiratory distress. Right breast exhibits no inverted nipple, no mass, no nipple discharge, no skin change and no tenderness. Left breast exhibits skin change and tenderness. Left breast exhibits no inverted nipple, no mass and no nipple discharge. Breasts are symmetrical.   Moderately amount of ecchymosis inferior to nipple L breast, soft  Mild tenderness to palpation  No axillary LAD   Abdominal: Soft. She exhibits no distension. There is no abdominal tenderness.   Musculoskeletal: Normal range of motion.   Lymphadenopathy:     She has no cervical adenopathy.   Neurological: She is alert and oriented to person, place, and time.   Skin: Skin is warm and dry.     Psychiatric: She has a normal mood and affect. Her behavior is normal.            Radiology review: Images personally reviewed by me in the clinic.   Screening [facility] 5/22/20  Findings:  This procedure was performed using tomosynthesis. Computer-aided detection was utilized in the interpretation of this examination.  The breasts are heterogeneously dense, which may obscure small masses.      Left  There are amorphous calcifications in a grouped distribution seen in the left breast at 6 o'clock in the middle depth.      Right  There is no evidence of suspicious masses, calcifications, or other abnormal findings in the right breast.     Impression:  Left  Calcifications: Left breast calcifications at the middle 6 o'clock position. Assessment: 0 - Incomplete. Special Views: Magnification View is recommended.      Right  There is no mammographic evidence of malignancy in the right breast.     BI-RADS Category:   Overall: 0 - Incomplete: Needs Additional Imaging Evaluation     Recommendation:  Diagnostic mammogram including magnification views is  recommended.     Your estimated lifetime risk of breast cancer (to age 85) based on Tyrer-Cuzick risk assessment model is Tyrer-Cuzick: 16.65 %. According to the American Cancer Society, patients with a lifetime breast cancer risk of 20% or higher might benefit from supplemental screening tests.     Diagnostic [facility] 5/29/20  Findings:  This procedure was performed using tomosynthesis. Computer-aided detection was utilized in the interpretation of this examination.  The left breast is heterogeneously dense, which may obscure small masses.     There are amorphous calcifications in a grouped distribution seen in the left breast at 6 o'clock in the middle depth. Maximum diameter of cluster = 4 mm.     Impression:  Left  Calcifications: Left breast 4 mm calcifications at the middle 6 o'clock position. Assessment: 4 - Suspicious finding. Biopsy is recommended.      BI-RADS Category:   Overall: 4 - Suspicious     Pathology  1. Left breast with calcifications (middle 6:00 position), stereotactic biopsy:  - Ductal carcinoma in situ (DCIS) with apocrine features: High-grade, cribriform with central necrosis  and associated microcalcifications  - 4 mm in greatest linear dimension  - Negative for evidence of invasive carcinoma     Assessment:       Sheila Zarco is a 69 y.o. postmenopausal female with recently diagnosed ductal carcinoma in situ of the left breast.      Plan:    Options for management were discussed with the patient and her family. We reviewed the existing data noting the equivalency of breast conserving surgery with radiation therapy and mastectomy. We also reviewed the guidelines of the National Comprehensive Cancer Network for DCIS.  We discussed the need for lumpectomy margins to be negative for carcinoma and the necessity for postoperative radiation therapy after breast conservation in most cases. In the setting of mastectomy, delayed or immediate reconstruction options are available and were discussed.   We discussed the chance of upstaging to an invasive carcinoma at the time of surgery, potentially requiring more surgery (such as SLNB) if this occurs.     The need for SLNB depends on tumor biology as well as size and location of the DCIS.  If in the upper outer quadrant, it is difficult to map to the axillary nodes so SLNB is usually performed. The possibility of a failed or false negative sentinel lymph node biopsy and the potential need for complete lymphadenectomy for a failed or positive sentinel lymph node biopsy were fully discussed.     In the setting of lumpectomy, radiation therapy would be recommended majority of the time.  The duration and treatment side effects were discussed with the patient.  This will coordinated with the radiation oncologist pending final pathology.     We also discussed the role of systemic therapy in the treatment of DCIS with endocrine therapy if the DCIS is ER and/or OR positive.  We discussed that this is based on tumor biology and kayleen status and will be determined based on final pathology.  We discussed that if the DCIS is hormone positive, endocrine therapy may be recommended and its use can reduce the risk of recurrence. Side effects of treatment were briefly discussed. We also discussed that chemotherapy is not recommended in the setting of DCIS.      - Order B Breast MRI  - Order genetics  - Plan for L breast partial mastectomy with seed (no SLNB)  - F/u information will be provided postop     Patient was educated on DCIS, receptors, wire localization lumpectomy, mastectomy, sentinel lymph node mapping and biopsy, axillary lymph node dissection, reconstruction, breast prosthesis with post-mastectomy bra and radiation therapy. Patient was given patient information binder including [facility] broch[facility].  All her questions were answered.     Total time spent with the patient: 60 minutes.  45 minutes of face to face consultation and 15 minutes of chart  review and coordination of care.

## 2020-07-24 NOTE — DISCHARGE SUMMARY
[facility]  Discharge Summary  General Surgery      Admit Date: 7/24/2020    Discharge Date and Time:  07/24/2020 8:07 AM    Attending Physician: Kayleen Alvarez MD     Discharge Provider: Kayleen Alvarez    Reason for Admission: surgery    Procedures Performed: Procedure(s) (LRB):  MASTECTOMY, PARTIAL-Left with Radiological Marker (Left)    Final Diagnoses:   Principal Problem: left DCIS       Discharged Condition: stable    Disposition: Home or Self Care    Follow Up/Patient Instructions: 7-14 days    Medications:  Reconciled Home Medications:      Medication List      START taking these medications    HYDROcodone-acetaminophen 5-325 mg per tablet  Commonly known as: NORCO  Take 1 tablet by mouth every 6 (six) hours as needed for Pain.        CHANGE how you take these medications    ALPRAZolam 0.5 MG tablet  Commonly known as: XANAX  Take 1 tablet (0.5 mg total) by mouth 2 (two) times daily.  What changed:   · when to take this  · reasons to take this     atorvastatin 20 MG tablet  Commonly known as: LIPITOR  Take 1 tablet (20 mg total) by mouth once daily. For cholesterol control.  What changed: when to take this     escitalopram oxalate 20 MG tablet  Commonly known as: LEXAPRO  Take 1 tablet (20 mg total) by mouth once daily. For anxiety.  What changed: when to take this     pantoprazole 40 MG tablet  Commonly known as: PROTONIX  TAKE 1 TABLET (40 MG TOTAL) BY MOUTH ONCE DAILY.  What changed: when to take this        CONTINUE taking these medications    aspirin 81 MG EC tablet  Commonly known as: ECOTRIN  Take 81 mg by mouth every morning.     clobetasoL 0.05 % cream  Commonly known as: TEMOVATE  Apply topically 2 (two) times daily.     desoximetasone 0.25 % cream  Commonly known as: TOPICORT  Apply topically 2 (two) times daily.     fluocinonide 0.05 % external solution  Commonly known as: LIDEX  AAA scalp qday prn itching, scaling     fluticasone-salmeterol 250-50 mcg/dose 250-50 mcg/dose diskus  inhaler  Commonly known as: ADVAIR  Inhale 1 puff into the lungs 2 (two) times daily.     FLUZONE HIGH-DOSE 2019-20 (PF) 180 mcg/0.5 mL Syrg  Generic drug: flu vacc [id number](65yr up)PF  TO BE ADMINISTERED BY PHARMACIST FOR IMMUNIZATION     * ketoconazole 2 % cream  Commonly known as: NIZORAL  AAA twice daily on nose prn flare     * ketoconazole 2 % shampoo  Commonly known as: NIZORAL  WASH HAIR W/ MEDICATED SHAMPOO AT LEAST 2X WEEK, LET SIT ON SCALP FOR ATLEAST 5MINUTES BEFORE RINSIN     levocetirizine 5 MG tablet  Commonly known as: XYZAL  Take 1 tablet (5 mg total) by mouth every evening. For sinus allergy symptoms     levothyroxine 88 MCG tablet  Commonly known as: SYNTHROID  TAKE 1 TABLET (88 MCG TOTAL) BY MOUTH EVERY MORNING.     meclizine 25 mg tablet  Commonly known as: ANTIVERT  Take 1 tablet (25 mg total) by mouth 3 (three) times daily as needed for Dizziness.     naproxen 500 MG tablet  Commonly known as: NAPROSYN  Take 1 tablet (500 mg total) by mouth 2 (two) times daily with meals.     olopatadine 0.1 % ophthalmic solution  Commonly known as: PATANOL  Place 1 drop into both eyes 2 (two) times daily.     prednisoLONE acetate 1 % Drps  Commonly known as: PRED FORTE  Place 1 drop into the right eye 4 (four) times daily.     triamcinolone acetonide 0.025% 0.025 % cream  Commonly known as: KENALOG  AAA qd to face, ears prn flare     valACYclovir 500 MG tablet  Commonly known as: VALTREX  TAKE ONE TABLET TWICE A DAY AS NEEDED FOR  OUTBREAK     * VENTOLIN HFA 90 mcg/actuation inhaler  Generic drug: albuterol  Inhale 2 puffs into the lungs every 6 (six) hours as needed for Wheezing.     * albuterol 0.63 mg/3 mL Nebu  Commonly known as: ACCUNEB  Take 3 mLs (0.63 mg total) by nebulization every 6 (six) hours as needed.     vitamin D 1000 units Tab  Commonly known as: VITAMIN D3  Take by mouth once daily. Pt takes 50 mcg daily OTC         * This list has 4 medication(s) that are the same as other medications  prescribed for you. Read the directions carefully, and ask your doctor or other care provider to review them with you.              Discharge Procedure Orders   Diet general     Lifting restrictions     Call MD for:  temperature >100.4     Call MD for:  persistent nausea and vomiting     Call MD for:  severe uncontrolled pain     Call MD for:  difficulty breathing, headache or visual disturbances     Call MD for:  redness, tenderness, or signs of infection (pain, swelling, redness, odor or green/yellow discharge around incision site)     Call MD for:  hives     Call MD for:  persistent dizziness or light-headedness     Call MD for:  extreme fatigue     No dressing needed         Diet: regular    Activity: as tolerated

## 2020-07-27 LAB
COMMENT: NORMAL
FINAL PATHOLOGIC DIAGNOSIS: NORMAL
GROSS: NORMAL

## 2020-08-03 ENCOUNTER — OFFICE VISIT (OUTPATIENT)
Dept: SURGERY | Facility: CLINIC | Age: 70
End: 2020-08-03
Payer: MEDICARE

## 2020-08-03 VITALS
BODY MASS INDEX: 26.02 KG/M2 | HEIGHT: 66 IN | HEART RATE: 71 BPM | DIASTOLIC BLOOD PRESSURE: 69 MMHG | SYSTOLIC BLOOD PRESSURE: 141 MMHG | WEIGHT: 161.94 LBS

## 2020-08-03 DIAGNOSIS — Z17.1 MALIGNANT NEOPLASM OF LEFT BREAST IN FEMALE, ESTROGEN RECEPTOR NEGATIVE, UNSPECIFIED SITE OF BREAST: Primary | ICD-10-CM

## 2020-08-03 DIAGNOSIS — Z12.31 SCREENING MAMMOGRAM, ENCOUNTER FOR: Primary | ICD-10-CM

## 2020-08-03 DIAGNOSIS — C50.912 MALIGNANT NEOPLASM OF LEFT BREAST IN FEMALE, ESTROGEN RECEPTOR NEGATIVE, UNSPECIFIED SITE OF BREAST: Primary | ICD-10-CM

## 2020-08-03 DIAGNOSIS — D05.12 DUCTAL CARCINOMA IN SITU (DCIS) OF LEFT BREAST: ICD-10-CM

## 2020-08-03 PROCEDURE — 99024 POSTOP FOLLOW-UP VISIT: CPT | Mod: POP,,, | Performed by: SURGERY

## 2020-08-03 PROCEDURE — 99024 PR POST-OP FOLLOW-UP VISIT: ICD-10-PCS | Mod: POP,,, | Performed by: SURGERY

## 2020-08-03 PROCEDURE — 99999 PR PBB SHADOW E&M-EST. PATIENT-LVL V: CPT | Mod: PBBFAC,,, | Performed by: SURGERY

## 2020-08-03 PROCEDURE — 99215 OFFICE O/P EST HI 40 MIN: CPT | Mod: PBBFAC | Performed by: SURGERY

## 2020-08-03 PROCEDURE — 99999 PR PBB SHADOW E&M-EST. PATIENT-LVL V: ICD-10-PCS | Mod: PBBFAC,,, | Performed by: SURGERY

## 2020-08-03 NOTE — PROGRESS NOTES
REFERRING PHYSICIAN:  No referring provider defined for this encounter.       Farhat Correa MD    DIAGNOSIS:    This is a 70 y.o. female with a stage 0 grade 3 DCIS of the left breast.    TREATMENT SUMMARY:  The patient is status post left partial mastectomy on 7/24/20.  Final pathology showed DCIS with negative margins (5mm).    1.  BREAST, LEFT, LOWER OUTER QUADRANT, PARTIAL MASTECTOMY:   - Ductal carcinoma in-situ (DCIS), high nuclear grade, solid and comedo   types, with central necrosis and apocrine features, see Tumor Synoptic.   - Size of DCIS:  9.5 MM, see comment.   - Margins:   · DCIS is present at cauterized medial margin (see specimen 3 for final   medial margin status).   · DCIS is 5 MM from nearest superior margin.   - Microcalcifications:  Seen in association with DCIS.   - No skin is present for evaluation.   - Negative for invasive carcinoma.   - Pathologic staging: pTis   2.  BREAST, LEFT, ADDITIONAL INFERIOR POSTERIOR MARGIN, EXCISION:   - Negative for atypia or carcinoma.   - Benign breast tissue with fibrocystic changes and usual ductal hyperplasia.   - Microcalcifications:  Seen in association with benign breast ducts.   3.  BREAST, LEFT, ADDITIONAL MEDIAL MARGIN, EXCISION:   - DCIS is present 8 MM from nearest new medial margin.   - Negative for invasive carcinoma.     INTERVAL HISTORY:   Sheila Zarco comes in for a post-op check.  She denies fever, chills, chest pain or shortness of breath.  Her pain is well controlled.        PHYSICAL EXAMINATION:   General:  This is a well appearing female with appropriate speech, affect and gait.     Breast:  Incision clean, dry, and intact    IMPRESSION:   The patient has had an uneventful postoperative course.    PLAN:   1. return in 6 months for a follow up office visit and breast exam  2. bilateral mammogram in 11 months. Will get left mammo to ensure no residual calcs.  3. The patient is advised in continued exam of the breast chest wall and  to report to this office sooner should she note any areas of abnormality or concern.   4.  She has been instructed to meet with med onc and rad onc for discussion of adjuvant treatment recommendations      mmg 8/2 to check for residual calcs  F/u 6 months  Refer to rad onc--wants to start rad first week september

## 2020-08-04 ENCOUNTER — TELEPHONE (OUTPATIENT)
Dept: RADIATION ONCOLOGY | Facility: CLINIC | Age: 70
End: 2020-08-04

## 2020-08-04 ENCOUNTER — TUMOR BOARD CONFERENCE (OUTPATIENT)
Dept: SURGERY | Facility: CLINIC | Age: 70
End: 2020-08-04

## 2020-08-04 NOTE — PROGRESS NOTES
Oncology History   Breast neoplasm, Tis (DCIS), left   5/29/2020 Imaging Significant Findings    Mammogram  Impression:  Left  Calcifications: Left breast 4 mm calcifications at the middle 6 o'clock position. Assessment: 4 - Suspicious finding. Biopsy is recommended.      BI-RADS Category:   Overall: 4 - Suspicious     Recommendation:  Core Needle Biopsy is recommended. Findings and recommendation were discussed with the patient at the time of the procedure. She should be off aspirin for one week prior to biopsy if possible.        6/8/2020 Biopsy    1. Left breast with calcifications (middle 6:00 position), stereotactic biopsy:  - Ductal carcinoma in situ (DCIS) with apocrine features: High-grade,     6/8/2020 Initial Diagnosis    Breast neoplasm, Tis (DCIS), left     6/8/2020 Breast Tumor Markers    Estrogen Receptor: Negative  Progesterone Receptor: Negative     6/15/2020 Genetic Testing    MyRisk: negative     6/18/2020 Imaging Significant Findings    Breast MRI  Impression:  Left  Non-mass Enhancement: Left breast 1.7 cm x 2.2 cm x 2.1 cm non-mass enhancement at the middle 6 o'clock position. Assessment: 6 - Known biopsy, proven malignancy.      Right  There is no MR evidence of malignancy in the right breast.        Recommendation:  Surgical Consult is recommended.  Patient has an appointment with Dr. Alvarez.      7/24/2020 Breast Surgery    Surgery: Dr Kayleen Alvarez, [phone] performed left lumpectomy without kayleen sampling with pathology showing grade 3 ductal carcinoma in situ,  9.5 mm with 0 positive lymph nodes.        7/24/2020 Cancer Staged    Tis Nx     8/4/2020 Tumor Conference      Not planning on re excision. Will get mammogram to check for any residual calcifications. Will meet with radiation oncology only. No medical oncology recommended.

## 2020-08-11 ENCOUNTER — HOSPITAL ENCOUNTER (OUTPATIENT)
Dept: RADIOLOGY | Facility: HOSPITAL | Age: 70
Discharge: HOME | End: 2020-08-11
Attending: SURGERY
Payer: MEDICARE

## 2020-08-11 DIAGNOSIS — D05.12 DUCTAL CARCINOMA IN SITU (DCIS) OF LEFT BREAST: ICD-10-CM

## 2020-08-11 PROCEDURE — 77061 MAMMO DIGITAL DIAGNOSTIC LEFT WITH TOMOSYNTHESIS_CAD: ICD-10-PCS | Mod: 26,LT,, | Performed by: RADIOLOGY

## 2020-08-11 PROCEDURE — 77061 BREAST TOMOSYNTHESIS UNI: CPT | Mod: 26,LT,, | Performed by: RADIOLOGY

## 2020-08-11 PROCEDURE — 77065 MAMMO DIGITAL DIAGNOSTIC LEFT WITH TOMOSYNTHESIS_CAD: ICD-10-PCS | Mod: 26,LT,, | Performed by: RADIOLOGY

## 2020-08-11 PROCEDURE — 77061 BREAST TOMOSYNTHESIS UNI: CPT | Mod: TC,LT

## 2020-08-11 PROCEDURE — 77065 DX MAMMO INCL CAD UNI: CPT | Mod: 26,LT,, | Performed by: RADIOLOGY

## 2020-08-11 PROCEDURE — 77065 DX MAMMO INCL CAD UNI: CPT | Mod: TC,LT

## 2020-08-11 NOTE — PROGRESS NOTES
The patient location is:  home  The chief complaint leading to consultation is:  DCIS of the left breast    Visit type: audiovisual    Face to Face time with patient:  30 min   Sixty minutes of total time spent on the encounter, which includes face to face time and non-face to face time preparing to see the patient (eg, review of tests), Obtaining and/or reviewing separately obtained history, Documenting clinical information in the electronic or other health record, Independently interpreting results (not separately reported) and communicating results to the patient/family/caregiver, or Care coordination (not separately reported).     Each patient to whom he or she provides medical services by telemedicine is:  (1) informed of the relationship between the physician and patient and the respective role of any other health care provider with respect to management of the patient; and (2) notified that he or she may decline to receive medical services by telemedicine and may withdraw from such care at any time.    REFERRING PHYSICIAN:   Kayleen Alvarez M.D.    DIAGNOSIS:  pTisNxM0, Stage 0, Dcis of the left breast     HISTORY OF PRESENT ILLNESS:   Ms. Zarco is a 70-year-old female who was recently diagnosed with left breast cancer after evaluation for an abnormal mammogram in May 2020.  This revealed the left breast with a 4 mm area of abnormal calcifications in the 6 o'clock position.  A core needle biopsy on June 8, 2020 revealed ductal carcinoma in situ, high-grade, which was ER/MT negative.  An MRI of the bilateral breasts on June 18, 2020 revealed a 1.7 x 2.2 x 2.1 cm heterogeneous, non mass enhancemen at the biopsy site.  On July 24, 2020, she underwent lumpectomy.  Pathology revealed the left breast with approximately 9.5 mm, grade 3, ductal carcinoma in situ, solid and commute types, with central necrosis.  The medial margin was positive for DCIS.  Additional medial margin was removed which revealed a focus of  DCIS which was located 8 mm from the new medial margin.  The superior margin is noted at 5 mm.  No lymph node sampling is done.  She is here today for recommendations regarding further treatment.  After discussion the multidisciplinary breast Conference, she underwent a repeat mammogram on 2020 which revealed no suspicious residual calcifications.  No further surgery is planned.    At present, she is healing from the surgery without any unexpected side effects.  She denies left breast pain, edema, erythema, or nipple discharge.  She also denies fever, night sweats, or weight loss.    REVIEW OF SYSTEMS:  As above.  In addition, patient denies headaches, visual problems, dizziness, chest pain, shortness of breath, cough, nausea, vomiting, diarrhea, or any new bony pains. Patient also denies easy bruising, skin rashes, or numbness or tingling.    GYN HISTORY:   Menarche age 16.  .  Menopause at age 54.  She denies hormone replacement therapy.    ECO    PAST MEDICAL HISTORY:  Past Medical History:   Diagnosis Date    Asthma     childhood    Baker's cyst     Cataract     Glaucoma     Thyroid disease        PAST SURGICAL HISTORY:  Past Surgical History:   Procedure Laterality Date    BUNIONECTOMY      GALLBLADDER SURGERY      HYSTERECTOMY      INTRAOCULAR PROSTHESES INSERTION Right 2020    Procedure: INSERTION, IOL PROSTHESIS;  Surgeon: Lucas Jefferson MD;  Location: [address];  Service: Ophthalmology;  Laterality: Right;    KNEE CARTILAGE SURGERY      MASTECTOMY, PARTIAL Left 2020    Procedure: MASTECTOMY, PARTIAL-Left with Radiological Marker;  Surgeon: Kayleen Alvarez MD;  Location: [facility];  Service: General;  Laterality: Left;    narrow angles eye surgery       PHACOEMULSIFICATION OF CATARACT Right 2020    Procedure: PHACOEMULSIFICATION, CATARACT;  Surgeon: Lucas Jefferson MD;  Location: [address];  Service: Ophthalmology;  Laterality: Right;        ALLERGIES:   Review of patient's allergies indicates:  No Known Allergies    MEDICATIONS:  Current Outpatient Medications   Medication Sig    albuterol (ACCUNEB) 0.63 mg/3 mL Nebu Take 3 mLs (0.63 mg total) by nebulization every 6 (six) hours as needed.    albuterol (VENTOLIN HFA) 90 mcg/actuation inhaler Inhale 2 puffs into the lungs every 6 (six) hours as needed for Wheezing.    ALPRAZolam (XANAX) 0.5 MG tablet Take 1 tablet (0.5 mg total) by mouth 2 (two) times daily. (Patient taking differently: Take 0.5 mg by mouth 2 (two) times daily as needed. )    aspirin (ECOTRIN) 81 MG EC tablet Take 81 mg by mouth every morning.    atorvastatin (LIPITOR) 20 MG tablet Take 1 tablet (20 mg total) by mouth once daily. For cholesterol control. (Patient taking differently: Take 20 mg by mouth every morning. For cholesterol control.)    clobetasol (TEMOVATE) 0.05 % cream Apply topically 2 (two) times daily.    desoximetasone (TOPICORT) 0.25 % cream Apply topically 2 (two) times daily.      escitalopram oxalate (LEXAPRO) 20 MG tablet Take 1 tablet (20 mg total) by mouth once daily. For anxiety. (Patient taking differently: Take 20 mg by mouth every morning. For anxiety.)    fluocinonide (LIDEX) 0.05 % external solution AAA scalp qday prn itching, scaling    fluticasone-salmeterol 250-50 mcg/dose (ADVAIR) 250-50 mcg/dose diskus inhaler Inhale 1 puff into the lungs 2 (two) times daily.    FLUZONE HIGH-DOSE 2019-20, PF, 180 mcg/0.5 mL Syrg TO BE ADMINISTERED BY PHARMACIST FOR IMMUNIZATION    HYDROcodone-acetaminophen (NORCO) 5-325 mg per tablet Take 1 tablet by mouth every 6 (six) hours as needed for Pain. (Patient not taking: Reported on 8/3/2020)    ketoconazole (NIZORAL) 2 % cream AAA twice daily on nose prn flare    ketoconazole (NIZORAL) 2 % shampoo WASH HAIR W/ MEDICATED SHAMPOO AT LEAST 2X WEEK, LET SIT ON SCALP FOR ATLEAST 5MINUTES BEFORE RINSIN    levocetirizine (XYZAL) 5 MG tablet Take 1 tablet (5 mg  total) by mouth every evening. For sinus allergy symptoms    levothyroxine (SYNTHROID) 88 MCG tablet TAKE 1 TABLET (88 MCG TOTAL) BY MOUTH EVERY MORNING.    meclizine (ANTIVERT) 25 mg tablet Take 1 tablet (25 mg total) by mouth 3 (three) times daily as needed for Dizziness. (Patient not taking: Reported on 8/3/2020)    naproxen (NAPROSYN) 500 MG tablet Take 1 tablet (500 mg total) by mouth 2 (two) times daily with meals.    olopatadine (PATANOL) 0.1 % ophthalmic solution Place 1 drop into both eyes 2 (two) times daily. (Patient not taking: Reported on 8/3/2020)    pantoprazole (PROTONIX) 40 MG tablet TAKE 1 TABLET (40 MG TOTAL) BY MOUTH ONCE DAILY. (Patient taking differently: Take 40 mg by mouth every morning. )    prednisoLONE acetate (PRED FORTE) 1 % DrpS Place 1 drop into the right eye 4 (four) times daily.    triamcinolone acetonide 0.025% (KENALOG) 0.025 % cream AAA qd to face, ears prn flare    valACYclovir (VALTREX) 500 MG tablet TAKE ONE TABLET TWICE A DAY AS NEEDED FOR  OUTBREAK    vitamin D (VITAMIN D3) 1000 units Tab Take by mouth once daily. Pt takes 50 mcg daily OTC     No current facility-administered medications for this visit.      Facility-Administered Medications Ordered in Other Visits   Medication    0.9%  NaCl infusion    cyclopentolate 1% ophthalmic solution 1 drop    phenylephrine HCL 2.5% ophthalmic solution 1 drop    tropicamide 1% ophthalmic solution 1 drop       SOCIAL HISTORY:  Social History     Socioeconomic History    Marital status:      Spouse name: Not on file    Number of children: Not on file    Years of education: Not on file    Highest education level: Not on file   Occupational History    Not on file   Social Needs    Financial resource strain: Not very hard    Food insecurity     Worry: Never true     Inability: Never true    Transportation needs     Medical: No     Non-medical: No   Tobacco Use    Smoking status: Never Smoker    Smokeless  tobacco: Never Used   Substance and Sexual Activity    Alcohol use: Yes     Alcohol/week: 8.0 standard drinks     Types: 4 Glasses of wine, 4 Cans of beer per week     Frequency: 2-4 times a month     Drinks per session: 3 or 4     Binge frequency: Less than monthly     Comment: drinks 4 days a week- >1 drink    Drug use: No    Sexual activity: Yes   Lifestyle    Physical activity     Days per week: 0 days     Minutes per session: Not on file    Stress: Only a little   Relationships    Social connections     Talks on phone: More than three times a week     Gets together: Once a week     Attends Yazidism service: Not on file     Active member of club or organization: No     Attends meetings of clubs or organizations: Never     Relationship status:    Other Topics Concern    Are you pregnant or think you may be? No    Breast-feeding No   Social History Narrative    Not on file       FAMILY HISTORY:  Family History   Problem Relation Age of Onset    Heart disease Mother     Heart disease Father     Liver disease Brother     Breast cancer Sister     Breast cancer Daughter 43        bilateral mastectomy    No Known Problems Sister     No Known Problems Maternal Grandmother     No Known Problems Maternal Grandfather     No Known Problems Paternal Grandmother     No Known Problems Paternal Grandfather     No Known Problems Maternal Aunt     No Known Problems Maternal Uncle     No Known Problems Paternal Aunt     No Known Problems Paternal Uncle     Breast cancer Other     Colon cancer Neg Hx     Ovarian cancer Neg Hx     Melanoma Neg Hx     Lupus Neg Hx     Acne Neg Hx     Amblyopia Neg Hx     Blindness Neg Hx     Cancer Neg Hx     Cataracts Neg Hx     Diabetes Neg Hx     Glaucoma Neg Hx     Hypertension Neg Hx     Macular degeneration Neg Hx     Retinal detachment Neg Hx     Strabismus Neg Hx     Stroke Neg Hx     Thyroid disease Neg Hx          PHYSICAL EXAMINATION:  There  were no vitals filed for this visit.There is no height or weight on file to calculate BMI.  GENERAL: Patient is alert and oriented, in no acute distress.  HEENT:Extraocular muscles are intact.   BREAST EXAM:  Deferred    ASSESSMENT:   This is a 70-year-old female with pTis Nx M0, stage 0, grade 3, DCIS of the left breast who underwent lumpectomy on July 24, 2020 with 9.5 cm lesion which is ER/VT negative.    PLAN:   After discussion the multidisciplinary breast Conference and review of the available pathology and radiological images,  is noted to have high-grade DCIS which was excised with ultimately negative margins.  Based on the discussion in the conference, she underwent a repeat mammogram of the left breast yesterday which revealed no residual calcifications in the left breast.  To reduce her chance of local recurrence, she is recommended to undergo postoperative radiation to the left breast.  I plan to deliver approximately 4200 cGy.    The risks, benefits, and side effects of radiation were explained in detail to the patient.  All questions were answered.  I plan to see the patient back for radiation planning CT in approximately 2-3 weeks.  She will sign consent at that time.    Psychosocial Distress screening score of 1 noted and reviewed. No intervention indicated.    I spent approximately 60 minutes reviewing the available records and evaluating the patient, out of which over 50% of the time was spent face to face with the patient in counseling and coordinating this patient's care.

## 2020-08-12 ENCOUNTER — OFFICE VISIT (OUTPATIENT)
Dept: RADIATION ONCOLOGY | Facility: CLINIC | Age: 70
End: 2020-08-12
Payer: MEDICARE

## 2020-08-12 DIAGNOSIS — Z17.1 MALIGNANT NEOPLASM OF LEFT BREAST IN FEMALE, ESTROGEN RECEPTOR NEGATIVE, UNSPECIFIED SITE OF BREAST: ICD-10-CM

## 2020-08-12 DIAGNOSIS — D05.12 BREAST NEOPLASM, TIS (DCIS), LEFT: Primary | ICD-10-CM

## 2020-08-12 DIAGNOSIS — C50.912 MALIGNANT NEOPLASM OF LEFT BREAST IN FEMALE, ESTROGEN RECEPTOR NEGATIVE, UNSPECIFIED SITE OF BREAST: ICD-10-CM

## 2020-08-12 PROCEDURE — 99204 OFFICE O/P NEW MOD 45 MIN: CPT | Mod: 95,,, | Performed by: RADIOLOGY

## 2020-08-12 PROCEDURE — 99204 PR OFFICE/OUTPT VISIT, NEW, LEVL IV, 45-59 MIN: ICD-10-PCS | Mod: 95,,, | Performed by: RADIOLOGY

## 2020-08-12 NOTE — PATIENT INSTRUCTIONS
Return for ct/sim in 2-3 weeks  Radiation Therapy Treatment  Radiation therapy can help you in your fight against cancer. It begins with a session to discuss treatment with your doctor. If you and your doctor decide on radiation, you will return for a simulation. The simulation is a planning session that helps the doctor target your cancer. He or she will design a radiation plan to protect normal tissues. When the simulation and plan are completed, you will begin your daily treatments. Treatment is usually once daily Monday to Friday. It takes less than a half an hour. Sometimes you may need radiation twice a day, with usually 6 hours between treatments. After the course of radiation is complete, you will be scheduled for follow-up appointments. This is to make sure the cancer is under control. The follow-ups will also make sure that any side effects from the treatment are taken care of.  Radiation therapy uses high-energy X-rays to kill cancer cells.   Your treatment planning visit: The simulation  Your radiation therapy team uses a special machine called a simulator to map out your treatment. The simulator is usually an X-ray machine (fluoroscopy), CT scanner, MRI scanner, or PET-CT scanner machine. Laser lights act as guides to help position your body accurately. During this visit:  · The team figures out the best position for your body. They make notes in your chart so youll be placed the same way each time.  · You may use special devices to keep your body correctly positioned and still during treatment. These may include molds, masks, rests, and blocks.  · The team makes ink marks on your skin. These will help you get in the same position for each treatment. Tiny permanent tattoos may also be used.   · Markers such as metal balls or wires may be put on or in your body. Sometime these are taped to the skin to help with the imaging process. These work with the X-rays to position your body. The markers are  removed when the visit is over.  After the team has the imaging and data, the information is sent into the computer planning system. Your doctor and the team of physicists and dosimetrists design a treatment field. The field will best target your cancer and how it might spread. It will also help limit radiation to nearby normal tissues.  Your treatments  Each treatment usually takes 10 to 30 minutes. You may need to change into a hospital gown. The radiation therapist puts you in the correct position on the treatment table, then leaves the room. Sometimes you may need more imaging before each treatment. The machine may take digital X-rays or a CT scan to help make sure you are lined up correctly. During treatment, lie as still as you can and breathe normally. You will hear noises coming from the machine. You can talk to the radiation therapist, who watches you from the control room on a TV monitor. After treatment, the therapist will help you off the table. You can then get dressed and go back to your normal activities.  Date Last Reviewed: 1/14/2016 © 2000-2017 [employer]. [address] [place] [address]. All rights reserved. This information is not intended as a substitute for professional medical care. Always follow your healthcare professional's instructions.        Discharge Instructions for Radiation Therapy  Radiation therapy uses high-energy X-rays to kill cancer cells and help you in your fight against cancer. Radiation destroys cancer cells gradually, over time. The goal of therapy is to focus on and kill as many cancer cells as possible. Radiation can also damage or kill some of the normal cells that are closest to the tumor. Damaged normal cells can repair themselves, often within a few days.  Caring for your skin  You should ask your healthcare provider for specific products that he or she recommends for washing and bathing. In general, use a mild nondetergent soap and warm  "(not hot) water to clean the area receiving radiation. Pat the region dry rather than rubbing.  Your healthcare provider may give you products to moisturize the skin and prevent infection. The goal is to prevent cracks or breaks in the skin that may be sensitive from treatment:   · Dont be surprised if your treatment causes skin redness, and a type of "sunburn" over time. Some radiation treatments can cause this.   · Ask your therapy team what lotion to use. Also ask for directions about when and how to apply it.  · Avoid prolonged or direct sunlight on the treated area. Ask your therapy team about using a sunscreen. You do not have to avoid going outside altogether, but must take appropriate precautions.  · Dont remove ink marks unless your radiation therapist says its OK. Dont scrub or use soap on the marks when you wash. Let water run over them and pat them dry.  · Protect your skin from heat or cold. Avoid hot tubs, saunas, heating pads, or ice packs.  · Avoid clothing that causes friction or rubbing on the skin.  Fighting fatigue  Radiation therapy may cause you to feel tired. Your body is working hard to heal and repair itself. To feel better, try these things:  · Do light exercise each day. Take short walks.  · Plan tasks for the times when you tend to have the most energy. Ask for help when you need it.  · Relax before you go to bed. This will help you sleep better. Try reading or listening to soothing music.  Coping with appetite changes  Here are ways to cope:  · Tell your therapy team if you find it hard to eat or you have no appetite. You may be referred to a nutritionist to help you with meal planning.  · Radiation to certain internal sites can cause nausea, depending on the location of treatment. This can affect your appetite. Think of healthy eating as part of your treatment. Try these tips:  ¨ Eat slowly.  ¨ Eat small meals several times a day.  ¨ Eat more food when youre feeling better.  ¨ Ask " others to keep you company when you eat.  ¨ Stock up on easy-to-prepare foods.  ¨ Eat foods high in protein and calories. Your healthcare provider may recommend liquid meal supplements.  ¨ Drink plenty of water and other fluids.  ¨ Ask your healthcare provider before taking any vitamins or over-the-counter supplements. Such products are not regulated by the FDA and can sometimes interfere with your treatments.   Dealing with other side effects  Here are suggestions to deal with other side effects:   · Be prepared for hair loss in the area being treated. The hair loss may be permanent. Be sure to discuss this with your healthcare provider.  · Sip cool water if your mouth or throat becomes dry or sore. Ice chips may also help.  · Tell your healthcare provider if you have diarrhea or constipation. You may be given a special diet.  · If you have trouble swallowing liquids, tell your healthcare provider.  Follow-up  Make a follow-up appointment as directed by your healthcare provider.     When to call your healthcare provider  Call your healthcare provider right away if you have any of the following:  · Unexpected headaches  · Trouble concentrating  · Ongoing fatigue  · Wheezing, shortness of breath, or trouble breathing  · Pain that doesnt go away, especially if its always in the same place  · New or unusual lumps, bumps, or swelling  · Dizziness or lightheadedness  · Unusual rashes, bruises, or bleeding  · Fever of 100.4°F (38°C) or higher, or chills  · Nausea and vomiting  · Diarrhea that doesnt improve with time  · Skin breakdown; significant pain due to skin irritation   Date Last Reviewed: 1/13/2016  © 5900-8871 [employer]. [address] [place] [address], [place] [address]. All rights reserved. This information is not intended as a substitute for professional medical care. Always follow your healthcare professional's instructions.        Radiation Therapy: Managing Short-Term Side Effects     Take short  walks daily.   Radiation therapy uses high-energy X-rays or particles to kill cancer cells. Some normal cells can also be affected. This causes side effects such as dry skin, tiredness (fatigue), or changes in your appetite. Most side effects go away when your radiation therapy is over.  Having side effects of radiation therapy does not mean that your cancer is getting worse or that therapy isnt working.   Caring for your skin  Skin problems may happen where your body gets radiation. Your skin may become dry, itchy, red, and peeling. It may darken in that spot, like a tan. To care for your skin:  · Dont scrub on the treatment area. Clean that area of the skin every day. Use warm water and mild soap, or as your healthcare provider advises. Pat the skin afterward or let it air dry.  · Ask your therapy team what lotion to use and when to use it.  · Keep the treated area out of the sun. Ask your team about using a sunscreen.  · Don't remove ink marks unless your radiation therapist says you can. Dont scrub the marks when you wash. Let water run over them and pat them dry.  · Protect your skin from heat or cold. Avoid hot tubs, saunas, hot pads, and ice packs.  · Wear soft, loose clothing to avoid rubbing skin.  Fighting tiredness  The cancer itself or the radiation therapy may cause you to feel tired. Your body is working hard to heal and repair itself. To feel better:  · Try light exercise each day. Take short walks.  · Plan tasks for the times when you tend to have the most energy. Ask for help when you need it.  · Relax before you go to bed to sleep better. Try reading or listening to soothing music.  · Be sure to let your cancer care team know if you continue to have fatigue that is not getting better. They may be able to offer ways to help.   Coping with appetite changes  Tell your therapy team if you find it hard to eat or have no appetite. You may need to see a nutritionist. This is a healthcare provider with  special training in meal planning. To keep your strength up, you need to eat well and maintain your weight. Think of healthy eating as part of your treatment. Try these tips:  · Eat slowly.  · Eat small meals several times a day.  · Eat more food when youre feeling better, even if it is not mealtime.  · Ask others to keep you company when you eat.  · Stock up on easy-to-prepare foods.  · Eat foods high in protein and calories.  · Drink plenty of water and other fluids.  · Ask your healthcare provider before taking any vitamins.  Site-specific side effects  These side effects include the following:   · You may lose hair in the area being treated. The hair often grows back after treatment.  · Your mouth or throat can become dry or sore if your head or neck is being treated. Sip cool water to help ease discomfort.  · Nausea and bowel changes can happen with radiation to the pelvic region. Tell your healthcare provider if you have nausea, diarrhea, or constipation. You may need to take medicine or follow a special diet.  Talk with your healthcare team  Radiation therapy can also have other side effects, including some that might not show up until years later. Be sure to talk with your healthcare team about what to expect with the type of radiation therapy you are getting, including when you should call them with concerns.   Date Last Reviewed: 5/1/2016  © 6770-0941 [employer]. [address] [place] [address], [place] [address]. All rights reserved. This information is not intended as a substitute for professional medical care. Always follow your healthcare professional's instructions.

## 2020-08-12 NOTE — LETTER
August 12, 2020      Kayleen Alvarez MD  [address]  [facility] [place] [address]           [facility]  [address]  [facility] [id number]  Phone: [phone]          Patient: Sheila Zarco   MR Number: [id number]   YOB: 1950   Date of Visit: 8/12/2020       Dear Dr. Kayleen Alvarez:    Thank you for referring Sheila Zarco to me for evaluation. Attached you will find relevant portions of my assessment and plan of care.    If you have questions, please do not hesitate to call me. I look forward to following Sheila Zarco along with you.    Sincerely,    Hanane Sahu MD    Enclosure  CC:  No Recipients    If you would like to receive this communication electronically, please contact [email or web address] or [phone] to request more information on [employer] access.    For providers and/or their staff who would like to refer a patient to [facility], please contact us through our one-stop-shop provider referral line, [facility], at [phone].    If you feel you have received this communication in error or would no longer like to receive these types of communications, please e-mail [email or web address]

## 2020-08-25 ENCOUNTER — HOSPITAL ENCOUNTER (OUTPATIENT)
Dept: RADIATION THERAPY | Facility: HOSPITAL | Age: 70
Discharge: HOME | End: 2020-08-25
Attending: RADIOLOGY
Payer: MEDICARE

## 2020-08-25 PROCEDURE — 77334 PR  RADN TREATMENT AID(S) COMPLX: ICD-10-PCS | Mod: 26,,, | Performed by: RADIOLOGY

## 2020-08-25 PROCEDURE — 77014 HC CT GUIDANCE RADIATION THERAPY FLDS PLACEMENT: CPT | Mod: TC | Performed by: RADIOLOGY

## 2020-08-25 PROCEDURE — 77263 PR  RADIATION THERAPY PLAN COMPLEX: ICD-10-PCS | Mod: ,,, | Performed by: RADIOLOGY

## 2020-08-25 PROCEDURE — 77290 THER RAD SIMULAJ FIELD CPLX: CPT | Mod: TC | Performed by: RADIOLOGY

## 2020-08-25 PROCEDURE — 77334 RADIATION TREATMENT AID(S): CPT | Mod: TC | Performed by: RADIOLOGY

## 2020-08-25 PROCEDURE — 77290 PR  SET RADN THERAPY FIELD COMPLEX: ICD-10-PCS | Mod: 26,,, | Performed by: RADIOLOGY

## 2020-08-25 PROCEDURE — 77334 RADIATION TREATMENT AID(S): CPT | Mod: 26,,, | Performed by: RADIOLOGY

## 2020-08-25 PROCEDURE — 77290 THER RAD SIMULAJ FIELD CPLX: CPT | Mod: 26,,, | Performed by: RADIOLOGY

## 2020-08-25 PROCEDURE — 77263 THER RADIOLOGY TX PLNG CPLX: CPT | Mod: ,,, | Performed by: RADIOLOGY

## 2020-08-28 PROCEDURE — 77334 PR  RADN TREATMENT AID(S) COMPLX: ICD-10-PCS | Mod: 26,,, | Performed by: RADIOLOGY

## 2020-08-28 PROCEDURE — 77293 RESPIRATOR MOTION MGMT SIMUL: CPT | Mod: TC | Performed by: RADIOLOGY

## 2020-08-28 PROCEDURE — 77334 RADIATION TREATMENT AID(S): CPT | Mod: TC | Performed by: RADIOLOGY

## 2020-08-28 PROCEDURE — 77334 RADIATION TREATMENT AID(S): CPT | Mod: 26,,, | Performed by: RADIOLOGY

## 2020-08-28 PROCEDURE — 77293 PR RESPIRATORY MOTION MGMT SIMULATION: ICD-10-PCS | Mod: 26,,, | Performed by: RADIOLOGY

## 2020-08-28 PROCEDURE — 77300 RADIATION THERAPY DOSE PLAN: CPT | Mod: TC | Performed by: RADIOLOGY

## 2020-08-28 PROCEDURE — 77300 RADIATION THERAPY DOSE PLAN: CPT | Mod: 26,,, | Performed by: RADIOLOGY

## 2020-08-28 PROCEDURE — 77293 RESPIRATOR MOTION MGMT SIMUL: CPT | Mod: 26,,, | Performed by: RADIOLOGY

## 2020-08-28 PROCEDURE — 77295 3-D RADIOTHERAPY PLAN: CPT | Mod: 26,,, | Performed by: RADIOLOGY

## 2020-08-28 PROCEDURE — 77295 3-D RADIOTHERAPY PLAN: CPT | Mod: TC | Performed by: RADIOLOGY

## 2020-08-28 PROCEDURE — 77300 PR RADIATION THERAPY,DOSIMETRY PLAN: ICD-10-PCS | Mod: 26,,, | Performed by: RADIOLOGY

## 2020-08-28 PROCEDURE — 77295 PR 3D RADIOTHERAPY PLAN: ICD-10-PCS | Mod: 26,,, | Performed by: RADIOLOGY

## 2020-08-28 NOTE — PROGRESS NOTES
Assessment /Plan     For exam results, see Encounter Report.    Residual stage angle-closure glaucoma, unspecified laterality    Nuclear sclerotic cataract of left eye  -     Cancel: CONJUNCTIVORHINOSTOMY, W/ TUBE OR STENT - OU - BOTH EYES  -     IOL Master - OS - Left Eye    Status post cataract extraction and insertion of intraocular lens of right eye          Dr Rushing      Hx Narrow Angles  SP LPI OU JDN    PC IOL OD  Quiet  Patient happy with improved Va OD  Off drops      NSC OS  CE PRN  Glare --> patient wishing to proceed with CE IOL OS  With bothersome disparity between the two eyes, consider CE IOL OS --> glare OS    Cataract right and left Eyes: Discussed options including observation, glasses & surgery with patient with good understanding. Patient wishes to proceed with:  observation Glasses Surgery  Left         Left eye CE IOL  Cataract Surgery Consent: Patient with a visually significant cataract with difficulties of ADLs, reading, driving, night vision, glare.  Discussed with patient options, risks and benefits, expectations of cataract surgery with questions and answers to facilitate discussion.  Discussed lens options and patient understands that glasses may be required for optimal vision for distance and/or near vision after cataract surgery.  The patient  voice good understanding and patient wishes to proceed with surgery.  The patient will likely benefit from surgery and patient signed consent for Left Eye.  IOL choice:       AL   22.95 OD         22.95 OS     PCB00  119.3  24.0 -0.54  23.5 -0.26      -->   3/2018 / Grieving --> just sold his trucks & boat  --> Recent Breast Ca --> planning lumpectomy end  --> undergoing XRT        Plan  RTC CE IOL OS  RTC sooner PRN with good understanding

## 2020-09-01 ENCOUNTER — HOSPITAL ENCOUNTER (OUTPATIENT)
Dept: RADIATION THERAPY | Facility: HOSPITAL | Age: 70
Discharge: HOME | End: 2020-09-01
Attending: RADIOLOGY
Payer: MEDICARE

## 2020-09-03 ENCOUNTER — PATIENT MESSAGE (OUTPATIENT)
Dept: INTERNAL MEDICINE | Facility: CLINIC | Age: 70
End: 2020-09-03

## 2020-09-03 PROCEDURE — 77280 THER RAD SIMULAJ FIELD SMPL: CPT | Mod: 26,,, | Performed by: RADIOLOGY

## 2020-09-03 PROCEDURE — 77280 THER RAD SIMULAJ FIELD SMPL: CPT | Mod: TC | Performed by: RADIOLOGY

## 2020-09-03 PROCEDURE — 77280 PR  SET RADN THERAPY FIELD SIMPLE: ICD-10-PCS | Mod: 26,,, | Performed by: RADIOLOGY

## 2020-09-04 RX ORDER — VALACYCLOVIR HYDROCHLORIDE 500 MG/1
TABLET, FILM COATED ORAL
Qty: 40 TABLET | Refills: 3 | Status: SHIPPED | OUTPATIENT
Start: 2020-09-04

## 2020-09-08 PROCEDURE — G6002 PR STEREOSCOPIC XRAY GUIDE FOR RADIATION TX DELIV: ICD-10-PCS | Mod: 26,,, | Performed by: RADIOLOGY

## 2020-09-08 PROCEDURE — 77387 GUIDANCE FOR RADJ TX DLVR: CPT | Mod: TC | Performed by: RADIOLOGY

## 2020-09-08 PROCEDURE — G6002 STEREOSCOPIC X-RAY GUIDANCE: HCPCS | Mod: 26,,, | Performed by: RADIOLOGY

## 2020-09-08 PROCEDURE — 77417 THER RADIOLOGY PORT IMAGE(S): CPT | Performed by: RADIOLOGY

## 2020-09-08 PROCEDURE — 77412 RADIATION TX DELIVERY LVL 3: CPT | Performed by: RADIOLOGY

## 2020-09-09 ENCOUNTER — TELEPHONE (OUTPATIENT)
Dept: OPHTHALMOLOGY | Facility: CLINIC | Age: 70
End: 2020-09-09

## 2020-09-09 ENCOUNTER — OFFICE VISIT (OUTPATIENT)
Dept: OPHTHALMOLOGY | Facility: CLINIC | Age: 70
End: 2020-09-09
Payer: MEDICARE

## 2020-09-09 DIAGNOSIS — H25.12 NUCLEAR SCLEROTIC CATARACT OF LEFT EYE: Primary | ICD-10-CM

## 2020-09-09 DIAGNOSIS — Z96.1 STATUS POST CATARACT EXTRACTION AND INSERTION OF INTRAOCULAR LENS OF RIGHT EYE: ICD-10-CM

## 2020-09-09 DIAGNOSIS — Z13.9 SCREENING PROCEDURE: ICD-10-CM

## 2020-09-09 DIAGNOSIS — Z98.41 STATUS POST CATARACT EXTRACTION AND INSERTION OF INTRAOCULAR LENS OF RIGHT EYE: ICD-10-CM

## 2020-09-09 DIAGNOSIS — H25.12 NUCLEAR SCLEROTIC CATARACT OF LEFT EYE: ICD-10-CM

## 2020-09-09 DIAGNOSIS — H40.249 RESIDUAL STAGE ANGLE-CLOSURE GLAUCOMA, UNSPECIFIED LATERALITY: Primary | ICD-10-CM

## 2020-09-09 PROCEDURE — 99214 OFFICE O/P EST MOD 30 MIN: CPT | Mod: PBBFAC | Performed by: OPHTHALMOLOGY

## 2020-09-09 PROCEDURE — 77387 GUIDANCE FOR RADJ TX DLVR: CPT | Mod: TC | Performed by: RADIOLOGY

## 2020-09-09 PROCEDURE — G6002 STEREOSCOPIC X-RAY GUIDANCE: HCPCS | Mod: 26,,, | Performed by: RADIOLOGY

## 2020-09-09 PROCEDURE — 99999 PR PBB SHADOW E&M-EST. PATIENT-LVL IV: ICD-10-PCS | Mod: PBBFAC,,, | Performed by: OPHTHALMOLOGY

## 2020-09-09 PROCEDURE — 92012 INTRM OPH EXAM EST PATIENT: CPT | Mod: S$PBB,,, | Performed by: OPHTHALMOLOGY

## 2020-09-09 PROCEDURE — 92136 IOL MASTER - OS - LEFT EYE: ICD-10-PCS | Mod: 26,S$PBB,LT, | Performed by: OPHTHALMOLOGY

## 2020-09-09 PROCEDURE — 77412 RADIATION TX DELIVERY LVL 3: CPT | Performed by: RADIOLOGY

## 2020-09-09 PROCEDURE — 99999 PR PBB SHADOW E&M-EST. PATIENT-LVL IV: CPT | Mod: PBBFAC,,, | Performed by: OPHTHALMOLOGY

## 2020-09-09 PROCEDURE — 92136 OPHTHALMIC BIOMETRY: CPT | Mod: PBBFAC,LT | Performed by: OPHTHALMOLOGY

## 2020-09-09 PROCEDURE — G6002 PR STEREOSCOPIC XRAY GUIDE FOR RADIATION TX DELIV: ICD-10-PCS | Mod: 26,,, | Performed by: RADIOLOGY

## 2020-09-09 PROCEDURE — 92012 PR EYE EXAM, EST PATIENT,INTERMED: ICD-10-PCS | Mod: S$PBB,,, | Performed by: OPHTHALMOLOGY

## 2020-09-10 PROCEDURE — 77387 GUIDANCE FOR RADJ TX DLVR: CPT | Mod: TC | Performed by: RADIOLOGY

## 2020-09-10 PROCEDURE — G6002 STEREOSCOPIC X-RAY GUIDANCE: HCPCS | Mod: 26,,, | Performed by: RADIOLOGY

## 2020-09-10 PROCEDURE — 77412 RADIATION TX DELIVERY LVL 3: CPT | Performed by: RADIOLOGY

## 2020-09-10 PROCEDURE — G6002 PR STEREOSCOPIC XRAY GUIDE FOR RADIATION TX DELIV: ICD-10-PCS | Mod: 26,,, | Performed by: RADIOLOGY

## 2020-09-11 PROCEDURE — 77387 GUIDANCE FOR RADJ TX DLVR: CPT | Mod: TC | Performed by: RADIOLOGY

## 2020-09-11 PROCEDURE — 77412 RADIATION TX DELIVERY LVL 3: CPT | Performed by: RADIOLOGY

## 2020-09-11 PROCEDURE — G6002 STEREOSCOPIC X-RAY GUIDANCE: HCPCS | Mod: 26,,, | Performed by: RADIOLOGY

## 2020-09-11 PROCEDURE — G6002 PR STEREOSCOPIC XRAY GUIDE FOR RADIATION TX DELIV: ICD-10-PCS | Mod: 26,,, | Performed by: RADIOLOGY

## 2020-09-14 PROCEDURE — 77387 GUIDANCE FOR RADJ TX DLVR: CPT | Mod: TC | Performed by: RADIOLOGY

## 2020-09-14 PROCEDURE — 77412 RADIATION TX DELIVERY LVL 3: CPT | Performed by: RADIOLOGY

## 2020-09-14 PROCEDURE — G6002 PR STEREOSCOPIC XRAY GUIDE FOR RADIATION TX DELIV: ICD-10-PCS | Mod: 26,,, | Performed by: RADIOLOGY

## 2020-09-14 PROCEDURE — G6002 STEREOSCOPIC X-RAY GUIDANCE: HCPCS | Mod: 26,,, | Performed by: RADIOLOGY

## 2020-09-14 PROCEDURE — 77336 RADIATION PHYSICS CONSULT: CPT | Performed by: RADIOLOGY

## 2020-09-15 ENCOUNTER — DOCUMENTATION ONLY (OUTPATIENT)
Dept: RADIATION ONCOLOGY | Facility: CLINIC | Age: 70
End: 2020-09-15

## 2020-09-15 PROCEDURE — 77417 THER RADIOLOGY PORT IMAGE(S): CPT | Performed by: RADIOLOGY

## 2020-09-15 PROCEDURE — G6002 STEREOSCOPIC X-RAY GUIDANCE: HCPCS | Mod: 26,,, | Performed by: RADIOLOGY

## 2020-09-15 PROCEDURE — 77412 RADIATION TX DELIVERY LVL 3: CPT | Performed by: RADIOLOGY

## 2020-09-15 PROCEDURE — 77387 GUIDANCE FOR RADJ TX DLVR: CPT | Mod: TC | Performed by: RADIOLOGY

## 2020-09-15 PROCEDURE — G6002 PR STEREOSCOPIC XRAY GUIDE FOR RADIATION TX DELIV: ICD-10-PCS | Mod: 26,,, | Performed by: RADIOLOGY

## 2020-09-16 PROCEDURE — 77387 GUIDANCE FOR RADJ TX DLVR: CPT | Mod: TC | Performed by: RADIOLOGY

## 2020-09-16 PROCEDURE — G6002 STEREOSCOPIC X-RAY GUIDANCE: HCPCS | Mod: 26,,, | Performed by: RADIOLOGY

## 2020-09-16 PROCEDURE — 77412 RADIATION TX DELIVERY LVL 3: CPT | Performed by: RADIOLOGY

## 2020-09-16 PROCEDURE — G6002 PR STEREOSCOPIC XRAY GUIDE FOR RADIATION TX DELIV: ICD-10-PCS | Mod: 26,,, | Performed by: RADIOLOGY

## 2020-09-17 ENCOUNTER — TELEPHONE (OUTPATIENT)
Dept: INTERNAL MEDICINE | Facility: CLINIC | Age: 70
End: 2020-09-17

## 2020-09-17 DIAGNOSIS — R05.9 COUGH: ICD-10-CM

## 2020-09-17 DIAGNOSIS — Z20.822 EXPOSURE TO COVID-19 VIRUS: Primary | ICD-10-CM

## 2020-09-17 PROCEDURE — G6002 PR STEREOSCOPIC XRAY GUIDE FOR RADIATION TX DELIV: ICD-10-PCS | Mod: 26,,, | Performed by: RADIOLOGY

## 2020-09-17 PROCEDURE — 77387 GUIDANCE FOR RADJ TX DLVR: CPT | Mod: TC | Performed by: RADIOLOGY

## 2020-09-17 PROCEDURE — G6002 STEREOSCOPIC X-RAY GUIDANCE: HCPCS | Mod: 26,,, | Performed by: RADIOLOGY

## 2020-09-17 PROCEDURE — 77412 RADIATION TX DELIVERY LVL 3: CPT | Performed by: RADIOLOGY

## 2020-09-17 RX ORDER — PREDNISONE 20 MG/1
TABLET ORAL
Qty: 30 TABLET | Refills: 0 | Status: SHIPPED | OUTPATIENT
Start: 2020-09-17 | End: 2022-05-21 | Stop reason: ALTCHOICE

## 2020-09-17 RX ORDER — AZITHROMYCIN 250 MG/1
TABLET, FILM COATED ORAL
Qty: 6 TABLET | Refills: 0 | Status: SHIPPED | OUTPATIENT
Start: 2020-09-17 | End: 2021-01-27

## 2020-09-17 NOTE — TELEPHONE ENCOUNTER
Exposed to someone 1 week ago and tested postive yesterday.   She was at a party with daughter and no one was masked. . ( I told her no more parties)  Started with congestion a few days ago.  Feels like her asthma acting up. Denies fever, some cough and sometimes gets up clear to cloudy looking phelgm. Denies wheez.  Says some tightness in chest.     Doing radiation every day and temp has been fine.  Will be at radiation 2:15  And will have her phone with her.    Daughter also congested and she got tested and was negative.     What can you recommend?   Thanks pebbles

## 2020-09-17 NOTE — TELEPHONE ENCOUNTER
The patient should be tested for COVID -19.  Prescription orders for prednisone and Zithromax will be sent to the patient's [facility] pharmacy.

## 2020-09-17 NOTE — TELEPHONE ENCOUNTER
----- Message from Delfina Hernandez sent at 9/17/2020 11:10 AM CDT -----  Contact: Self [phone]  Would like to receive medical advice.    Pharmacy name/number (copy/paste from chart):  [facility]    Would they like a call back or a response via [employer]:  call back    Additional information:  Calling to speak with the nurse regarding wheezing.  Patient states she was around a friend that tested positive for covid, but the pt does not have symptoms. Patient also mentioned she does have asthma and would like a call back from a nurse with advice.

## 2020-09-18 ENCOUNTER — LAB VISIT (OUTPATIENT)
Dept: INTERNAL MEDICINE | Facility: CLINIC | Age: 70
End: 2020-09-18
Payer: MEDICARE

## 2020-09-18 ENCOUNTER — TELEPHONE (OUTPATIENT)
Dept: INTERNAL MEDICINE | Facility: CLINIC | Age: 70
End: 2020-09-18

## 2020-09-18 DIAGNOSIS — E78.5 HYPERLIPIDEMIA, UNSPECIFIED HYPERLIPIDEMIA TYPE: Primary | ICD-10-CM

## 2020-09-18 DIAGNOSIS — Z20.822 EXPOSURE TO COVID-19 VIRUS: ICD-10-CM

## 2020-09-18 DIAGNOSIS — R05.9 COUGH: ICD-10-CM

## 2020-09-18 DIAGNOSIS — U07.1 COVID-19 VIRUS DETECTED: ICD-10-CM

## 2020-09-18 LAB — SARS-COV-2 RNA RESP QL NAA+PROBE: DETECTED

## 2020-09-18 PROCEDURE — U0003 INFECTIOUS AGENT DETECTION BY NUCLEIC ACID (DNA OR RNA); SEVERE ACUTE RESPIRATORY SYNDROME CORONAVIRUS 2 (SARS-COV-2) (CORONAVIRUS DISEASE [COVID-19]), AMPLIFIED PROBE TECHNIQUE, MAKING USE OF HIGH THROUGHPUT TECHNOLOGIES AS DESCRIBED BY CMS-2020-01-R: HCPCS

## 2020-09-18 NOTE — TELEPHONE ENCOUNTER
I told her what was ordered.  She wanted to go for her hair appt this am.  I explained must assume as covid till test results are back and avoid exposure to others.    We set up covid test for this am and gave directions . explained quarantine till results are back.    Reminded her to get someone to  rx's at [facility].    She expressed understanding.

## 2020-09-18 NOTE — TELEPHONE ENCOUNTER
I spoke with the patient this evening.  She was aware of her positive COVID -19 antigen test results.  She was advised to quarantine herself for 2 weeks.  She has a good friend who was with her at her party.  Her friend is asymptomatic but certainly is eligible for testing.  The patient reports that her asthma is doing better today.  A prednisone taper was ordered on yesterday when she complained of increased chest congestion related to her asthma.  She is afebrile.  The patient was advised to go to the emergency room if she develops worsening shortness of breath, chest pain or very high fever.  She was encouraged to increase oral intake of fluids and maintain a good diet and rest at home.    The patient also is inquiring about follow-up lab testing for her cholesterol since she started statin therapy.  Orders for a CMP, CBC, and lipid profile in 1 month will be entered.

## 2020-09-21 NOTE — TELEPHONE ENCOUNTER
I sent remind me msg to call later this week to set up labs for a month.  Please schedule a CMP, CBC, and lipid profile in 1 month for this patient.    Routing comment

## 2020-09-29 ENCOUNTER — TELEPHONE (OUTPATIENT)
Dept: RADIATION ONCOLOGY | Facility: CLINIC | Age: 70
End: 2020-09-29

## 2020-09-29 NOTE — TELEPHONE ENCOUNTER
Spoke with Pt. Re: Covid.  Pt. Stated she was still having headaches, no fever.  Pt. Has hx of asthma and still on steroids and inhalers.  Informed Dr. Sahu of same.  Will check back with pt. Re:symptoms on Friday, 10/2.

## 2020-10-01 ENCOUNTER — HOSPITAL ENCOUNTER (OUTPATIENT)
Dept: RADIATION THERAPY | Facility: HOSPITAL | Age: 70
Discharge: HOME | End: 2020-10-01
Attending: RADIOLOGY
Payer: MEDICARE

## 2020-10-02 ENCOUNTER — TELEPHONE (OUTPATIENT)
Dept: RADIATION ONCOLOGY | Facility: CLINIC | Age: 70
End: 2020-10-02

## 2020-10-02 NOTE — TELEPHONE ENCOUNTER
Pt. Called to discuss resuming radiation.  Pt. Post + covid test on 9/18.  Symptom free for >3 days.  Fever free since 9/19.  Pt. Will return for xrt ton 10/5.

## 2020-10-05 PROCEDURE — G6002 PR STEREOSCOPIC XRAY GUIDE FOR RADIATION TX DELIV: ICD-10-PCS | Mod: 26,,, | Performed by: RADIOLOGY

## 2020-10-05 PROCEDURE — 77387 GUIDANCE FOR RADJ TX DLVR: CPT | Mod: TC | Performed by: RADIOLOGY

## 2020-10-05 PROCEDURE — 77412 RADIATION TX DELIVERY LVL 3: CPT | Performed by: RADIOLOGY

## 2020-10-05 PROCEDURE — 77417 THER RADIOLOGY PORT IMAGE(S): CPT | Performed by: RADIOLOGY

## 2020-10-05 PROCEDURE — G6002 STEREOSCOPIC X-RAY GUIDANCE: HCPCS | Mod: 26,,, | Performed by: RADIOLOGY

## 2020-10-06 ENCOUNTER — DOCUMENTATION ONLY (OUTPATIENT)
Dept: RADIATION ONCOLOGY | Facility: CLINIC | Age: 70
End: 2020-10-06

## 2020-10-06 PROCEDURE — 77336 RADIATION PHYSICS CONSULT: CPT | Performed by: RADIOLOGY

## 2020-10-06 PROCEDURE — 77412 RADIATION TX DELIVERY LVL 3: CPT | Performed by: RADIOLOGY

## 2020-10-06 PROCEDURE — G6002 STEREOSCOPIC X-RAY GUIDANCE: HCPCS | Mod: 26,,, | Performed by: RADIOLOGY

## 2020-10-06 PROCEDURE — 77387 GUIDANCE FOR RADJ TX DLVR: CPT | Mod: TC | Performed by: RADIOLOGY

## 2020-10-06 PROCEDURE — G6002 PR STEREOSCOPIC XRAY GUIDE FOR RADIATION TX DELIV: ICD-10-PCS | Mod: 26,,, | Performed by: RADIOLOGY

## 2020-10-06 NOTE — PLAN OF CARE
"Pt. On day 10 of outpt. Xrt to breast.  Pt. Returned this week post covid.  Stated "feeling much better"  no symptoms.  Using cream.  Skin intact.  "

## 2020-10-07 PROCEDURE — G6002 STEREOSCOPIC X-RAY GUIDANCE: HCPCS | Mod: 26,,, | Performed by: RADIOLOGY

## 2020-10-07 PROCEDURE — 77387 GUIDANCE FOR RADJ TX DLVR: CPT | Mod: TC | Performed by: RADIOLOGY

## 2020-10-07 PROCEDURE — 77412 RADIATION TX DELIVERY LVL 3: CPT | Performed by: RADIOLOGY

## 2020-10-07 PROCEDURE — G6002 PR STEREOSCOPIC XRAY GUIDE FOR RADIATION TX DELIV: ICD-10-PCS | Mod: 26,,, | Performed by: RADIOLOGY

## 2020-10-08 PROCEDURE — 77387 GUIDANCE FOR RADJ TX DLVR: CPT | Mod: TC | Performed by: RADIOLOGY

## 2020-10-08 PROCEDURE — G6002 STEREOSCOPIC X-RAY GUIDANCE: HCPCS | Mod: 26,,, | Performed by: RADIOLOGY

## 2020-10-08 PROCEDURE — 77412 RADIATION TX DELIVERY LVL 3: CPT | Performed by: RADIOLOGY

## 2020-10-08 PROCEDURE — G6002 PR STEREOSCOPIC XRAY GUIDE FOR RADIATION TX DELIV: ICD-10-PCS | Mod: 26,,, | Performed by: RADIOLOGY

## 2020-10-09 PROCEDURE — 77387 GUIDANCE FOR RADJ TX DLVR: CPT | Mod: TC | Performed by: RADIOLOGY

## 2020-10-09 PROCEDURE — G6002 PR STEREOSCOPIC XRAY GUIDE FOR RADIATION TX DELIV: ICD-10-PCS | Mod: 26,,, | Performed by: RADIOLOGY

## 2020-10-09 PROCEDURE — G6002 STEREOSCOPIC X-RAY GUIDANCE: HCPCS | Mod: 26,,, | Performed by: RADIOLOGY

## 2020-10-09 PROCEDURE — 77412 RADIATION TX DELIVERY LVL 3: CPT | Performed by: RADIOLOGY

## 2020-10-09 PROCEDURE — 77417 THER RADIOLOGY PORT IMAGE(S): CPT | Performed by: RADIOLOGY

## 2020-10-12 PROCEDURE — 77412 RADIATION TX DELIVERY LVL 3: CPT | Performed by: RADIOLOGY

## 2020-10-12 PROCEDURE — G6002 PR STEREOSCOPIC XRAY GUIDE FOR RADIATION TX DELIV: ICD-10-PCS | Mod: 26,,, | Performed by: RADIOLOGY

## 2020-10-12 PROCEDURE — 77387 GUIDANCE FOR RADJ TX DLVR: CPT | Mod: TC | Performed by: RADIOLOGY

## 2020-10-12 PROCEDURE — G6002 STEREOSCOPIC X-RAY GUIDANCE: HCPCS | Mod: 26,,, | Performed by: RADIOLOGY

## 2020-10-13 PROCEDURE — G6002 STEREOSCOPIC X-RAY GUIDANCE: HCPCS | Mod: 26,,, | Performed by: RADIOLOGY

## 2020-10-13 PROCEDURE — G6002 PR STEREOSCOPIC XRAY GUIDE FOR RADIATION TX DELIV: ICD-10-PCS | Mod: 26,,, | Performed by: RADIOLOGY

## 2020-10-13 PROCEDURE — 77412 RADIATION TX DELIVERY LVL 3: CPT | Performed by: RADIOLOGY

## 2020-10-13 PROCEDURE — 77336 RADIATION PHYSICS CONSULT: CPT | Performed by: RADIOLOGY

## 2020-10-13 PROCEDURE — 77387 GUIDANCE FOR RADJ TX DLVR: CPT | Mod: TC | Performed by: RADIOLOGY

## 2020-10-14 ENCOUNTER — DOCUMENTATION ONLY (OUTPATIENT)
Dept: RADIATION ONCOLOGY | Facility: CLINIC | Age: 70
End: 2020-10-14

## 2020-10-14 PROCEDURE — 77412 RADIATION TX DELIVERY LVL 3: CPT | Performed by: RADIOLOGY

## 2020-10-14 PROCEDURE — 77387 GUIDANCE FOR RADJ TX DLVR: CPT | Mod: TC | Performed by: RADIOLOGY

## 2020-10-14 PROCEDURE — G6002 STEREOSCOPIC X-RAY GUIDANCE: HCPCS | Mod: 26,,, | Performed by: RADIOLOGY

## 2020-10-14 PROCEDURE — G6002 PR STEREOSCOPIC XRAY GUIDE FOR RADIATION TX DELIV: ICD-10-PCS | Mod: 26,,, | Performed by: RADIOLOGY

## 2020-10-15 PROCEDURE — 77336 RADIATION PHYSICS CONSULT: CPT | Performed by: RADIOLOGY

## 2020-10-15 RX ORDER — LIDOCAINE HYDROCHLORIDE 40 MG/ML
1 INJECTION, SOLUTION RETROBULBAR
Status: CANCELLED | OUTPATIENT
Start: 2020-10-15

## 2020-10-19 ENCOUNTER — PATIENT MESSAGE (OUTPATIENT)
Dept: HEMATOLOGY/ONCOLOGY | Facility: CLINIC | Age: 70
End: 2020-10-19

## 2020-10-20 ENCOUNTER — LAB VISIT (OUTPATIENT)
Dept: URGENT CARE | Facility: CLINIC | Age: 70
End: 2020-10-20
Payer: MEDICARE

## 2020-10-20 VITALS — HEART RATE: 65 BPM | OXYGEN SATURATION: 96 % | TEMPERATURE: 98 F

## 2020-10-20 DIAGNOSIS — Z13.9 SCREENING PROCEDURE: ICD-10-CM

## 2020-10-20 PROCEDURE — U0003 INFECTIOUS AGENT DETECTION BY NUCLEIC ACID (DNA OR RNA); SEVERE ACUTE RESPIRATORY SYNDROME CORONAVIRUS 2 (SARS-COV-2) (CORONAVIRUS DISEASE [COVID-19]), AMPLIFIED PROBE TECHNIQUE, MAKING USE OF HIGH THROUGHPUT TECHNOLOGIES AS DESCRIBED BY CMS-2020-01-R: HCPCS

## 2020-10-21 ENCOUNTER — TELEPHONE (OUTPATIENT)
Dept: OPTOMETRY | Facility: CLINIC | Age: 70
End: 2020-10-21

## 2020-10-21 LAB — SARS-COV-2 RNA RESP QL NAA+PROBE: NOT DETECTED

## 2020-10-22 ENCOUNTER — ANESTHESIA (OUTPATIENT)
Dept: SURGERY | Facility: HOSPITAL | Age: 70
End: 2020-10-22
Payer: MEDICARE

## 2020-10-22 ENCOUNTER — ANESTHESIA EVENT (OUTPATIENT)
Dept: SURGERY | Facility: HOSPITAL | Age: 70
End: 2020-10-22
Payer: MEDICARE

## 2020-10-22 ENCOUNTER — HOSPITAL ENCOUNTER (OUTPATIENT)
Facility: HOSPITAL | Age: 70
Discharge: HOME | End: 2020-10-22
Attending: OPHTHALMOLOGY | Admitting: OPHTHALMOLOGY
Payer: MEDICARE

## 2020-10-22 VITALS
WEIGHT: 159 LBS | BODY MASS INDEX: 25.55 KG/M2 | HEART RATE: 55 BPM | DIASTOLIC BLOOD PRESSURE: 57 MMHG | RESPIRATION RATE: 20 BRPM | SYSTOLIC BLOOD PRESSURE: 120 MMHG | OXYGEN SATURATION: 96 % | HEIGHT: 66 IN | TEMPERATURE: 98 F

## 2020-10-22 DIAGNOSIS — H25.10 SENILE NUCLEAR SCLEROSIS: ICD-10-CM

## 2020-10-22 DIAGNOSIS — H25.12 NUCLEAR SCLEROTIC CATARACT OF LEFT EYE: Primary | ICD-10-CM

## 2020-10-22 PROCEDURE — 66984 PR REMOVAL, CATARACT, W/INSRT INTRAOC LENS, W/O ENDO CYCLO: ICD-10-PCS | Mod: LT,,, | Performed by: OPHTHALMOLOGY

## 2020-10-22 PROCEDURE — D9220A PRA ANESTHESIA: Mod: ANES,,, | Performed by: ANESTHESIOLOGY

## 2020-10-22 PROCEDURE — 37000008 HC ANESTHESIA 1ST 15 MINUTES: Performed by: OPHTHALMOLOGY

## 2020-10-22 PROCEDURE — D9220A PRA ANESTHESIA: Mod: CRNA,,, | Performed by: NURSE ANESTHETIST, CERTIFIED REGISTERED

## 2020-10-22 PROCEDURE — 71000015 HC POSTOP RECOV 1ST HR: Performed by: OPHTHALMOLOGY

## 2020-10-22 PROCEDURE — 36000706: Performed by: OPHTHALMOLOGY

## 2020-10-22 PROCEDURE — V2632 POST CHMBR INTRAOCULAR LENS: HCPCS | Performed by: OPHTHALMOLOGY

## 2020-10-22 PROCEDURE — 25000003 PHARM REV CODE 250

## 2020-10-22 PROCEDURE — 63600175 PHARM REV CODE 636 W HCPCS: Performed by: ANESTHESIOLOGY

## 2020-10-22 PROCEDURE — 25000003 PHARM REV CODE 250: Performed by: OPHTHALMOLOGY

## 2020-10-22 PROCEDURE — D9220A PRA ANESTHESIA: ICD-10-PCS | Mod: ANES,,, | Performed by: ANESTHESIOLOGY

## 2020-10-22 PROCEDURE — 66984 XCAPSL CTRC RMVL W/O ECP: CPT | Mod: LT,,, | Performed by: OPHTHALMOLOGY

## 2020-10-22 PROCEDURE — 63600175 PHARM REV CODE 636 W HCPCS: Performed by: NURSE ANESTHETIST, CERTIFIED REGISTERED

## 2020-10-22 PROCEDURE — D9220A PRA ANESTHESIA: ICD-10-PCS | Mod: CRNA,,, | Performed by: NURSE ANESTHETIST, CERTIFIED REGISTERED

## 2020-10-22 PROCEDURE — 71000044 HC DOSC ROUTINE RECOVERY FIRST HOUR: Performed by: OPHTHALMOLOGY

## 2020-10-22 PROCEDURE — 63600175 PHARM REV CODE 636 W HCPCS: Performed by: OPHTHALMOLOGY

## 2020-10-22 PROCEDURE — 37000009 HC ANESTHESIA EA ADD 15 MINS: Performed by: OPHTHALMOLOGY

## 2020-10-22 PROCEDURE — 36000707: Performed by: OPHTHALMOLOGY

## 2020-10-22 DEVICE — LENS IOL ITEC PRELOAD 23.5D: Type: IMPLANTABLE DEVICE | Site: EYE | Status: FUNCTIONAL

## 2020-10-22 RX ORDER — LIDOCAINE HYDROCHLORIDE 10 MG/ML
INJECTION, SOLUTION EPIDURAL; INFILTRATION; INTRACAUDAL; PERINEURAL
Status: DISCONTINUED | OUTPATIENT
Start: 2020-10-22 | End: 2020-10-22 | Stop reason: HOSPADM

## 2020-10-22 RX ORDER — PHENYLEPHRINE HYDROCHLORIDE 25 MG/ML
1 SOLUTION/ DROPS OPHTHALMIC
Status: COMPLETED | OUTPATIENT
Start: 2020-10-22 | End: 2020-10-22

## 2020-10-22 RX ORDER — ACETAMINOPHEN 325 MG/1
650 TABLET ORAL EVERY 4 HOURS PRN
Status: DISCONTINUED | OUTPATIENT
Start: 2020-10-22 | End: 2020-10-22 | Stop reason: HOSPADM

## 2020-10-22 RX ORDER — PREDNISOLONE ACETATE 10 MG/ML
SUSPENSION/ DROPS OPHTHALMIC
Status: DISCONTINUED | OUTPATIENT
Start: 2020-10-22 | End: 2020-10-22 | Stop reason: HOSPADM

## 2020-10-22 RX ORDER — LIDOCAINE HYDROCHLORIDE 40 MG/ML
1 INJECTION, SOLUTION RETROBULBAR
Status: ACTIVE | OUTPATIENT
Start: 2020-10-22 | End: 2020-10-22

## 2020-10-22 RX ORDER — CYCLOPENTOLATE HYDROCHLORIDE 10 MG/ML
1 SOLUTION/ DROPS OPHTHALMIC
Status: COMPLETED | OUTPATIENT
Start: 2020-10-22 | End: 2020-10-22

## 2020-10-22 RX ORDER — LIDOCAINE HYDROCHLORIDE 40 MG/ML
INJECTION, SOLUTION RETROBULBAR
Status: DISCONTINUED | OUTPATIENT
Start: 2020-10-22 | End: 2020-10-22 | Stop reason: HOSPADM

## 2020-10-22 RX ORDER — MOXIFLOXACIN 5 MG/ML
SOLUTION/ DROPS OPHTHALMIC
Status: DISCONTINUED | OUTPATIENT
Start: 2020-10-22 | End: 2020-10-22 | Stop reason: HOSPADM

## 2020-10-22 RX ORDER — FENTANYL CITRATE 50 UG/ML
25 INJECTION, SOLUTION INTRAMUSCULAR; INTRAVENOUS EVERY 5 MIN PRN
Status: DISCONTINUED | OUTPATIENT
Start: 2020-10-22 | End: 2020-10-22 | Stop reason: HOSPADM

## 2020-10-22 RX ORDER — ONDANSETRON 2 MG/ML
4 INJECTION INTRAMUSCULAR; INTRAVENOUS DAILY PRN
Status: DISCONTINUED | OUTPATIENT
Start: 2020-10-22 | End: 2020-10-22 | Stop reason: HOSPADM

## 2020-10-22 RX ORDER — MOXIFLOXACIN 5 MG/ML
SOLUTION/ DROPS OPHTHALMIC
Status: DISCONTINUED
Start: 2020-10-22 | End: 2020-10-22 | Stop reason: HOSPADM

## 2020-10-22 RX ORDER — PREDNISOLONE ACETATE 10 MG/ML
SUSPENSION/ DROPS OPHTHALMIC
Status: DISCONTINUED
Start: 2020-10-22 | End: 2020-10-22 | Stop reason: HOSPADM

## 2020-10-22 RX ORDER — TROPICAMIDE 10 MG/ML
1 SOLUTION/ DROPS OPHTHALMIC
Status: COMPLETED | OUTPATIENT
Start: 2020-10-22 | End: 2020-10-22

## 2020-10-22 RX ORDER — LIDOCAINE HYDROCHLORIDE 10 MG/ML
INJECTION, SOLUTION EPIDURAL; INFILTRATION; INTRACAUDAL; PERINEURAL
Status: DISCONTINUED
Start: 2020-10-22 | End: 2020-10-22 | Stop reason: HOSPADM

## 2020-10-22 RX ORDER — SODIUM CHLORIDE 9 MG/ML
INJECTION, SOLUTION INTRAVENOUS CONTINUOUS
Status: DISCONTINUED | OUTPATIENT
Start: 2020-10-22 | End: 2020-10-22 | Stop reason: HOSPADM

## 2020-10-22 RX ORDER — MIDAZOLAM HYDROCHLORIDE 1 MG/ML
INJECTION, SOLUTION INTRAMUSCULAR; INTRAVENOUS
Status: DISCONTINUED | OUTPATIENT
Start: 2020-10-22 | End: 2020-10-22

## 2020-10-22 RX ORDER — ACETAMINOPHEN 325 MG/1
650 TABLET ORAL EVERY 4 HOURS PRN
Status: CANCELLED | OUTPATIENT
Start: 2020-10-22

## 2020-10-22 RX ORDER — LIDOCAINE HYDROCHLORIDE 40 MG/ML
INJECTION, SOLUTION RETROBULBAR
Status: DISCONTINUED
Start: 2020-10-22 | End: 2020-10-22 | Stop reason: HOSPADM

## 2020-10-22 RX ADMIN — TROPICAMIDE 1 DROP: 10 SOLUTION/ DROPS OPHTHALMIC at 06:10

## 2020-10-22 RX ADMIN — PHENYLEPHRINE HYDROCHLORIDE 1 DROP: 25 SOLUTION/ DROPS OPHTHALMIC at 06:10

## 2020-10-22 RX ADMIN — SODIUM CHLORIDE 1000 ML: 0.9 INJECTION, SOLUTION INTRAVENOUS at 06:10

## 2020-10-22 RX ADMIN — MIDAZOLAM HYDROCHLORIDE 1 MG: 1 INJECTION, SOLUTION INTRAMUSCULAR; INTRAVENOUS at 07:10

## 2020-10-22 RX ADMIN — CYCLOPENTOLATE HYDROCHLORIDE 1 DROP: 10 SOLUTION/ DROPS OPHTHALMIC at 06:10

## 2020-10-22 RX ADMIN — FENTANYL CITRATE 25 MCG: 50 INJECTION, SOLUTION INTRAMUSCULAR; INTRAVENOUS at 07:10

## 2020-10-22 RX ADMIN — ACETAMINOPHEN 650 MG: 325 TABLET ORAL at 08:10

## 2020-10-22 RX ADMIN — MIDAZOLAM HYDROCHLORIDE 0.5 MG: 1 INJECTION, SOLUTION INTRAMUSCULAR; INTRAVENOUS at 07:10

## 2020-10-22 NOTE — ANESTHESIA POSTPROCEDURE EVALUATION
Anesthesia Post Evaluation    Patient: Sheila Zarco    Procedure(s) Performed: Procedure(s) (LRB):  PHACOEMULSIFICATION, CATARACT (Left)  INSERTION, IOL PROSTHESIS (Left)    Final Anesthesia Type: MAC    Patient location during evaluation: PACU  Patient participation: Yes- Able to Participate  Level of consciousness: awake and alert  Post-procedure vital signs: reviewed and stable  Pain management: adequate  Airway patency: patent    PONV status at discharge: No PONV  Anesthetic complications: no      Cardiovascular status: blood pressure returned to baseline  Respiratory status: unassisted, spontaneous ventilation and room air  Hydration status: euvolemic  Follow-up not needed.          Vitals Value Taken Time   /59 10/22/20 0818   Temp 36.6 °C (97.9 °F) 10/22/20 0753   Pulse 54 10/22/20 0833   Resp 20 10/22/20 0833   SpO2 96 % 10/22/20 0833   Vitals shown include unvalidated device data.      No case tracking events are documented in the log.      Pain/Paddy Score: Pain Rating Prior to Med Admin: 3 (10/22/2020  8:11 AM)  Paddy Score: 10 (10/22/2020  8:00 AM)

## 2020-10-22 NOTE — PLAN OF CARE
Discharge instructions given and explained to patient with verbalization of understanding all instructions. Instructions given to daughter via phonecall. Patients v/s stable, denies n/v and tolerating po, rates pain level tolerable, IV removed, and daughter notified pt is ready for discharge home.

## 2020-10-22 NOTE — DISCHARGE INSTRUCTIONS
Small-Incision Cataract Surgery: Implanting the New Lens    Once your old lens has been removed, your doctor slips the new lens (IOL or intraocular lens) in through the incision. The IOL is then placed in the capsule that held your old lens. With the new lens in place, your doctor is ready to close the incision. Some incisions may be closed with stitches. Others are self-sealing. That means they will stay closed on their own without stitches. The IOL does much the same thing as your old lens did before it became cloudy. It focuses light, letting you see sharp images and vivid colors. The IOL normally lasts a lifetime.  How small is an IOL?     Date Last Reviewed: 6/14/2015  © 4075-4640 [employer] [address] [place] [address], [place] [address]. All rights reserved. This information is not intended as a substitute for professional medical care. Always follow your healthcare professional's instructions.

## 2020-10-22 NOTE — DISCHARGE SUMMARY
[facility]  Discharge Note  Short Stay    Procedure(s) (LRB):  PHACOEMULSIFICATION, CATARACT (Left)  INSERTION, IOL PROSTHESIS (Left)    OUTCOME: Patient tolerated treatment/procedure well without complication and is now ready for discharge.    DISPOSITION: Home or Self Care    FINAL DIAGNOSIS:  <principal problem not specified>    FOLLOWUP: In clinic       Operative Date:  10/22/2020    Discharge Date:  10/22/2020    Discharge Patient Home    Report Title: Operative Note      SURGEON: Lucas Jefferson MD    ASSISTANT: Ruslan Laughlin MD    PREOPERATIVE DIAGNOSIS: Visually significant NSC cataract,  Left Eye.    POSTOPERATIVE DIAGNOSIS: Visually-significant NSC cataract,  Left Eye.    PROCEDURE PERFORMED: Phacoemulsification of the cataract with posterior chamber intraocular lens Left Eye.    ANESTHESIA: Topical with MAC    COMPLICATIONS: None    ESTIMATED BLOOD LOSS: Minimal    INDICATIONS FOR PROCEDURE:   The patient is a pleasant 70 year old woman with increasing difficulties with activities of daily living secondary to a dense visually significant cataract in the Left Eye with history of Narrow Angles and LPI OS.  Discussions have been carried out with this patient concerning the options to surgery, risks, benefits and expectations.  The patient voiced good understanding and wished to proceed with the above procedure.    PROCEDURE IN DETAIL: The patient was brought to the operating room and received topical anesthetic to the eye and then was prepped and draped in the usual sterile fashion.  The Left Eye was first entered at the 4:30 oclock paracentesis site, followed by intracameral lidocaine and Viscoat material.  The eye was then reentered at the primary surgical site followed by continuous capsulotomy, hydrodissection and phacoemulsification of the cataract.  Residual cortical material was removed using automated irrigation-aspiration technique.  Healon was injected into the posterior chamber and  an PCB00 23.5 diopter lens was placed in the bag without difficulty. Residual Healon was removed using automated irrigation-aspiration technique. The eye was re-pressurized using BSS solution and both the paracentesis site and the primary surgical site were demonstrated to be watertight at the end of the case with Weck--Elizabeth manipulation.  A collagen shield soaked in Vigamox and Pred Forte eye drops was placed on the cornea.  The eye was closed, patched and a Hopkins shield placed.  The patient was taken to the recovery room in good and stable condition.  The patient tolerated the procedure well.  The patient was instructed to refrain from any heavy lifting, bending, stooping or straining activities, discharged home  and to follow-up in the morning for routine postoperative care with Dr. Lucas Jefferson.        DISCHARGE INSTRUCTIONS:  No discharge procedures on file.

## 2020-10-22 NOTE — ANESTHESIA PREPROCEDURE EVALUATION
10/22/2020  Sheila Zarco is a 70 y.o., female.  Pre-operative evaluation for Procedure(s) (LRB):  PHACOEMULSIFICATION, CATARACT (Left)  INSERTION, IOL PROSTHESIS (Left)    Sheila Zarco is a 70 y.o. female     Patient Active Problem List   Diagnosis    Rotator cuff impingement syndrome    Residual stage angle-closure glaucoma    GERD (gastroesophageal reflux disease)    Hypothyroidism    Anxiety    Hyperlipidemia    NAFLD (nonalcoholic fatty liver disease)    Nuclear sclerotic cataract of left eye    Status post cataract extraction and insertion of intraocular lens of right eye    Breast neoplasm, Tis (DCIS), left    Senile nuclear sclerosis       Review of patient's allergies indicates:  No Known Allergies    Current Facility-Administered Medications on File Prior to Encounter   Medication Dose Route Frequency Provider Last Rate Last Dose    0.9%  NaCl infusion   Intravenous Continuous Lucas Jefferson MD   1,000 mL at 07/09/20 0633    cyclopentolate 1% ophthalmic solution 1 drop  1 drop Right Eye On Call Procedure Lucas Jefferson MD   1 drop at 07/09/20 0650    phenylephrine HCL 2.5% ophthalmic solution 1 drop  1 drop Right Eye On Call Procedure Lucas Jefferson MD   1 drop at 07/09/20 0650    tropicamide 1% ophthalmic solution 1 drop  1 drop Right Eye On Call Procedure Lucas Jefferson MD   1 drop at 07/09/20 0650     Current Outpatient Medications on File Prior to Encounter   Medication Sig Dispense Refill    albuterol (ACCUNEB) 0.63 mg/3 mL Nebu Take 3 mLs (0.63 mg total) by nebulization every 6 (six) hours as needed. 100 vial 2    albuterol (VENTOLIN HFA) 90 mcg/actuation inhaler Inhale 2 puffs into the lungs every 6 (six) hours as needed for Wheezing.      aspirin (ECOTRIN) 81 MG EC tablet Take 81 mg by mouth every morning.      atorvastatin  (LIPITOR) 20 MG tablet Take 1 tablet (20 mg total) by mouth once daily. For cholesterol control. (Patient taking differently: Take 20 mg by mouth every morning. For cholesterol control.) 90 tablet 3    escitalopram oxalate (LEXAPRO) 20 MG tablet Take 1 tablet (20 mg total) by mouth once daily. For anxiety. (Patient taking differently: Take 20 mg by mouth every morning. For anxiety.) 90 tablet 2    fluocinonide (LIDEX) 0.05 % external solution AAA scalp qday prn itching, scaling 60 mL 3    pantoprazole (PROTONIX) 40 MG tablet Take 1 tablet (40 mg total) by mouth every morning. 90 tablet 3    vitamin D (VITAMIN D3) 1000 units Tab Take by mouth once daily. Pt takes 50 mcg daily OTC      clobetasol (TEMOVATE) 0.05 % cream Apply topically 2 (two) times daily. 30 g 3    desoximetasone (TOPICORT) 0.25 % cream Apply topically 2 (two) times daily.        fluticasone-salmeterol 250-50 mcg/dose (ADVAIR) 250-50 mcg/dose diskus inhaler Inhale 1 puff into the lungs 2 (two) times daily. 60 each 5    FLUZONE HIGH-DOSE 2019-20, PF, 180 mcg/0.5 mL Syrg TO BE ADMINISTERED BY PHARMACIST FOR IMMUNIZATION      HYDROcodone-acetaminophen (NORCO) 5-325 mg per tablet Take 1 tablet by mouth every 6 (six) hours as needed for Pain. 5 tablet 0    ketoconazole (NIZORAL) 2 % cream AAA twice daily on nose prn flare 60 g 3    ketoconazole (NIZORAL) 2 % shampoo WASH HAIR W/ MEDICATED SHAMPOO AT LEAST 2X WEEK, LET SIT ON SCALP FOR ATLEAST 5MINUTES BEFORE RINSIN 120 mL 1    levocetirizine (XYZAL) 5 MG tablet Take 1 tablet (5 mg total) by mouth every evening. For sinus allergy symptoms 30 tablet 4    meclizine (ANTIVERT) 25 mg tablet Take 1 tablet (25 mg total) by mouth 3 (three) times daily as needed for Dizziness. 15 tablet 0    naproxen (NAPROSYN) 500 MG tablet Take 1 tablet (500 mg total) by mouth 2 (two) times daily with meals. 60 tablet 3    olopatadine (PATANOL) 0.1 % ophthalmic solution Place 1 drop into both eyes 2 (two) times  daily. 5 mL 4    prednisoLONE acetate (PRED FORTE) 1 % DrpS Place 1 drop into the right eye 4 (four) times daily. (Patient not taking: Reported on 9/9/2020) 10 mL 1    triamcinolone acetonide 0.025% (KENALOG) 0.025 % cream AAA qd to face, ears prn flare 45 g 1    valACYclovir (VALTREX) 500 MG tablet TAKE ONE TABLET TWICE A DAY AS NEEDED FOR OUTBREAK 40 tablet 3       Past Surgical History:   Procedure Laterality Date    BREAST BIOPSY Left 06/2020    BUNIONECTOMY      GALLBLADDER SURGERY      HYSTERECTOMY      INTRAOCULAR PROSTHESES INSERTION Right 7/9/2020    Procedure: INSERTION, IOL PROSTHESIS;  Surgeon: Lucas Jefferson MD;  Location: [address];  Service: Ophthalmology;  Laterality: Right;    KNEE CARTILAGE SURGERY      MASTECTOMY, PARTIAL Left 7/24/2020    Procedure: MASTECTOMY, PARTIAL-Left with Radiological Marker;  Surgeon: Kayleen Alvarez MD;  Location: [facility];  Service: General;  Laterality: Left;    narrow angles eye surgery       PHACOEMULSIFICATION OF CATARACT Right 7/9/2020    Procedure: PHACOEMULSIFICATION, CATARACT;  Surgeon: Lucas Jefferson MD;  Location: [facility] OR [address] [place] [address];  Service: Ophthalmology;  Laterality: Right;       Social History     Socioeconomic History    Marital status:      Spouse name: Not on file    Number of children: Not on file    Years of education: Not on file    Highest education level: Not on file   Occupational History    Not on file   Social Needs    Financial resource strain: Not very hard    Food insecurity     Worry: Never true     Inability: Never true    Transportation needs     Medical: No     Non-medical: No   Tobacco Use    Smoking status: Never Smoker    Smokeless tobacco: Never Used   Substance and Sexual Activity    Alcohol use: Yes     Alcohol/week: 8.0 standard drinks     Types: 4 Glasses of wine, 4 Cans of beer per week     Frequency: 2-4 times a month     Drinks per session: 3 or 4     Binge frequency: Less than  monthly     Comment: drinks 4 days a week- >1 drink    Drug use: No    Sexual activity: Yes   Lifestyle    Physical activity     Days per week: 0 days     Minutes per session: Not on file    Stress: Only a little   Relationships    Social connections     Talks on phone: More than three times a week     Gets together: Once a week     Attends [employer] service: Not on file     Active member of club or organization: No     Attends meetings of clubs or organizations: Never     Relationship status:    Other Topics Concern    Are you pregnant or think you may be? No    Breast-feeding No   Social History Narrative    Not on file           Anesthesia Evaluation    I have reviewed the Patient Summary Reports.    I have reviewed the Nursing Notes.    I have reviewed the Medications.     Review of Systems  Anesthesia Hx:  No problems with previous Anesthesia  History of prior surgery of interest to airway management or planning: Denies Family Hx of Anesthesia complications.   Denies Personal Hx of Anesthesia complications.   Social:  Non-Smoker    Hematology/Oncology:  Hematology Normal   Oncology Normal     EENT/Dental:EENT/Dental Normal   Cardiovascular:  Cardiovascular Normal     Pulmonary:  Pulmonary Normal    Renal/:  Renal/ Normal     Hepatic/GI:   GERD    Musculoskeletal:  Musculoskeletal Normal    Neurological:  Neurology Normal    Endocrine:   Hypothyroidism    Psych:  Psychiatric Normal           Physical Exam  General:  Well nourished    Airway/Jaw/Neck:  Airway Findings: Mouth Opening: Normal Tongue: Normal  General Airway Assessment: Adult  Mallampati: I  TM Distance: Normal, at least 6 cm  Jaw/Neck Findings:  Neck ROM: Normal ROM      Dental:  Dental Findings: In tact   Chest/Lungs:  Chest/Lungs Findings: Clear to auscultation, Normal Respiratory Rate     Heart/Vascular:  Heart Findings: Rate: Normal  Rhythm: Regular Rhythm  Sounds: Normal        Mental Status:  Mental Status Findings:   Cooperative, Alert and Oriented         Anesthesia Plan  Type of Anesthesia, risks & benefits discussed:  Anesthesia Type:  general, MAC  Patient's Preference:   Intra-op Monitoring Plan: standard ASA monitors  Intra-op Monitoring Plan Comments:   Post Op Pain Control Plan: multimodal analgesia  Post Op Pain Control Plan Comments:   Induction:   IV  Beta Blocker:  Patient is not currently on a Beta-Blocker (No further documentation required).       Informed Consent: Patient understands risks and agrees with Anesthesia plan.  Questions answered. Anesthesia consent signed with patient.  ASA Score: 2     Day of Surgery Review of History & Physical:    H&P update referred to the surgeon.         Ready For Surgery From Anesthesia Perspective.

## 2020-10-22 NOTE — DISCHARGE SUMMARY
[facility]  Discharge Note  Short Stay    Procedure(s) (LRB):  PHACOEMULSIFICATION, CATARACT (Left)  INSERTION, IOL PROSTHESIS (Left)    OUTCOME: Patient tolerated treatment/procedure well without complication and is now ready for discharge.    DISPOSITION: Home or Self Care    FINAL DIAGNOSIS:  <principal problem not specified>    FOLLOWUP: In clinic    DISCHARGE INSTRUCTIONS:    Discharge Procedure Orders   Diet general        Clinical Reference Documents Added to Patient Instructions       Document    CATARACT SURGERY, SMALL-INCISION: IMPLANTING THE NEW LENS (ENGLISH)

## 2020-10-22 NOTE — OP NOTE
Operative Date:  10/22/2020    Discharge Date:  10/22/2020    Discharge Patient Home    Report Title: Operative Note      SURGEON: Lucas Jefferson MD    ASSISTANT: Ruslan Laughlin MD    PREOPERATIVE DIAGNOSIS: Visually significant NSC cataract,  Left Eye.    POSTOPERATIVE DIAGNOSIS: Visually-significant NSC cataract,  Left Eye.    PROCEDURE PERFORMED: Phacoemulsification of the cataract with posterior chamber intraocular lens Left Eye.    ANESTHESIA: Topical with MAC    COMPLICATIONS: None    ESTIMATED BLOOD LOSS: Minimal    INDICATIONS FOR PROCEDURE:   The patient is a pleasant 70 year old woman with increasing difficulties with activities of daily living secondary to a dense visually significant cataract in the Left Eye with history of Narrow Angles and LPI OS.  Discussions have been carried out with this patient concerning the options to surgery, risks, benefits and expectations.  The patient voiced good understanding and wished to proceed with the above procedure.    PROCEDURE IN DETAIL: The patient was brought to the operating room and received topical anesthetic to the eye and then was prepped and draped in the usual sterile fashion.  The Left Eye was first entered at the 4:30 oclock paracentesis site, followed by intracameral lidocaine and Viscoat material.  The eye was then reentered at the primary surgical site followed by continuous capsulotomy, hydrodissection and phacoemulsification of the cataract.  Residual cortical material was removed using automated irrigation-aspiration technique.  Healon was injected into the posterior chamber and an PCB00 23.5 diopter lens was placed in the bag without difficulty. Residual Healon was removed using automated irrigation-aspiration technique. The eye was re-pressurized using BSS solution and both the paracentesis site and the primary surgical site were demonstrated to be watertight at the end of the case with Weck--Elizabeth manipulation.  A collagen shield soaked in  Vigamox and Pred Forte eye drops was placed on the cornea.  The eye was closed, patched and a Hopkins shield placed.  The patient was taken to the recovery room in good and stable condition.  The patient tolerated the procedure well.  The patient was instructed to refrain from any heavy lifting, bending, stooping or straining activities, discharged home  and to follow-up in the morning for routine postoperative care with Dr. Lucas Jefferson.

## 2020-10-22 NOTE — H&P
History    Chief complaint:  Painless progressive vision loss left Eye    Present Ilness/Diagnosis: Nuclear sclerotic Cataract left Eye    ROS: +Eyes, otherwise no significant changes    Past Medical History: refer to chart    Family History/Social History: refer to chart    Allergies:   Review of patient's allergies indicates:  No Known Allergies    Current Medications: see medcard      Physical Exam    BP: Vital signs stable  General: No apparent distress  HEENT: cataract OS --> see eye notes for details  Lungs: adequate respirations  Heart: + pulses  Abdomen: soft  Rectal/pelvic: deferred    Labs: Labs Reviewed    Lab Results   Component Value Date    WBC 7.49 06/15/2020    HGB 12.5 06/15/2020    HCT 40.0 06/15/2020    MCV 93 06/15/2020     06/15/2020           CMP  Sodium   Date Value Ref Range Status   06/15/2020 141 136 - 145 mmol/L Final     Potassium   Date Value Ref Range Status   06/15/2020 3.8 3.5 - 5.1 mmol/L Final     Chloride   Date Value Ref Range Status   06/15/2020 107 95 - 110 mmol/L Final     CO2   Date Value Ref Range Status   06/15/2020 22 (L) 23 - 29 mmol/L Final     Glucose   Date Value Ref Range Status   06/15/2020 101 70 - 110 mg/dL Final     BUN, Bld   Date Value Ref Range Status   06/15/2020 13 8 - 23 mg/dL Final     Creatinine   Date Value Ref Range Status   06/15/2020 0.8 0.5 - 1.4 mg/dL Final     Calcium   Date Value Ref Range Status   06/15/2020 9.9 8.7 - 10.5 mg/dL Final     Total Protein   Date Value Ref Range Status   06/15/2020 7.4 6.0 - 8.4 g/dL Final     Albumin   Date Value Ref Range Status   06/15/2020 4.0 3.5 - 5.2 g/dL Final     Total Bilirubin   Date Value Ref Range Status   06/15/2020 0.8 0.1 - 1.0 mg/dL Final     Comment:     For infants and newborns, interpretation of results should be based  on gestational age, weight and in agreement with clinical  observations.  Premature Infant recommended reference ranges:  Up to 24 hours.............<8.0 mg/dL  Up to 48  hours............<12.0 mg/dL  3-5 days..................<15.0 mg/dL  6-29 days.................<15.0 mg/dL       Alkaline Phosphatase   Date Value Ref Range Status   06/15/2020 79 55 - 135 U/L Final     AST   Date Value Ref Range Status   06/15/2020 17 10 - 40 U/L Final     ALT   Date Value Ref Range Status   06/15/2020 14 10 - 44 U/L Final     Anion Gap   Date Value Ref Range Status   06/15/2020 12 8 - 16 mmol/L Final     eGFR if    Date Value Ref Range Status   06/15/2020 >60.0 >60 mL/min/1.73 m^2 Final     eGFR if non    Date Value Ref Range Status   06/15/2020 >60.0 >60 mL/min/1.73 m^2 Final     Comment:     Calculation used to obtain the estimated glomerular filtration  rate (eGFR) is the CKD-EPI equation.          Impression: Visually significant Cataract left Eye    Plan: Phacoemulsification with implantation of Intraocular lens left Eye

## 2020-10-22 NOTE — TRANSFER OF CARE
"Anesthesia Transfer of Care Note    Patient: Sheila Zarco    Procedure(s) Performed: Procedure(s) (LRB):  PHACOEMULSIFICATION, CATARACT (Left)  INSERTION, IOL PROSTHESIS (Left)    Patient location: PACU    Anesthesia Type: MAC    Transport from OR: Transported from OR on room air with adequate spontaneous ventilation    Post pain: adequate analgesia    Post assessment: no apparent anesthetic complications    Post vital signs: stable    Level of consciousness: awake, alert and oriented    Nausea/Vomiting: no nausea/vomiting    Complications: none    Transfer of care protocol was followed      Last vitals:   Visit Vitals  BP (!) 123/58 (BP Location: Left arm, Patient Position: Lying)   Pulse 63   Temp 36.4 °C (97.5 °F) (Oral)   Resp 16   Ht 5' 6" (1.676 m)   Wt 72.1 kg (159 lb)   SpO2 99%   Breastfeeding No   BMI 25.66 kg/m²     "

## 2020-10-23 ENCOUNTER — OFFICE VISIT (OUTPATIENT)
Dept: OPHTHALMOLOGY | Facility: CLINIC | Age: 70
End: 2020-10-23
Payer: MEDICARE

## 2020-10-23 DIAGNOSIS — H40.249 RESIDUAL STAGE ANGLE-CLOSURE GLAUCOMA, UNSPECIFIED LATERALITY: ICD-10-CM

## 2020-10-23 DIAGNOSIS — Z98.49 STATUS POST CATARACT EXTRACTION AND INSERTION OF INTRAOCULAR LENS, UNSPECIFIED LATERALITY: Primary | ICD-10-CM

## 2020-10-23 DIAGNOSIS — Z96.1 STATUS POST CATARACT EXTRACTION AND INSERTION OF INTRAOCULAR LENS, UNSPECIFIED LATERALITY: Primary | ICD-10-CM

## 2020-10-23 PROCEDURE — 99999 PR PBB SHADOW E&M-EST. PATIENT-LVL IV: ICD-10-PCS | Mod: PBBFAC,,, | Performed by: OPHTHALMOLOGY

## 2020-10-23 PROCEDURE — 99024 POSTOP FOLLOW-UP VISIT: CPT | Mod: POP,,, | Performed by: OPHTHALMOLOGY

## 2020-10-23 PROCEDURE — 99999 PR PBB SHADOW E&M-EST. PATIENT-LVL IV: CPT | Mod: PBBFAC,,, | Performed by: OPHTHALMOLOGY

## 2020-10-23 PROCEDURE — 99214 OFFICE O/P EST MOD 30 MIN: CPT | Mod: PBBFAC | Performed by: OPHTHALMOLOGY

## 2020-10-23 PROCEDURE — 99024 PR POST-OP FOLLOW-UP VISIT: ICD-10-PCS | Mod: POP,,, | Performed by: OPHTHALMOLOGY

## 2020-10-23 NOTE — PROGRESS NOTES
Assessment /Plan     For exam results, see Encounter Report.    Status post cataract extraction and insertion of intraocular lens, unspecified laterality    Residual stage angle-closure glaucoma, unspecified laterality          Dr Rushing      Hx Narrow Angles  SP LPI OU JDN    PC IOL OD  Quiet  Observe      PC IOL OS  Post-op cataract surgery left Eye, POD 1: Patient doing well, reviewed post-op instructions handout with limited activity & eye care instructions, eye drop therapy and endophthalmitis precautions. Will continue topical antibiotic eye drops 4x  a day and steroid eye drops 4x  a day in the surgery eye. The patient voice good understanding and will return as scheduled or sooner as needed.         AL   22.95 OD         22.95 OS     PCB00  119.3  24.0 -0.54  23.5 -0.26      -->   3/2018 / Grieving --> just sold his trucks & boat  --> Recent Breast Ca --> planning lumpectomy end  --> undergoing XRT        Plan  RTC 1 week p CE IOL OS  RTC sooner PRN with good understanding

## 2020-10-26 ENCOUNTER — PATIENT MESSAGE (OUTPATIENT)
Dept: INTERNAL MEDICINE | Facility: CLINIC | Age: 70
End: 2020-10-26

## 2020-10-26 NOTE — TELEPHONE ENCOUNTER
"email says " ar Dr. Correa ,       I am scheduled for blood work Wednesday and I was wondering if  I could be checked for the Covid antibody also.  I had Covid in early part of September. "    Ok we add order?  This is not on list of things I am allowed to enter order.    Please advise.  Thanks pebbles"

## 2020-10-27 ENCOUNTER — PATIENT MESSAGE (OUTPATIENT)
Dept: INTERNAL MEDICINE | Facility: CLINIC | Age: 70
End: 2020-10-27

## 2020-10-27 DIAGNOSIS — Z20.822 COVID-19 RULED OUT: Primary | ICD-10-CM

## 2020-10-30 ENCOUNTER — LAB VISIT (OUTPATIENT)
Dept: LAB | Facility: HOSPITAL | Age: 70
End: 2020-10-30
Payer: MEDICARE

## 2020-10-30 ENCOUNTER — OFFICE VISIT (OUTPATIENT)
Dept: OPHTHALMOLOGY | Facility: CLINIC | Age: 70
End: 2020-10-30
Payer: MEDICARE

## 2020-10-30 DIAGNOSIS — Z98.49 STATUS POST CATARACT EXTRACTION AND INSERTION OF INTRAOCULAR LENS, UNSPECIFIED LATERALITY: Primary | ICD-10-CM

## 2020-10-30 DIAGNOSIS — H40.013 OPEN ANGLE WITH BORDERLINE FINDINGS AND LOW GLAUCOMA RISK IN BOTH EYES: ICD-10-CM

## 2020-10-30 DIAGNOSIS — Z96.1 STATUS POST CATARACT EXTRACTION AND INSERTION OF INTRAOCULAR LENS, UNSPECIFIED LATERALITY: Primary | ICD-10-CM

## 2020-10-30 DIAGNOSIS — Z20.822 COVID-19 RULED OUT: ICD-10-CM

## 2020-10-30 DIAGNOSIS — E78.5 HYPERLIPIDEMIA, UNSPECIFIED HYPERLIPIDEMIA TYPE: ICD-10-CM

## 2020-10-30 LAB
ALBUMIN SERPL BCP-MCNC: 3.8 G/DL (ref 3.5–5.2)
ALBUMIN SERPL BCP-MCNC: 3.8 G/DL (ref 3.5–5.2)
ALP SERPL-CCNC: 89 U/L (ref 55–135)
ALP SERPL-CCNC: 89 U/L (ref 55–135)
ALT SERPL W/O P-5'-P-CCNC: 14 U/L (ref 10–44)
ALT SERPL W/O P-5'-P-CCNC: 14 U/L (ref 10–44)
ANION GAP SERPL CALC-SCNC: 8 MMOL/L (ref 8–16)
ANION GAP SERPL CALC-SCNC: 8 MMOL/L (ref 8–16)
AST SERPL-CCNC: 14 U/L (ref 10–40)
AST SERPL-CCNC: 14 U/L (ref 10–40)
BASOPHILS # BLD AUTO: 0.07 K/UL (ref 0–0.2)
BASOPHILS NFR BLD: 1 % (ref 0–1.9)
BILIRUB SERPL-MCNC: 1 MG/DL (ref 0.1–1)
BILIRUB SERPL-MCNC: 1 MG/DL (ref 0.1–1)
BUN SERPL-MCNC: 18 MG/DL (ref 8–23)
BUN SERPL-MCNC: 18 MG/DL (ref 8–23)
CALCIUM SERPL-MCNC: 9.7 MG/DL (ref 8.7–10.5)
CALCIUM SERPL-MCNC: 9.7 MG/DL (ref 8.7–10.5)
CHLORIDE SERPL-SCNC: 105 MMOL/L (ref 95–110)
CHLORIDE SERPL-SCNC: 105 MMOL/L (ref 95–110)
CHOLEST SERPL-MCNC: 186 MG/DL (ref 120–199)
CHOLEST SERPL-MCNC: 186 MG/DL (ref 120–199)
CHOLEST/HDLC SERPL: 2 {RATIO} (ref 2–5)
CHOLEST/HDLC SERPL: 2 {RATIO} (ref 2–5)
CO2 SERPL-SCNC: 28 MMOL/L (ref 23–29)
CO2 SERPL-SCNC: 28 MMOL/L (ref 23–29)
CREAT SERPL-MCNC: 0.9 MG/DL (ref 0.5–1.4)
CREAT SERPL-MCNC: 0.9 MG/DL (ref 0.5–1.4)
DIFFERENTIAL METHOD: ABNORMAL
EOSINOPHIL # BLD AUTO: 0.2 K/UL (ref 0–0.5)
EOSINOPHIL NFR BLD: 3 % (ref 0–8)
ERYTHROCYTE [DISTWIDTH] IN BLOOD BY AUTOMATED COUNT: 13.9 % (ref 11.5–14.5)
EST. GFR  (AFRICAN AMERICAN): >60 ML/MIN/1.73 M^2
EST. GFR  (AFRICAN AMERICAN): >60 ML/MIN/1.73 M^2
EST. GFR  (NON AFRICAN AMERICAN): >60 ML/MIN/1.73 M^2
EST. GFR  (NON AFRICAN AMERICAN): >60 ML/MIN/1.73 M^2
GLUCOSE SERPL-MCNC: 115 MG/DL (ref 70–110)
GLUCOSE SERPL-MCNC: 115 MG/DL (ref 70–110)
HCT VFR BLD AUTO: 39 % (ref 37–48.5)
HDLC SERPL-MCNC: 95 MG/DL (ref 40–75)
HDLC SERPL-MCNC: 95 MG/DL (ref 40–75)
HDLC SERPL: 51.1 % (ref 20–50)
HDLC SERPL: 51.1 % (ref 20–50)
HGB BLD-MCNC: 12.4 G/DL (ref 12–16)
IMM GRANULOCYTES # BLD AUTO: 0.02 K/UL (ref 0–0.04)
IMM GRANULOCYTES NFR BLD AUTO: 0.3 % (ref 0–0.5)
LDLC SERPL CALC-MCNC: 52.4 MG/DL (ref 63–159)
LDLC SERPL CALC-MCNC: 52.4 MG/DL (ref 63–159)
LYMPHOCYTES # BLD AUTO: 1.6 K/UL (ref 1–4.8)
LYMPHOCYTES NFR BLD: 23.3 % (ref 18–48)
MCH RBC QN AUTO: 28.9 PG (ref 27–31)
MCHC RBC AUTO-ENTMCNC: 31.8 G/DL (ref 32–36)
MCV RBC AUTO: 91 FL (ref 82–98)
MONOCYTES # BLD AUTO: 0.4 K/UL (ref 0.3–1)
MONOCYTES NFR BLD: 5.8 % (ref 4–15)
NEUTROPHILS # BLD AUTO: 4.7 K/UL (ref 1.8–7.7)
NEUTROPHILS NFR BLD: 66.6 % (ref 38–73)
NONHDLC SERPL-MCNC: 91 MG/DL
NONHDLC SERPL-MCNC: 91 MG/DL
NRBC BLD-RTO: 0 /100 WBC
PLATELET # BLD AUTO: 366 K/UL (ref 150–350)
PMV BLD AUTO: 9.3 FL (ref 9.2–12.9)
POTASSIUM SERPL-SCNC: 3.9 MMOL/L (ref 3.5–5.1)
POTASSIUM SERPL-SCNC: 3.9 MMOL/L (ref 3.5–5.1)
PROT SERPL-MCNC: 7.2 G/DL (ref 6–8.4)
PROT SERPL-MCNC: 7.2 G/DL (ref 6–8.4)
RBC # BLD AUTO: 4.29 M/UL (ref 4–5.4)
SARS-COV-2 IGG SERPLBLD QL IA.RAPID: POSITIVE
SODIUM SERPL-SCNC: 141 MMOL/L (ref 136–145)
SODIUM SERPL-SCNC: 141 MMOL/L (ref 136–145)
TRIGL SERPL-MCNC: 193 MG/DL (ref 30–150)
TRIGL SERPL-MCNC: 193 MG/DL (ref 30–150)
WBC # BLD AUTO: 7.04 K/UL (ref 3.9–12.7)

## 2020-10-30 PROCEDURE — 99999 PR PBB SHADOW E&M-EST. PATIENT-LVL IV: ICD-10-PCS | Mod: PBBFAC,,, | Performed by: OPHTHALMOLOGY

## 2020-10-30 PROCEDURE — 99024 PR POST-OP FOLLOW-UP VISIT: ICD-10-PCS | Mod: POP,,, | Performed by: OPHTHALMOLOGY

## 2020-10-30 PROCEDURE — 80053 COMPREHEN METABOLIC PANEL: CPT

## 2020-10-30 PROCEDURE — 86769 SARS-COV-2 COVID-19 ANTIBODY: CPT

## 2020-10-30 PROCEDURE — 99999 PR PBB SHADOW E&M-EST. PATIENT-LVL IV: CPT | Mod: PBBFAC,,, | Performed by: OPHTHALMOLOGY

## 2020-10-30 PROCEDURE — 99214 OFFICE O/P EST MOD 30 MIN: CPT | Mod: PBBFAC | Performed by: OPHTHALMOLOGY

## 2020-10-30 PROCEDURE — 85025 COMPLETE CBC W/AUTO DIFF WBC: CPT

## 2020-10-30 PROCEDURE — 36415 COLL VENOUS BLD VENIPUNCTURE: CPT

## 2020-10-30 PROCEDURE — 99024 POSTOP FOLLOW-UP VISIT: CPT | Mod: POP,,, | Performed by: OPHTHALMOLOGY

## 2020-10-30 PROCEDURE — 80061 LIPID PANEL: CPT

## 2020-10-31 NOTE — PROGRESS NOTES
Assessment /Plan     For exam results, see Encounter Report.    Status post cataract extraction and insertion of intraocular lens, unspecified laterality    Open angle with borderline findings and low glaucoma risk in both eyes          Dr Rushing      Hx Narrow Angles  SP LPI OU JDN    IOP acceptable    PC IOL OD  Quiet  Observe      PC IOL OS  Post-op cataract surgery left Eye, POW 1: Patient doing well, reviewed post-op instructions handout with limited activity & eye care instructions, eye drop therapy and endophthalmitis precautions. The patient voice good understanding and will return as scheduled or sooner as needed.    Vig QID --> finish  PF 1% QID --> taper off         AL   22.95 OD         22.95 OS     PCB00  119.3  24.0 -0.54  23.5 -0.26      -->   3/2018 / Grieving --> just sold his trucks & boat  --> Recent Breast Ca --> planning lumpectomy end  --> undergoing XRT        Plan  RTC 1 month p CE IOL OS --> re-establish care with Dr Rushing  RTC sooner PRN with good understanding

## 2020-11-06 DIAGNOSIS — L29.2 VULVAR ITCHING: ICD-10-CM

## 2020-11-06 DIAGNOSIS — N95.2 VAGINAL ATROPHY: ICD-10-CM

## 2020-11-06 DIAGNOSIS — N90.60 VULVAR HYPERTROPHY: ICD-10-CM

## 2020-11-06 RX ORDER — CLOBETASOL PROPIONATE 0.5 MG/G
CREAM TOPICAL 2 TIMES DAILY
Qty: 30 G | Refills: 0 | Status: SHIPPED | OUTPATIENT
Start: 2020-11-06 | End: 2023-05-11

## 2020-11-06 NOTE — TELEPHONE ENCOUNTER
Patient put on bedpan with no difficulty. Call light in reach.         Lucia Monte RN  08/17/18 9994 Refill clobetasol last seen 8/8/2019.

## 2020-11-11 ENCOUNTER — PATIENT MESSAGE (OUTPATIENT)
Dept: INTERNAL MEDICINE | Facility: CLINIC | Age: 70
End: 2020-11-11

## 2020-11-11 NOTE — TELEPHONE ENCOUNTER
Blood test results show that she does have antibodies to COVID -19.  This indicates that she has had a past infection with the virus.  She likely has some immunity for a period of time - the duration of which is unknown.    Remaining lab studies were within acceptable range except for borderline elevated blood glucose level of 115.  This is in the prediabetic range.  Weight loss and exercise (such as walking 30 min a day 5 days a week) have been shown to lower blood sugar levels naturally.  The mildly elevated triglyceride level can also be seen when the blood sugar level is elevated.  The patient should continue her atorvastatin as prescribed.   Universal Safety Interventions

## 2020-11-11 NOTE — TELEPHONE ENCOUNTER
Please review recent labs and advise what we can tell patient.  She looks online.    She only has recall to return in may for annual.    Thanks pebbles

## 2020-11-18 ENCOUNTER — PATIENT MESSAGE (OUTPATIENT)
Dept: INTERNAL MEDICINE | Facility: CLINIC | Age: 70
End: 2020-11-18

## 2020-11-19 RX ORDER — ALBUTEROL SULFATE 0.63 MG/3ML
0.63 SOLUTION RESPIRATORY (INHALATION) EVERY 6 HOURS PRN
Qty: 100 VIAL | Refills: 2 | Status: SHIPPED | OUTPATIENT
Start: 2020-11-19 | End: 2022-11-10 | Stop reason: SDUPTHER

## 2020-12-07 ENCOUNTER — OFFICE VISIT (OUTPATIENT)
Dept: DERMATOLOGY | Facility: CLINIC | Age: 70
End: 2020-12-07
Payer: MEDICARE

## 2020-12-07 DIAGNOSIS — D18.01 CHERRY ANGIOMA: ICD-10-CM

## 2020-12-07 DIAGNOSIS — D22.9 NEVUS: ICD-10-CM

## 2020-12-07 DIAGNOSIS — L73.8 SEBACEOUS GLAND HYPERPLASIA: ICD-10-CM

## 2020-12-07 DIAGNOSIS — L82.1 SEBORRHEIC KERATOSES: Primary | ICD-10-CM

## 2020-12-07 PROCEDURE — 99999 PR PBB SHADOW E&M-EST. PATIENT-LVL III: CPT | Mod: PBBFAC,,, | Performed by: DERMATOLOGY

## 2020-12-07 PROCEDURE — 99213 OFFICE O/P EST LOW 20 MIN: CPT | Mod: PBBFAC,PO | Performed by: DERMATOLOGY

## 2020-12-07 PROCEDURE — 99214 PR OFFICE/OUTPT VISIT, EST, LEVL IV, 30-39 MIN: ICD-10-PCS | Mod: S$PBB,,, | Performed by: DERMATOLOGY

## 2020-12-07 PROCEDURE — 99999 PR PBB SHADOW E&M-EST. PATIENT-LVL III: ICD-10-PCS | Mod: PBBFAC,,, | Performed by: DERMATOLOGY

## 2020-12-07 PROCEDURE — 99214 OFFICE O/P EST MOD 30 MIN: CPT | Mod: S$PBB,,, | Performed by: DERMATOLOGY

## 2020-12-07 NOTE — PROGRESS NOTES
"  Subjective:       Patient ID:  Sheila Zarco is a 70 y.o. female who presents for   Chief Complaint   Patient presents with    Skin Check     Pt here today for a TBSE; c/o scaly brown lesions on left side and mid upper chest. Raised. Not bleeding. No tx.       Patient is here today for a "mole" check.   Pt has a history of  moderate sun exposure in the past.   Pt recalls several blistering sunburns in the past- no  Pt has history of tanning bed use- no  Pt has  had moles removed in the past- yes; benign per pt  Pt has history of melanoma in first degree relatives-  no        Review of Systems   Skin: Positive for activity-related sunscreen use. Negative for daily sunscreen use and tendency to form keloidal scars.   Hematologic/Lymphatic: Does not bruise/bleed easily.        Objective:    Physical Exam   Constitutional: She appears well-developed and well-nourished. No distress.   Neurological: She is alert and oriented to person, place, and time. She is not disoriented.   Psychiatric: She has a normal mood and affect.   Skin:   Areas Examined (abnormalities noted in diagram):   Scalp / Hair Palpated and Inspected  Head / Face Inspection Performed  Neck Inspection Performed  Chest / Axilla Inspection Performed  Abdomen Inspection Performed  Genitals / Buttocks / Groin Inspection Performed  Back Inspection Performed  RUE Inspected  LUE Inspection Performed  RLE Inspected  LLE Inspection Performed  Nails and Digits Inspection Performed                       Diagram Legend     Erythematous scaling macule/papule c/w actinic keratosis       Vascular papule c/w angioma      Pigmented verrucoid papule/plaque c/w seborrheic keratosis      Yellow umbilicated papule c/w sebaceous hyperplasia      Irregularly shaped tan macule c/w lentigo     1-2 mm smooth white papules consistent with Milia      Movable subcutaneous cyst with punctum c/w epidermal inclusion cyst      Subcutaneous movable cyst c/w pilar cyst      Firm " pink to brown papule c/w dermatofibroma      Pedunculated fleshy papule(s) c/w skin tag(s)      Evenly pigmented macule c/w junctional nevus     Mildly variegated pigmented, slightly irregular-bordered macule c/w mildly atypical nevus      Flesh colored to evenly pigmented papule c/w intradermal nevus       Pink pearly papule/plaque c/w basal cell carcinoma      Erythematous hyperkeratotic cursted plaque c/w SCC      Surgical scar with no sign of skin cancer recurrence      Open and closed comedones      Inflammatory papules and pustules      Verrucoid papule consistent consistent with wart     Erythematous eczematous patches and plaques     Dystrophic onycholytic nail with subungual debris c/w onychomycosis     Umbilicated papule    Erythematous-base heme-crusted tan verrucoid plaque consistent with inflamed seborrheic keratosis     Erythematous Silvery Scaling Plaque c/w Psoriasis     See annotation      Assessment / Plan:        Seborrheic keratoses  These are benign inherited growths without a malignant potential. Reassurance given to patient. No treatment is necessary.   Pt can schedule an appt for cosmetic removal if she would like     Cherry angioma  These are benign vascular lesions that are inherited.  Treatment is not necessary.    Sebaceous gland hyperplasia  This is a common condition representing benign enlargement of the sebaceous lobule. It typically occurs in adulthood. Reassurance given to patient.     Nevus  Discussed ABCDE's of nevi.  Monitor for new mole or moles that are becoming bigger, darker, irritated, or developing irregular borders. Brochure provided.      Total body skin examination performed today including at least 12 points as noted in physical examination. No lesions suspicious for malignancy noted.             Follow up in about 1 year (around 12/7/2021).

## 2020-12-08 NOTE — PROGRESS NOTES
The patient location is:  home  The chief complaint leading to consultation is:  Left breast cancer    Visit type: audiovisual    Face to Face time with patient:  10 min  Thirty minutes of total time spent on the encounter, which includes face to face time and non-face to face time preparing to see the patient (eg, review of tests), Obtaining and/or reviewing separately obtained history, Documenting clinical information in the electronic or other health record, Independently interpreting results (not separately reported) and communicating results to the patient/family/caregiver, or Care coordination (not separately reported).     Each patient to whom he or she provides medical services by telemedicine is:  (1) informed of the relationship between the physician and patient and the respective role of any other health care provider with respect to management of the patient; and (2) notified that he or she may decline to receive medical services by telemedicine and may withdraw from such care at any time.    REFERRING PHYSICIAN: Kayleen Alvarez MD    DIAGNOSIS: pTisNxM0, Stage 0, Dcis of the left breast     Radiation Treatment Summary:  Treatment Site Energy Dose/Fx (Gy) #Fx Total Dose (Gy) Start Date End Date Elapsed Days   Lt breast 6X 2.65 16 / 16 42.4 9/8/2020 10/14/2020 36     INTERVAL HISTORY:   Ms. Zarco is a 70-year-old female who completed postoperative radiation to the left breast as above, who returns for routine follow-up.    She was diagnosed with left breast cancer after evaluation for an abnormal mammogram in May 2020. This revealed the left breast with a 4 mm area of abnormal calcifications in the 6 o'clock position. A core needle biopsy on June 8, 2020 revealed ductal carcinoma in situ, high-grade, which was ER/SC negative. An MRI of the bilateral breasts on June 18, 2020 revealed a 1.7 x 2.2 x 2.1 cm heterogeneous, non mass enhancemen at the biopsy site. On July 24, 2020, she underwent lumpectomy.  Pathology revealed the left breast with approximately 9.5 mm, grade 3, ductal carcinoma in situ, solid and commute types, with central necrosis. The medial margin was positive for DCIS. Additional medial margin was removed which revealed a focus of DCIS which was located 8 mm from the new medial margin. The superior margin is noted at 5 mm. No lymph node sampling is done. After discussion the multidisciplinary breast Conference, she underwent a repeat mammogram on August 11, 2020 which revealed no suspicious residual calcifications. To reduce her chance of local recurrence, she received post operative radiation to the left breast as above.  She is here today for a routine follow-up.    At present, she reports occasional shooting pains in the left breast.  She denies left breast edema, erythema, or nipple discharge.  She also denies fever, night sweats, or weight loss.       PHYSICAL EXAMINATION:  GENERAL: Patient is alert and oriented, in no acute distress.  HEENT: Extraocular muscles are intact.    BREAST EXAM:  The skin of the left breast appears well-healed via video.  Scar noted in the periareolar region.    ASSESSMENT:   This is a 70-year-old female with pTis Nx M0, stage 0, grade 3, DCIS of the left breast who underwent lumpectomy on July 24, 2020 followed by postoperative radiation with 9.5 cm lesion which is ER/WI negative.     PLAN:   Ms. Zarco is recovering from the radiation without any unexpected side effects.  She will follow up with Surgical Oncology as planned in February 2021.  She will repeat yearly mammogram in May 2021.  I plan to see her back in approximately 6 months, unless symptoms warrant otherwise.

## 2020-12-11 ENCOUNTER — OFFICE VISIT (OUTPATIENT)
Dept: RADIATION ONCOLOGY | Facility: CLINIC | Age: 70
End: 2020-12-11
Payer: MEDICARE

## 2020-12-11 DIAGNOSIS — D05.12 BREAST NEOPLASM, TIS (DCIS), LEFT: Primary | ICD-10-CM

## 2020-12-11 PROCEDURE — 99213 OFFICE O/P EST LOW 20 MIN: CPT | Mod: 95,,, | Performed by: RADIOLOGY

## 2020-12-11 PROCEDURE — 99213 PR OFFICE/OUTPT VISIT, EST, LEVL III, 20-29 MIN: ICD-10-PCS | Mod: 95,,, | Performed by: RADIOLOGY

## 2020-12-11 NOTE — PATIENT INSTRUCTIONS
Follow-up with Dr. Alvarez in February 2021  Repeat yearly mammogram in June 2021  Return to see me in approximately 6 months

## 2021-01-03 ENCOUNTER — PATIENT MESSAGE (OUTPATIENT)
Dept: OPHTHALMOLOGY | Facility: CLINIC | Age: 71
End: 2021-01-03

## 2021-01-04 ENCOUNTER — OFFICE VISIT (OUTPATIENT)
Dept: OPTOMETRY | Facility: CLINIC | Age: 71
End: 2021-01-04
Payer: MEDICARE

## 2021-01-04 DIAGNOSIS — H04.123 DRY EYES, BILATERAL: Primary | ICD-10-CM

## 2021-01-04 PROCEDURE — 99211 OFF/OP EST MAY X REQ PHY/QHP: CPT | Mod: PBBFAC,PO | Performed by: OPTOMETRIST

## 2021-01-04 PROCEDURE — 99999 PR PBB SHADOW E&M-EST. PATIENT-LVL I: ICD-10-PCS | Mod: PBBFAC,,, | Performed by: OPTOMETRIST

## 2021-01-04 PROCEDURE — 92012 INTRM OPH EXAM EST PATIENT: CPT | Mod: S$PBB,,, | Performed by: OPTOMETRIST

## 2021-01-04 PROCEDURE — 92012 PR EYE EXAM, EST PATIENT,INTERMED: ICD-10-PCS | Mod: S$PBB,,, | Performed by: OPTOMETRIST

## 2021-01-04 PROCEDURE — 99999 PR PBB SHADOW E&M-EST. PATIENT-LVL I: CPT | Mod: PBBFAC,,, | Performed by: OPTOMETRIST

## 2021-01-04 RX ORDER — PREDNISOLONE ACETATE 10 MG/ML
1 SUSPENSION/ DROPS OPHTHALMIC 4 TIMES DAILY
Qty: 1 BOTTLE | Refills: 1 | Status: SHIPPED | OUTPATIENT
Start: 2021-01-04 | End: 2022-01-04

## 2021-01-13 ENCOUNTER — PATIENT MESSAGE (OUTPATIENT)
Dept: OPTOMETRY | Facility: CLINIC | Age: 71
End: 2021-01-13

## 2021-01-27 ENCOUNTER — OFFICE VISIT (OUTPATIENT)
Dept: SURGERY | Facility: CLINIC | Age: 71
End: 2021-01-27
Payer: MEDICARE

## 2021-01-27 VITALS
DIASTOLIC BLOOD PRESSURE: 66 MMHG | WEIGHT: 158.94 LBS | BODY MASS INDEX: 25.54 KG/M2 | SYSTOLIC BLOOD PRESSURE: 162 MMHG | HEART RATE: 63 BPM | HEIGHT: 66 IN

## 2021-01-27 DIAGNOSIS — Z85.3 HISTORY OF LEFT BREAST CANCER: ICD-10-CM

## 2021-01-27 DIAGNOSIS — N64.4 BREAST PAIN, LEFT: Primary | ICD-10-CM

## 2021-01-27 PROCEDURE — 99999 PR PBB SHADOW E&M-EST. PATIENT-LVL IV: CPT | Mod: PBBFAC,,, | Performed by: PHYSICIAN ASSISTANT

## 2021-01-27 PROCEDURE — 99213 PR OFFICE/OUTPT VISIT, EST, LEVL III, 20-29 MIN: ICD-10-PCS | Mod: S$PBB,,, | Performed by: PHYSICIAN ASSISTANT

## 2021-01-27 PROCEDURE — 99213 OFFICE O/P EST LOW 20 MIN: CPT | Mod: S$PBB,,, | Performed by: PHYSICIAN ASSISTANT

## 2021-01-27 PROCEDURE — 99214 OFFICE O/P EST MOD 30 MIN: CPT | Mod: PBBFAC | Performed by: PHYSICIAN ASSISTANT

## 2021-01-27 PROCEDURE — 99999 PR PBB SHADOW E&M-EST. PATIENT-LVL IV: ICD-10-PCS | Mod: PBBFAC,,, | Performed by: PHYSICIAN ASSISTANT

## 2021-01-30 ENCOUNTER — PATIENT MESSAGE (OUTPATIENT)
Dept: INTERNAL MEDICINE | Facility: CLINIC | Age: 71
End: 2021-01-30

## 2021-02-02 ENCOUNTER — HOSPITAL ENCOUNTER (OUTPATIENT)
Dept: RADIOLOGY | Facility: HOSPITAL | Age: 71
Discharge: HOME | End: 2021-02-02
Attending: PHYSICIAN ASSISTANT
Payer: MEDICARE

## 2021-02-02 ENCOUNTER — HOSPITAL ENCOUNTER (OUTPATIENT)
Dept: RADIOLOGY | Facility: HOSPITAL | Age: 71
Discharge: HOME | End: 2021-02-02
Attending: SURGERY
Payer: MEDICARE

## 2021-02-02 DIAGNOSIS — Z85.3 HISTORY OF LEFT BREAST CANCER: ICD-10-CM

## 2021-02-02 DIAGNOSIS — N64.4 BREAST PAIN, LEFT: ICD-10-CM

## 2021-02-02 DIAGNOSIS — D05.12 DUCTAL CARCINOMA IN SITU (DCIS) OF LEFT BREAST: ICD-10-CM

## 2021-02-02 PROCEDURE — 76642 ULTRASOUND BREAST LIMITED: CPT | Mod: TC,LT

## 2021-02-02 PROCEDURE — 77061 BREAST TOMOSYNTHESIS UNI: CPT | Mod: TC,LT

## 2021-02-02 PROCEDURE — 77061 MAMMO DIGITAL DIAGNOSTIC LEFT WITH TOMO: ICD-10-PCS | Mod: 26,LT,, | Performed by: RADIOLOGY

## 2021-02-02 PROCEDURE — 76642 ULTRASOUND BREAST LIMITED: CPT | Mod: 26,LT,, | Performed by: RADIOLOGY

## 2021-02-02 PROCEDURE — 77061 BREAST TOMOSYNTHESIS UNI: CPT | Mod: 26,LT,, | Performed by: RADIOLOGY

## 2021-02-02 PROCEDURE — 77065 DX MAMMO INCL CAD UNI: CPT | Mod: 26,LT,, | Performed by: RADIOLOGY

## 2021-02-02 PROCEDURE — 76642 US BREAST LEFT LIMITED: ICD-10-PCS | Mod: 26,LT,, | Performed by: RADIOLOGY

## 2021-02-02 PROCEDURE — 77065 MAMMO DIGITAL DIAGNOSTIC LEFT WITH TOMO: ICD-10-PCS | Mod: 26,LT,, | Performed by: RADIOLOGY

## 2021-02-11 ENCOUNTER — IMMUNIZATION (OUTPATIENT)
Dept: PHARMACY | Facility: CLINIC | Age: 71
End: 2021-02-11
Payer: MEDICARE

## 2021-02-11 DIAGNOSIS — Z23 NEED FOR VACCINATION: Primary | ICD-10-CM

## 2021-02-12 ENCOUNTER — PATIENT MESSAGE (OUTPATIENT)
Dept: INTERNAL MEDICINE | Facility: CLINIC | Age: 71
End: 2021-02-12

## 2021-02-15 ENCOUNTER — OFFICE VISIT (OUTPATIENT)
Dept: INTERNAL MEDICINE | Facility: CLINIC | Age: 71
End: 2021-02-15
Payer: MEDICARE

## 2021-02-15 DIAGNOSIS — I77.1 SUBCLAVIAN ARTERY STENOSIS: ICD-10-CM

## 2021-02-15 DIAGNOSIS — K52.9 CHRONIC DIARRHEA: Primary | ICD-10-CM

## 2021-02-15 DIAGNOSIS — C50.512 MALIGNANT NEOPLASM OF LOWER-OUTER QUADRANT OF LEFT FEMALE BREAST, UNSPECIFIED ESTROGEN RECEPTOR STATUS: ICD-10-CM

## 2021-02-15 PROCEDURE — 99213 OFFICE O/P EST LOW 20 MIN: CPT | Mod: 95,,, | Performed by: FAMILY MEDICINE

## 2021-02-15 PROCEDURE — 99213 PR OFFICE/OUTPT VISIT, EST, LEVL III, 20-29 MIN: ICD-10-PCS | Mod: 95,,, | Performed by: FAMILY MEDICINE

## 2021-03-11 ENCOUNTER — IMMUNIZATION (OUTPATIENT)
Dept: PHARMACY | Facility: CLINIC | Age: 71
End: 2021-03-11
Payer: MEDICARE

## 2021-03-11 DIAGNOSIS — Z23 NEED FOR VACCINATION: Primary | ICD-10-CM

## 2021-04-07 ENCOUNTER — PATIENT MESSAGE (OUTPATIENT)
Dept: INTERNAL MEDICINE | Facility: CLINIC | Age: 71
End: 2021-04-07

## 2021-04-07 DIAGNOSIS — M79.671 FOOT PAIN, RIGHT: Primary | ICD-10-CM

## 2021-04-08 ENCOUNTER — PATIENT MESSAGE (OUTPATIENT)
Dept: INTERNAL MEDICINE | Facility: CLINIC | Age: 71
End: 2021-04-08

## 2021-05-18 ENCOUNTER — PES CALL (OUTPATIENT)
Dept: ADMINISTRATIVE | Facility: CLINIC | Age: 71
End: 2021-05-18

## 2021-05-20 ENCOUNTER — HOSPITAL ENCOUNTER (OUTPATIENT)
Dept: RADIOLOGY | Facility: HOSPITAL | Age: 71
Discharge: HOME | End: 2021-05-20
Attending: PODIATRIST
Payer: MEDICARE

## 2021-05-20 ENCOUNTER — OFFICE VISIT (OUTPATIENT)
Dept: PODIATRY | Facility: CLINIC | Age: 71
End: 2021-05-20
Payer: MEDICARE

## 2021-05-20 VITALS
BODY MASS INDEX: 25.66 KG/M2 | DIASTOLIC BLOOD PRESSURE: 69 MMHG | HEIGHT: 66 IN | SYSTOLIC BLOOD PRESSURE: 131 MMHG | HEART RATE: 67 BPM

## 2021-05-20 DIAGNOSIS — M79.672 LEFT FOOT PAIN: ICD-10-CM

## 2021-05-20 DIAGNOSIS — M79.671 RIGHT FOOT PAIN: Primary | ICD-10-CM

## 2021-05-20 DIAGNOSIS — M79.671 FOOT PAIN, RIGHT: ICD-10-CM

## 2021-05-20 DIAGNOSIS — M79.671 RIGHT FOOT PAIN: ICD-10-CM

## 2021-05-20 DIAGNOSIS — Z98.890 S/P FOOT SURGERY: ICD-10-CM

## 2021-05-20 PROCEDURE — 99999 PR PBB SHADOW E&M-EST. PATIENT-LVL V: CPT | Mod: PBBFAC,,, | Performed by: PODIATRIST

## 2021-05-20 PROCEDURE — 73630 XR FOOT COMPLETE 3 VIEW BILATERAL: ICD-10-PCS | Mod: 26,RT,, | Performed by: RADIOLOGY

## 2021-05-20 PROCEDURE — 73630 X-RAY EXAM OF FOOT: CPT | Mod: 26,RT,, | Performed by: RADIOLOGY

## 2021-05-20 PROCEDURE — 99999 PR PBB SHADOW E&M-EST. PATIENT-LVL V: ICD-10-PCS | Mod: PBBFAC,,, | Performed by: PODIATRIST

## 2021-05-20 PROCEDURE — 99203 PR OFFICE/OUTPT VISIT, NEW, LEVL III, 30-44 MIN: ICD-10-PCS | Mod: S$PBB,,, | Performed by: PODIATRIST

## 2021-05-20 PROCEDURE — 99215 OFFICE O/P EST HI 40 MIN: CPT | Mod: PBBFAC,25,PN | Performed by: PODIATRIST

## 2021-05-20 PROCEDURE — 73630 X-RAY EXAM OF FOOT: CPT | Mod: TC,50,PN

## 2021-05-20 PROCEDURE — 73630 X-RAY EXAM OF FOOT: CPT | Mod: 26,LT,, | Performed by: RADIOLOGY

## 2021-05-20 PROCEDURE — 99203 OFFICE O/P NEW LOW 30 MIN: CPT | Mod: S$PBB,,, | Performed by: PODIATRIST

## 2021-05-24 ENCOUNTER — PATIENT MESSAGE (OUTPATIENT)
Dept: INTERNAL MEDICINE | Facility: CLINIC | Age: 71
End: 2021-05-24

## 2021-05-25 ENCOUNTER — OFFICE VISIT (OUTPATIENT)
Dept: OPTOMETRY | Facility: CLINIC | Age: 71
End: 2021-05-25
Payer: MEDICARE

## 2021-05-25 DIAGNOSIS — Z96.1 PSEUDOPHAKIA OF BOTH EYES: ICD-10-CM

## 2021-05-25 DIAGNOSIS — Z98.890 S/P LASER IRIDOTOMY: ICD-10-CM

## 2021-05-25 DIAGNOSIS — H04.123 DRY EYES, BILATERAL: Primary | ICD-10-CM

## 2021-05-25 PROCEDURE — 99213 OFFICE O/P EST LOW 20 MIN: CPT | Mod: PBBFAC,PO | Performed by: OPTOMETRIST

## 2021-05-25 PROCEDURE — 99999 PR PBB SHADOW E&M-EST. PATIENT-LVL III: CPT | Mod: PBBFAC,,, | Performed by: OPTOMETRIST

## 2021-05-25 PROCEDURE — 92014 COMPRE OPH EXAM EST PT 1/>: CPT | Mod: S$PBB,,, | Performed by: OPTOMETRIST

## 2021-05-25 PROCEDURE — 99999 PR PBB SHADOW E&M-EST. PATIENT-LVL III: ICD-10-PCS | Mod: PBBFAC,,, | Performed by: OPTOMETRIST

## 2021-05-25 PROCEDURE — 92014 PR EYE EXAM, EST PATIENT,COMPREHESV: ICD-10-PCS | Mod: S$PBB,,, | Performed by: OPTOMETRIST

## 2021-05-26 ENCOUNTER — HOSPITAL ENCOUNTER (OUTPATIENT)
Dept: RADIOLOGY | Facility: HOSPITAL | Age: 71
Discharge: HOME | End: 2021-05-26
Attending: SURGERY
Payer: MEDICARE

## 2021-05-26 DIAGNOSIS — Z12.31 SCREENING MAMMOGRAM, ENCOUNTER FOR: ICD-10-CM

## 2021-05-26 PROCEDURE — 77063 MAMMO DIGITAL SCREENING BILAT WITH TOMOSYNTHESIS_CAD: ICD-10-PCS | Mod: 26,,, | Performed by: RADIOLOGY

## 2021-05-26 PROCEDURE — 77067 SCR MAMMO BI INCL CAD: CPT | Mod: 26,,, | Performed by: RADIOLOGY

## 2021-05-26 PROCEDURE — 77067 SCR MAMMO BI INCL CAD: CPT | Mod: TC,PO

## 2021-05-26 PROCEDURE — 77067 MAMMO DIGITAL SCREENING BILAT WITH TOMOSYNTHESIS_CAD: ICD-10-PCS | Mod: 26,,, | Performed by: RADIOLOGY

## 2021-05-26 PROCEDURE — 77063 BREAST TOMOSYNTHESIS BI: CPT | Mod: 26,,, | Performed by: RADIOLOGY

## 2021-06-10 ENCOUNTER — OFFICE VISIT (OUTPATIENT)
Dept: PODIATRY | Facility: CLINIC | Age: 71
End: 2021-06-10
Payer: MEDICARE

## 2021-06-10 VITALS
HEIGHT: 66 IN | DIASTOLIC BLOOD PRESSURE: 75 MMHG | HEART RATE: 67 BPM | BODY MASS INDEX: 25.66 KG/M2 | SYSTOLIC BLOOD PRESSURE: 123 MMHG

## 2021-06-10 DIAGNOSIS — G57.91 NEURITIS OF FOOT, RIGHT: ICD-10-CM

## 2021-06-10 DIAGNOSIS — M79.671 RIGHT FOOT PAIN: Primary | ICD-10-CM

## 2021-06-10 PROCEDURE — 99214 OFFICE O/P EST MOD 30 MIN: CPT | Mod: PBBFAC,PN | Performed by: PODIATRIST

## 2021-06-10 PROCEDURE — 99999 PR PBB SHADOW E&M-EST. PATIENT-LVL IV: ICD-10-PCS | Mod: PBBFAC,,, | Performed by: PODIATRIST

## 2021-06-10 PROCEDURE — 99999 PR PBB SHADOW E&M-EST. PATIENT-LVL IV: CPT | Mod: PBBFAC,,, | Performed by: PODIATRIST

## 2021-06-10 PROCEDURE — 64450 NJX AA&/STRD OTHER PN/BRANCH: CPT | Mod: PBBFAC,PN | Performed by: PODIATRIST

## 2021-06-10 PROCEDURE — 99212 PR OFFICE/OUTPT VISIT, EST, LEVL II, 10-19 MIN: ICD-10-PCS | Mod: 25,S$PBB,, | Performed by: PODIATRIST

## 2021-06-10 PROCEDURE — 64450 NJX AA&/STRD OTHER PN/BRANCH: CPT | Mod: S$PBB,RT,, | Performed by: PODIATRIST

## 2021-06-10 PROCEDURE — 99212 OFFICE O/P EST SF 10 MIN: CPT | Mod: 25,S$PBB,, | Performed by: PODIATRIST

## 2021-06-10 PROCEDURE — 64450 PR NERVE BLOCK INJ, ANES/STEROID, OTHER PERIPHERAL: ICD-10-PCS | Mod: S$PBB,RT,, | Performed by: PODIATRIST

## 2021-06-10 RX ADMIN — TRIAMCINOLONE ACETONIDE 40 MG: 40 INJECTION, SUSPENSION INTRA-ARTICULAR; INTRAMUSCULAR at 11:06

## 2021-06-29 ENCOUNTER — PATIENT MESSAGE (OUTPATIENT)
Dept: PODIATRY | Facility: CLINIC | Age: 71
End: 2021-06-29

## 2021-06-30 ENCOUNTER — PATIENT MESSAGE (OUTPATIENT)
Dept: PODIATRY | Facility: CLINIC | Age: 71
End: 2021-06-30

## 2021-08-03 ENCOUNTER — PATIENT OUTREACH (OUTPATIENT)
Dept: ADMINISTRATIVE | Facility: OTHER | Age: 71
End: 2021-08-03

## 2021-08-05 ENCOUNTER — OFFICE VISIT (OUTPATIENT)
Dept: SURGERY | Facility: CLINIC | Age: 71
End: 2021-08-05
Payer: MEDICARE

## 2021-08-05 VITALS
HEART RATE: 71 BPM | SYSTOLIC BLOOD PRESSURE: 138 MMHG | WEIGHT: 160 LBS | BODY MASS INDEX: 25.71 KG/M2 | HEIGHT: 66 IN | DIASTOLIC BLOOD PRESSURE: 61 MMHG

## 2021-08-05 DIAGNOSIS — Z12.31 SCREENING MAMMOGRAM, ENCOUNTER FOR: Primary | ICD-10-CM

## 2021-08-05 DIAGNOSIS — Z85.3 PERSONAL HISTORY OF BREAST CANCER: ICD-10-CM

## 2021-08-05 PROCEDURE — 99213 OFFICE O/P EST LOW 20 MIN: CPT | Mod: S$PBB,,, | Performed by: SURGERY

## 2021-08-05 PROCEDURE — 99213 PR OFFICE/OUTPT VISIT, EST, LEVL III, 20-29 MIN: ICD-10-PCS | Mod: S$PBB,,, | Performed by: SURGERY

## 2021-08-05 PROCEDURE — 99999 PR PBB SHADOW E&M-EST. PATIENT-LVL IV: CPT | Mod: PBBFAC,,, | Performed by: SURGERY

## 2021-08-05 PROCEDURE — 99214 OFFICE O/P EST MOD 30 MIN: CPT | Mod: PBBFAC | Performed by: SURGERY

## 2021-08-05 PROCEDURE — 99999 PR PBB SHADOW E&M-EST. PATIENT-LVL IV: ICD-10-PCS | Mod: PBBFAC,,, | Performed by: SURGERY

## 2021-08-19 ENCOUNTER — PATIENT MESSAGE (OUTPATIENT)
Dept: VASCULAR SURGERY | Facility: CLINIC | Age: 71
End: 2021-08-19

## 2021-08-19 DIAGNOSIS — I65.22 CAROTID STENOSIS, ASYMPTOMATIC, LEFT: Primary | ICD-10-CM

## 2021-08-20 ENCOUNTER — PATIENT MESSAGE (OUTPATIENT)
Dept: VASCULAR SURGERY | Facility: CLINIC | Age: 71
End: 2021-08-20

## 2021-08-20 ENCOUNTER — TELEPHONE (OUTPATIENT)
Dept: VASCULAR SURGERY | Facility: CLINIC | Age: 71
End: 2021-08-20

## 2021-08-25 ENCOUNTER — HOSPITAL ENCOUNTER (OUTPATIENT)
Dept: CARDIOLOGY | Facility: HOSPITAL | Age: 71
Discharge: HOME | End: 2021-08-25
Attending: SURGERY
Payer: MEDICARE

## 2021-08-25 ENCOUNTER — OFFICE VISIT (OUTPATIENT)
Dept: VASCULAR SURGERY | Facility: CLINIC | Age: 71
End: 2021-08-25
Payer: MEDICARE

## 2021-08-25 VITALS
WEIGHT: 159.94 LBS | BODY MASS INDEX: 25.71 KG/M2 | SYSTOLIC BLOOD PRESSURE: 120 MMHG | DIASTOLIC BLOOD PRESSURE: 66 MMHG | HEIGHT: 66 IN

## 2021-08-25 DIAGNOSIS — R42 EPISODIC LIGHTHEADEDNESS: Primary | ICD-10-CM

## 2021-08-25 DIAGNOSIS — I65.22 CAROTID STENOSIS, ASYMPTOMATIC, LEFT: ICD-10-CM

## 2021-08-25 DIAGNOSIS — I65.09 VERTEBRAL ARTERY STENOSIS, UNSPECIFIED LATERALITY: ICD-10-CM

## 2021-08-25 DIAGNOSIS — I65.02 VERTEBRAL ARTERY NARROWING, LEFT: ICD-10-CM

## 2021-08-25 PROCEDURE — 99999 PR PBB SHADOW E&M-EST. PATIENT-LVL V: CPT | Mod: PBBFAC,,, | Performed by: SURGERY

## 2021-08-25 PROCEDURE — 99999 PR PBB SHADOW E&M-EST. PATIENT-LVL V: ICD-10-PCS | Mod: PBBFAC,,, | Performed by: SURGERY

## 2021-08-25 PROCEDURE — 93880 EXTRACRANIAL BILAT STUDY: CPT | Mod: 26,,, | Performed by: SURGERY

## 2021-08-25 PROCEDURE — 93880 EXTRACRANIAL BILAT STUDY: CPT

## 2021-08-25 PROCEDURE — 99214 OFFICE O/P EST MOD 30 MIN: CPT | Mod: S$PBB,,, | Performed by: SURGERY

## 2021-08-25 PROCEDURE — 99214 PR OFFICE/OUTPT VISIT, EST, LEVL IV, 30-39 MIN: ICD-10-PCS | Mod: S$PBB,,, | Performed by: SURGERY

## 2021-08-25 PROCEDURE — 93880 CV US DOPPLER CAROTID (CUPID ONLY): ICD-10-PCS | Mod: 26,,, | Performed by: SURGERY

## 2021-08-25 PROCEDURE — 99215 OFFICE O/P EST HI 40 MIN: CPT | Mod: PBBFAC,25 | Performed by: SURGERY

## 2021-09-07 LAB
LEFT CBA DIAS: 8 CM/S
LEFT CBA SYS: 53 CM/S
LEFT CCA DIST DIAS: 10 CM/S
LEFT CCA DIST SYS: 65 CM/S
LEFT CCA MID DIAS: 10 CM/S
LEFT CCA MID SYS: 67 CM/S
LEFT CCA PROX DIAS: 10 CM/S
LEFT CCA PROX SYS: 70 CM/S
LEFT ECA DIAS: 10 CM/S
LEFT ECA SYS: 76 CM/S
LEFT ICA DIST DIAS: 13 CM/S
LEFT ICA DIST SYS: 69 CM/S
LEFT ICA MID DIAS: 15 CM/S
LEFT ICA MID SYS: 82 CM/S
LEFT ICA PROX DIAS: 21 CM/S
LEFT ICA PROX SYS: 106 CM/S
LEFT VERTEBRAL DIAS: 6 CM/S
LEFT VERTEBRAL SYS: 41 CM/S
OHS CV CAROTID RIGHT ICA EDV HIGHEST: 18
OHS CV CAROTID ULTRASOUND LEFT ICA/CCA RATIO: 1.63
OHS CV CAROTID ULTRASOUND RIGHT ICA/CCA RATIO: 2.27
OHS CV PV CAROTID LEFT HIGHEST CCA: 70
OHS CV PV CAROTID LEFT HIGHEST ICA: 106
OHS CV PV CAROTID RIGHT HIGHEST CCA: 72
OHS CV PV CAROTID RIGHT HIGHEST ICA: 118
OHS CV US CAROTID LEFT HIGHEST EDV: 21
RIGHT CBA DIAS: 9 CM/S
RIGHT CBA SYS: 40 CM/S
RIGHT CCA DIST DIAS: 14 CM/S
RIGHT CCA DIST SYS: 52 CM/S
RIGHT CCA MID DIAS: 11 CM/S
RIGHT CCA MID SYS: 55 CM/S
RIGHT CCA PROX DIAS: 10 CM/S
RIGHT CCA PROX SYS: 72 CM/S
RIGHT ECA DIAS: 10 CM/S
RIGHT ECA SYS: 76 CM/S
RIGHT ICA DIST DIAS: 13 CM/S
RIGHT ICA DIST SYS: 69 CM/S
RIGHT ICA MID DIAS: 18 CM/S
RIGHT ICA MID SYS: 118 CM/S
RIGHT ICA PROX DIAS: 11 CM/S
RIGHT ICA PROX SYS: 54 CM/S
RIGHT VERTEBRAL DIAS: 6 CM/S
RIGHT VERTEBRAL SYS: 41 CM/S

## 2021-09-13 ENCOUNTER — PATIENT MESSAGE (OUTPATIENT)
Dept: DERMATOLOGY | Facility: CLINIC | Age: 71
End: 2021-09-13

## 2021-09-13 ENCOUNTER — OFFICE VISIT (OUTPATIENT)
Dept: DERMATOLOGY | Facility: CLINIC | Age: 71
End: 2021-09-13
Payer: MEDICARE

## 2021-09-13 DIAGNOSIS — L30.9 HAND ECZEMA: ICD-10-CM

## 2021-09-13 DIAGNOSIS — D48.5 NEOPLASM OF UNCERTAIN BEHAVIOR OF SKIN: Primary | ICD-10-CM

## 2021-09-13 DIAGNOSIS — L21.9 SEBORRHEIC DERMATITIS, UNSPECIFIED: ICD-10-CM

## 2021-09-13 PROCEDURE — 88305 TISSUE EXAM BY PATHOLOGIST: CPT | Mod: 26,,, | Performed by: PATHOLOGY

## 2021-09-13 PROCEDURE — 11102 PR TANGENTIAL BIOPSY, SKIN, SINGLE LESION: ICD-10-PCS | Mod: S$PBB,,, | Performed by: DERMATOLOGY

## 2021-09-13 PROCEDURE — 11102 TANGNTL BX SKIN SINGLE LES: CPT | Mod: PBBFAC | Performed by: DERMATOLOGY

## 2021-09-13 PROCEDURE — 88305 TISSUE EXAM BY PATHOLOGIST: CPT | Performed by: PATHOLOGY

## 2021-09-13 PROCEDURE — 99999 PR PBB SHADOW E&M-EST. PATIENT-LVL IV: ICD-10-PCS | Mod: PBBFAC,,, | Performed by: DERMATOLOGY

## 2021-09-13 PROCEDURE — 88305 TISSUE EXAM BY PATHOLOGIST: ICD-10-PCS | Mod: 26,,, | Performed by: PATHOLOGY

## 2021-09-13 PROCEDURE — 99999 PR PBB SHADOW E&M-EST. PATIENT-LVL IV: CPT | Mod: PBBFAC,,, | Performed by: DERMATOLOGY

## 2021-09-13 PROCEDURE — 99214 OFFICE O/P EST MOD 30 MIN: CPT | Mod: 25,S$PBB,, | Performed by: DERMATOLOGY

## 2021-09-13 PROCEDURE — 11102 TANGNTL BX SKIN SINGLE LES: CPT | Mod: S$PBB,,, | Performed by: DERMATOLOGY

## 2021-09-13 PROCEDURE — 99214 OFFICE O/P EST MOD 30 MIN: CPT | Mod: PBBFAC | Performed by: DERMATOLOGY

## 2021-09-13 PROCEDURE — 99214 PR OFFICE/OUTPT VISIT, EST, LEVL IV, 30-39 MIN: ICD-10-PCS | Mod: 25,S$PBB,, | Performed by: DERMATOLOGY

## 2021-09-13 RX ORDER — ACETIC ACID 20.65 MG/ML
SOLUTION AURICULAR (OTIC)
Qty: 6 ML | Refills: 2 | Status: SHIPPED | OUTPATIENT
Start: 2021-09-13

## 2021-09-13 RX ORDER — TRIAMCINOLONE ACETONIDE 1 MG/G
OINTMENT TOPICAL
Qty: 60 G | Refills: 1 | Status: SHIPPED | OUTPATIENT
Start: 2021-09-13 | End: 2023-05-11

## 2021-09-15 LAB
FINAL PATHOLOGIC DIAGNOSIS: NORMAL
GROSS: NORMAL
Lab: NORMAL
MICROSCOPIC EXAM: NORMAL

## 2021-09-20 ENCOUNTER — TELEPHONE (OUTPATIENT)
Dept: NEUROLOGY | Facility: CLINIC | Age: 71
End: 2021-09-20

## 2021-09-20 DIAGNOSIS — I67.89 OTHER CEREBROVASCULAR DISEASE: ICD-10-CM

## 2021-09-20 DIAGNOSIS — I65.09 VERTEBRAL ARTERY STENOSIS, UNSPECIFIED LATERALITY: Primary | ICD-10-CM

## 2021-09-21 ENCOUNTER — PATIENT MESSAGE (OUTPATIENT)
Dept: OTOLARYNGOLOGY | Facility: CLINIC | Age: 71
End: 2021-09-21

## 2021-09-22 ENCOUNTER — TELEPHONE (OUTPATIENT)
Dept: PODIATRY | Facility: CLINIC | Age: 71
End: 2021-09-22

## 2021-10-13 RX ORDER — TRIAMCINOLONE ACETONIDE 40 MG/ML
40 INJECTION, SUSPENSION INTRA-ARTICULAR; INTRAMUSCULAR
Status: DISCONTINUED | OUTPATIENT
Start: 2021-06-10 | End: 2022-05-21

## 2021-11-11 ENCOUNTER — CLINICAL SUPPORT (OUTPATIENT)
Dept: AUDIOLOGY | Facility: CLINIC | Age: 71
End: 2021-11-11
Payer: MEDICARE

## 2021-11-11 ENCOUNTER — OFFICE VISIT (OUTPATIENT)
Dept: OTOLARYNGOLOGY | Facility: CLINIC | Age: 71
End: 2021-11-11
Payer: MEDICARE

## 2021-11-11 ENCOUNTER — PATIENT MESSAGE (OUTPATIENT)
Dept: OTOLARYNGOLOGY | Facility: CLINIC | Age: 71
End: 2021-11-11

## 2021-11-11 VITALS — SYSTOLIC BLOOD PRESSURE: 129 MMHG | HEART RATE: 75 BPM | DIASTOLIC BLOOD PRESSURE: 66 MMHG

## 2021-11-11 DIAGNOSIS — H69.93 NEGATIVE MIDDLE EAR PRESSURE OF BOTH EARS: Primary | ICD-10-CM

## 2021-11-11 DIAGNOSIS — H90.3 ASYMMETRIC SNHL (SENSORINEURAL HEARING LOSS): ICD-10-CM

## 2021-11-11 DIAGNOSIS — H90.A22 SENSORINEURAL HEARING LOSS (SNHL) OF LEFT EAR WITH RESTRICTED HEARING OF RIGHT EAR: Primary | ICD-10-CM

## 2021-11-11 DIAGNOSIS — Z88.9 HISTORY OF MULTIPLE ALLERGIES: ICD-10-CM

## 2021-11-11 DIAGNOSIS — L29.9 ITCHING OF EAR: ICD-10-CM

## 2021-11-11 DIAGNOSIS — Z87.09 HISTORY OF ASTHMA: ICD-10-CM

## 2021-11-11 DIAGNOSIS — R20.2 FACIAL TINGLING SENSATION: ICD-10-CM

## 2021-11-11 DIAGNOSIS — R42 VERTIGO: ICD-10-CM

## 2021-11-11 PROCEDURE — 99999 PR PBB SHADOW E&M-EST. PATIENT-LVL V: ICD-10-PCS | Mod: PBBFAC,,, | Performed by: OTOLARYNGOLOGY

## 2021-11-11 PROCEDURE — 99213 PR OFFICE/OUTPT VISIT, EST, LEVL III, 20-29 MIN: ICD-10-PCS | Mod: 25,S$PBB,, | Performed by: OTOLARYNGOLOGY

## 2021-11-11 PROCEDURE — 99999 PR PBB SHADOW E&M-EST. PATIENT-LVL V: CPT | Mod: PBBFAC,,, | Performed by: OTOLARYNGOLOGY

## 2021-11-11 PROCEDURE — 92557 COMPREHENSIVE HEARING TEST: CPT | Mod: PBBFAC | Performed by: AUDIOLOGIST

## 2021-11-11 PROCEDURE — 92504 PR EAR MICROSCOPY EXAMINATION: ICD-10-PCS | Mod: S$PBB,,, | Performed by: OTOLARYNGOLOGY

## 2021-11-11 PROCEDURE — 99213 OFFICE O/P EST LOW 20 MIN: CPT | Mod: 25,S$PBB,, | Performed by: OTOLARYNGOLOGY

## 2021-11-11 PROCEDURE — 92567 TYMPANOMETRY: CPT | Mod: PBBFAC | Performed by: AUDIOLOGIST

## 2021-11-11 PROCEDURE — 92504 EAR MICROSCOPY EXAMINATION: CPT | Mod: S$PBB,,, | Performed by: OTOLARYNGOLOGY

## 2021-11-11 PROCEDURE — 99215 OFFICE O/P EST HI 40 MIN: CPT | Mod: PBBFAC | Performed by: OTOLARYNGOLOGY

## 2021-11-15 ENCOUNTER — TELEPHONE (OUTPATIENT)
Dept: NEUROLOGY | Facility: CLINIC | Age: 71
End: 2021-11-15
Payer: MEDICARE

## 2021-11-22 ENCOUNTER — PATIENT MESSAGE (OUTPATIENT)
Dept: NEUROLOGY | Facility: CLINIC | Age: 71
End: 2021-11-22
Payer: MEDICARE

## 2021-12-03 ENCOUNTER — PATIENT OUTREACH (OUTPATIENT)
Dept: ADMINISTRATIVE | Facility: OTHER | Age: 71
End: 2021-12-03
Payer: MEDICARE

## 2021-12-07 ENCOUNTER — OFFICE VISIT (OUTPATIENT)
Dept: CARDIOLOGY | Facility: CLINIC | Age: 71
End: 2021-12-07
Payer: MEDICARE

## 2021-12-07 VITALS
SYSTOLIC BLOOD PRESSURE: 154 MMHG | DIASTOLIC BLOOD PRESSURE: 67 MMHG | HEIGHT: 66 IN | BODY MASS INDEX: 26.14 KG/M2 | WEIGHT: 162.69 LBS | HEART RATE: 64 BPM

## 2021-12-07 DIAGNOSIS — R42 VERTIGO: ICD-10-CM

## 2021-12-07 PROCEDURE — 99213 PR OFFICE/OUTPT VISIT, EST, LEVL III, 20-29 MIN: ICD-10-PCS | Mod: S$PBB,GC,, | Performed by: INTERNAL MEDICINE

## 2021-12-07 PROCEDURE — 99215 OFFICE O/P EST HI 40 MIN: CPT | Mod: PBBFAC | Performed by: INTERNAL MEDICINE

## 2021-12-07 PROCEDURE — 99213 OFFICE O/P EST LOW 20 MIN: CPT | Mod: S$PBB,GC,, | Performed by: INTERNAL MEDICINE

## 2021-12-07 PROCEDURE — 99999 PR PBB SHADOW E&M-EST. PATIENT-LVL V: CPT | Mod: PBBFAC,GC,, | Performed by: INTERNAL MEDICINE

## 2021-12-07 PROCEDURE — 99999 PR PBB SHADOW E&M-EST. PATIENT-LVL V: ICD-10-PCS | Mod: PBBFAC,GC,, | Performed by: INTERNAL MEDICINE

## 2021-12-08 ENCOUNTER — CLINICAL SUPPORT (OUTPATIENT)
Dept: AUDIOLOGY | Facility: CLINIC | Age: 71
End: 2021-12-08
Payer: MEDICARE

## 2021-12-08 DIAGNOSIS — H81.8X9 OTHER DISORDERS OF VESTIBULAR FUNCTION, UNSPECIFIED EAR: Primary | ICD-10-CM

## 2021-12-08 PROCEDURE — 92541 SPONTANEOUS NYSTAGMUS TEST: CPT | Mod: PBBFAC | Performed by: AUDIOLOGIST

## 2021-12-08 PROCEDURE — 92542 POSITIONAL NYSTAGMUS TEST: CPT | Mod: 59,PBBFAC | Performed by: AUDIOLOGIST

## 2021-12-08 PROCEDURE — 92541 SPONTANEOUS NYSTAGMUS TEST: CPT | Mod: 26,S$PBB,, | Performed by: AUDIOLOGIST

## 2021-12-08 PROCEDURE — 92542 PR POSITIONAL NYSTAGMUS TEST: ICD-10-PCS | Mod: 26,59,S$PBB, | Performed by: AUDIOLOGIST

## 2021-12-08 PROCEDURE — 92542 POSITIONAL NYSTAGMUS TEST: CPT | Mod: 26,59,S$PBB, | Performed by: AUDIOLOGIST

## 2021-12-08 PROCEDURE — 92541 PR SPONTANEOUS NYSTAGMUS TEST: ICD-10-PCS | Mod: 26,S$PBB,, | Performed by: AUDIOLOGIST

## 2021-12-14 RX ORDER — ALPRAZOLAM 0.5 MG/1
0.5 TABLET ORAL 2 TIMES DAILY PRN
Qty: 60 TABLET | OUTPATIENT
Start: 2021-12-14

## 2021-12-15 ENCOUNTER — PATIENT MESSAGE (OUTPATIENT)
Dept: INTERNAL MEDICINE | Facility: CLINIC | Age: 71
End: 2021-12-15
Payer: MEDICARE

## 2021-12-15 DIAGNOSIS — E03.9 HYPOTHYROIDISM, UNSPECIFIED TYPE: Primary | ICD-10-CM

## 2021-12-15 DIAGNOSIS — J45.21 MILD INTERMITTENT ASTHMATIC BRONCHITIS WITH ACUTE EXACERBATION: ICD-10-CM

## 2021-12-15 DIAGNOSIS — R79.9 ABNORMAL FINDING OF BLOOD CHEMISTRY, UNSPECIFIED: ICD-10-CM

## 2021-12-15 DIAGNOSIS — E55.9 VITAMIN D DEFICIENCY: ICD-10-CM

## 2021-12-15 RX ORDER — ALPRAZOLAM 0.5 MG/1
0.5 TABLET ORAL 2 TIMES DAILY PRN
Qty: 60 TABLET | Refills: 1 | Status: SHIPPED | OUTPATIENT
Start: 2021-12-15 | End: 2022-03-25 | Stop reason: SDUPTHER

## 2021-12-23 RX ORDER — LEVOTHYROXINE SODIUM 88 UG/1
88 TABLET ORAL EVERY MORNING
Qty: 90 TABLET | Refills: 3 | Status: SHIPPED | OUTPATIENT
Start: 2021-12-23 | End: 2022-03-25 | Stop reason: SDUPTHER

## 2022-01-10 ENCOUNTER — IMMUNIZATION (OUTPATIENT)
Dept: PHARMACY | Facility: CLINIC | Age: 72
End: 2022-01-10
Payer: MEDICARE

## 2022-01-10 DIAGNOSIS — Z23 NEED FOR VACCINATION: Primary | ICD-10-CM

## 2022-01-19 ENCOUNTER — PATIENT OUTREACH (OUTPATIENT)
Dept: ADMINISTRATIVE | Facility: OTHER | Age: 72
End: 2022-01-19
Payer: MEDICARE

## 2022-01-20 ENCOUNTER — PROCEDURE VISIT (OUTPATIENT)
Dept: DERMATOLOGY | Facility: CLINIC | Age: 72
End: 2022-01-20

## 2022-01-20 ENCOUNTER — OFFICE VISIT (OUTPATIENT)
Dept: DERMATOLOGY | Facility: CLINIC | Age: 72
End: 2022-01-20
Payer: MEDICARE

## 2022-01-20 DIAGNOSIS — L82.1 SK (SEBORRHEIC KERATOSIS): Primary | ICD-10-CM

## 2022-01-20 DIAGNOSIS — L82.1 SK (SEBORRHEIC KERATOSIS): ICD-10-CM

## 2022-01-20 DIAGNOSIS — D48.5 NEOPLASM OF UNCERTAIN BEHAVIOR OF SKIN: Primary | ICD-10-CM

## 2022-01-20 DIAGNOSIS — L81.4 LENTIGO: ICD-10-CM

## 2022-01-20 DIAGNOSIS — D18.01 CHERRY ANGIOMA: ICD-10-CM

## 2022-01-20 DIAGNOSIS — D22.9 NEVUS: ICD-10-CM

## 2022-01-20 PROCEDURE — 11102 TANGNTL BX SKIN SINGLE LES: CPT | Mod: PBBFAC,PO | Performed by: DERMATOLOGY

## 2022-01-20 PROCEDURE — 99999 PR PBB SHADOW E&M-EST. PATIENT-LVL IV: ICD-10-PCS | Mod: PBBFAC,,, | Performed by: DERMATOLOGY

## 2022-01-20 PROCEDURE — 99214 OFFICE O/P EST MOD 30 MIN: CPT | Mod: PBBFAC,PO | Performed by: DERMATOLOGY

## 2022-01-20 PROCEDURE — 88305 TISSUE EXAM BY PATHOLOGIST: CPT | Performed by: PATHOLOGY

## 2022-01-20 PROCEDURE — 99213 PR OFFICE/OUTPT VISIT, EST, LEVL III, 20-29 MIN: ICD-10-PCS | Mod: 25,S$PBB,, | Performed by: DERMATOLOGY

## 2022-01-20 PROCEDURE — 17110 DESTRUCTION B9 LES UP TO 14: CPT | Mod: CSM,S$GLB,, | Performed by: DERMATOLOGY

## 2022-01-20 PROCEDURE — 11102 PR TANGENTIAL BIOPSY, SKIN, SINGLE LESION: ICD-10-PCS | Mod: S$PBB,,, | Performed by: DERMATOLOGY

## 2022-01-20 PROCEDURE — 99999 PR PBB SHADOW E&M-EST. PATIENT-LVL IV: CPT | Mod: PBBFAC,,, | Performed by: DERMATOLOGY

## 2022-01-20 PROCEDURE — 17110 PR DESTRUCTION BENIGN LESIONS UP TO 14: ICD-10-PCS | Mod: CSM,S$GLB,, | Performed by: DERMATOLOGY

## 2022-01-20 PROCEDURE — 88305 TISSUE EXAM BY PATHOLOGIST: CPT | Mod: 26,,, | Performed by: PATHOLOGY

## 2022-01-20 PROCEDURE — 88305 TISSUE EXAM BY PATHOLOGIST: ICD-10-PCS | Mod: 26,,, | Performed by: PATHOLOGY

## 2022-01-20 PROCEDURE — 11102 TANGNTL BX SKIN SINGLE LES: CPT | Mod: S$PBB,,, | Performed by: DERMATOLOGY

## 2022-01-20 PROCEDURE — 99499 UNLISTED E&M SERVICE: CPT | Mod: S$GLB,,, | Performed by: DERMATOLOGY

## 2022-01-20 PROCEDURE — 99213 OFFICE O/P EST LOW 20 MIN: CPT | Mod: 25,S$PBB,, | Performed by: DERMATOLOGY

## 2022-01-20 PROCEDURE — 99499 NO LOS: ICD-10-PCS | Mod: S$GLB,,, | Performed by: DERMATOLOGY

## 2022-01-20 NOTE — PROGRESS NOTES
Subjective:       Patient ID:  Sheila Zarco is a 71 y.o. female who presents for No chief complaint on file.    Pt here for cosmetic removal of sk's on face and chest      Review of Systems   Skin: Negative for tendency to form keloidal scars.   Hematologic/Lymphatic: Does not bruise/bleed easily.        Objective:    Physical Exam   Skin:   Areas Examined (abnormalities noted in diagram):   Head / Face Inspection Performed  Chest / Axilla Inspection Performed                   Diagram Legend     Erythematous scaling macule/papule c/w actinic keratosis       Vascular papule c/w angioma      Pigmented verrucoid papule/plaque c/w seborrheic keratosis      Yellow umbilicated papule c/w sebaceous hyperplasia      Irregularly shaped tan macule c/w lentigo     1-2 mm smooth white papules consistent with Milia      Movable subcutaneous cyst with punctum c/w epidermal inclusion cyst      Subcutaneous movable cyst c/w pilar cyst      Firm pink to brown papule c/w dermatofibroma      Pedunculated fleshy papule(s) c/w skin tag(s)      Evenly pigmented macule c/w junctional nevus     Mildly variegated pigmented, slightly irregular-bordered macule c/w mildly atypical nevus      Flesh colored to evenly pigmented papule c/w intradermal nevus       Pink pearly papule/plaque c/w basal cell carcinoma      Erythematous hyperkeratotic cursted plaque c/w SCC      Surgical scar with no sign of skin cancer recurrence      Open and closed comedones      Inflammatory papules and pustules      Verrucoid papule consistent consistent with wart     Erythematous eczematous patches and plaques     Dystrophic onycholytic nail with subungual debris c/w onychomycosis     Umbilicated papule    Erythematous-base heme-crusted tan verrucoid plaque consistent with inflamed seborrheic keratosis     Erythematous Silvery Scaling Plaque c/w Psoriasis     See annotation      Assessment / Plan:        SK (seborrheic keratosis)  Procedure note for  destruction via hyfrecation and curettage:    Verbal consent obtained. Risks of recurrence and scarring discussed.   9 lesions cleaned with alcohol and anesthetized with 1% lidocaine with epinephrine. Areas then lightly hyfrecated and curetted to remove gross lesion. Hemostasis achieved with aluminum chloride application. No complications.   Areas dressed with Vaseline jelly and bandage. Wound care discussed.    Discussed with patient that this procedure may not be covered by insurance as it can be considered cosmetic and pt would like to pursue treatment.  He/she understands that he/she  may be responsible for the bill and agrees to pay.            Follow up if symptoms worsen or fail to improve.

## 2022-01-20 NOTE — PROGRESS NOTES
"  Subjective:       Patient ID:  Sheila Zarco is a 71 y.o. female who presents for   Chief Complaint   Patient presents with    Skin Check     tbse     Patient is here today for a "mole" check.   Pt has a history of  moderate sun exposure in the past.   Pt recalls several blistering sunburns in the past- no  Pt has history of tanning bed use- no  Pt has  had moles removed in the past- yes; benign per pt  Pt has history of melanoma in first degree relatives-  no     Pt has sore lesion on back of head.  Scratched it and still present. No tx.         Review of Systems   Skin: Positive for daily sunscreen use and activity-related sunscreen use. Negative for tendency to form keloidal scars.   Hematologic/Lymphatic: Bruises/bleeds easily (bruise).        Objective:    Physical Exam   Constitutional: She appears well-developed and well-nourished. No distress.   Neurological: She is alert and oriented to person, place, and time. She is not disoriented.   Psychiatric: She has a normal mood and affect.   Skin:   Areas Examined (abnormalities noted in diagram):   Scalp / Hair Palpated and Inspected  Head / Face Inspection Performed  Neck Inspection Performed  Chest / Axilla Inspection Performed  Abdomen Inspection Performed  Genitals / Buttocks / Groin Inspection Performed  Back Inspection Performed  RUE Inspected  LUE Inspection Performed  RLE Inspected  LLE Inspection Performed  Nails and Digits Inspection Performed                               Diagram Legend     Erythematous scaling macule/papule c/w actinic keratosis       Vascular papule c/w angioma      Pigmented verrucoid papule/plaque c/w seborrheic keratosis      Yellow umbilicated papule c/w sebaceous hyperplasia      Irregularly shaped tan macule c/w lentigo     1-2 mm smooth white papules consistent with Milia      Movable subcutaneous cyst with punctum c/w epidermal inclusion cyst      Subcutaneous movable cyst c/w pilar cyst      Firm pink to brown " papule c/w dermatofibroma      Pedunculated fleshy papule(s) c/w skin tag(s)      Evenly pigmented macule c/w junctional nevus     Mildly variegated pigmented, slightly irregular-bordered macule c/w mildly atypical nevus      Flesh colored to evenly pigmented papule c/w intradermal nevus       Pink pearly papule/plaque c/w basal cell carcinoma      Erythematous hyperkeratotic cursted plaque c/w SCC      Surgical scar with no sign of skin cancer recurrence      Open and closed comedones      Inflammatory papules and pustules      Verrucoid papule consistent consistent with wart     Erythematous eczematous patches and plaques     Dystrophic onycholytic nail with subungual debris c/w onychomycosis     Umbilicated papule    Erythematous-base heme-crusted tan verrucoid plaque consistent with inflamed seborrheic keratosis     Erythematous Silvery Scaling Plaque c/w Psoriasis     See annotation      Assessment / Plan:      Pathology Orders:     Normal Orders This Visit    Specimen to Pathology, Dermatology     Comments:    Number of Specimens:->1  ------------------------->-------------------------  Spec 1 Procedure:->Biopsy  Spec 1 Clinical Impression:->r/o inflamed keratosis  Spec 1 Source:->left mid flank    Questions:    Procedure Type: Dermatology and skin neoplasms    Number of Specimens: 1    ------------------------: -------------------------    Spec 1 Procedure: Biopsy    Spec 1 Clinical Impression: r/o inflamed keratosis    Spec 1 Source: left mid flank    Release to patient:         Neoplasm of uncertain behavior of skin  Shave biopsy procedure note:    Shave biopsy performed after verbal consent including risk of infection, scar, recurrence, need for additional treatment of site. Area prepped with alcohol, anesthetized with approximately 1.0cc of 1% lidocaine with epinephrine. Lesional tissue shaved with razor blade. Hemostasis achieved with application of aluminum chloride followed by hyfrecation. No  complications. Dressing applied. Wound care explained.      -     Specimen to Pathology, Dermatology    SK (seborrheic keratosis)  These are benign inherited growths without a malignant potential. Reassurance given to patient. No treatment is necessary.     Lentigo  This is a benign hyperpigmented sun induced lesion. Recommend daily sun protection/avoidance and use of at least SPF 30, broad spectrum sunscreen (OTC drug) will reduce the number of new lesions. Treatment of these benign lesions are considered cosmetic.    The nature of sun-induced photo-aging and skin cancers is discussed.  Sun avoidance, protective clothing, and the use of 30-SPF sunscreens is advised. Observe closely for skin damage/changes, and call if such occurs.    Cherry angioma  These are benign vascular lesions that are inherited.  Treatment is not necessary.    Nevus  Discussed ABCDE's of nevi.  Monitor for new mole or moles that are becoming bigger, darker, irritated, or developing irregular borders. Brochure provided. Instructed patient to observe lesion(s) for changes and follow up in clinic if changes are noted. Patient to monitor skin at home for new or changing lesions.     Total body skin examination performed today including at least 12 points as noted in physical examination. Suspicious lesions noted.    Recommend daily sun protection/avoidance, use of at least SPF 30, broad spectrum sunscreen (OTC drug), skin self examinations, and routine physician surveillance to optimize early detection             Follow up for prn bx report.

## 2022-01-20 NOTE — PATIENT INSTRUCTIONS
Shave Biopsy Wound Care    Your doctor has performed a shave biopsy today.  A band aid and vaseline ointment has been placed over the site.  This should remain in place for NO LONGER THAN 48 hours.  It is fine to remove the bandaid after 24 hours, if the area is no longer bleeding. It is recommended that you keep the area dry (do not wet)) for the first 24 hours.  After 24 hours, wash the area with warm soap and water and apply Vaseline jelly.  Many patients prefer to use Neosporin or Bacitracin ointment.  This is acceptable; however, know that you can develop an allergy to this medication even if you have used it safely for years.  It is important to keep the area moist.  Letting it dry out and get air slows healing time, and will worsen the scar.        If you notice increasing redness, tenderness, pain, or yellow drainage at the biopsy site, please notify your doctor.  These are signs of an infection.    If your biopsy site is bleeding, apply firm pressure for 15 minutes straight.  Repeat for another 15 minutes, if it is still bleeding.   If the surgical site continues to bleed, then please contact your doctor.      For [facility] users:   You will receive your biopsy results in [facility] as soon as they are available. Please be assured that your physician/provider will review your results and will then determine what further treatment, evaluation, or planning is required. You should be contacted by your physician's/provider's office within 5 business days of receiving your results; If not, please reach out to directly. This is one more way [facility] is putting you first.     [facility] [place] [address]/ [phone] FAX/ [email or web address]

## 2022-01-31 LAB
FINAL PATHOLOGIC DIAGNOSIS: NORMAL
GROSS: NORMAL
Lab: NORMAL
MICROSCOPIC EXAM: NORMAL

## 2022-02-02 NOTE — PROGRESS NOTES
Ms. Zarco, your pathology report indicates a benign, non cancerous lesion. No further treatment is needed at this time. Please schedule a follow up appointment in 12 months. Thank you for allowing me to care for you and take care, Dr. Sun    13 d ago   Final Pathologic Diagnosis 1.  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   This lesion is benign

## 2022-02-24 RX ORDER — LEVOCETIRIZINE DIHYDROCHLORIDE 5 MG/1
5 TABLET, FILM COATED ORAL NIGHTLY
Qty: 90 TABLET | Refills: 1 | Status: SHIPPED | OUTPATIENT
Start: 2022-02-24 | End: 2023-05-11

## 2022-02-24 NOTE — TELEPHONE ENCOUNTER
Care Due:                  Date            Visit Type   Department     Provider  --------------------------------------------------------------------------------                                ESTABLISHED                              PATIENT -    [facility] INTERNAL  Last Visit: 02-      VIRTUAL      MEDICINE       Sarkis Patel                              EP -                              PRIMARY      [facility] INTERNAL  Next Visit: 03-      CARE ([facility])   MEDICINE       Farhat Correa                                                            Last  Test          Frequency    Reason                     Performed    Due Date  --------------------------------------------------------------------------------    CBC.........  12 months..  valACYclovir.............  10-   10-    CMP.........  12 months..  atorvastatin,              10-   10-                             valACYclovir.............    Lipid Panel.  12 months..  atorvastatin.............  10-   10-    TSH.........  12 months..  levothyroxine............  05- 05-    Powered by [employer]. Reference number: [id number].   2/24/2022 1:58:25 PM CST

## 2022-03-22 ENCOUNTER — LAB VISIT (OUTPATIENT)
Dept: LAB | Facility: HOSPITAL | Age: 72
End: 2022-03-22
Attending: INTERNAL MEDICINE
Payer: MEDICARE

## 2022-03-22 DIAGNOSIS — J45.21 MILD INTERMITTENT ASTHMATIC BRONCHITIS WITH ACUTE EXACERBATION: ICD-10-CM

## 2022-03-22 DIAGNOSIS — E03.9 HYPOTHYROIDISM, UNSPECIFIED TYPE: ICD-10-CM

## 2022-03-22 DIAGNOSIS — R79.9 ABNORMAL FINDING OF BLOOD CHEMISTRY, UNSPECIFIED: ICD-10-CM

## 2022-03-22 DIAGNOSIS — E55.9 VITAMIN D DEFICIENCY: ICD-10-CM

## 2022-03-22 LAB
25(OH)D3+25(OH)D2 SERPL-MCNC: 33 NG/ML (ref 30–96)
ALBUMIN SERPL BCP-MCNC: 3.8 G/DL (ref 3.5–5.2)
ALP SERPL-CCNC: 76 U/L (ref 55–135)
ALT SERPL W/O P-5'-P-CCNC: 16 U/L (ref 10–44)
ANION GAP SERPL CALC-SCNC: 14 MMOL/L (ref 8–16)
AST SERPL-CCNC: 20 U/L (ref 10–40)
BASOPHILS # BLD AUTO: 0.04 K/UL (ref 0–0.2)
BASOPHILS NFR BLD: 0.7 % (ref 0–1.9)
BILIRUB SERPL-MCNC: 1.1 MG/DL (ref 0.1–1)
BUN SERPL-MCNC: 12 MG/DL (ref 8–23)
CALCIUM SERPL-MCNC: 10 MG/DL (ref 8.7–10.5)
CHLORIDE SERPL-SCNC: 106 MMOL/L (ref 95–110)
CHOLEST SERPL-MCNC: 217 MG/DL (ref 120–199)
CHOLEST/HDLC SERPL: 2.2 {RATIO} (ref 2–5)
CO2 SERPL-SCNC: 22 MMOL/L (ref 23–29)
CREAT SERPL-MCNC: 0.7 MG/DL (ref 0.5–1.4)
DIFFERENTIAL METHOD: ABNORMAL
EOSINOPHIL # BLD AUTO: 0.2 K/UL (ref 0–0.5)
EOSINOPHIL NFR BLD: 2.6 % (ref 0–8)
ERYTHROCYTE [DISTWIDTH] IN BLOOD BY AUTOMATED COUNT: 13.7 % (ref 11.5–14.5)
EST. GFR  (AFRICAN AMERICAN): >60 ML/MIN/1.73 M^2
EST. GFR  (NON AFRICAN AMERICAN): >60 ML/MIN/1.73 M^2
ESTIMATED AVG GLUCOSE: 105 MG/DL (ref 68–131)
GLUCOSE SERPL-MCNC: 99 MG/DL (ref 70–110)
HBA1C MFR BLD: 5.3 % (ref 4–5.6)
HCT VFR BLD AUTO: 40.5 % (ref 37–48.5)
HDLC SERPL-MCNC: 100 MG/DL (ref 40–75)
HDLC SERPL: 46.1 % (ref 20–50)
HGB BLD-MCNC: 12.8 G/DL (ref 12–16)
IMM GRANULOCYTES # BLD AUTO: 0.01 K/UL (ref 0–0.04)
IMM GRANULOCYTES NFR BLD AUTO: 0.2 % (ref 0–0.5)
LDLC SERPL CALC-MCNC: 83.4 MG/DL (ref 63–159)
LYMPHOCYTES # BLD AUTO: 1.3 K/UL (ref 1–4.8)
LYMPHOCYTES NFR BLD: 22 % (ref 18–48)
MCH RBC QN AUTO: 29 PG (ref 27–31)
MCHC RBC AUTO-ENTMCNC: 31.6 G/DL (ref 32–36)
MCV RBC AUTO: 92 FL (ref 82–98)
MONOCYTES # BLD AUTO: 0.3 K/UL (ref 0.3–1)
MONOCYTES NFR BLD: 4.6 % (ref 4–15)
NEUTROPHILS # BLD AUTO: 4.2 K/UL (ref 1.8–7.7)
NEUTROPHILS NFR BLD: 69.9 % (ref 38–73)
NONHDLC SERPL-MCNC: 117 MG/DL
NRBC BLD-RTO: 0 /100 WBC
PLATELET # BLD AUTO: 376 K/UL (ref 150–450)
PMV BLD AUTO: 9.7 FL (ref 9.2–12.9)
POTASSIUM SERPL-SCNC: 3.5 MMOL/L (ref 3.5–5.1)
PROT SERPL-MCNC: 7.3 G/DL (ref 6–8.4)
RBC # BLD AUTO: 4.42 M/UL (ref 4–5.4)
SODIUM SERPL-SCNC: 142 MMOL/L (ref 136–145)
TRIGL SERPL-MCNC: 168 MG/DL (ref 30–150)
TSH SERPL DL<=0.005 MIU/L-ACNC: 2.09 UIU/ML (ref 0.4–4)
WBC # BLD AUTO: 6.04 K/UL (ref 3.9–12.7)

## 2022-03-22 PROCEDURE — 80061 LIPID PANEL: CPT | Performed by: INTERNAL MEDICINE

## 2022-03-22 PROCEDURE — 80053 COMPREHEN METABOLIC PANEL: CPT | Performed by: INTERNAL MEDICINE

## 2022-03-22 PROCEDURE — 36415 COLL VENOUS BLD VENIPUNCTURE: CPT | Mod: PO | Performed by: INTERNAL MEDICINE

## 2022-03-22 PROCEDURE — 85025 COMPLETE CBC W/AUTO DIFF WBC: CPT | Performed by: INTERNAL MEDICINE

## 2022-03-22 PROCEDURE — 82306 VITAMIN D 25 HYDROXY: CPT | Performed by: INTERNAL MEDICINE

## 2022-03-22 PROCEDURE — 83036 HEMOGLOBIN GLYCOSYLATED A1C: CPT | Performed by: INTERNAL MEDICINE

## 2022-03-22 PROCEDURE — 84443 ASSAY THYROID STIM HORMONE: CPT | Performed by: INTERNAL MEDICINE

## 2022-03-25 ENCOUNTER — OFFICE VISIT (OUTPATIENT)
Dept: INTERNAL MEDICINE | Facility: CLINIC | Age: 72
End: 2022-03-25
Payer: MEDICARE

## 2022-03-25 VITALS
OXYGEN SATURATION: 98 % | WEIGHT: 155.19 LBS | HEART RATE: 74 BPM | BODY MASS INDEX: 24.94 KG/M2 | DIASTOLIC BLOOD PRESSURE: 62 MMHG | RESPIRATION RATE: 16 BRPM | HEIGHT: 66 IN | SYSTOLIC BLOOD PRESSURE: 112 MMHG | TEMPERATURE: 98 F

## 2022-03-25 DIAGNOSIS — F41.9 ANXIETY: ICD-10-CM

## 2022-03-25 DIAGNOSIS — J45.21 MILD INTERMITTENT ASTHMATIC BRONCHITIS WITH ACUTE EXACERBATION: Primary | ICD-10-CM

## 2022-03-25 DIAGNOSIS — Z78.0 POSTMENOPAUSAL: ICD-10-CM

## 2022-03-25 DIAGNOSIS — E03.9 HYPOTHYROIDISM, UNSPECIFIED TYPE: ICD-10-CM

## 2022-03-25 DIAGNOSIS — K21.9 GASTROESOPHAGEAL REFLUX DISEASE, UNSPECIFIED WHETHER ESOPHAGITIS PRESENT: ICD-10-CM

## 2022-03-25 PROCEDURE — 99215 OFFICE O/P EST HI 40 MIN: CPT | Mod: PBBFAC,PO | Performed by: INTERNAL MEDICINE

## 2022-03-25 PROCEDURE — 99214 PR OFFICE/OUTPT VISIT, EST, LEVL IV, 30-39 MIN: ICD-10-PCS | Mod: S$PBB,,, | Performed by: INTERNAL MEDICINE

## 2022-03-25 PROCEDURE — 99999 PR PBB SHADOW E&M-EST. PATIENT-LVL V: ICD-10-PCS | Mod: PBBFAC,,, | Performed by: INTERNAL MEDICINE

## 2022-03-25 PROCEDURE — 99999 PR PBB SHADOW E&M-EST. PATIENT-LVL V: CPT | Mod: PBBFAC,,, | Performed by: INTERNAL MEDICINE

## 2022-03-25 PROCEDURE — 99214 OFFICE O/P EST MOD 30 MIN: CPT | Mod: S$PBB,,, | Performed by: INTERNAL MEDICINE

## 2022-03-25 RX ORDER — FLUTICASONE PROPIONATE AND SALMETEROL 250; 50 UG/1; UG/1
1 POWDER RESPIRATORY (INHALATION) 2 TIMES DAILY
Qty: 60 EACH | Refills: 5 | Status: SHIPPED | OUTPATIENT
Start: 2022-03-25 | End: 2022-09-22

## 2022-03-25 RX ORDER — LEVOTHYROXINE SODIUM 88 UG/1
88 TABLET ORAL EVERY MORNING
Qty: 90 TABLET | Refills: 3 | Status: SHIPPED | OUTPATIENT
Start: 2022-03-25 | End: 2023-04-06

## 2022-03-25 RX ORDER — ESCITALOPRAM OXALATE 20 MG/1
20 TABLET ORAL DAILY
Qty: 90 TABLET | Refills: 2 | Status: SHIPPED | OUTPATIENT
Start: 2022-03-25 | End: 2023-04-28

## 2022-03-25 RX ORDER — ATORVASTATIN CALCIUM 20 MG/1
20 TABLET, FILM COATED ORAL DAILY
Qty: 90 TABLET | Refills: 3 | Status: SHIPPED | OUTPATIENT
Start: 2022-03-25 | End: 2023-05-11

## 2022-03-25 RX ORDER — ALPRAZOLAM 0.5 MG/1
0.5 TABLET ORAL 2 TIMES DAILY PRN
Qty: 60 TABLET | Refills: 1 | Status: SHIPPED | OUTPATIENT
Start: 2022-03-25 | End: 2022-09-09

## 2022-03-25 RX ORDER — PANTOPRAZOLE SODIUM 40 MG/1
40 TABLET, DELAYED RELEASE ORAL DAILY
Qty: 90 TABLET | Refills: 3 | Status: SHIPPED | OUTPATIENT
Start: 2022-03-25 | End: 2023-04-24

## 2022-03-25 NOTE — PROGRESS NOTES
Subjective:       Patient ID: Sheila Zarco is a 71 y.o. female.    Chief Complaint: Elbow Injury (3 wks ago; tripped holding grandson and Right elbow hit the ground. Pt c/o soreness, hurts when lying on it or putting pressure) and Annual Exam    HPI   The patient presents for follow-up of medical conditions which include asthma, GERD, hyperlipidemia.  The patient has been experiencing stress and anxiety.  Her son has been diagnosed with posttraumatic stress disorder and associated alcohol use disorder.  He is being seen at the [facility].  She is trying to have him admitted to [facility].  She occasionally has used a half of a Xanax tablet for acute anxiety symptoms.    The patient states her asthma has been stable.  She uses her controller as needed for periods of asthma flare up.  She otherwise uses her nebulizer or metered dose inhaler for acute wheezing.  Reflux symptoms have been controlled.  He uses Protonix is needed for flare ups.  She was also advised use Tums is needed for mild flare ups when she is off of Protonix.    Patient recently hurt her right elbow, right shoulder, and right hip when she sustained a fall when she accidentally tripped over.  She is not seeking any additional evaluation of this injury at the present time.    She has been evaluated by her podiatrist for right heel pain.  Symptoms have improved.    She has seen a neurologist for vertebral artery narrowing noted on a recent carotid artery ultrasound.    Review of Systems   Constitutional: Negative for fatigue, fever and unexpected weight change.   HENT: Negative for nasal congestion, postnasal drip, rhinorrhea and sore throat.    Eyes: Negative for visual disturbance.   Respiratory: Positive for wheezing. Negative for cough, chest tightness and shortness of breath.    Cardiovascular: Positive for palpitations. Negative for chest pain and leg swelling.        Palpitations are felt during her periods of acute  anxiety.   Gastrointestinal: Negative for abdominal pain and blood in stool.   Genitourinary: Negative for dysuria, frequency and hematuria.   Musculoskeletal: Positive for arthralgias. Negative for back pain, joint swelling and myalgias.   Integumentary:  Negative for rash.   Neurological: Negative for dizziness, seizures, syncope, weakness, numbness, headaches, disturbances in coordination and coordination difficulties.   Psychiatric/Behavioral: Negative for sleep disturbance. The patient is nervous/anxious.             Physical Exam  Vitals and nursing note reviewed.   Constitutional:       General: She is not in acute distress.     Appearance: She is well-developed.   HENT:      Head: Normocephalic and atraumatic.      Right Ear: External ear normal.      Left Ear: External ear normal.      Nose: Nose normal.      Mouth/Throat:      Pharynx: No oropharyngeal exudate.   Eyes:      General: No scleral icterus.     Conjunctiva/sclera: Conjunctivae normal.      Pupils: Pupils are equal, round, and reactive to light.   Neck:      Thyroid: No thyromegaly.      Vascular: No carotid bruit or JVD.   Cardiovascular:      Rate and Rhythm: Normal rate and regular rhythm.      Pulses: Normal pulses.      Heart sounds: Normal heart sounds. No murmur heard.    No friction rub. No gallop.   Pulmonary:      Effort: Pulmonary effort is normal. No respiratory distress.      Breath sounds: Normal breath sounds. No wheezing or rales.   Chest:   Breasts:      Right: No supraclavicular adenopathy.      Left: No supraclavicular adenopathy.       Abdominal:      General: Bowel sounds are normal. There is no abdominal bruit.      Palpations: Abdomen is soft. There is no hepatomegaly, splenomegaly or mass.      Tenderness: There is no abdominal tenderness.      Hernia: No hernia is present.   Musculoskeletal:         General: No tenderness. Normal range of motion.      Right shoulder: No deformity or effusion.      Cervical back: Normal  range of motion and neck supple.   Lymphadenopathy:      Cervical: No cervical adenopathy.      Upper Body:      Right upper body: No supraclavicular adenopathy.      Left upper body: No supraclavicular adenopathy.   Skin:     General: Skin is warm and dry.      Findings: No rash.   Neurological:      Mental Status: She is alert and oriented to person, place, and time.      Cranial Nerves: No cranial nerve deficit.   Psychiatric:         Speech: Speech normal.         Behavior: Behavior normal.           Lab Visit on 03/22/2022   Component Date Value Ref Range Status    Sodium 03/22/2022 142  136 - 145 mmol/L Final    Potassium 03/22/2022 3.5  3.5 - 5.1 mmol/L Final    Chloride 03/22/2022 106  95 - 110 mmol/L Final    CO2 03/22/2022 22 (A) 23 - 29 mmol/L Final    Glucose 03/22/2022 99  70 - 110 mg/dL Final    BUN 03/22/2022 12  8 - 23 mg/dL Final    Creatinine 03/22/2022 0.7  0.5 - 1.4 mg/dL Final    Calcium 03/22/2022 10.0  8.7 - 10.5 mg/dL Final    Total Protein 03/22/2022 7.3  6.0 - 8.4 g/dL Final    Albumin 03/22/2022 3.8  3.5 - 5.2 g/dL Final    Total Bilirubin 03/22/2022 1.1 (A) 0.1 - 1.0 mg/dL Final    Comment: For infants and newborns, interpretation of results should be based  on gestational age, weight and in agreement with clinical  observations.    Premature Infant recommended reference ranges:  Up to 24 hours.............<8.0 mg/dL  Up to 48 hours............<12.0 mg/dL  3-5 days..................<15.0 mg/dL  6-29 days.................<15.0 mg/dL      Alkaline Phosphatase 03/22/2022 76  55 - 135 U/L Final    AST 03/22/2022 20  10 - 40 U/L Final    ALT 03/22/2022 16  10 - 44 U/L Final    Anion Gap 03/22/2022 14  8 - 16 mmol/L Final    eGFR if African American 03/22/2022 >60.0  >60 mL/min/1.73 m^2 Final    eGFR if non African American 03/22/2022 >60.0  >60 mL/min/1.73 m^2 Final    Comment: Calculation used to obtain the estimated glomerular filtration  rate (eGFR) is the CKD-EPI equation.        Cholesterol 03/22/2022 217 (A) 120 - 199 mg/dL Final    Comment: The National Cholesterol Education Program (NCEP) has set the  following guidelines (reference ranges) for Cholesterol:  Optimal.....................<200 mg/dL  Borderline High.............200-239 mg/dL  High........................> or = 240 mg/dL      Triglycerides 03/22/2022 168 (A) 30 - 150 mg/dL Final    Comment: The National Cholesterol Education Program (NCEP) has set the  following guidelines (reference values) for triglycerides:  Normal......................<150 mg/dL  Borderline High.............150-199 mg/dL  High........................200-499 mg/dL      HDL 03/22/2022 100 (A) 40 - 75 mg/dL Final    Comment: The National Cholesterol Education Program (NCEP) has set the  following guidelines (reference values) for HDL Cholesterol:  Low...............<40 mg/dL  Optimal...........>60 mg/dL      LDL Cholesterol 03/22/2022 83.4  63.0 - 159.0 mg/dL Final    Comment: The National Cholesterol Education Program (NCEP) has set the  following guidelines (reference values) for LDL Cholesterol:  Optimal.......................<130 mg/dL  Borderline High...............130-159 mg/dL  High..........................160-189 mg/dL  Very High.....................>190 mg/dL      HDL/Cholesterol Ratio 03/22/2022 46.1  20.0 - 50.0 % Final    Total Cholesterol/HDL Ratio 03/22/2022 2.2  2.0 - 5.0 Final    Non-HDL Cholesterol 03/22/2022 117  mg/dL Final    Comment: Risk category and Non-HDL cholesterol goals:  Coronary heart disease (CHD)or equivalent (10-year risk of CHD >20%):  Non-HDL cholesterol goal     <130 mg/dL  Two or more CHD risk factors and 10-year risk of CHD <= 20%:  Non-HDL cholesterol goal     <160 mg/dL  0 to 1 CHD risk factor:  Non-HDL cholesterol goal     <190 mg/dL      WBC 03/22/2022 6.04  3.90 - 12.70 K/uL Final    RBC 03/22/2022 4.42  4.00 - 5.40 M/uL Final    Hemoglobin 03/22/2022 12.8  12.0 - 16.0 g/dL Final    Hematocrit  03/22/2022 40.5  37.0 - 48.5 % Final    MCV 03/22/2022 92  82 - 98 fL Final    MCH 03/22/2022 29.0  27.0 - 31.0 pg Final    MCHC 03/22/2022 31.6 (A) 32.0 - 36.0 g/dL Final    RDW 03/22/2022 13.7  11.5 - 14.5 % Final    Platelets 03/22/2022 376  150 - 450 K/uL Final    MPV 03/22/2022 9.7  9.2 - 12.9 fL Final    Immature Granulocytes 03/22/2022 0.2  0.0 - 0.5 % Final    Gran # (ANC) 03/22/2022 4.2  1.8 - 7.7 K/uL Final    Immature Grans (Abs) 03/22/2022 0.01  0.00 - 0.04 K/uL Final    Comment: Mild elevation in immature granulocytes is non specific and   can be seen in a variety of conditions including stress response,   acute inflammation, trauma and pregnancy. Correlation with other   laboratory and clinical findings is essential.      Lymph # 03/22/2022 1.3  1.0 - 4.8 K/uL Final    Mono # 03/22/2022 0.3  0.3 - 1.0 K/uL Final    Eos # 03/22/2022 0.2  0.0 - 0.5 K/uL Final    Baso # 03/22/2022 0.04  0.00 - 0.20 K/uL Final    nRBC 03/22/2022 0  0 /100 WBC Final    Gran % 03/22/2022 69.9  38.0 - 73.0 % Final    Lymph % 03/22/2022 22.0  18.0 - 48.0 % Final    Mono % 03/22/2022 4.6  4.0 - 15.0 % Final    Eosinophil % 03/22/2022 2.6  0.0 - 8.0 % Final    Basophil % 03/22/2022 0.7  0.0 - 1.9 % Final    Differential Method 03/22/2022 Automated   Final    TSH 03/22/2022 2.091  0.400 - 4.000 uIU/mL Final    Hemoglobin A1C 03/22/2022 5.3  4.0 - 5.6 % Final    Comment: ADA Screening Guidelines:  5.7-6.4%  Consistent with prediabetes  >or=6.5%  Consistent with diabetes    High levels of fetal hemoglobin interfere with the HbA1C  assay. Heterozygous hemoglobin variants (HbS, HgC, etc)do  not significantly interfere with this assay.   However, presence of multiple variants may affect accuracy.      Estimated Avg Glucose 03/22/2022 105  68 - 131 mg/dL Final    Vit D, 25-Hydroxy 03/22/2022 33  30 - 96 ng/mL Final    Comment: Vitamin D deficiency.........<10 ng/mL                              Vitamin D  insufficiency......10-29 ng/mL       Vitamin D sufficiency........> or equal to 30 ng/mL  Vitamin D toxicity............>100 ng/mL     Lab Visit on 03/22/2022   Component Date Value Ref Range Status    Specimen UA 03/22/2022 Urine, Unspecified   Final    Color, UA 03/22/2022 Yellow  Yellow, Straw, Hilda Final    Appearance, UA 03/22/2022 Hazy (A) Clear Final    pH, UA 03/22/2022 5.0  5.0 - 8.0 Final    Specific Gravity, UA 03/22/2022 1.015  1.005 - 1.030 Final    Protein, UA 03/22/2022 Negative  Negative Final    Comment: Recommend a 24 hour urine protein or a urine   protein/creatinine ratio if globulin induced proteinuria is  clinically suspected.      Glucose, UA 03/22/2022 Negative  Negative Final    Ketones, UA 03/22/2022 Trace (A) Negative Final    Bilirubin (UA) 03/22/2022 Negative  Negative Final    Occult Blood UA 03/22/2022 1+ (A) Negative Final    Nitrite, UA 03/22/2022 Negative  Negative Final    Leukocytes, UA 03/22/2022 Negative  Negative Final    Microalbumin, Urine 03/22/2022 7.0  ug/mL Final    Creatinine, Urine 03/22/2022 133.0  15.0 - 325.0 mg/dL Final    Microalb/Creat Ratio 03/22/2022 5.3  0.0 - 30.0 ug/mg Final    RBC, UA 03/22/2022 1  0 - 4 /hpf Final    WBC, UA 03/22/2022 2  0 - 5 /hpf Final    Squam Epithel, UA 03/22/2022 4  /hpf Final    Microscopic Comment 03/22/2022 SEE COMMENT   Final    Comment: Other formed elements not mentioned in the report are not   present in the microscopic examination.          Assessment & Plan:      Sheila was seen today for elbow injury and annual exam.    Diagnoses and all orders for this visit:    Mild intermittent asthmatic bronchitis with acute exacerbation    Hypothyroidism, unspecified type    Gastroesophageal reflux disease, unspecified whether esophagitis present    Anxiety    Postmenopausal  -     DXA Bone Density Spine And Hip; Future    Other orders  -     pantoprazole (PROTONIX) 40 MG tablet; Take 1 tablet (40 mg total) by  mouth once daily.  -     levothyroxine (SYNTHROID) 88 MCG tablet; Take 1 tablet (88 mcg total) by mouth every morning.  -     fluticasone-salmeterol diskus inhaler 250-50 mcg; Inhale 1 puff into the lungs 2 (two) times daily.  -     EScitalopram oxalate (LEXAPRO) 20 MG tablet; Take 1 tablet (20 mg total) by mouth once daily. For anxiety.  -     atorvastatin (LIPITOR) 20 MG tablet; Take 1 tablet (20 mg total) by mouth once daily. For cholesterol control.  -     ALPRAZolam (XANAX) 0.5 MG tablet; Take 1 tablet (0.5 mg total) by mouth 2 (two) times daily as needed.         Follow up in about 6 months (around 9/25/2022).     Farhat Correa MD

## 2022-04-28 ENCOUNTER — PATIENT MESSAGE (OUTPATIENT)
Dept: DERMATOLOGY | Facility: CLINIC | Age: 72
End: 2022-04-28
Payer: MEDICARE

## 2022-05-03 ENCOUNTER — PATIENT MESSAGE (OUTPATIENT)
Dept: OPTOMETRY | Facility: CLINIC | Age: 72
End: 2022-05-03
Payer: MEDICARE

## 2022-05-04 ENCOUNTER — OFFICE VISIT (OUTPATIENT)
Dept: OPTOMETRY | Facility: CLINIC | Age: 72
End: 2022-05-04
Payer: MEDICARE

## 2022-05-04 DIAGNOSIS — H04.123 DRY EYES, BILATERAL: Primary | ICD-10-CM

## 2022-05-04 PROCEDURE — 99214 OFFICE O/P EST MOD 30 MIN: CPT | Mod: PBBFAC | Performed by: OPTOMETRIST

## 2022-05-04 PROCEDURE — 99999 PR PBB SHADOW E&M-EST. PATIENT-LVL IV: CPT | Mod: PBBFAC,,, | Performed by: OPTOMETRIST

## 2022-05-04 PROCEDURE — 92014 COMPRE OPH EXAM EST PT 1/>: CPT | Mod: S$PBB,,, | Performed by: OPTOMETRIST

## 2022-05-04 PROCEDURE — 92014 PR EYE EXAM, EST PATIENT,COMPREHESV: ICD-10-PCS | Mod: S$PBB,,, | Performed by: OPTOMETRIST

## 2022-05-04 PROCEDURE — 99999 PR PBB SHADOW E&M-EST. PATIENT-LVL IV: ICD-10-PCS | Mod: PBBFAC,,, | Performed by: OPTOMETRIST

## 2022-05-04 NOTE — PROGRESS NOTES
HPI     Presents today for [facility]ARE. Pt reports constant eye irritation--tearing,   discharge and itching x 1 month. Using Pataday, Systane, and olopatadine   hcl  0.1% prescribed by dr lux in 7/17/20. No relief. Pt denies eye   pain and vision changes.    S/P PCIOL OU    Last edited by Natalie Churchill MA on 5/4/2022  2:34 PM. (History)        ROS     Positive for: Eyes (cat surgery OU)    Negative for: Constitutional, Gastrointestinal, Neurological, Skin,   Genitourinary, Musculoskeletal, HENT, Endocrine, Cardiovascular,   Respiratory, Psychiatric, Allergic/Imm, Heme/Lymph    Last edited by Gregory Vang, OD on 5/4/2022  2:39 PM. (History)        Assessment /Plan     For exam results, see Encounter Report.    Dry eyes, bilateral      1. Sp pciol OU  2. Sp PI OU  3. Pt presents TODAY with dry eye issues.  Only using ATs once or twice a day    PLAN:    1. SOOTHE XP Ats QID  2. MICHAELA HEAT MASK 10 min daily  3. Discussed chronic nature of condition--need for daily maintenance  4.  Pt has appt next month for routine/refraction--will check progress then

## 2022-05-06 ENCOUNTER — HOSPITAL ENCOUNTER (OUTPATIENT)
Dept: RADIOLOGY | Facility: CLINIC | Age: 72
Discharge: HOME | End: 2022-05-06
Attending: INTERNAL MEDICINE
Payer: MEDICARE

## 2022-05-06 DIAGNOSIS — Z78.0 POSTMENOPAUSAL: ICD-10-CM

## 2022-05-06 PROCEDURE — 77080 DXA BONE DENSITY AXIAL: CPT | Mod: TC

## 2022-05-06 PROCEDURE — 77080 DXA BONE DENSITY AXIAL: CPT | Mod: 26,,, | Performed by: INTERNAL MEDICINE

## 2022-05-06 PROCEDURE — 77080 DEXA BONE DENSITY SPINE HIP: ICD-10-PCS | Mod: 26,,, | Performed by: INTERNAL MEDICINE

## 2022-05-09 ENCOUNTER — OFFICE VISIT (OUTPATIENT)
Dept: DERMATOLOGY | Facility: CLINIC | Age: 72
End: 2022-05-09
Payer: MEDICARE

## 2022-05-09 DIAGNOSIS — L82.1 SK (SEBORRHEIC KERATOSIS): Primary | ICD-10-CM

## 2022-05-09 PROCEDURE — 99999 PR PBB SHADOW E&M-EST. PATIENT-LVL I: CPT | Mod: PBBFAC,,, | Performed by: DERMATOLOGY

## 2022-05-09 PROCEDURE — 99999 PR PBB SHADOW E&M-EST. PATIENT-LVL I: ICD-10-PCS | Mod: PBBFAC,,, | Performed by: DERMATOLOGY

## 2022-05-09 PROCEDURE — 99024 PR POST-OP FOLLOW-UP VISIT: ICD-10-PCS | Mod: POP,,, | Performed by: DERMATOLOGY

## 2022-05-09 PROCEDURE — 99024 POSTOP FOLLOW-UP VISIT: CPT | Mod: POP,,, | Performed by: DERMATOLOGY

## 2022-05-09 PROCEDURE — 99211 OFF/OP EST MAY X REQ PHY/QHP: CPT | Mod: PBBFAC,PO | Performed by: DERMATOLOGY

## 2022-05-09 NOTE — PROGRESS NOTES
Subjective:       Patient ID:  Sheila Zarco is a 71 y.o. female who presents for No chief complaint on file.    Pt s/p cautery and curettage to sk's on face  Pt feels there are 2 lesions on left cheek that are still scaly and she is concerned about their healing      Review of Systems   Skin: Negative for daily sunscreen use.   Hematologic/Lymphatic: Does not bruise/bleed easily.        Objective:    Physical Exam   Constitutional: She appears well-developed and well-nourished. No distress.   Neurological: She is alert and oriented to person, place, and time. She is not disoriented.   Psychiatric: She has a normal mood and affect.   Skin:   Areas Examined (abnormalities noted in diagram):   Head / Face Inspection Performed              Diagram Legend     Erythematous scaling macule/papule c/w actinic keratosis       Vascular papule c/w angioma      Pigmented verrucoid papule/plaque c/w seborrheic keratosis      Yellow umbilicated papule c/w sebaceous hyperplasia      Irregularly shaped tan macule c/w lentigo     1-2 mm smooth white papules consistent with Milia      Movable subcutaneous cyst with punctum c/w epidermal inclusion cyst      Subcutaneous movable cyst c/w pilar cyst      Firm pink to brown papule c/w dermatofibroma      Pedunculated fleshy papule(s) c/w skin tag(s)      Evenly pigmented macule c/w junctional nevus     Mildly variegated pigmented, slightly irregular-bordered macule c/w mildly atypical nevus      Flesh colored to evenly pigmented papule c/w intradermal nevus       Pink pearly papule/plaque c/w basal cell carcinoma      Erythematous hyperkeratotic cursted plaque c/w SCC      Surgical scar with no sign of skin cancer recurrence      Open and closed comedones      Inflammatory papules and pustules      Verrucoid papule consistent consistent with wart     Erythematous eczematous patches and plaques     Dystrophic onycholytic nail with subungual debris c/w onychomycosis     Umbilicated  papule    Erythematous-base heme-crusted tan verrucoid plaque consistent with inflamed seborrheic keratosis     Erythematous Silvery Scaling Plaque c/w Psoriasis     See annotation      Assessment / Plan:        SK (seborrheic keratosis)  Light cryo to 2 lesions on left temple and cheek  Spf 30 + strongly encouraged on daily basis  Retinol nightly and handout provided           Follow up in about 6 months (around 11/9/2022) for TBSE.

## 2022-05-21 ENCOUNTER — OFFICE VISIT (OUTPATIENT)
Dept: URGENT CARE | Facility: CLINIC | Age: 72
End: 2022-05-21
Payer: MEDICARE

## 2022-05-21 VITALS
HEART RATE: 88 BPM | OXYGEN SATURATION: 96 % | TEMPERATURE: 99 F | SYSTOLIC BLOOD PRESSURE: 125 MMHG | HEIGHT: 66 IN | BODY MASS INDEX: 24.91 KG/M2 | DIASTOLIC BLOOD PRESSURE: 69 MMHG | WEIGHT: 155 LBS | RESPIRATION RATE: 18 BRPM

## 2022-05-21 DIAGNOSIS — U07.1 COVID-19 VIRUS DETECTED: ICD-10-CM

## 2022-05-21 DIAGNOSIS — U07.1 COVID: Primary | ICD-10-CM

## 2022-05-21 DIAGNOSIS — R06.2 WHEEZING: ICD-10-CM

## 2022-05-21 LAB
CTP QC/QA: YES
SARS-COV-2 RDRP RESP QL NAA+PROBE: POSITIVE

## 2022-05-21 PROCEDURE — U0002: ICD-10-PCS | Mod: QW,CR,S$GLB, | Performed by: NURSE PRACTITIONER

## 2022-05-21 PROCEDURE — 71046 XR CHEST PA AND LATERAL: ICD-10-PCS | Mod: FY,S$GLB,, | Performed by: RADIOLOGY

## 2022-05-21 PROCEDURE — U0002 COVID-19 LAB TEST NON-CDC: HCPCS | Mod: QW,CR,S$GLB, | Performed by: NURSE PRACTITIONER

## 2022-05-21 PROCEDURE — 99203 PR OFFICE/OUTPT VISIT, NEW, LEVL III, 30-44 MIN: ICD-10-PCS | Mod: CR,S$GLB,, | Performed by: NURSE PRACTITIONER

## 2022-05-21 PROCEDURE — 99203 OFFICE O/P NEW LOW 30 MIN: CPT | Mod: CR,S$GLB,, | Performed by: NURSE PRACTITIONER

## 2022-05-21 PROCEDURE — 71046 X-RAY EXAM CHEST 2 VIEWS: CPT | Mod: FY,S$GLB,, | Performed by: RADIOLOGY

## 2022-05-21 RX ORDER — PREDNISONE 20 MG/1
40 TABLET ORAL DAILY
Qty: 10 TABLET | Refills: 0 | Status: SHIPPED | OUTPATIENT
Start: 2022-05-21 | End: 2022-05-26

## 2022-05-21 NOTE — PATIENT INSTRUCTIONS
Oral fluids  Rest  Steam (hot showers, hot tea)  Blow nose often  Droplet and contact precautions    Self quarantine x 5 days-ok to come out of quarantine as long as symptoms are improving on day 6  Continue to wear mask for 10 days    Follow up with worsening symptoms    Seek ER care with symptoms such as wheeze, respiratory distress, lethargy, dehydration

## 2022-05-21 NOTE — PROGRESS NOTES
"Subjective:       Patient ID: Sheila Zarco is a 71 y.o. female.    Vitals:  height is 5' 6" (1.676 m) and weight is 70.3 kg (155 lb). Her temperature is 98.5 °F (36.9 °C). Her blood pressure is 125/69 and her pulse is 88. Her respiration is 18 and oxygen saturation is 96%.     Chief Complaint: Cough    This is a 71 y.o. female who presents today with a chief complaint of dry cough with chest tightness and wheeze. Symptoms started yesterday. Pt took a home covid test and was positive. Takes nebulizer treatments at home, as needed-which is helping. Having fever. Tmax 101, Denies nausea, vomiting, and diarrhea. Pt reports she is supposed to be going on a cruise at the end of next week, and is concerned, however her 5 days of quarantine will be over by then, as long as her symptoms are improving.     Cough  This is a new problem. The current episode started yesterday. The problem has been gradually worsening. The cough is non-productive. Associated symptoms include nasal congestion, shortness of breath and wheezing. Associated symptoms comments: Chest tightness  . Nothing aggravates the symptoms. She has tried steroid inhaler for the symptoms. The treatment provided mild relief. Her past medical history is significant for asthma and bronchitis.       Respiratory: Positive for chest tightness, cough, shortness of breath and wheezing.        Objective:      Physical Exam   Constitutional: She is oriented to person, place, and time.  Non-toxic appearance. She appears ill. No distress.   HENT:   Head: Normocephalic and atraumatic.   Nose: Congestion present.   Mouth/Throat: Mucous membranes are moist. Posterior oropharyngeal erythema present. No oropharyngeal exudate. Oropharynx is clear.   Eyes: Right eye exhibits no discharge. Left eye exhibits no discharge. No scleral icterus.      Comments: Glassy eyes with erythema-no drainage   Neck: No neck rigidity present.   Cardiovascular: Normal rate and regular rhythm. "   Pulmonary/Chest: She has wheezes.   Abdominal: Normal appearance.   Musculoskeletal: Normal range of motion.         General: Normal range of motion.   Neurological: She is alert and oriented to person, place, and time.   Psychiatric: Her behavior is normal. Mood normal.   Nursing note and vitals reviewed.          XR CHEST PA AND LATERAL    Result Date: 5/21/2022  EXAMINATION: XR CHEST PA AND LATERAL CLINICAL HISTORY: Contact with and (suspected) exposure to covid-19 TECHNIQUE: PA and lateral views of the chest were performed. COMPARISON: Chest radiograph 07/25/2014 FINDINGS: Trachea is relatively midline and patent.Few scattered linear opacities throughout the lungs consistent with minimal platelike scarring versus atelectasis.  The lungs are otherwise symmetrically well expanded without consolidation, pleural effusion or pneumothorax. The cardiac silhouette is normal in size. Pulmonary vasculature and hilar and mediastinal contours are within normal limits noting calcification at the arch. Osseous structures show minimal degenerative change without acute or destructive process seen.     No detrimental change or radiographic acute intrathoracic process seen. Electronically signed by: Gregory Marks MD Date:    05/21/2022 Time:    12:47      Results for orders placed or performed in visit on 05/21/22   POCT COVID-19 Rapid Screening   Result Value Ref Range    POC Rapid COVID Positive (A) Negative     Acceptable Yes      Assessment:       1. COVID    2. Wheezing          Plan:         COVID  -     XR CHEST PA AND LATERAL; Future; Expected date: 05/21/2022  -     POCT COVID-19 Rapid Screening    Wheezing  -     predniSONE (DELTASONE) 20 MG tablet; Take 2 tablets (40 mg total) by mouth once daily. for 5 days  Dispense: 10 tablet; Refill: 0      Patient Instructions   Oral fluids  Rest  Steam (hot showers, hot tea)  Blow nose often  Droplet and contact precautions    Self quarantine x 5 days-ok to come  out of quarantine as long as symptoms are improving on day 6  Continue to wear mask for 10 days    Follow up with worsening symptoms    Seek ER care with symptoms such as wheeze, respiratory distress, lethargy, dehydration              2: covid risk score is 2

## 2022-05-24 ENCOUNTER — PATIENT MESSAGE (OUTPATIENT)
Dept: INTERNAL MEDICINE | Facility: CLINIC | Age: 72
End: 2022-05-24
Payer: MEDICARE

## 2022-05-24 NOTE — TELEPHONE ENCOUNTER
----- Message from Ayah Walsh sent at 5/24/2022  3:34 PM CDT -----  Contact: pt [phone]  COVID positive Patient would like a letter Recovery so she can travel on 5/27/22.   Please call and advise.    Thank you and have a great day.

## 2022-05-24 NOTE — TELEPHONE ENCOUNTER
----- Message from Ayah Walsh sent at 5/24/2022  3:25 PM CDT -----  Contact: pt [phone]  Patient is returning a phone call.  Who left a message for the patient: Bridget  Does patient know what this is regarding:  COVID   Would you like a call back, or a response through your [facility] portal?:   call  Comments:

## 2022-06-15 ENCOUNTER — HOSPITAL ENCOUNTER (OUTPATIENT)
Dept: RADIOLOGY | Facility: HOSPITAL | Age: 72
Discharge: HOME | End: 2022-06-15
Attending: SURGERY
Payer: MEDICARE

## 2022-06-15 DIAGNOSIS — Z12.31 SCREENING MAMMOGRAM, ENCOUNTER FOR: ICD-10-CM

## 2022-06-15 PROCEDURE — 77067 MAMMO DIGITAL SCREENING BILAT WITH TOMO: ICD-10-PCS | Mod: 26,,, | Performed by: RADIOLOGY

## 2022-06-15 PROCEDURE — 77063 MAMMO DIGITAL SCREENING BILAT WITH TOMO: ICD-10-PCS | Mod: 26,,, | Performed by: RADIOLOGY

## 2022-06-15 PROCEDURE — 77067 SCR MAMMO BI INCL CAD: CPT | Mod: TC

## 2022-06-15 PROCEDURE — 77063 BREAST TOMOSYNTHESIS BI: CPT | Mod: 26,,, | Performed by: RADIOLOGY

## 2022-06-15 PROCEDURE — 77063 BREAST TOMOSYNTHESIS BI: CPT | Mod: TC

## 2022-06-15 PROCEDURE — 77067 SCR MAMMO BI INCL CAD: CPT | Mod: 26,,, | Performed by: RADIOLOGY

## 2022-06-29 ENCOUNTER — OFFICE VISIT (OUTPATIENT)
Dept: OPTOMETRY | Facility: CLINIC | Age: 72
End: 2022-06-29
Payer: MEDICARE

## 2022-06-29 DIAGNOSIS — Z98.890 S/P LASER IRIDOTOMY: ICD-10-CM

## 2022-06-29 DIAGNOSIS — H52.4 PRESBYOPIA: ICD-10-CM

## 2022-06-29 DIAGNOSIS — Z96.1 PSEUDOPHAKIA OF BOTH EYES: ICD-10-CM

## 2022-06-29 DIAGNOSIS — H04.123 DRY EYES, BILATERAL: Primary | ICD-10-CM

## 2022-06-29 DIAGNOSIS — Z13.5 GLAUCOMA SCREENING: ICD-10-CM

## 2022-06-29 PROCEDURE — 99999 PR PBB SHADOW E&M-EST. PATIENT-LVL III: CPT | Mod: PBBFAC,,, | Performed by: OPTOMETRIST

## 2022-06-29 PROCEDURE — 92012 INTRM OPH EXAM EST PATIENT: CPT | Mod: S$PBB,,, | Performed by: OPTOMETRIST

## 2022-06-29 PROCEDURE — 92015 DETERMINE REFRACTIVE STATE: CPT | Mod: ,,, | Performed by: OPTOMETRIST

## 2022-06-29 PROCEDURE — 92012 PR EYE EXAM, EST PATIENT,INTERMED: ICD-10-PCS | Mod: S$PBB,,, | Performed by: OPTOMETRIST

## 2022-06-29 PROCEDURE — 99213 OFFICE O/P EST LOW 20 MIN: CPT | Mod: PBBFAC | Performed by: OPTOMETRIST

## 2022-06-29 PROCEDURE — 92015 PR REFRACTION: ICD-10-PCS | Mod: ,,, | Performed by: OPTOMETRIST

## 2022-06-29 PROCEDURE — 99999 PR PBB SHADOW E&M-EST. PATIENT-LVL III: ICD-10-PCS | Mod: PBBFAC,,, | Performed by: OPTOMETRIST

## 2022-06-29 NOTE — PROGRESS NOTES
HPI     72 Y/o female is here for routine eye exam with C/o   Pt denies pain and discomfort   No f/f    Eye med: Systane OU TID     Last edited by Sourav Lombardi MA on 6/29/2022  3:08 PM. (History)        ROS     Positive for: Eyes (cat surgery OU)    Negative for: Constitutional, Gastrointestinal, Neurological, Skin,   Genitourinary, Musculoskeletal, HENT, Endocrine, Cardiovascular,   Respiratory, Psychiatric, Allergic/Imm, Heme/Lymph    Last edited by Gregory Vang, OD on 6/29/2022  3:54 PM. (History)        Assessment /Plan     For exam results, see Encounter Report.    Dry eyes, bilateral    Pseudophakia of both eyes    S/P laser iridotomy    Glaucoma screening    Presbyopia      See notes from last month:  1. Sp pciol OU  2. Sp PI OU  3. Pt presents TODAY with dry eye issues.  Only using ATs once or twice a day    Pt presents TODAY w sx improved on Ats.  Discussed spex Rx, but pt happy w otc readers    PLAN:    rtc 1 yr

## 2022-07-15 ENCOUNTER — PATIENT MESSAGE (OUTPATIENT)
Dept: INTERNAL MEDICINE | Facility: CLINIC | Age: 72
End: 2022-07-15
Payer: MEDICARE

## 2022-07-29 ENCOUNTER — TELEPHONE (OUTPATIENT)
Dept: SURGERY | Facility: CLINIC | Age: 72
End: 2022-07-29
Payer: MEDICARE

## 2022-07-29 NOTE — TELEPHONE ENCOUNTER
From: Faustina Story RN   Sent: 7/29/2022  11:36 AM CDT   To: Mercedez Steven MA     Can you explain Mayelin took over [facility]'s patients and can see her for a CBE?     ----- Message -----   From: Nicole Reagan   Sent: 7/29/2022  11:12 AM CDT   To: Alka Rodríguez Staff     the patient is calling to get scheduled for a appt. NP appt   Pt's dr is no longer her dr and she needs another one, pt stated her Mammo came out right but she stated she is having fluid issues. Pt also has pain.   the patient can be reached at. [phone]

## 2022-09-22 ENCOUNTER — OFFICE VISIT (OUTPATIENT)
Dept: SURGERY | Facility: CLINIC | Age: 72
End: 2022-09-22
Payer: MEDICARE

## 2022-09-22 VITALS
HEIGHT: 66 IN | BODY MASS INDEX: 25.71 KG/M2 | SYSTOLIC BLOOD PRESSURE: 161 MMHG | DIASTOLIC BLOOD PRESSURE: 63 MMHG | WEIGHT: 160 LBS | HEART RATE: 63 BPM

## 2022-09-22 DIAGNOSIS — Z12.31 ENCOUNTER FOR SCREENING MAMMOGRAM FOR MALIGNANT NEOPLASM OF BREAST: ICD-10-CM

## 2022-09-22 DIAGNOSIS — Z85.3 PERSONAL HISTORY OF BREAST CANCER: ICD-10-CM

## 2022-09-22 DIAGNOSIS — Z12.39 ENCOUNTER FOR SCREENING BREAST EXAMINATION: Primary | ICD-10-CM

## 2022-09-22 DIAGNOSIS — Z12.31 SCREENING MAMMOGRAM, ENCOUNTER FOR: ICD-10-CM

## 2022-09-22 DIAGNOSIS — D05.12 BREAST NEOPLASM, TIS (DCIS), LEFT: ICD-10-CM

## 2022-09-22 PROCEDURE — 99213 OFFICE O/P EST LOW 20 MIN: CPT | Mod: S$PBB,,, | Performed by: SURGERY

## 2022-09-22 PROCEDURE — 99213 PR OFFICE/OUTPT VISIT, EST, LEVL III, 20-29 MIN: ICD-10-PCS | Mod: S$PBB,,, | Performed by: SURGERY

## 2022-09-22 PROCEDURE — 99999 PR PBB SHADOW E&M-EST. PATIENT-LVL V: ICD-10-PCS | Mod: PBBFAC,,, | Performed by: SURGERY

## 2022-09-22 PROCEDURE — 99215 OFFICE O/P EST HI 40 MIN: CPT | Mod: PBBFAC | Performed by: SURGERY

## 2022-09-22 PROCEDURE — 99999 PR PBB SHADOW E&M-EST. PATIENT-LVL V: CPT | Mod: PBBFAC,,, | Performed by: SURGERY

## 2022-09-22 NOTE — PROGRESS NOTES
[facility]           Breast Surgery Surveillance    9/22/2022    DIAGNOSIS:   This is a 72 y.o. 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 Left partial mastectomy on 07/04/2020. Kayleen Alvarez M.D. Surgical Oncology. Final pathology revealed (9.5 mm) DCIS with clear margins.  2. Radiation therapy completed on 10/14/2020. Hanane Sahu M.D. Radiation Oncology     HISTORY OF PRESENT ILLNESS:   Sheila Zarco is a 72 y.o. female comes in for oncological follow up. She denies change in her breast self-exam specifically denying new masses, skin or nipple changes, or nipple discharge. Past medical and surgical history is updated no new changes. There have been no changes to family history. The patient denies constitutional symptoms of night sweats, weight loss, new headaches, visual changes, new back or bony pain, chest pain, or shortness of breath.      IMAGING:   Screening mammogram 06/15/2022:   Result:   Mammo Digital Screening Bilat w/ Kashif     History:  Patient is 71 y.o. and is seen for a screening mammogram.     Films Compared:  Prior images (if available) were compared.     Findings:  This procedure was performed using tomosynthesis. Computer-aided detection was utilized in the interpretation of this examination.  The breasts are heterogeneously dense, which may obscure small masses.      Left  There are post-surgical findings from a previous lumpectomy seen in the left breast. There has been no interval development of a suspicious mass, microcalcification, or architectural distortion.      Right  There is no evidence of suspicious masses, calcifications, or other abnormal findings in the right breast.      No detrimental change.      Impression:  Bilateral  There is no mammographic evidence of malignancy.     BI-RADS Category:   Overall: 2 - Benign     Recommendation:  Routine mammogram in 1 year is recommended.     MEDICATIONS/ALLERGIES:  "    Review of patient's allergies indicates:  No Known Allergies    PHYSICAL EXAM:   BP (!) 161/63 (BP Location: Left arm, Patient Position: Sitting, BP Method: Small (Automatic))   Pulse 63   Ht 5' 6" (1.676 m)   Wt 72.6 kg (160 lb)   BMI 25.82 kg/m²     General: The patient appears well and is in no acute distress.   Neuro: Alert & oriented x3. HEENT: PERRLA, EOMI, sclerae nonicteric. Mucous membranes moist.   Cardiovascular: RRR, no g/r/m.  Breasts: The exam was done with the patient seated and supine. Left breast - firm tender mass of tissue consistent with scar tissue present to 3-4 o clock position of left breast adjacent to NAC. Post radiation fibrosis noted to this area. No erythema or edema bilaterally. No nipple inversion or discharge bilaterally. No supraclavicular or axillary lymphadenopathy on the left side.   Right breast - within normal limits. No palpable masses and no abnormal skin or nipple findings. No supraclavicular or axillary lymphadenopathy on the right side.  Pulmonary: clear to auscultation bilaterally   Abdomen: soft, nontender, nondistended. No masses.    Extremities: No clubbing or cyanosis. Bilateral full arm range of motion without  lymphedema.     ASSESSMENT:   This is a 72 y.o. female without evidence of recurrence by exam, history or imaging.       PLAN:   We discussed there was nothing concerning on imaging or exam today. Reassurance given   Continue monthly self breast exams and call the clinic with any changes or problems.  Mammogram in June 2023  Return to clinic in 1 year   Having a hard time with some social issues currently - son +ETOH entering rehab    The patient is in agreement with the plan. Questions were encouraged and answered to patient's satisfaction. Sheila will call our office with any questions or concerns.     25 minutes were spent on this encounter, 15 of which was face to face counseling and 10 minutes were spent on chart review and coordination of care. "

## 2022-10-10 ENCOUNTER — PATIENT MESSAGE (OUTPATIENT)
Dept: INTERNAL MEDICINE | Facility: CLINIC | Age: 72
End: 2022-10-10
Payer: MEDICARE

## 2022-10-10 DIAGNOSIS — R10.9 ABDOMINAL PAIN, UNSPECIFIED ABDOMINAL LOCATION: ICD-10-CM

## 2022-10-10 DIAGNOSIS — K52.9 CHRONIC DIARRHEA: Primary | ICD-10-CM

## 2022-10-10 RX ORDER — DICYCLOMINE HYDROCHLORIDE 20 MG/1
20 TABLET ORAL
Qty: 60 TABLET | Refills: 2 | Status: SHIPPED | OUTPATIENT
Start: 2022-10-10 | End: 2022-11-11

## 2022-10-10 NOTE — TELEPHONE ENCOUNTER
Looks like she cancelled appt with dr etta england 2016.  Needs new referral for gastro. See msg with symptoms.

## 2022-10-10 NOTE — TELEPHONE ENCOUNTER
GI referral order entered.  Also, a trial of an antispasmodic (dicyclomine) for the GI tract will be ordered.

## 2022-10-17 ENCOUNTER — TELEPHONE (OUTPATIENT)
Dept: INTERNAL MEDICINE | Facility: CLINIC | Age: 72
End: 2022-10-17
Payer: MEDICARE

## 2022-10-17 DIAGNOSIS — Z12.11 COLON CANCER SCREENING: ICD-10-CM

## 2022-10-17 DIAGNOSIS — R10.9 ABDOMINAL PAIN, UNSPECIFIED ABDOMINAL LOCATION: Primary | ICD-10-CM

## 2022-10-17 NOTE — TELEPHONE ENCOUNTER
"Pt  has been having stomach cramps for 2 weeks , gastro referral sent to coordinators , pt wants colonoscopy and her "stomach checked"  "

## 2022-10-17 NOTE — TELEPHONE ENCOUNTER
----- Message from Candis Dumont sent at 10/17/2022 11:33 AM CDT -----  Contact: pt     ----- Message -----  From: Xena Cummings MA  Sent: 10/17/2022  11:27 AM CDT  To: Candis Dumont    Wrong office.   ----- Message -----  From: Candis Dumont  Sent: 10/17/2022  11:14 AM CDT  To: Ale Braswell Staff    Patient is returning a phone call.  Who left a message for the patient: Hailey Anderson MA   Does patient know what this is regarding:  missed call  Would you like a call back, or a response through your [facility] portal?:   phone  Comments:

## 2022-10-17 NOTE — TELEPHONE ENCOUNTER
----- Message from Ivana South sent at 10/17/2022 10:08 AM CDT -----  Regarding: advice  Contact: patient [phone]  Patient is requesting help in locating an appointment on the [facility] with gastro before January.  Please call and advise.    2  Medication has not helped    3.   Requesting order for  colonoscopy and also to have stomach checked.

## 2022-10-18 ENCOUNTER — TELEPHONE (OUTPATIENT)
Dept: ENDOSCOPY | Facility: HOSPITAL | Age: 72
End: 2022-10-18
Payer: MEDICARE

## 2022-10-18 VITALS — BODY MASS INDEX: 24.96 KG/M2 | HEIGHT: 67 IN | WEIGHT: 159 LBS

## 2022-10-18 DIAGNOSIS — R10.9 ABDOMINAL PAIN, UNSPECIFIED ABDOMINAL LOCATION: ICD-10-CM

## 2022-10-18 DIAGNOSIS — Z12.11 SPECIAL SCREENING FOR MALIGNANT NEOPLASMS, COLON: Primary | ICD-10-CM

## 2022-10-18 DIAGNOSIS — Z12.11 COLON CANCER SCREENING: ICD-10-CM

## 2022-10-18 RX ORDER — POLYETHYLENE GLYCOL 3350, SODIUM SULFATE ANHYDROUS, SODIUM BICARBONATE, SODIUM CHLORIDE, POTASSIUM CHLORIDE 236; 22.74; 6.74; 5.86; 2.97 G/4L; G/4L; G/4L; G/4L; G/4L
4 POWDER, FOR SOLUTION ORAL ONCE
Qty: 4000 ML | Refills: 0 | Status: SHIPPED | OUTPATIENT
Start: 2022-10-18 | End: 2022-10-18

## 2022-10-18 NOTE — TELEPHONE ENCOUNTER
Referral orders have been entered for EGD and colonoscopy.  The EGD is to allow direct visualization and examination of the esophagus and stomach.  This is to check for gastritis, ulcers, or bacterial infection involving the stomach lining.

## 2022-10-25 ENCOUNTER — TELEPHONE (OUTPATIENT)
Dept: ENDOSCOPY | Facility: HOSPITAL | Age: 72
End: 2022-10-25
Payer: MEDICARE

## 2022-10-25 DIAGNOSIS — Z12.11 SPECIAL SCREENING FOR MALIGNANT NEOPLASMS, COLON: ICD-10-CM

## 2022-10-25 DIAGNOSIS — R10.9 ABDOMINAL PAIN, UNSPECIFIED ABDOMINAL LOCATION: Primary | ICD-10-CM

## 2022-10-25 NOTE — TELEPHONE ENCOUNTER
Pt calling to reschedule her procedure with Dr. Koroma  Pt was scheduled on 10/26 , pt called 10/25 stating she had an asthma attack , pt declined PAT 12/1 and asked that a message be sent to RN  Please advise

## 2022-11-07 ENCOUNTER — PATIENT MESSAGE (OUTPATIENT)
Dept: INTERNAL MEDICINE | Facility: CLINIC | Age: 72
End: 2022-11-07
Payer: MEDICARE

## 2022-11-10 ENCOUNTER — OFFICE VISIT (OUTPATIENT)
Dept: INTERNAL MEDICINE | Facility: CLINIC | Age: 72
End: 2022-11-10
Payer: MEDICARE

## 2022-11-10 VITALS
WEIGHT: 160.94 LBS | RESPIRATION RATE: 16 BRPM | HEART RATE: 68 BPM | DIASTOLIC BLOOD PRESSURE: 60 MMHG | SYSTOLIC BLOOD PRESSURE: 136 MMHG | TEMPERATURE: 98 F | BODY MASS INDEX: 25.86 KG/M2 | HEIGHT: 66 IN

## 2022-11-10 DIAGNOSIS — K21.9 GASTROESOPHAGEAL REFLUX DISEASE, UNSPECIFIED WHETHER ESOPHAGITIS PRESENT: ICD-10-CM

## 2022-11-10 DIAGNOSIS — F41.9 ANXIETY: ICD-10-CM

## 2022-11-10 DIAGNOSIS — J45.21 MILD INTERMITTENT ASTHMATIC BRONCHITIS WITH ACUTE EXACERBATION: Primary | ICD-10-CM

## 2022-11-10 DIAGNOSIS — E03.9 HYPOTHYROIDISM, UNSPECIFIED TYPE: ICD-10-CM

## 2022-11-10 PROCEDURE — 99213 OFFICE O/P EST LOW 20 MIN: CPT | Mod: S$PBB,,, | Performed by: INTERNAL MEDICINE

## 2022-11-10 PROCEDURE — 99999 PR PBB SHADOW E&M-EST. PATIENT-LVL V: CPT | Mod: PBBFAC,,, | Performed by: INTERNAL MEDICINE

## 2022-11-10 PROCEDURE — 99999 PR PBB SHADOW E&M-EST. PATIENT-LVL V: ICD-10-PCS | Mod: PBBFAC,,, | Performed by: INTERNAL MEDICINE

## 2022-11-10 PROCEDURE — 99213 PR OFFICE/OUTPT VISIT, EST, LEVL III, 20-29 MIN: ICD-10-PCS | Mod: S$PBB,,, | Performed by: INTERNAL MEDICINE

## 2022-11-10 PROCEDURE — 99215 OFFICE O/P EST HI 40 MIN: CPT | Mod: PBBFAC,25,PO | Performed by: INTERNAL MEDICINE

## 2022-11-10 PROCEDURE — 96372 THER/PROPH/DIAG INJ SC/IM: CPT | Mod: PBBFAC,PO

## 2022-11-10 RX ORDER — METHYLPREDNISOLONE 4 MG/1
TABLET ORAL
Qty: 1 EACH | Refills: 0 | Status: SHIPPED | OUTPATIENT
Start: 2022-11-10 | End: 2023-05-11

## 2022-11-10 RX ORDER — TRIAMCINOLONE ACETONIDE 40 MG/ML
40 INJECTION, SUSPENSION INTRA-ARTICULAR; INTRAMUSCULAR
Status: COMPLETED | OUTPATIENT
Start: 2022-11-10 | End: 2022-11-10

## 2022-11-10 RX ORDER — AZITHROMYCIN 250 MG/1
TABLET, FILM COATED ORAL
Qty: 6 TABLET | Refills: 0 | Status: SHIPPED | OUTPATIENT
Start: 2022-11-10 | End: 2023-05-11

## 2022-11-10 RX ORDER — ALBUTEROL SULFATE 0.63 MG/3ML
0.63 SOLUTION RESPIRATORY (INHALATION) EVERY 6 HOURS PRN
Qty: 50 EACH | Refills: 2 | Status: SHIPPED | OUTPATIENT
Start: 2022-11-10 | End: 2023-11-16 | Stop reason: SDUPTHER

## 2022-11-10 RX ADMIN — TRIAMCINOLONE ACETONIDE 40 MG: 40 INJECTION, SUSPENSION INTRA-ARTICULAR; INTRAMUSCULAR at 03:11

## 2022-11-29 ENCOUNTER — OFFICE VISIT (OUTPATIENT)
Dept: OBSTETRICS AND GYNECOLOGY | Facility: CLINIC | Age: 72
End: 2022-11-29
Payer: MEDICARE

## 2022-11-29 VITALS
DIASTOLIC BLOOD PRESSURE: 64 MMHG | WEIGHT: 155.63 LBS | HEIGHT: 66 IN | BODY MASS INDEX: 25.01 KG/M2 | SYSTOLIC BLOOD PRESSURE: 106 MMHG

## 2022-11-29 DIAGNOSIS — Z90.710 S/P HYSTERECTOMY: ICD-10-CM

## 2022-11-29 DIAGNOSIS — Z01.419 ENCOUNTER FOR GYNECOLOGICAL EXAMINATION WITHOUT ABNORMAL FINDING: Primary | ICD-10-CM

## 2022-11-29 DIAGNOSIS — Z78.0 POSTMENOPAUSAL: ICD-10-CM

## 2022-11-29 PROCEDURE — 99214 OFFICE O/P EST MOD 30 MIN: CPT | Mod: PBBFAC,PN | Performed by: OBSTETRICS & GYNECOLOGY

## 2022-11-29 PROCEDURE — G0101 CA SCREEN;PELVIC/BREAST EXAM: HCPCS | Mod: S$PBB,,, | Performed by: OBSTETRICS & GYNECOLOGY

## 2022-11-29 PROCEDURE — G0101 PR CA SCREEN;PELVIC/BREAST EXAM: ICD-10-PCS | Mod: S$PBB,,, | Performed by: OBSTETRICS & GYNECOLOGY

## 2022-11-29 PROCEDURE — 99999 PR PBB SHADOW E&M-EST. PATIENT-LVL IV: ICD-10-PCS | Mod: PBBFAC,,, | Performed by: OBSTETRICS & GYNECOLOGY

## 2022-11-29 PROCEDURE — 99999 PR PBB SHADOW E&M-EST. PATIENT-LVL IV: CPT | Mod: PBBFAC,,, | Performed by: OBSTETRICS & GYNECOLOGY

## 2022-11-29 NOTE — PROGRESS NOTES
CC: Well woman exam    Sheila Zarco is a 72 y.o. female  presents for a well woman exam.  She is having lower abdominal cramping. She is working on scheduling colonoscopy       Past Medical History:   Diagnosis Date    Asthma     childhood    Baker's cyst     Breast cancer 2020    DCIS left    Cataract     Glaucoma     Thyroid disease      Past Surgical History:   Procedure Laterality Date    BREAST BIOPSY Left 2020    core bx, +    BREAST LUMPECTOMY Left     BUNIONECTOMY      GALLBLADDER SURGERY      HYSTERECTOMY      INTRAOCULAR PROSTHESES INSERTION Right 2020    Procedure: INSERTION, IOL PROSTHESIS;  Surgeon: Lucas Jefferson MD;  Location: [address];  Service: Ophthalmology;  Laterality: Right;    INTRAOCULAR PROSTHESES INSERTION Left 10/22/2020    Procedure: INSERTION, IOL PROSTHESIS;  Surgeon: Lucas Jefferson MD;  Location: [address];  Service: Ophthalmology;  Laterality: Left;    KNEE CARTILAGE SURGERY      MASTECTOMY, PARTIAL Left 2020    Procedure: MASTECTOMY, PARTIAL-Left with Radiological Marker;  Surgeon: Kayleen Alvarez MD;  Location: [facility];  Service: General;  Laterality: Left;    narrow angles eye surgery       PHACOEMULSIFICATION OF CATARACT Right 2020    Procedure: PHACOEMULSIFICATION, CATARACT;  Surgeon: Lucas Jefferson MD;  Location: [address];  Service: Ophthalmology;  Laterality: Right;    PHACOEMULSIFICATION OF CATARACT Left 10/22/2020    Procedure: PHACOEMULSIFICATION, CATARACT;  Surgeon: Lucas Jefferson MD;  Location: [address];  Service: Ophthalmology;  Laterality: Left;     Family History   Problem Relation Age of Onset    Heart disease Mother     Heart disease Father     Liver disease Brother     Breast cancer Sister     Breast cancer Daughter 43        bilateral mastectomy    No Known Problems Sister     No Known Problems Maternal Grandmother     No Known Problems Maternal Grandfather     No Known Problems  "Paternal Grandmother     No Known Problems Paternal Grandfather     No Known Problems Maternal Aunt     No Known Problems Maternal Uncle     No Known Problems Paternal Aunt     No Known Problems Paternal Uncle     Breast cancer Other     Colon cancer Neg Hx     Ovarian cancer Neg Hx     Melanoma Neg Hx     Lupus Neg Hx     Acne Neg Hx     Amblyopia Neg Hx     Blindness Neg Hx     Cancer Neg Hx     Cataracts Neg Hx     Diabetes Neg Hx     Glaucoma Neg Hx     Hypertension Neg Hx     Macular degeneration Neg Hx     Retinal detachment Neg Hx     Strabismus Neg Hx     Stroke Neg Hx     Thyroid disease Neg Hx      Social History     Tobacco Use    Smoking status: Never    Smokeless tobacco: Never   Substance Use Topics    Alcohol use: Never     Alcohol/week: 8.0 standard drinks     Types: 4 Glasses of wine, 4 Cans of beer per week     Comment: drinks 4 days a week- >1 drink    Drug use: No     OB History          3    Para   3    Term   3            AB        Living             SAB        IAB        Ectopic        Multiple        Live Births                     /64   Ht 5' 6" (1.676 m)   Wt 70.6 kg (155 lb 10.3 oz)   BMI 25.12 kg/m²     ROS:  GENERAL: Denies weight gain or weight loss. Feeling well overall.   SKIN: Denies rash or lesions.   HEAD: Denies head injury or headache.   NODES: Denies enlarged lymph nodes.   CHEST: Denies chest pain or shortness of breath.   CARDIOVASCULAR: Denies palpitations or left sided chest pain.   ABDOMEN: No abdominal pain, constipation, diarrhea, nausea, vomiting or rectal bleeding.   URINARY: No frequency, dysuria, hematuria, or burning on urination.  REPRODUCTIVE: See HPI.   BREASTS: The patient performs breast self-examination and denies pain, lumps, or nipple discharge.   HEMATOLOGIC: No easy bruisability or excessive bleeding.   MUSCULOSKELETAL: Denies joint pain or swelling.   NEUROLOGIC: Denies syncope or weakness.   PSYCHIATRIC: Denies depression, anxiety " or mood swings.    PE:   APPEARANCE: Well nourished, well developed, in no acute distress.  AFFECT: WNL, alert and oriented x 3.  SKIN: No acne or hirsutism.  NECK: Neck symmetric without masses or thyromegaly.  NODES: No inguinal, cervical, axillary or femoral lymph node enlargement.  CHEST: Good respiratory effort.   ABDOMEN: Soft. No tenderness or masses. No hepatosplenomegaly. No hernias.  BREASTS: Symmetrical, no skin changes or visible lesions. No palpable masses, nipple discharge bilaterally.  PELVIC: Normal external female genitalia without lesions. Normal hair distribution. Adequate perineal body, normal urethral meatus. Vagina atrophic without lesions or discharge. No significant cystocele or rectocele. Bimanual exam shows uterus and cervix to be surgically absent. Adnexa without masses or tenderness.  RECTAL: Rectovaginal exam confirms above with normal sphincter tone, no masses.  EXTREMITIES: No edema.      ICD-10-CM ICD-9-CM    1. Encounter for gynecological examination without abnormal finding  Z01.419 V72.31       2. Postmenopausal  Z78.0 V49.81       3. S/P hysterectomy  Z90.710 V88.01         S/P RA [facility]        Patient was counseled today on A.C.S. Pap guidelines and recommendations for yearly pelvic exams, mammograms and monthly self breast exams; to see her PCP for other health maintenance.   Follow up with GI for possible GI discomfort  and lower pelvic concerns

## 2023-01-03 NOTE — PROGRESS NOTES
Subjective:       Patient ID: Sheila Zarco is a 72 y.o. female.    Chief Complaint: cough/congestion (Using nebulizer daily.  Congestion/cough 2 weeks.  Yellow pleghm coughs.  Says has pressure head in pm.  3 nites ago felt like had temp.  And pressure in chest.  Needs nebulizer meds refilled.  Not sure she wants meds for this. )    HPI  The patient presents for evaluation of chest congestion and worsening cough.  Symptoms have been present for over 2 weeks.  Symptoms are worse at night.  She also has been noting pressure in her sinuses.  Some purulent drainage is also present.  She felt feverish 3 days ago with those symptoms have resolved.  She does have asthma.  She also has reflux.  She feels the symptoms have been controlled lately with current therapy.    Other active medical conditions include anxiety, fatty liver, hyperlipidemia.  She has a personal history of treatment for left-sided breast cancer.  She also has a past history of vertigo and rotator cuff impingement syndrome.    Review of Systems   Constitutional:  Negative for activity change, appetite change, fatigue and unexpected weight change.   HENT:  Positive for sinus pressure/congestion.    Eyes:  Negative for visual disturbance.   Respiratory:  Positive for cough, chest tightness, shortness of breath and wheezing.    Cardiovascular:  Negative for chest pain, palpitations and leg swelling.   Gastrointestinal:  Positive for reflux. Negative for abdominal pain, blood in stool, constipation and diarrhea.   Genitourinary:  Negative for dysuria and hematuria.   Musculoskeletal:  Negative for arthralgias, neck pain and neck stiffness.   Integumentary:  Negative for rash.   Neurological:  Negative for dizziness, syncope and headaches.   Psychiatric/Behavioral:  Negative for sleep disturbance.           Physical Exam  Vitals and nursing note reviewed.   Constitutional:       General: She is not in acute distress.     Appearance: Normal appearance.  She is well-developed.   HENT:      Head: Normocephalic and atraumatic.      Right Ear: Tympanic membrane and ear canal normal.      Left Ear: Tympanic membrane and ear canal normal.      Mouth/Throat:      Pharynx: Oropharynx is clear. No oropharyngeal exudate.   Eyes:      General: No scleral icterus.     Extraocular Movements: Extraocular movements intact.      Conjunctiva/sclera: Conjunctivae normal.   Neck:      Thyroid: No thyromegaly.      Vascular: No JVD.   Cardiovascular:      Rate and Rhythm: Normal rate and regular rhythm.      Heart sounds: Normal heart sounds. No murmur heard.    No friction rub. No gallop.   Pulmonary:      Effort: Pulmonary effort is normal. No respiratory distress.      Breath sounds: Normal breath sounds. No wheezing or rales.   Abdominal:      General: Bowel sounds are normal.      Palpations: Abdomen is soft. There is no mass.      Tenderness: There is no abdominal tenderness.   Musculoskeletal:         General: No tenderness.      Cervical back: Normal range of motion and neck supple.      Right lower leg: No edema.      Left lower leg: No edema.   Lymphadenopathy:      Cervical: No cervical adenopathy.   Skin:     General: Skin is warm and dry.      Findings: No rash.   Neurological:      Mental Status: She is alert and oriented to person, place, and time.   Psychiatric:         Mood and Affect: Mood normal.         Behavior: Behavior normal.         No visits with results within 3 Month(s) from this visit.   Latest known visit with results is:   Office Visit on 05/21/2022   Component Date Value Ref Range Status    POC Rapid COVID 05/21/2022 Positive (A)  Negative Final     Acceptable 05/21/2022 Yes   Final       Assessment & Plan:      Sheila was seen today for cough/congestion.  Kenalog will be administered at this time.    Zithromax and a Medrol Dosepak will also be prescribed for the patient's asthmatic bronchitis.  She also has symptoms suggestive of was  sinusitis.    Laboratory studies will be obtained in 6 months prior to annual checkup.    Diagnoses and all orders for this visit:    Mild intermittent asthmatic bronchitis with acute exacerbation  -     Comprehensive Metabolic Panel; Future  -     Lipid Panel; Future  -     CBC Auto Differential; Future  -     TSH; Future  -     Urinalysis; Future    Gastroesophageal reflux disease, unspecified whether esophagitis present  -     Comprehensive Metabolic Panel; Future  -     Lipid Panel; Future  -     CBC Auto Differential; Future  -     TSH; Future  -     Urinalysis; Future    Anxiety    Hypothyroidism, unspecified type  -     Comprehensive Metabolic Panel; Future  -     Lipid Panel; Future  -     CBC Auto Differential; Future  -     TSH; Future  -     Urinalysis; Future    Other orders  -     triamcinolone acetonide injection 40 mg  -     azithromycin (ZITHROMAX Z-LUPE) 250 MG tablet; Take 2 tabs po on day 1; then 1 po daily until completed.  -     methylPREDNISolone (MEDROL DOSEPACK) 4 mg tablet; use as directed  -     albuterol (ACCUNEB) 0.63 mg/3 mL Nebu; Take 3 mLs (0.63 mg total) by nebulization every 6 (six) hours as needed (wheezing and congestion).         Follow up in about 6 months (around 5/10/2023).     Farhat Correa MD

## 2023-03-28 ENCOUNTER — PATIENT MESSAGE (OUTPATIENT)
Dept: INTERNAL MEDICINE | Facility: CLINIC | Age: 73
End: 2023-03-28
Payer: MEDICARE

## 2023-03-28 ENCOUNTER — OFFICE VISIT (OUTPATIENT)
Dept: URGENT CARE | Facility: CLINIC | Age: 73
End: 2023-03-28
Payer: MEDICARE

## 2023-03-28 VITALS
BODY MASS INDEX: 25.55 KG/M2 | HEIGHT: 66 IN | RESPIRATION RATE: 20 BRPM | SYSTOLIC BLOOD PRESSURE: 157 MMHG | WEIGHT: 159 LBS | TEMPERATURE: 98 F | OXYGEN SATURATION: 95 % | HEART RATE: 67 BPM | DIASTOLIC BLOOD PRESSURE: 62 MMHG

## 2023-03-28 DIAGNOSIS — S39.012A STRAIN OF LUMBAR REGION, INITIAL ENCOUNTER: Primary | ICD-10-CM

## 2023-03-28 LAB
BILIRUB UR QL STRIP: NEGATIVE
GLUCOSE UR QL STRIP: NEGATIVE
KETONES UR QL STRIP: NEGATIVE
LEUKOCYTE ESTERASE UR QL STRIP: NEGATIVE
PH, POC UA: 5.5 (ref 5–8)
POC BLOOD, URINE: NEGATIVE
POC NITRATES, URINE: NEGATIVE
PROT UR QL STRIP: NEGATIVE
SP GR UR STRIP: 1.02 (ref 1–1.03)
UROBILINOGEN UR STRIP-ACNC: NORMAL (ref 0.1–1.1)

## 2023-03-28 PROCEDURE — 99213 OFFICE O/P EST LOW 20 MIN: CPT | Mod: 25,S$GLB,, | Performed by: NURSE PRACTITIONER

## 2023-03-28 PROCEDURE — 99213 PR OFFICE/OUTPT VISIT, EST, LEVL III, 20-29 MIN: ICD-10-PCS | Mod: 25,S$GLB,, | Performed by: NURSE PRACTITIONER

## 2023-03-28 PROCEDURE — 81003 POCT URINALYSIS, DIPSTICK, AUTOMATED, W/O SCOPE: ICD-10-PCS | Mod: QW,S$GLB,, | Performed by: NURSE PRACTITIONER

## 2023-03-28 PROCEDURE — 81003 URINALYSIS AUTO W/O SCOPE: CPT | Mod: QW,S$GLB,, | Performed by: NURSE PRACTITIONER

## 2023-03-28 PROCEDURE — 96372 THER/PROPH/DIAG INJ SC/IM: CPT | Mod: S$GLB,,, | Performed by: NURSE PRACTITIONER

## 2023-03-28 PROCEDURE — 96372 PR INJECTION,THERAP/PROPH/DIAG2ST, IM OR SUBCUT: ICD-10-PCS | Mod: S$GLB,,, | Performed by: NURSE PRACTITIONER

## 2023-03-28 RX ORDER — KETOROLAC TROMETHAMINE 30 MG/ML
30 INJECTION, SOLUTION INTRAMUSCULAR; INTRAVENOUS
Status: COMPLETED | OUTPATIENT
Start: 2023-03-28 | End: 2023-03-28

## 2023-03-28 RX ORDER — CYCLOBENZAPRINE HCL 5 MG
5 TABLET ORAL 3 TIMES DAILY PRN
Qty: 20 TABLET | Refills: 0 | Status: SHIPPED | OUTPATIENT
Start: 2023-03-28 | End: 2023-04-07

## 2023-03-28 RX ORDER — NAPROXEN 500 MG/1
500 TABLET ORAL 2 TIMES DAILY WITH MEALS
Qty: 30 TABLET | Refills: 0 | Status: SHIPPED | OUTPATIENT
Start: 2023-03-28 | End: 2023-04-12

## 2023-03-28 RX ADMIN — KETOROLAC TROMETHAMINE 30 MG: 30 INJECTION, SOLUTION INTRAMUSCULAR; INTRAVENOUS at 04:03

## 2023-03-28 NOTE — PROGRESS NOTES
"Subjective:      Patient ID: Sheila Zarco is a 72 y.o. female.    Vitals:  height is 5' 6" (1.676 m) and weight is 72.1 kg (159 lb). Her temperature is 98.3 °F (36.8 °C). Her blood pressure is 157/62 (abnormal) and her pulse is 67. Her respiration is 20 and oxygen saturation is 95%.     Chief Complaint: Back Pain    71 yo female presents with left lower back pain starting 3 days ago. Reports gradual onset of aching pain, left lower lumbar/upper gluteal area. At onset took ibuprofen and applied heat with resolution upon waking the next day. Noticing exacerbation of pain with some movements since that time. Typically pain resolves with heat and ibuprofen. However, reports increased pain today.  Denies fall or other trauma.     Back Pain  This is a new problem. Episode onset: 3 days ago. The problem occurs constantly. The problem has been gradually worsening since onset. The quality of the pain is described as aching. The pain radiates to the left thigh. The pain is at a severity of 8/10. The pain is moderate. The symptoms are aggravated by bending. Associated symptoms include leg pain. Pertinent negatives include no abdominal pain, bladder incontinence, bowel incontinence, dysuria, fever, numbness, paresis, paresthesias, perianal numbness, tingling or weakness. She has tried NSAIDs and heat for the symptoms. The treatment provided significant relief.     Constitution: Negative for chills, fatigue and fever.   Gastrointestinal:  Negative for abdominal pain and bowel incontinence.   Genitourinary:  Negative for dysuria and bladder incontinence.   Musculoskeletal:  Positive for back pain and muscle ache. Negative for trauma and history of spine disorder.   Neurological:  Negative for numbness.    Objective:     Physical Exam   Constitutional: She does not appear ill. No distress.   Cardiovascular: Normal rate, regular rhythm, normal heart sounds and normal pulses.   Pulmonary/Chest: Effort normal and breath sounds " normal. No respiratory distress.   Abdominal: Normal appearance and bowel sounds are normal. She exhibits no distension. Soft. There is no abdominal tenderness.   Musculoskeletal:      Lumbar back: She exhibits tenderness (left lower lumbar/upper gluteal TTP). She exhibits normal range of motion, no bony tenderness and no swelling.   Neurological: She is alert.   Nursing note and vitals reviewed.    Results for orders placed or performed in visit on 03/28/23   POCT Urinalysis, Dipstick, Automated, W/O Scope   Result Value Ref Range    POC Blood, Urine Negative Negative    POC Bilirubin, Urine Negative Negative    POC Urobilinogen, Urine normal 0.1 - 1.1    POC Ketones, Urine Negative Negative    POC Protein, Urine Negative Negative    POC Nitrates, Urine Negative Negative    POC Glucose, Urine Negative Negative    pH, UA 5.5 5 - 8    POC Specific Gravity, Urine 1.025 1.003 - 1.029    POC Leukocytes, Urine Negative Negative       Assessment:     1. Strain of lumbar region, initial encounter        Plan:       Strain of lumbar region, initial encounter  -     POCT Urinalysis, Dipstick, Automated, W/O Scope  -     naproxen (NAPROSYN) 500 MG tablet; Take 1 tablet (500 mg total) by mouth 2 (two) times daily with meals. for 15 days  Dispense: 30 tablet; Refill: 0  -     cyclobenzaprine (FLEXERIL) 5 MG tablet; Take 1 tablet (5 mg total) by mouth 3 (three) times daily as needed for Muscle spasms.  Dispense: 20 tablet; Refill: 0  -     ketorolac injection 30 mg      - Today you have received a Toradol injection. DO NOT TAKE ANY NSAIDs (Ibuprofen, Motrin, Advil, Naproxen, etc.) for 24 hours after receiving the injection.   - You have been prescribed a muscle relaxer. DO NOT operate heavy machinery while taking this medication.   Continue to apply heat  Start naproxen tomorrow morning, take with food  Perform stretches per handout  Follow up with PCP for continued pain  Go to the ER for worsening of pain

## 2023-03-28 NOTE — PATIENT INSTRUCTIONS
- Today you have received a Toradol injection. DO NOT TAKE ANY NSAIDs (Ibuprofen, Motrin, Advil, Naproxen, etc.) for 24 hours after receiving the injection.   - You have been prescribed a muscle relaxer. DO NOT operate heavy machinery while taking this medication.   Continue to apply heat  Start naproxen tomorrow morning, take with food  Perform stretches per handout  Follow up with PCP for continued pain  Go to the ER for worsening of pain

## 2023-04-23 NOTE — TELEPHONE ENCOUNTER
No new care gaps identified.  [facility] Embedded Care Gaps. Reference number: [id number]. 4/23/2023   8:58:43 AM CRISSYT

## 2023-04-24 RX ORDER — PANTOPRAZOLE SODIUM 40 MG/1
TABLET, DELAYED RELEASE ORAL
Qty: 90 TABLET | Refills: 1 | Status: SHIPPED | OUTPATIENT
Start: 2023-04-24 | End: 2023-08-22

## 2023-04-24 NOTE — TELEPHONE ENCOUNTER
Refill Decision Note   Sheila Zarco  is requesting a refill authorization.  Brief Assessment and Rationale for Refill:  Approve     Medication Therapy Plan:         Comments:     Note composed:3:42 AM 04/24/2023             Appointments     Last Visit   11/10/2022 Farhat Correa MD   Next Visit   5/11/2023 Farhat Correa MD

## 2023-04-27 ENCOUNTER — TELEPHONE (OUTPATIENT)
Dept: FAMILY MEDICINE | Facility: CLINIC | Age: 73
End: 2023-04-27
Payer: MEDICARE

## 2023-04-28 RX ORDER — ESCITALOPRAM OXALATE 20 MG/1
20 TABLET ORAL DAILY
Qty: 90 TABLET | Refills: 1 | Status: SHIPPED | OUTPATIENT
Start: 2023-04-28 | End: 2023-08-22

## 2023-04-28 NOTE — TELEPHONE ENCOUNTER
Refill Decision Note   Sheila Zarco  is requesting a refill authorization.  Brief Assessment and Rationale for Refill:  Approve     Medication Therapy Plan:         Comments:     Note composed:3:49 PM 04/28/2023

## 2023-04-28 NOTE — TELEPHONE ENCOUNTER
No care due was identified.  [facility] Embedded Care Due Messages. Reference number: [id number].   4/28/2023 1:24:09 PM CDT

## 2023-05-04 ENCOUNTER — LAB VISIT (OUTPATIENT)
Dept: LAB | Facility: HOSPITAL | Age: 73
End: 2023-05-04
Attending: INTERNAL MEDICINE
Payer: MEDICARE

## 2023-05-04 DIAGNOSIS — J45.21 MILD INTERMITTENT ASTHMATIC BRONCHITIS WITH ACUTE EXACERBATION: ICD-10-CM

## 2023-05-04 DIAGNOSIS — E03.9 HYPOTHYROIDISM, UNSPECIFIED TYPE: ICD-10-CM

## 2023-05-04 DIAGNOSIS — K21.9 GASTROESOPHAGEAL REFLUX DISEASE, UNSPECIFIED WHETHER ESOPHAGITIS PRESENT: ICD-10-CM

## 2023-05-04 LAB
ALBUMIN SERPL BCP-MCNC: 3.7 G/DL (ref 3.5–5.2)
ALP SERPL-CCNC: 69 U/L (ref 55–135)
ALT SERPL W/O P-5'-P-CCNC: 15 U/L (ref 10–44)
ANION GAP SERPL CALC-SCNC: 7 MMOL/L (ref 8–16)
AST SERPL-CCNC: 20 U/L (ref 10–40)
BASOPHILS # BLD AUTO: 0.05 K/UL (ref 0–0.2)
BASOPHILS NFR BLD: 1.1 % (ref 0–1.9)
BILIRUB SERPL-MCNC: 0.4 MG/DL (ref 0.1–1)
BUN SERPL-MCNC: 12 MG/DL (ref 8–23)
CALCIUM SERPL-MCNC: 9.2 MG/DL (ref 8.7–10.5)
CHLORIDE SERPL-SCNC: 109 MMOL/L (ref 95–110)
CHOLEST SERPL-MCNC: 192 MG/DL (ref 120–199)
CHOLEST/HDLC SERPL: 1.9 {RATIO} (ref 2–5)
CO2 SERPL-SCNC: 26 MMOL/L (ref 23–29)
CREAT SERPL-MCNC: 0.7 MG/DL (ref 0.5–1.4)
DIFFERENTIAL METHOD: ABNORMAL
EOSINOPHIL # BLD AUTO: 0.1 K/UL (ref 0–0.5)
EOSINOPHIL NFR BLD: 3.1 % (ref 0–8)
ERYTHROCYTE [DISTWIDTH] IN BLOOD BY AUTOMATED COUNT: 13.2 % (ref 11.5–14.5)
EST. GFR  (NO RACE VARIABLE): >60 ML/MIN/1.73 M^2
GLUCOSE SERPL-MCNC: 99 MG/DL (ref 70–110)
HCT VFR BLD AUTO: 36 % (ref 37–48.5)
HDLC SERPL-MCNC: 100 MG/DL (ref 40–75)
HDLC SERPL: 52.1 % (ref 20–50)
HGB BLD-MCNC: 11.5 G/DL (ref 12–16)
IMM GRANULOCYTES # BLD AUTO: 0.01 K/UL (ref 0–0.04)
IMM GRANULOCYTES NFR BLD AUTO: 0.2 % (ref 0–0.5)
LDLC SERPL CALC-MCNC: 81.2 MG/DL (ref 63–159)
LYMPHOCYTES # BLD AUTO: 1.4 K/UL (ref 1–4.8)
LYMPHOCYTES NFR BLD: 31 % (ref 18–48)
MCH RBC QN AUTO: 28.8 PG (ref 27–31)
MCHC RBC AUTO-ENTMCNC: 31.9 G/DL (ref 32–36)
MCV RBC AUTO: 90 FL (ref 82–98)
MONOCYTES # BLD AUTO: 0.3 K/UL (ref 0.3–1)
MONOCYTES NFR BLD: 7.2 % (ref 4–15)
NEUTROPHILS # BLD AUTO: 2.6 K/UL (ref 1.8–7.7)
NEUTROPHILS NFR BLD: 57.4 % (ref 38–73)
NONHDLC SERPL-MCNC: 92 MG/DL
NRBC BLD-RTO: 0 /100 WBC
PLATELET # BLD AUTO: 284 K/UL (ref 150–450)
PMV BLD AUTO: 9.5 FL (ref 9.2–12.9)
POTASSIUM SERPL-SCNC: 4 MMOL/L (ref 3.5–5.1)
PROT SERPL-MCNC: 6.6 G/DL (ref 6–8.4)
RBC # BLD AUTO: 3.99 M/UL (ref 4–5.4)
SODIUM SERPL-SCNC: 142 MMOL/L (ref 136–145)
TRIGL SERPL-MCNC: 54 MG/DL (ref 30–150)
TSH SERPL DL<=0.005 MIU/L-ACNC: 1.38 UIU/ML (ref 0.4–4)
WBC # BLD AUTO: 4.45 K/UL (ref 3.9–12.7)

## 2023-05-04 PROCEDURE — 80053 COMPREHEN METABOLIC PANEL: CPT | Performed by: INTERNAL MEDICINE

## 2023-05-04 PROCEDURE — 84443 ASSAY THYROID STIM HORMONE: CPT | Performed by: INTERNAL MEDICINE

## 2023-05-04 PROCEDURE — 80061 LIPID PANEL: CPT | Performed by: INTERNAL MEDICINE

## 2023-05-04 PROCEDURE — 85025 COMPLETE CBC W/AUTO DIFF WBC: CPT | Performed by: INTERNAL MEDICINE

## 2023-05-04 PROCEDURE — 36415 COLL VENOUS BLD VENIPUNCTURE: CPT | Mod: PO | Performed by: INTERNAL MEDICINE

## 2023-05-05 ENCOUNTER — LAB VISIT (OUTPATIENT)
Dept: LAB | Facility: HOSPITAL | Age: 73
End: 2023-05-05
Attending: INTERNAL MEDICINE
Payer: MEDICARE

## 2023-05-05 DIAGNOSIS — J45.21 MILD INTERMITTENT ASTHMATIC BRONCHITIS WITH ACUTE EXACERBATION: ICD-10-CM

## 2023-05-05 DIAGNOSIS — K21.9 GASTROESOPHAGEAL REFLUX DISEASE, UNSPECIFIED WHETHER ESOPHAGITIS PRESENT: ICD-10-CM

## 2023-05-05 DIAGNOSIS — E03.9 HYPOTHYROIDISM, UNSPECIFIED TYPE: ICD-10-CM

## 2023-05-05 LAB
BILIRUB UR QL STRIP: NEGATIVE
CLARITY UR REFRACT.AUTO: CLEAR
COLOR UR AUTO: COLORLESS
GLUCOSE UR QL STRIP: NEGATIVE
HGB UR QL STRIP: NEGATIVE
KETONES UR QL STRIP: NEGATIVE
LEUKOCYTE ESTERASE UR QL STRIP: NEGATIVE
NITRITE UR QL STRIP: NEGATIVE
PH UR STRIP: 6 [PH] (ref 5–8)
PROT UR QL STRIP: NEGATIVE
SP GR UR STRIP: 1.01 (ref 1–1.03)
URN SPEC COLLECT METH UR: ABNORMAL

## 2023-05-05 PROCEDURE — 81003 URINALYSIS AUTO W/O SCOPE: CPT | Performed by: INTERNAL MEDICINE

## 2023-05-11 ENCOUNTER — OFFICE VISIT (OUTPATIENT)
Dept: INTERNAL MEDICINE | Facility: CLINIC | Age: 73
End: 2023-05-11
Payer: MEDICARE

## 2023-05-11 ENCOUNTER — LAB VISIT (OUTPATIENT)
Dept: LAB | Facility: HOSPITAL | Age: 73
End: 2023-05-11
Attending: INTERNAL MEDICINE
Payer: MEDICARE

## 2023-05-11 VITALS
TEMPERATURE: 98 F | RESPIRATION RATE: 16 BRPM | SYSTOLIC BLOOD PRESSURE: 130 MMHG | HEART RATE: 64 BPM | WEIGHT: 165.38 LBS | BODY MASS INDEX: 26.58 KG/M2 | DIASTOLIC BLOOD PRESSURE: 60 MMHG | HEIGHT: 66 IN

## 2023-05-11 DIAGNOSIS — E78.49 OTHER HYPERLIPIDEMIA: Primary | ICD-10-CM

## 2023-05-11 DIAGNOSIS — E78.49 OTHER HYPERLIPIDEMIA: ICD-10-CM

## 2023-05-11 DIAGNOSIS — R26.81 UNSTEADINESS ON FEET: ICD-10-CM

## 2023-05-11 DIAGNOSIS — E03.9 ACQUIRED HYPOTHYROIDISM: Chronic | ICD-10-CM

## 2023-05-11 DIAGNOSIS — Z12.11 COLON CANCER SCREENING: ICD-10-CM

## 2023-05-11 DIAGNOSIS — K76.0 NAFLD (NONALCOHOLIC FATTY LIVER DISEASE): ICD-10-CM

## 2023-05-11 DIAGNOSIS — J45.20 MILD INTERMITTENT ASTHMA WITHOUT COMPLICATION: Primary | ICD-10-CM

## 2023-05-11 DIAGNOSIS — D64.9 ANEMIA, UNSPECIFIED TYPE: ICD-10-CM

## 2023-05-11 DIAGNOSIS — K21.9 GASTROESOPHAGEAL REFLUX DISEASE, UNSPECIFIED WHETHER ESOPHAGITIS PRESENT: Chronic | ICD-10-CM

## 2023-05-11 DIAGNOSIS — F41.9 ANXIETY: Chronic | ICD-10-CM

## 2023-05-11 DIAGNOSIS — K76.0 NAFLD (NONALCOHOLIC FATTY LIVER DISEASE): Chronic | ICD-10-CM

## 2023-05-11 PROBLEM — C50.512 MALIGNANT NEOPLASM OF LOWER-OUTER QUADRANT OF LEFT FEMALE BREAST: Status: RESOLVED | Noted: 2021-02-15 | Resolved: 2023-05-11

## 2023-05-11 PROBLEM — I77.1 SUBCLAVIAN ARTERY STENOSIS: Status: RESOLVED | Noted: 2021-02-15 | Resolved: 2023-05-11

## 2023-05-11 LAB
FERRITIN SERPL-MCNC: 67 NG/ML (ref 20–300)
IRON SERPL-MCNC: 57 UG/DL (ref 30–160)
RETICS/RBC NFR AUTO: 1.8 % (ref 0.5–2.5)
SATURATED IRON: 15 % (ref 20–50)
TOTAL IRON BINDING CAPACITY: 386 UG/DL (ref 250–450)
TRANSFERRIN SERPL-MCNC: 261 MG/DL (ref 200–375)
VIT B12 SERPL-MCNC: 362 PG/ML (ref 210–950)

## 2023-05-11 PROCEDURE — 36415 COLL VENOUS BLD VENIPUNCTURE: CPT | Mod: PO | Performed by: INTERNAL MEDICINE

## 2023-05-11 PROCEDURE — 99215 OFFICE O/P EST HI 40 MIN: CPT | Mod: PBBFAC,PO | Performed by: INTERNAL MEDICINE

## 2023-05-11 PROCEDURE — 99999 PR PBB SHADOW E&M-EST. PATIENT-LVL V: ICD-10-PCS | Mod: PBBFAC,,, | Performed by: INTERNAL MEDICINE

## 2023-05-11 PROCEDURE — 99999 PR PBB SHADOW E&M-EST. PATIENT-LVL V: CPT | Mod: PBBFAC,,, | Performed by: INTERNAL MEDICINE

## 2023-05-11 PROCEDURE — 99214 PR OFFICE/OUTPT VISIT, EST, LEVL IV, 30-39 MIN: ICD-10-PCS | Mod: S$PBB,,, | Performed by: INTERNAL MEDICINE

## 2023-05-11 PROCEDURE — 82607 VITAMIN B-12: CPT | Performed by: INTERNAL MEDICINE

## 2023-05-11 PROCEDURE — 82728 ASSAY OF FERRITIN: CPT | Performed by: INTERNAL MEDICINE

## 2023-05-11 PROCEDURE — 85045 AUTOMATED RETICULOCYTE COUNT: CPT | Performed by: INTERNAL MEDICINE

## 2023-05-11 PROCEDURE — 84466 ASSAY OF TRANSFERRIN: CPT | Performed by: INTERNAL MEDICINE

## 2023-05-11 PROCEDURE — 99214 OFFICE O/P EST MOD 30 MIN: CPT | Mod: S$PBB,,, | Performed by: INTERNAL MEDICINE

## 2023-05-11 RX ORDER — ATORVASTATIN CALCIUM 20 MG/1
20 TABLET, FILM COATED ORAL DAILY
Qty: 90 TABLET | Refills: 1 | Status: SHIPPED | OUTPATIENT
Start: 2023-05-11 | End: 2024-03-06

## 2023-05-11 NOTE — TELEPHONE ENCOUNTER
Refill Decision Note   Sheila Zarco  is requesting a refill authorization.  Brief Assessment and Rationale for Refill:  Approve     Medication Therapy Plan:         Comments:     Note composed:10:37 AM 05/11/2023             Appointments     Last Visit   11/10/2022 Farhat Correa MD   Next Visit   5/11/2023 Farhat Correa MD      
No care due was identified.  [facility] Embedded Care Due Messages. Reference number: [id number].   5/11/2023 12:53:13 AM CDT  
yes...

## 2023-05-20 NOTE — PROGRESS NOTES
Subjective:       Patient ID: Sheila Zarco is a 72 y.o. female.    Chief Complaint: Hypothyroidism (6  mos w/labs.   )    HPI  The patient presents for follow-up of medical conditions which include asthma, GERD, hypothyroidism, anxiety and hyperlipidemia.  The patient states her asthma has been stable with no recent exacerbations.  She has intermittent symptoms of reflux.  She uses Protonix daily for management.    Intermittent anxiety symptoms are noted.  She has been stressed out over the relapse of her son who is 42 years old and has alcohol use disorder.  She does have intermittent depressive symptoms.    She was evaluated for flare of lower back pain.  Naprosyn was prescribed.  She also has been performing back exercises.  Symptoms have been improving.    She is tolerating her thyroid medication well without side effects.    Review of Systems   Constitutional:  Negative for activity change, appetite change, fatigue and unexpected weight change.   Eyes:  Negative for visual disturbance.   Respiratory:  Negative for cough, shortness of breath and wheezing.    Cardiovascular:  Negative for chest pain, palpitations and leg swelling.   Gastrointestinal:  Positive for reflux. Negative for abdominal pain, blood in stool, constipation and diarrhea.   Genitourinary:  Negative for dysuria and hematuria.   Musculoskeletal:  Positive for back pain. Negative for arthralgias, neck pain and neck stiffness.   Integumentary:  Negative for rash.   Neurological:  Negative for dizziness, syncope and headaches.   Psychiatric/Behavioral:  Positive for dysphoric mood. Negative for hallucinations, sleep disturbance and suicidal ideas. The patient is nervous/anxious.           Physical Exam  Vitals and nursing note reviewed.   Constitutional:       General: She is not in acute distress.     Appearance: Normal appearance. She is well-developed.   HENT:      Head: Normocephalic and atraumatic.   Eyes:      General: No scleral  icterus.     Extraocular Movements: Extraocular movements intact.      Conjunctiva/sclera: Conjunctivae normal.   Neck:      Thyroid: No thyromegaly.      Vascular: No JVD.   Cardiovascular:      Rate and Rhythm: Normal rate and regular rhythm.      Heart sounds: Normal heart sounds. No murmur heard.    No friction rub. No gallop.   Pulmonary:      Effort: Pulmonary effort is normal. No respiratory distress.      Breath sounds: Normal breath sounds. No wheezing or rales.   Abdominal:      General: Bowel sounds are normal.      Palpations: Abdomen is soft. There is no mass.      Tenderness: There is no abdominal tenderness.   Musculoskeletal:         General: No tenderness. Normal range of motion.      Cervical back: Normal range of motion and neck supple.      Right lower leg: No edema.      Left lower leg: No edema.      Comments: Back:  Negative straight leg raising test bilaterally.  No lumbar paraspinous muscle spasm.    Hips:  Range of motion is intact bilaterally.   Lymphadenopathy:      Cervical: No cervical adenopathy.   Skin:     General: Skin is warm and dry.      Findings: No rash.   Neurological:      Mental Status: She is alert and oriented to person, place, and time.   Psychiatric:         Mood and Affect: Mood normal.         Behavior: Behavior normal.         Thought Content: Thought content normal.      Comments: The patient gets emotional when discussing her son's illness.  There is no suicidal or homicidal ideation.  No hallucinosis or delusions.  Judgment and insight are intact.         Lab Visit on 05/11/2023   Component Date Value Ref Range Status    Iron 05/11/2023 57  30 - 160 ug/dL Final    Transferrin 05/11/2023 261  200 - 375 mg/dL Final    TIBC 05/11/2023 386  250 - 450 ug/dL Final    Saturated Iron 05/11/2023 15 (L)  20 - 50 % Final    Vitamin B-12 05/11/2023 362  210 - 950 pg/mL Final    Ferritin 05/11/2023 67  20.0 - 300.0 ng/mL Final    Retic 05/11/2023 1.8  0.5 - 2.5 % Final   Lab  Visit on 05/05/2023   Component Date Value Ref Range Status    Specimen UA 05/05/2023 Urine, Clean Catch   Final    Color, UA 05/05/2023 Colorless (A)  Yellow, Straw, Hilda Final    Appearance, UA 05/05/2023 Clear  Clear Final    pH, UA 05/05/2023 6.0  5.0 - 8.0 Final    Specific Gravity, UA 05/05/2023 1.010  1.005 - 1.030 Final    Protein, UA 05/05/2023 Negative  Negative Final    Comment: Recommend a 24 hour urine protein or a urine   protein/creatinine ratio if globulin induced proteinuria is  clinically suspected.      Glucose, UA 05/05/2023 Negative  Negative Final    Ketones, UA 05/05/2023 Negative  Negative Final    Bilirubin (UA) 05/05/2023 Negative  Negative Final    Occult Blood UA 05/05/2023 Negative  Negative Final    Nitrite, UA 05/05/2023 Negative  Negative Final    Leukocytes, UA 05/05/2023 Negative  Negative Final   Lab Visit on 05/04/2023   Component Date Value Ref Range Status    Sodium 05/04/2023 142  136 - 145 mmol/L Final    Potassium 05/04/2023 4.0  3.5 - 5.1 mmol/L Final    Chloride 05/04/2023 109  95 - 110 mmol/L Final    CO2 05/04/2023 26  23 - 29 mmol/L Final    Glucose 05/04/2023 99  70 - 110 mg/dL Final    BUN 05/04/2023 12  8 - 23 mg/dL Final    Creatinine 05/04/2023 0.7  0.5 - 1.4 mg/dL Final    Calcium 05/04/2023 9.2  8.7 - 10.5 mg/dL Final    Total Protein 05/04/2023 6.6  6.0 - 8.4 g/dL Final    Albumin 05/04/2023 3.7  3.5 - 5.2 g/dL Final    Total Bilirubin 05/04/2023 0.4  0.1 - 1.0 mg/dL Final    Comment: For infants and newborns, interpretation of results should be based  on gestational age, weight and in agreement with clinical  observations.    Premature Infant recommended reference ranges:  Up to 24 hours.............<8.0 mg/dL  Up to 48 hours............<12.0 mg/dL  3-5 days..................<15.0 mg/dL  6-29 days.................<15.0 mg/dL      Alkaline Phosphatase 05/04/2023 69  55 - 135 U/L Final    AST 05/04/2023 20  10 - 40 U/L Final    ALT 05/04/2023 15  10 - 44 U/L  Final    Anion Gap 05/04/2023 7 (L)  8 - 16 mmol/L Final    eGFR 05/04/2023 >60.0  >60 mL/min/1.73 m^2 Final    Cholesterol 05/04/2023 192  120 - 199 mg/dL Final    Comment: The National Cholesterol Education Program (NCEP) has set the  following guidelines (reference ranges) for Cholesterol:  Optimal.....................<200 mg/dL  Borderline High.............200-239 mg/dL  High........................> or = 240 mg/dL      Triglycerides 05/04/2023 54  30 - 150 mg/dL Final    Comment: The National Cholesterol Education Program (NCEP) has set the  following guidelines (reference values) for triglycerides:  Normal......................<150 mg/dL  Borderline High.............150-199 mg/dL  High........................200-499 mg/dL      HDL 05/04/2023 100 (H)  40 - 75 mg/dL Final    Comment: The National Cholesterol Education Program (NCEP) has set the  following guidelines (reference values) for HDL Cholesterol:  Low...............<40 mg/dL  Optimal...........>60 mg/dL      LDL Cholesterol 05/04/2023 81.2  63.0 - 159.0 mg/dL Final    Comment: The National Cholesterol Education Program (NCEP) has set the  following guidelines (reference values) for LDL Cholesterol:  Optimal.......................<130 mg/dL  Borderline High...............130-159 mg/dL  High..........................160-189 mg/dL  Very High.....................>190 mg/dL      HDL/Cholesterol Ratio 05/04/2023 52.1 (H)  20.0 - 50.0 % Final    Total Cholesterol/HDL Ratio 05/04/2023 1.9 (L)  2.0 - 5.0 Final    Non-HDL Cholesterol 05/04/2023 92  mg/dL Final    Comment: Risk category and Non-HDL cholesterol goals:  Coronary heart disease (CHD)or equivalent (10-year risk of CHD >20%):  Non-HDL cholesterol goal     <130 mg/dL  Two or more CHD risk factors and 10-year risk of CHD <= 20%:  Non-HDL cholesterol goal     <160 mg/dL  0 to 1 CHD risk factor:  Non-HDL cholesterol goal     <190 mg/dL      WBC 05/04/2023 4.45  3.90 - 12.70 K/uL Final    RBC 05/04/2023 3.99  (L)  4.00 - 5.40 M/uL Final    Hemoglobin 05/04/2023 11.5 (L)  12.0 - 16.0 g/dL Final    Hematocrit 05/04/2023 36.0 (L)  37.0 - 48.5 % Final    MCV 05/04/2023 90  82 - 98 fL Final    MCH 05/04/2023 28.8  27.0 - 31.0 pg Final    MCHC 05/04/2023 31.9 (L)  32.0 - 36.0 g/dL Final    RDW 05/04/2023 13.2  11.5 - 14.5 % Final    Platelets 05/04/2023 284  150 - 450 K/uL Final    MPV 05/04/2023 9.5  9.2 - 12.9 fL Final    Immature Granulocytes 05/04/2023 0.2  0.0 - 0.5 % Final    Gran # (ANC) 05/04/2023 2.6  1.8 - 7.7 K/uL Final    Immature Grans (Abs) 05/04/2023 0.01  0.00 - 0.04 K/uL Final    Comment: Mild elevation in immature granulocytes is non specific and   can be seen in a variety of conditions including stress response,   acute inflammation, trauma and pregnancy. Correlation with other   laboratory and clinical findings is essential.      Lymph # 05/04/2023 1.4  1.0 - 4.8 K/uL Final    Mono # 05/04/2023 0.3  0.3 - 1.0 K/uL Final    Eos # 05/04/2023 0.1  0.0 - 0.5 K/uL Final    Baso # 05/04/2023 0.05  0.00 - 0.20 K/uL Final    nRBC 05/04/2023 0  0 /100 WBC Final    Gran % 05/04/2023 57.4  38.0 - 73.0 % Final    Lymph % 05/04/2023 31.0  18.0 - 48.0 % Final    Mono % 05/04/2023 7.2  4.0 - 15.0 % Final    Eosinophil % 05/04/2023 3.1  0.0 - 8.0 % Final    Basophil % 05/04/2023 1.1  0.0 - 1.9 % Final    Differential Method 05/04/2023 Automated   Final    TSH 05/04/2023 1.383  0.400 - 4.000 uIU/mL Final   Office Visit on 03/28/2023   Component Date Value Ref Range Status    POC Blood, Urine 03/28/2023 Negative  Negative Final    POC Bilirubin, Urine 03/28/2023 Negative  Negative Final    POC Urobilinogen, Urine 03/28/2023 normal  0.1 - 1.1 Final    POC Ketones, Urine 03/28/2023 Negative  Negative Final    POC Protein, Urine 03/28/2023 Negative  Negative Final    POC Nitrates, Urine 03/28/2023 Negative  Negative Final    POC Glucose, Urine 03/28/2023 Negative  Negative Final    pH, UA 03/28/2023 5.5  5 - 8 Final    POC  Specific Gravity, Urine 03/28/2023 1.025  1.003 - 1.029 Final    POC Leukocytes, Urine 03/28/2023 Negative  Negative Final       Assessment & Plan:      Sheila was seen today for hypothyroidism.  Additional anemia lab studies will be obtained.      EGD and colonoscopy will be obtained.    CBC will be repeated in 4-6 weeks.      Current medications will be continued for management of hypothyroidism, GERD and asthma.    Diagnoses and all orders for this visit:    Mild intermittent asthma without complication    Other hyperlipidemia    Acquired hypothyroidism    Gastroesophageal reflux disease, unspecified whether esophagitis present    Anxiety    NAFLD (nonalcoholic fatty liver disease)    Anemia, unspecified type  -     Iron and TIBC; Future  -     Vitamin B12; Future  -     Ferritin; Future  -     Reticulocytes; Future  -     Ambulatory referral/consult to Endo Procedure ; Future    Unsteadiness on feet  -     Vitamin B12; Future    Colon cancer screening  -     Ambulatory referral/consult to Endo Procedure ; Future         Follow up in about 4 months (around 9/11/2023).     Farhat Correa MD

## 2023-06-08 ENCOUNTER — PATIENT MESSAGE (OUTPATIENT)
Dept: INTERNAL MEDICINE | Facility: CLINIC | Age: 73
End: 2023-06-08
Payer: MEDICARE

## 2023-06-08 ENCOUNTER — TELEPHONE (OUTPATIENT)
Dept: INTERNAL MEDICINE | Facility: CLINIC | Age: 73
End: 2023-06-08
Payer: MEDICARE

## 2023-06-08 NOTE — TELEPHONE ENCOUNTER
----- Message from Nabila Gongora sent at 6/8/2023  1:44 PM CDT -----  Regarding: appt  Name of caller: jason       What is the requesting detail: pt is requesting a call back to schedule her endoscopy and coloscopy.  Please advise       Can the clinic reply by [facility]:       What number to call back:[phone]

## 2023-06-16 ENCOUNTER — HOSPITAL ENCOUNTER (OUTPATIENT)
Dept: RADIOLOGY | Facility: HOSPITAL | Age: 73
Discharge: HOME | End: 2023-06-16
Attending: SURGERY
Payer: MEDICARE

## 2023-06-16 DIAGNOSIS — Z12.31 SCREENING MAMMOGRAM, ENCOUNTER FOR: ICD-10-CM

## 2023-06-16 PROCEDURE — 77063 BREAST TOMOSYNTHESIS BI: CPT | Mod: 26,,, | Performed by: RADIOLOGY

## 2023-06-16 PROCEDURE — 77067 SCR MAMMO BI INCL CAD: CPT | Mod: 26,,, | Performed by: RADIOLOGY

## 2023-06-16 PROCEDURE — 77063 MAMMO DIGITAL SCREENING BILAT WITH TOMO: ICD-10-PCS | Mod: 26,,, | Performed by: RADIOLOGY

## 2023-06-16 PROCEDURE — 77067 SCR MAMMO BI INCL CAD: CPT | Mod: TC,PO

## 2023-06-16 PROCEDURE — 77067 MAMMO DIGITAL SCREENING BILAT WITH TOMO: ICD-10-PCS | Mod: 26,,, | Performed by: RADIOLOGY

## 2023-06-28 ENCOUNTER — PATIENT MESSAGE (OUTPATIENT)
Dept: INTERNAL MEDICINE | Facility: CLINIC | Age: 73
End: 2023-06-28
Payer: MEDICARE

## 2023-07-03 ENCOUNTER — TELEPHONE (OUTPATIENT)
Dept: RADIOLOGY | Facility: HOSPITAL | Age: 73
End: 2023-07-03
Payer: MEDICARE

## 2023-07-03 NOTE — TELEPHONE ENCOUNTER
Spoke with patient and explained mammogram findings.Patient expressed understanding of results. Patient scheduled repeat mammogram follow up appointment at [facility] on 7/6/2023.

## 2023-07-06 ENCOUNTER — HOSPITAL ENCOUNTER (OUTPATIENT)
Dept: RADIOLOGY | Facility: HOSPITAL | Age: 73
Discharge: HOME | End: 2023-07-06
Attending: SURGERY
Payer: MEDICARE

## 2023-07-06 DIAGNOSIS — R92.8 ABNORMAL FINDING ON BREAST IMAGING: ICD-10-CM

## 2023-07-06 PROCEDURE — 77061 BREAST TOMOSYNTHESIS UNI: CPT | Mod: TC,LT

## 2023-07-10 ENCOUNTER — OFFICE VISIT (OUTPATIENT)
Dept: URGENT CARE | Facility: CLINIC | Age: 73
End: 2023-07-10
Payer: MEDICARE

## 2023-07-10 VITALS
OXYGEN SATURATION: 97 % | WEIGHT: 165 LBS | DIASTOLIC BLOOD PRESSURE: 69 MMHG | RESPIRATION RATE: 18 BRPM | HEIGHT: 66 IN | SYSTOLIC BLOOD PRESSURE: 117 MMHG | BODY MASS INDEX: 26.52 KG/M2 | HEART RATE: 62 BPM | TEMPERATURE: 98 F

## 2023-07-10 DIAGNOSIS — L03.113 CELLULITIS OF RIGHT UPPER EXTREMITY: ICD-10-CM

## 2023-07-10 DIAGNOSIS — T63.441A BEE STING REACTION, ACCIDENTAL OR UNINTENTIONAL, INITIAL ENCOUNTER: Primary | ICD-10-CM

## 2023-07-10 PROCEDURE — 96372 THER/PROPH/DIAG INJ SC/IM: CPT | Mod: S$GLB,,,

## 2023-07-10 PROCEDURE — 96372 PR INJECTION,THERAP/PROPH/DIAG2ST, IM OR SUBCUT: ICD-10-PCS | Mod: S$GLB,,,

## 2023-07-10 PROCEDURE — 99213 PR OFFICE/OUTPT VISIT, EST, LEVL III, 20-29 MIN: ICD-10-PCS | Mod: 25,S$GLB,,

## 2023-07-10 PROCEDURE — 99213 OFFICE O/P EST LOW 20 MIN: CPT | Mod: 25,S$GLB,,

## 2023-07-10 RX ORDER — CEPHALEXIN 500 MG/1
500 CAPSULE ORAL 4 TIMES DAILY
Qty: 28 CAPSULE | Refills: 0 | Status: SHIPPED | OUTPATIENT
Start: 2023-07-10 | End: 2023-07-17

## 2023-07-10 RX ORDER — DEXAMETHASONE SODIUM PHOSPHATE 100 MG/10ML
6 INJECTION INTRAMUSCULAR; INTRAVENOUS
Status: COMPLETED | OUTPATIENT
Start: 2023-07-10 | End: 2023-07-10

## 2023-07-10 RX ADMIN — DEXAMETHASONE SODIUM PHOSPHATE 6 MG: 100 INJECTION INTRAMUSCULAR; INTRAVENOUS at 12:07

## 2023-07-10 NOTE — PROGRESS NOTES
"Subjective:      Patient ID: Sheila Zarco is a 72 y.o. female.    Vitals:  height is 5' 6" (1.676 m) and weight is 74.8 kg (165 lb). Her oral temperature is 98 °F (36.7 °C). Her blood pressure is 117/69 and her pulse is 57 (abnormal). Her respiration is 18 and oxygen saturation is 97%.     Chief Complaint: Insect Bite    This is a 72 y.o. female pt who presents today with a chief complaint of a bee sting. Patient reports she was stung under her right arm yesterday. Pt reports that the area is red and has some swelling . Pt reports swelling is from the forearm to the armpit. Pt denies any shortness of breath, wheezing, chest pain, nausea/vomiting or difficulty swallowing.      Insect Bite  This is a new problem. The current episode started yesterday. The problem occurs constantly. The problem has been gradually worsening. Nothing aggravates the symptoms. Treatments tried: Benadryl. The treatment provided mild relief.   Skin:  Positive for erythema (erythema from upper arm to forearm with mild induration; no drainage or fluctuance).    Objective:     Physical Exam   Constitutional: She is oriented to person, place, and time. She appears well-developed.   HENT:   Head: Normocephalic and atraumatic. Head is without abrasion, without contusion and without laceration.   Ears:   Right Ear: External ear normal.   Left Ear: External ear normal.   Nose: Nose normal.   Mouth/Throat: Oropharynx is clear and moist and mucous membranes are normal.   Eyes: Conjunctivae, EOM and lids are normal. Pupils are equal, round, and reactive to light.   Neck: Trachea normal and phonation normal. Neck supple.   Cardiovascular: Normal rate, regular rhythm and normal heart sounds.   Pulmonary/Chest: Effort normal and breath sounds normal. No stridor. No respiratory distress.   Musculoskeletal: Normal range of motion.         General: Normal range of motion.   Neurological: She is alert and oriented to person, place, and time.   Skin: " Skin is warm, dry, intact and no rash. Capillary refill takes less than 2 seconds. erythema (erythema from upper arm to forearm with mild induration; no drainage or fluctuance) No abrasion, No burn, No bruising and No ecchymosis   Psychiatric: Her speech is normal and behavior is normal. Judgment and thought content normal.   Nursing note and vitals reviewed.    Assessment:     1. Bee sting reaction, accidental or unintentional, initial encounter    2. Cellulitis of right upper extremity        Plan:       Bee sting reaction, accidental or unintentional, initial encounter  -     dexAMETHasone injection 6 mg    Cellulitis of right upper extremity  -     cephALEXin (KEFLEX) 500 MG capsule; Take 1 capsule (500 mg total) by mouth 4 (four) times daily. for 7 days  Dispense: 28 capsule; Refill: 0    Pt in no acute distress. Vitals reassuring. Discussed OTC medications to help with allergic reaction. Pt requesting steroid injection. Discussed side effects in detail, pt agreed to continue with treatment. Discussed treatment of cellulitis with Keflex. Discussed f/u with PCP if symptoms do not improve. Discussed the importance of further evaluation if symptoms worsen. Patient stated verbal understanding.    Patient Instructions                                        Allergic Reaction   If your condition worsens or fails to improve we recommend that you receive another evaluation at the ER immediately or contact your PCP to discuss your concerns or return here. You must understand that you've received an urgent care treatment only and that you may be released before all your medical problems are known or treated. You the patient will arrange for followup care as instructed.   Increase fluids and rest are important  Please take over the counter Pepcid or Zantac as directed for the next 24-72hours as needed.  Please take Claritin or Zyrtec or Allegra (24 hours) twice a day.  You can add Benadryl or Hydroxyzine as needed for  itching, however these may make you drowsy, so do not  drive or operate heavy equipment or machinery while taking these medications.    If you were given a steroid shot in the clinic and have also been given a prescription for a steroid such as Prednisone or a Medrol Dose Pack, please begin taking them tomorrow. If you received a steroid shot today - this can elevate your blood pressure, elevate your blood sugar, water weight gain, nervous energy, redness to the face and dimpling of the skin where the shot goes in.      If you develop additional symptoms such as tongue swelling or trouble breathing go immediately to the ER.     If you were stung by an insect rarely do these get infected. However if it gets painful, increase redness or drainage you need to be rechecked either here or call your PCP.    In the future make sure to keep benadryl with you or an Epi-pen if you were prescribed one.     PLEASE READ YOUR DISCHARGE INSTRUCTIONS ENTIRELY AS IT CONTAINS IMPORTANT INFORMATION.    If you were prescribed antibiotics, please take them to completion.    If not allergic, please take over the counter Tylenol (Acetaminophen) and/or Motrin (Ibuprofen) as directed for control of pain and/or fever.    Please return here/PCP or go to the Emergency Department for any concerns or worsening of condition.    Please arrange follow up with your primary medical clinic as soon as possible. You must understand that you've received an Urgent Care treatment only and that you may be released before all of your medical problems are known or treated. You, the patient, will arrange for follow up as instructed. If your symptoms worsen or fail to improve you should go to the Emergency Room.

## 2023-07-10 NOTE — PATIENT INSTRUCTIONS
Allergic Reaction   If your condition worsens or fails to improve we recommend that you receive another evaluation at the ER immediately or contact your PCP to discuss your concerns or return here. You must understand that you've received an urgent care treatment only and that you may be released before all your medical problems are known or treated. You the patient will arrange for followup care as instructed.   Increase fluids and rest are important  Please take over the counter Pepcid or Zantac as directed for the next 24-72hours as needed.  Please take Claritin or Zyrtec or Allegra (24 hours) twice a day.  You can add Benadryl or Hydroxyzine as needed for itching, however these may make you drowsy, so do not  drive or operate heavy equipment or machinery while taking these medications.    If you were given a steroid shot in the clinic and have also been given a prescription for a steroid such as Prednisone or a Medrol Dose Pack, please begin taking them tomorrow. If you received a steroid shot today - this can elevate your blood pressure, elevate your blood sugar, water weight gain, nervous energy, redness to the face and dimpling of the skin where the shot goes in.      If you develop additional symptoms such as tongue swelling or trouble breathing go immediately to the ER.     If you were stung by an insect rarely do these get infected. However if it gets painful, increase redness or drainage you need to be rechecked either here or call your PCP.    In the future make sure to keep benadryl with you or an Epi-pen if you were prescribed one.     PLEASE READ YOUR DISCHARGE INSTRUCTIONS ENTIRELY AS IT CONTAINS IMPORTANT INFORMATION.    If you were prescribed antibiotics, please take them to completion.    If not allergic, please take over the counter Tylenol (Acetaminophen) and/or Motrin (Ibuprofen) as directed for control of pain and/or fever.    Please return here/PCP or go  to the Emergency Department for any concerns or worsening of condition.    Please arrange follow up with your primary medical clinic as soon as possible. You must understand that you've received an Urgent Care treatment only and that you may be released before all of your medical problems are known or treated. You, the patient, will arrange for follow up as instructed. If your symptoms worsen or fail to improve you should go to the Emergency Room.

## 2023-07-11 ENCOUNTER — TELEPHONE (OUTPATIENT)
Dept: RADIOLOGY | Facility: HOSPITAL | Age: 73
End: 2023-07-11
Payer: MEDICARE

## 2023-07-12 ENCOUNTER — TELEPHONE (OUTPATIENT)
Dept: URGENT CARE | Facility: CLINIC | Age: 73
End: 2023-07-12
Payer: MEDICARE

## 2023-07-19 ENCOUNTER — TELEPHONE (OUTPATIENT)
Dept: ENDOSCOPY | Facility: HOSPITAL | Age: 73
End: 2023-07-19

## 2023-07-19 ENCOUNTER — CLINICAL SUPPORT (OUTPATIENT)
Dept: ENDOSCOPY | Facility: HOSPITAL | Age: 73
End: 2023-07-19
Attending: INTERNAL MEDICINE
Payer: MEDICARE

## 2023-07-19 VITALS — BODY MASS INDEX: 26.52 KG/M2 | WEIGHT: 165 LBS | HEIGHT: 66 IN

## 2023-07-19 DIAGNOSIS — Z12.11 COLON CANCER SCREENING: ICD-10-CM

## 2023-07-19 DIAGNOSIS — D64.9 ANEMIA, UNSPECIFIED TYPE: ICD-10-CM

## 2023-07-19 NOTE — TELEPHONE ENCOUNTER
Spoke to pt to schedule procedure(s) Colonoscopy/EGD       Physician to perform procedure(s) Dr. EFRAIN Weiss  Date of Procedure (s) 10/11/23  Arrival Time 7:15 AM  Time of Procedure(s) 8:15 AM   Location of Procedure(s) [facility]  Type of Rx Prep sent to patient: Suprep  Instructions provided to patient via Postal Mail    Patient was informed on the following information and verbalized understanding. Screening questionnaire reviewed with patient and complete. If procedure requires anesthesia, a responsible adult needs to be present to accompany the patient home, patient cannot drive after receiving anesthesia. Appointment details are tentative, especially check-in time. Patient will receive a prep-op call 4 days prior to confirm check-in time for procedure. If applicable the patient should contact their pharmacy to verify Rx for procedure prep is ready for pick-up. Patient was advised to call the scheduling department at [phone] if pharmacy states no Rx is available. Patient was advised to call the endoscopy scheduling department if any questions or concerns arise.      [facility] Scheduling Department

## 2023-08-21 ENCOUNTER — TELEPHONE (OUTPATIENT)
Dept: PODIATRY | Facility: CLINIC | Age: 73
End: 2023-08-21
Payer: MEDICARE

## 2023-08-21 NOTE — TELEPHONE ENCOUNTER
No care due was identified.  [facility] Embedded Care Due Messages. Reference number: [id number].   8/21/2023 3:12:14 PM CDT

## 2023-08-22 RX ORDER — PANTOPRAZOLE SODIUM 40 MG/1
TABLET, DELAYED RELEASE ORAL
Qty: 90 TABLET | Refills: 2 | Status: SHIPPED | OUTPATIENT
Start: 2023-08-22

## 2023-08-22 RX ORDER — ESCITALOPRAM OXALATE 20 MG/1
20 TABLET ORAL DAILY
Qty: 90 TABLET | Refills: 2 | Status: SHIPPED | OUTPATIENT
Start: 2023-08-22 | End: 2024-05-18

## 2023-08-22 NOTE — TELEPHONE ENCOUNTER
Refill Decision Note   Sheila Zarco  is requesting a refill authorization.  Brief Assessment and Rationale for Refill:  Approve     Medication Therapy Plan:         Comments:     Note composed:12:05 PM 08/22/2023

## 2023-08-29 ENCOUNTER — OFFICE VISIT (OUTPATIENT)
Dept: PODIATRY | Facility: CLINIC | Age: 73
End: 2023-08-29
Payer: MEDICARE

## 2023-08-29 VITALS
WEIGHT: 164.88 LBS | SYSTOLIC BLOOD PRESSURE: 155 MMHG | DIASTOLIC BLOOD PRESSURE: 63 MMHG | BODY MASS INDEX: 26.5 KG/M2 | HEART RATE: 62 BPM | HEIGHT: 66 IN

## 2023-08-29 DIAGNOSIS — Z98.890 H/O FOOT SURGERY: Primary | ICD-10-CM

## 2023-08-29 DIAGNOSIS — L60.9 DISEASE OF NAIL: ICD-10-CM

## 2023-08-29 DIAGNOSIS — B35.3 TINEA PEDIS OF RIGHT FOOT: ICD-10-CM

## 2023-08-29 PROCEDURE — 99213 OFFICE O/P EST LOW 20 MIN: CPT | Mod: S$PBB,,, | Performed by: PODIATRIST

## 2023-08-29 PROCEDURE — 99999 PR PBB SHADOW E&M-EST. PATIENT-LVL IV: ICD-10-PCS | Mod: PBBFAC,,, | Performed by: PODIATRIST

## 2023-08-29 PROCEDURE — 99213 PR OFFICE/OUTPT VISIT, EST, LEVL III, 20-29 MIN: ICD-10-PCS | Mod: S$PBB,,, | Performed by: PODIATRIST

## 2023-08-29 PROCEDURE — 99999 PR PBB SHADOW E&M-EST. PATIENT-LVL IV: CPT | Mod: PBBFAC,,, | Performed by: PODIATRIST

## 2023-08-29 PROCEDURE — 99214 OFFICE O/P EST MOD 30 MIN: CPT | Mod: PBBFAC,PN | Performed by: PODIATRIST

## 2023-08-29 NOTE — PROGRESS NOTES
Subjective:      Patient ID: Sheila Zarco is a 73 y.o. female.    Chief Complaint:   Rash (Right foot between 4th and 5th toe, has started to heal, keeps coming back, had the same rash between 8/3 - 8/7 and it cleared eventually)    Sheila is a 73 y.o. female who presents to the podiatry clinic  with complaint of  right foot rash  Patient relates she almost cancel the appointment it seemed to clear up however it came back.  She relates she is been using Lotrimin and it seems to have helped  Beginning in August she had a crust in between her toes which she scratched with her fingernail the toes became red and swollen that has all resolved  There is no current itching however there was in the past    She also inquiring about the left big foot bunion area she relates she has pain when she wears flatter type shoes.  This is the area of the previous surgery.  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/2020    DCIS left    Cataract     Glaucoma     Thyroid disease      Past Surgical History:   Procedure Laterality Date    BREAST BIOPSY Left 06/2020    core bx, +    BREAST LUMPECTOMY Left     BUNIONECTOMY      GALLBLADDER SURGERY      HYSTERECTOMY      INTRAOCULAR PROSTHESES INSERTION Right 7/9/2020    Procedure: INSERTION, IOL PROSTHESIS;  Surgeon: Lucas Jefferson MD;  Location: [facility] OR [address] [place] [address];  Service: Ophthalmology;  Laterality: Right;    INTRAOCULAR PROSTHESES INSERTION Left 10/22/2020    Procedure: INSERTION, IOL PROSTHESIS;   Surgeon: Lucas Jefferson MD;  Location: [address];  Service: Ophthalmology;  Laterality: Left;    KNEE CARTILAGE SURGERY      MASTECTOMY, PARTIAL Left 7/24/2020    Procedure: MASTECTOMY, PARTIAL-Left with Radiological Marker;  Surgeon: Kayleen Alvarez MD;  Location: [facility];  Service: General;  Laterality: Left;    narrow angles eye surgery       PHACOEMULSIFICATION OF CATARACT Right 7/9/2020    Procedure: PHACOEMULSIFICATION, CATARACT;  Surgeon: Lucas Jefferson MD;  Location: [address];  Service: Ophthalmology;  Laterality: Right;    PHACOEMULSIFICATION OF CATARACT Left 10/22/2020    Procedure: PHACOEMULSIFICATION, CATARACT;  Surgeon: Lucas Jefferson MD;  Location: [address];  Service: Ophthalmology;  Laterality: Left;     Current Outpatient Medications on File Prior to Visit   Medication Sig Dispense Refill    albuterol (PROVENTIL/VENTOLIN HFA) 90 mcg/actuation inhaler Inhale 2 puffs into the lungs every 6 (six) hours as needed for Wheezing.      ALPRAZolam (XANAX) 0.5 MG tablet TAKE 1 TABLET BY MOUTH TWICE A DAY AS NEEDED 60 tablet 2    aspirin (ECOTRIN) 81 MG EC tablet Take 81 mg by mouth every morning.      atorvastatin (LIPITOR) 20 MG tablet TAKE 1 TABLET (20 MG TOTAL) BY MOUTH ONCE DAILY. FOR CHOLESTEROL CONTROL. 90 tablet 1    desoximetasone (TOPICORT) 0.25 % cream Apply topically 2 (two) times daily.      EScitalopram oxalate (LEXAPRO) 20 MG tablet TAKE 1 TABLET (20 MG TOTAL) BY MOUTH ONCE DAILY. FOR ANXIETY. 90 tablet 2    levothyroxine (SYNTHROID) 88 MCG tablet TAKE 1 TABLET BY MOUTH EVERY MORNING. 90 tablet 3    pantoprazole (PROTONIX) 40 MG tablet TAKE 1 TABLET BY MOUTH EVERY DAY 90 tablet 2    vitamin D (VITAMIN D3) 1000 units Tab Take by mouth once daily. Pt takes 50 mcg daily OTC      acetic acid (VOSOL) 2 % otic solution Apply 4 drops to affected ear tid prn flare (Patient not taking: Reported on 11/29/2022) 6 mL 2    albuterol (ACCUNEB) 0.63 mg/3 mL Nebu Take 3 mLs  (0.63 mg total) by nebulization every 6 (six) hours as needed (wheezing and congestion). (Patient not taking: Reported on 3/28/2023) 50 each 2    fluocinonide (LIDEX) 0.05 % external solution AAA scalp qday prn itching, scaling (Patient not taking: Reported on 5/11/2023) 60 mL 3    fluticasone-salmeterol diskus inhaler 250-50 mcg Inhale 1 puff into the lungs 2 (two) times daily. 60 each 5    ketoconazole (NIZORAL) 2 % cream AAA twice daily on nose prn flare (Patient not taking: Reported on 3/28/2023) 60 g 3    ketoconazole (NIZORAL) 2 % shampoo WASH HAIR W/ MEDICATED SHAMPOO AT LEAST 2X WEEK, LET SIT ON SCALP FOR ATLEAST 5MINUTES BEFORE RINSIN (Patient not taking: Reported on 3/28/2023) 120 mL 1    levocetirizine (XYZAL) 5 MG tablet TAKE 1 TABLET (5 MG TOTAL) BY MOUTH EVERY EVENING. FOR SINUS ALLERGY SYMPTOMS 90 tablet 1    meclizine (ANTIVERT) 25 mg tablet Take 1 tablet (25 mg total) by mouth 3 (three) times daily as needed for Dizziness. (Patient not taking: Reported on 3/28/2023) 15 tablet 0    triamcinolone acetonide 0.025% (KENALOG) 0.025 % cream AAA qd to face, ears prn flare (Patient not taking: Reported on 3/28/2023) 45 g 1    valACYclovir (VALTREX) 500 MG tablet TAKE ONE TABLET TWICE A DAY AS NEEDED FOR OUTBREAK (Patient not taking: Reported on 3/28/2023) 40 tablet 3     Current Facility-Administered Medications on File Prior to Visit   Medication Dose Route Frequency Provider Last Rate Last Admin    0.9%  NaCl infusion   Intravenous Continuous Lucas Jefferson MD   1,000 mL at 07/09/20 0633    cyclopentolate 1% ophthalmic solution 1 drop  1 drop Right Eye On Call Procedure Lucas Jefferson MD   1 drop at 07/09/20 0650    phenylephrine HCL 2.5% ophthalmic solution 1 drop  1 drop Right Eye On Call Procedure Lucas Jefferson MD   1 drop at 07/09/20 0650    tropicamide 1% ophthalmic solution 1 drop  1 drop Right Eye On Call Procedure Lucas Jefferson MD   1 drop at 07/09/20 0684  "    Review of patient's allergies indicates:  No Known Allergies    Review of Systems   Constitutional: Negative for chills, decreased appetite, fever, malaise/fatigue, night sweats, weight gain and weight loss.   Cardiovascular:  Negative for chest pain, claudication, dyspnea on exertion, leg swelling, palpitations and syncope.   Respiratory:  Negative for cough and shortness of breath.    Endocrine: Negative for cold intolerance and heat intolerance.   Hematologic/Lymphatic: Negative for bleeding problem. Does not bruise/bleed easily.   Skin:  Positive for itching, nail changes and rash. Negative for color change, dry skin, flushing, poor wound healing, skin cancer, suspicious lesions and unusual hair distribution.   Musculoskeletal:  Positive for arthritis. Negative for back pain, falls, gout, joint pain, joint swelling, muscle cramps, muscle weakness, myalgias, neck pain and stiffness.   Gastrointestinal:  Negative for diarrhea, nausea and vomiting.   Neurological:  Negative for dizziness, focal weakness, light-headedness, numbness, paresthesias, tremors, vertigo and weakness.   Psychiatric/Behavioral:  Negative for altered mental status and depression. The patient does not have insomnia.    Allergic/Immunologic: Negative.            Objective:       Vitals:    08/29/23 0905   BP: (!) 155/63   Pulse: 62   Weight: 74.8 kg (164 lb 14.5 oz)   Height: 5' 6" (1.676 m)   PainSc: 0-No pain   PainLoc: Toe   74.8 kg (164 lb 14.5 oz)     Physical Exam  Vitals reviewed.   Constitutional:       General: She is not in acute distress.     Appearance: She is well-developed. She is not ill-appearing, toxic-appearing or diaphoretic.      Comments: Proper supportive shoegear      Cardiovascular:      Pulses:           Dorsalis pedis pulses are 2+ on the right side and 2+ on the left side.        Posterior tibial pulses are 2+ on the right side and 2+ on the left side.   Musculoskeletal:         General: No tenderness.      Right " lower leg: No edema.      Left lower leg: No edema.      Right ankle: Normal.      Right Achilles Tendon: Normal.      Left ankle: Normal.      Left Achilles Tendon: Normal.      Right foot: Decreased range of motion. Deformity present. No tenderness or bony tenderness.      Left foot: Decreased range of motion. Deformity present. No tenderness or bony tenderness.      Comments: No pain with rom left 1st MPTJ    + dorsal 1st MTPJ end rom     Feet:      Right foot:      Protective Sensation: 10 sites tested.  10 sites sensed.      Skin integrity: Dry skin present. No ulcer, blister, skin breakdown, erythema, warmth or callus.      Toenail Condition: Right toenails are normal.      Left foot:      Protective Sensation: 10 sites tested.  10 sites sensed.      Skin integrity: No ulcer, blister, skin breakdown, erythema, warmth, callus or dry skin.      Toenail Condition: Left toenails are normal.      Comments: Nails short, thin  + peeling eythema dorsal 4th IS. No soi, no drainage.   Skin:     General: Skin is warm.      Capillary Refill: Capillary refill takes 2 to 3 seconds.      Coloration: Skin is not pale.      Findings: No erythema or rash.   Neurological:      Mental Status: She is alert and oriented to person, place, and time.      Sensory: No sensory deficit.      Gait: Gait is intact.   Psychiatric:         Attention and Perception: Attention normal.         Mood and Affect: Mood normal.         Speech: Speech normal.         Behavior: Behavior normal.         Thought Content: Thought content normal.         Cognition and Memory: Cognition normal.         Judgment: Judgment normal.               Assessment:       Encounter Diagnoses   Name Primary?    H/O foot surgery Yes    Tinea pedis of right foot     Disease of nail          Plan:       Sheila was seen today for rash.    Diagnoses and all orders for this visit:    H/O foot surgery    Tinea pedis of right foot    Disease of nail      I counseled the  patient on her conditions, their implications and medical management.    Tinea improving   - did well with topical lotrimin. Recommend topical antibiotics few days    - re-reviewed xrays with pt    - do not recommend surgery    - recommend grow out nails... return for biopsy  Recommend           Follow up if symptoms worsen or fail to improve.

## 2023-09-15 ENCOUNTER — TELEPHONE (OUTPATIENT)
Dept: DERMATOLOGY | Facility: CLINIC | Age: 73
End: 2023-09-15
Payer: MEDICARE

## 2023-09-15 NOTE — TELEPHONE ENCOUNTER
Spoke with pt. Confirmed pt;'s appt 10/23.    ----- Message from Karis Cooper LPN sent at 9/14/2023  4:10 PM CDT -----    ----- Message -----  From: Maria L Ryan  Sent: 9/14/2023   3:34 PM CDT  To: Corinne LEMUS Staff    Type:  Needs  appointment request     Who Called: pt    Would the patient rather a call back or a response via [facility]? call  Best Call Back Number: [phone]  Additional Information: pt would like to be scheduled to see this provider for a Annual /mole check Preferred Date Range: Any date 10/30/2023 or earlier

## 2023-10-02 ENCOUNTER — TELEPHONE (OUTPATIENT)
Dept: INTERNAL MEDICINE | Facility: CLINIC | Age: 73
End: 2023-10-02
Payer: MEDICARE

## 2023-10-02 ENCOUNTER — OFFICE VISIT (OUTPATIENT)
Dept: URGENT CARE | Facility: CLINIC | Age: 73
End: 2023-10-02
Payer: MEDICARE

## 2023-10-02 VITALS
DIASTOLIC BLOOD PRESSURE: 65 MMHG | TEMPERATURE: 98 F | HEART RATE: 78 BPM | WEIGHT: 164 LBS | SYSTOLIC BLOOD PRESSURE: 165 MMHG | BODY MASS INDEX: 26.36 KG/M2 | HEIGHT: 66 IN | RESPIRATION RATE: 18 BRPM | OXYGEN SATURATION: 96 %

## 2023-10-02 DIAGNOSIS — J18.9 PNEUMONIA DUE TO INFECTIOUS ORGANISM, UNSPECIFIED LATERALITY, UNSPECIFIED PART OF LUNG: ICD-10-CM

## 2023-10-02 DIAGNOSIS — R05.9 COUGH, UNSPECIFIED TYPE: Primary | ICD-10-CM

## 2023-10-02 DIAGNOSIS — J45.909 ASTHMA, UNSPECIFIED ASTHMA SEVERITY, UNSPECIFIED WHETHER COMPLICATED, UNSPECIFIED WHETHER PERSISTENT: ICD-10-CM

## 2023-10-02 LAB
CTP QC/QA: YES
SARS-COV-2 AG RESP QL IA.RAPID: NEGATIVE

## 2023-10-02 PROCEDURE — 99214 PR OFFICE/OUTPT VISIT, EST, LEVL IV, 30-39 MIN: ICD-10-PCS | Mod: S$GLB,,, | Performed by: FAMILY MEDICINE

## 2023-10-02 PROCEDURE — 71046 X-RAY EXAM CHEST 2 VIEWS: CPT | Mod: FY,S$GLB,, | Performed by: RADIOLOGY

## 2023-10-02 PROCEDURE — 71046 XR CHEST PA AND LATERAL: ICD-10-PCS | Mod: FY,S$GLB,, | Performed by: RADIOLOGY

## 2023-10-02 PROCEDURE — 87811 SARS-COV-2 COVID19 W/OPTIC: CPT | Mod: QW,S$GLB,, | Performed by: FAMILY MEDICINE

## 2023-10-02 PROCEDURE — 87811 SARS CORONAVIRUS 2 ANTIGEN POCT, MANUAL READ: ICD-10-PCS | Mod: QW,S$GLB,, | Performed by: FAMILY MEDICINE

## 2023-10-02 PROCEDURE — 99214 OFFICE O/P EST MOD 30 MIN: CPT | Mod: S$GLB,,, | Performed by: FAMILY MEDICINE

## 2023-10-02 RX ORDER — BENZONATATE 200 MG/1
200 CAPSULE ORAL 3 TIMES DAILY PRN
Qty: 30 CAPSULE | Refills: 0 | Status: SHIPPED | OUTPATIENT
Start: 2023-10-02 | End: 2023-10-12

## 2023-10-02 RX ORDER — AMOXICILLIN AND CLAVULANATE POTASSIUM 500; 125 MG/1; MG/1
1 TABLET, FILM COATED ORAL 3 TIMES DAILY
Qty: 20 TABLET | Refills: 0 | Status: SHIPPED | OUTPATIENT
Start: 2023-10-02 | End: 2023-11-16 | Stop reason: ALTCHOICE

## 2023-10-02 RX ORDER — ALBUTEROL SULFATE 90 UG/1
2 AEROSOL, METERED RESPIRATORY (INHALATION) EVERY 6 HOURS PRN
Qty: 18 G | Refills: 0 | Status: SHIPPED | OUTPATIENT
Start: 2023-10-02

## 2023-10-02 NOTE — TELEPHONE ENCOUNTER
Returned patient call.  She was in [place] with friends and sick the whole time she was there.  She did 4 covid test and all negative.    Nausea , Asthma acting up, congested,stomach upset.  Did 4 covid test  During the week and all negative.  Took Cephalexin and aspirin today and thinks that upset her stomach.    I apologized dr schneider is booked.  Don't see any openings today here or [facility].  She agreed to  get to urgent care 1 block down for eval. Lives close.

## 2023-10-02 NOTE — PROGRESS NOTES
"Subjective:      Patient ID: Sheila Zarco is a 73 y.o. female.    Vitals:  height is 5' 6" (1.676 m) and weight is 74.4 kg (164 lb). Her oral temperature is 98 °F (36.7 °C). Her blood pressure is 165/65 (abnormal) and her pulse is 78. Her respiration is 18 and oxygen saturation is 96%.     Chief Complaint: Cough (Asthma - Entered by patient)    This is a 73 y.o. female who presents today with a chief complaint of  coughing x 3 days. Pt state she went to [place] and started to feel bad.    Cough  This is a new problem. The current episode started in the past 7 days. The problem has been unchanged. The problem occurs constantly. The cough is Productive of sputum. Pertinent negatives include no chest pain, chills, ear congestion, ear pain, fever, headaches, heartburn, hemoptysis, myalgias, nasal congestion, postnasal drip, rash, rhinorrhea, sore throat, shortness of breath, sweats, weight loss or wheezing. Nothing aggravates the symptoms. Risk factors for lung disease include travel. She has tried nothing for the symptoms. There is no history of asthma, bronchiectasis, bronchitis, COPD, emphysema, environmental allergies or pneumonia.     Constitution: Negative for chills and fever.   HENT:  Negative for ear pain, postnasal drip and sore throat.    Cardiovascular:  Negative for chest pain.   Respiratory:  Positive for cough. Negative for bloody sputum, shortness of breath and wheezing.    Gastrointestinal:  Negative for heartburn.   Musculoskeletal:  Negative for muscle ache.   Skin:  Negative for rash.   Allergic/Immunologic: Negative for environmental allergies.   Neurological:  Negative for headaches.    Objective:     Physical Exam   Constitutional: She is oriented to person, place, and time. She appears ill.   HENT:   Head: Normocephalic and atraumatic.   Ears:   Right Ear: Tympanic membrane, external ear and ear canal normal.   Left Ear: Tympanic membrane, external ear and ear canal normal.   Nose: " Rhinorrhea and congestion present.   Mouth/Throat: Mucous membranes are moist. Oropharynx is clear.   Eyes: Conjunctivae are normal. Pupils are equal, round, and reactive to light. Extraocular movement intact   Neck: Neck supple.   Cardiovascular: Normal rate, regular rhythm, normal heart sounds and normal pulses.   Pulmonary/Chest: Effort normal. No stridor. No respiratory distress. She has wheezes. She has rhonchi.   Abdominal: Normal appearance and bowel sounds are normal. Soft. flat abdomen   Neurological: no focal deficit. She is alert, oriented to person, place, and time and at baseline.   Skin: Skin is warm and dry. Capillary refill takes less than 2 seconds. jaundice  Psychiatric: Her behavior is normal. Mood, judgment and thought content normal.   Nursing note and vitals reviewed.    Assessment:     Plan:   1. Cough, unspecified type  - SARS Coronavirus 2 Antigen, POCT Manual Read  - XR CHEST PA AND LATERAL; Future  - benzonatate (TESSALON) 200 MG capsule; Take 1 capsule (200 mg total) by mouth 3 (three) times daily as needed.  Dispense: 30 capsule; Refill: 0    2. Pneumonia due to infectious organism, unspecified laterality, unspecified part of lung  - amoxicillin-clavulanate 500-125mg (AUGMENTIN) 500-125 mg Tab; Take 1 tablet (500 mg total) by mouth 3 (three) times daily.  Dispense: 20 tablet; Refill: 0    3. Asthma, unspecified asthma severity, unspecified whether complicated, unspecified whether persistent  - albuterol (VENTOLIN HFA) 90 mcg/actuation inhaler; Inhale 2 puffs into the lungs every 6 (six) hours as needed for Wheezing. Rescue  Dispense: 18 g; Refill: 0   All results discussed with pt

## 2023-10-02 NOTE — TELEPHONE ENCOUNTER
----- Message from Rocio Du sent at 10/2/2023  1:38 PM CDT -----  Contact: P [phone]  [email or web address]     Patient is calling for Medical Advice regarding: Nauseas, and Asthma, congestion. sick after vacation .    How long has patient had these symptoms: a Week    Pharmacy name and phone#:   [id number] - [place] - [address]  [place] [address]  Phone: [phone] Fax: [phone]       Would like response via mychart:  Phone     Comments:

## 2023-10-04 ENCOUNTER — PATIENT MESSAGE (OUTPATIENT)
Dept: INTERNAL MEDICINE | Facility: CLINIC | Age: 73
End: 2023-10-04
Payer: MEDICARE

## 2023-10-06 ENCOUNTER — TELEPHONE (OUTPATIENT)
Dept: ENDOSCOPY | Facility: HOSPITAL | Age: 73
End: 2023-10-06
Payer: MEDICARE

## 2023-10-06 NOTE — TELEPHONE ENCOUNTER
Returned pts call to reschedule EGD on 10/11 due to having pneumonia. Left voicemail for pt to call Endoscopy Scheduling.

## 2023-10-10 NOTE — PROGRESS NOTES
Subjective:       Patient ID: Sheila Zarco is a 73 y.o. female.    Chief Complaint: Follow-up (Pneumonia and asthma Urgent Care ), Abnormal Chest X-ray (Pt requesting Chest Xray. Been having pain.), and Headache (Pt stated for several days now. A lot pressure )         Patient is a 73 y.o. female who traditionally follows with Farhat Correa MD presenting today for:    Malaise  Patient was seen in Urgent Care on 10/2/23 with reports of cough. She was started on Augmentin, Tessalon and Ventolin for presumed pneumonia.     Today she notes that she is not still feeling well. Her chest feels heavy and she has had a headache for several days.     Review of patient's allergies indicates:  No Known Allergies    Medication List with Changes/Refills   New Medications    PREDNISONE (DELTASONE) 20 MG TABLET    Take 2 tablets (40 mg total) by mouth once daily. for 5 days   Current Medications    ACETIC ACID (VOSOL) 2 % OTIC SOLUTION    Apply 4 drops to affected ear tid prn flare    ALBUTEROL (ACCUNEB) 0.63 MG/3 ML NEBU    Take 3 mLs (0.63 mg total) by nebulization every 6 (six) hours as needed (wheezing and congestion).    ALBUTEROL (PROVENTIL/VENTOLIN HFA) 90 MCG/ACTUATION INHALER    Inhale 2 puffs into the lungs every 6 (six) hours as needed for Wheezing.    ALBUTEROL (VENTOLIN HFA) 90 MCG/ACTUATION INHALER    Inhale 2 puffs into the lungs every 6 (six) hours as needed for Wheezing. Rescue    ALPRAZOLAM (XANAX) 0.5 MG TABLET    TAKE 1 TABLET BY MOUTH TWICE A DAY AS NEEDED    AMOXICILLIN-CLAVULANATE 500-125MG (AUGMENTIN) 500-125 MG TAB    Take 1 tablet (500 mg total) by mouth 3 (three) times daily.    ASPIRIN (ECOTRIN) 81 MG EC TABLET    Take 81 mg by mouth every morning.    ATORVASTATIN (LIPITOR) 20 MG TABLET    TAKE 1 TABLET (20 MG TOTAL) BY MOUTH ONCE DAILY. FOR CHOLESTEROL CONTROL.    BENZONATATE (TESSALON) 200 MG CAPSULE    Take 1 capsule (200 mg total) by mouth 3 (three) times daily as needed.    DESOXIMETASONE  "(TOPICORT) 0.25 % CREAM    Apply topically 2 (two) times daily.    ESCITALOPRAM OXALATE (LEXAPRO) 20 MG TABLET    TAKE 1 TABLET (20 MG TOTAL) BY MOUTH ONCE DAILY. FOR ANXIETY.    FLUOCINONIDE (LIDEX) 0.05 % EXTERNAL SOLUTION    AAA scalp qday prn itching, scaling    FLUTICASONE-SALMETEROL DISKUS INHALER 250-50 MCG    Inhale 1 puff into the lungs 2 (two) times daily.    KETOCONAZOLE (NIZORAL) 2 % CREAM    AAA twice daily on nose prn flare    KETOCONAZOLE (NIZORAL) 2 % SHAMPOO    WASH HAIR W/ MEDICATED SHAMPOO AT LEAST 2X WEEK, LET SIT ON SCALP FOR ATLEAST 5MINUTES BEFORE RINSIN    LEVOCETIRIZINE (XYZAL) 5 MG TABLET    TAKE 1 TABLET (5 MG TOTAL) BY MOUTH EVERY EVENING. FOR SINUS ALLERGY SYMPTOMS    LEVOTHYROXINE (SYNTHROID) 88 MCG TABLET    TAKE 1 TABLET BY MOUTH EVERY MORNING.    MECLIZINE (ANTIVERT) 25 MG TABLET    Take 1 tablet (25 mg total) by mouth 3 (three) times daily as needed for Dizziness.    PANTOPRAZOLE (PROTONIX) 40 MG TABLET    TAKE 1 TABLET BY MOUTH EVERY DAY    TRIAMCINOLONE ACETONIDE 0.025% (KENALOG) 0.025 % CREAM    AAA qd to face, ears prn flare    VALACYCLOVIR (VALTREX) 500 MG TABLET    TAKE ONE TABLET TWICE A DAY AS NEEDED FOR OUTBREAK    VITAMIN D (VITAMIN D3) 1000 UNITS TAB    Take by mouth once daily. Pt takes 50 mcg daily OTC     Medical, social and surgical history has been reviewed with the patient.      Review of Systems   Constitutional:  Positive for malaise/fatigue. Negative for chills and fever.   Respiratory:  Negative for cough and shortness of breath.    Cardiovascular:  Negative for chest pain.   Neurological:  Positive for headaches. Negative for dizziness.     Objective:   BP (!) 140/62 (BP Location: Right arm, Patient Position: Sitting, BP Method: Medium (Manual))   Pulse 61   Temp 97.2 °F (36.2 °C) (Temporal)   Resp 20   Ht 5' 6.5" (1.689 m)   Wt 75.6 kg (166 lb 10.7 oz)   SpO2 97%   BMI 26.50 kg/m²       Physical Exam  Vitals reviewed.   Constitutional:       Appearance: " Normal appearance.   HENT:      Head: Normocephalic and atraumatic.      Right Ear: There is impacted cerumen.      Left Ear: Tympanic membrane normal.      Mouth/Throat:      Pharynx: Oropharynx is clear.   Cardiovascular:      Rate and Rhythm: Normal rate and regular rhythm.      Heart sounds: Normal heart sounds. No murmur heard.  Pulmonary:      Effort: Pulmonary effort is normal.      Breath sounds: Wheezing present.   Lymphadenopathy:      Head:      Right side of head: No submental, submandibular or tonsillar adenopathy.      Left side of head: No submental, submandibular or tonsillar adenopathy.   Skin:     General: Skin is warm and dry.   Neurological:      Mental Status: She is alert and oriented to person, place, and time.       I reviewed and independently interpreted the labs and imaging below:  Last Labs:  Glucose   Date Value Ref Range Status   05/04/2023 99 70 - 110 mg/dL Final   03/22/2022 99 70 - 110 mg/dL Final     BUN   Date Value Ref Range Status   05/04/2023 12 8 - 23 mg/dL Final   03/22/2022 12 8 - 23 mg/dL Final     Creatinine   Date Value Ref Range Status   05/04/2023 0.7 0.5 - 1.4 mg/dL Final   03/22/2022 0.7 0.5 - 1.4 mg/dL Final     Cholesterol   Date Value Ref Range Status   05/04/2023 192 120 - 199 mg/dL Final     Comment:     The National Cholesterol Education Program (NCEP) has set the  following guidelines (reference ranges) for Cholesterol:  Optimal.....................<200 mg/dL  Borderline High.............200-239 mg/dL  High........................> or = 240 mg/dL     03/22/2022 217 (H) 120 - 199 mg/dL Final     Comment:     The National Cholesterol Education Program (NCEP) has set the  following guidelines (reference ranges) for Cholesterol:  Optimal.....................<200 mg/dL  Borderline High.............200-239 mg/dL  High........................> or = 240 mg/dL       Hemoglobin A1C   Date Value Ref Range Status   03/22/2022 5.3 4.0 - 5.6 % Final     Comment:     ADA  Screening Guidelines:  5.7-6.4%  Consistent with prediabetes  >or=6.5%  Consistent with diabetes    High levels of fetal hemoglobin interfere with the HbA1C  assay. Heterozygous hemoglobin variants (HbS, HgC, etc)do  not significantly interfere with this assay.   However, presence of multiple variants may affect accuracy.     05/12/2020 5.3 4.0 - 5.6 % Final     Comment:     ADA Screening Guidelines:  5.7-6.4%  Consistent with prediabetes  >or=6.5%  Consistent with diabetes  High levels of fetal hemoglobin interfere with the HbA1C  assay. Heterozygous hemoglobin variants (HbS, HgC, etc)do  not significantly interfere with this assay.   However, presence of multiple variants may affect accuracy.       Hemoglobin   Date Value Ref Range Status   06/16/2023 12.5 12.0 - 16.0 g/dL Final   05/04/2023 11.5 (L) 12.0 - 16.0 g/dL Final     Hematocrit   Date Value Ref Range Status   06/16/2023 37.9 37.0 - 48.5 % Final   05/04/2023 36.0 (L) 37.0 - 48.5 % Final     Assessment and Plan:     1. Malaise  - Influenza A & B by Molecular    2. Chest heaviness  - X-Ray Chest PA And Lateral; Future  - predniSONE (DELTASONE) 20 MG tablet; Take 2 tablets (40 mg total) by mouth once daily. for 5 days  Dispense: 10 tablet; Refill: 0  - X-Ray Chest PA And Lateral; Future    3. Mild intermittent asthma without complication  - predniSONE (DELTASONE) 20 MG tablet; Take 2 tablets (40 mg total) by mouth once daily. for 5 days  Dispense: 10 tablet; Refill: 0  - X-Ray Chest PA And Lateral; Future

## 2023-10-11 ENCOUNTER — HOSPITAL ENCOUNTER (OUTPATIENT)
Dept: RADIOLOGY | Facility: HOSPITAL | Age: 73
Discharge: HOME | End: 2023-10-11
Attending: NURSE PRACTITIONER
Payer: MEDICARE

## 2023-10-11 ENCOUNTER — OFFICE VISIT (OUTPATIENT)
Dept: INTERNAL MEDICINE | Facility: CLINIC | Age: 73
End: 2023-10-11
Payer: MEDICARE

## 2023-10-11 VITALS
WEIGHT: 166.69 LBS | OXYGEN SATURATION: 97 % | SYSTOLIC BLOOD PRESSURE: 140 MMHG | RESPIRATION RATE: 20 BRPM | BODY MASS INDEX: 26.16 KG/M2 | HEART RATE: 61 BPM | TEMPERATURE: 97 F | HEIGHT: 67 IN | DIASTOLIC BLOOD PRESSURE: 62 MMHG

## 2023-10-11 DIAGNOSIS — R07.89 CHEST HEAVINESS: ICD-10-CM

## 2023-10-11 DIAGNOSIS — J45.20 MILD INTERMITTENT ASTHMA WITHOUT COMPLICATION: ICD-10-CM

## 2023-10-11 DIAGNOSIS — R53.81 MALAISE: Primary | ICD-10-CM

## 2023-10-11 LAB
INFLUENZA A, MOLECULAR: NEGATIVE
INFLUENZA B, MOLECULAR: NEGATIVE
SARS-COV-2 RNA RESP QL NAA+PROBE: NOT DETECTED
SPECIMEN SOURCE: NORMAL

## 2023-10-11 PROCEDURE — 99999 PR PBB SHADOW E&M-EST. PATIENT-LVL V: ICD-10-PCS | Mod: PBBFAC,,, | Performed by: NURSE PRACTITIONER

## 2023-10-11 PROCEDURE — 99215 OFFICE O/P EST HI 40 MIN: CPT | Mod: PBBFAC,25,PO | Performed by: NURSE PRACTITIONER

## 2023-10-11 PROCEDURE — 99213 OFFICE O/P EST LOW 20 MIN: CPT | Mod: S$PBB,,, | Performed by: NURSE PRACTITIONER

## 2023-10-11 PROCEDURE — 99999 PR PBB SHADOW E&M-EST. PATIENT-LVL V: CPT | Mod: PBBFAC,,, | Performed by: NURSE PRACTITIONER

## 2023-10-11 PROCEDURE — 71046 X-RAY EXAM CHEST 2 VIEWS: CPT | Mod: TC,PO

## 2023-10-11 PROCEDURE — 87502 INFLUENZA DNA AMP PROBE: CPT | Mod: PO | Performed by: NURSE PRACTITIONER

## 2023-10-11 PROCEDURE — 99213 PR OFFICE/OUTPT VISIT, EST, LEVL III, 20-29 MIN: ICD-10-PCS | Mod: S$PBB,,, | Performed by: NURSE PRACTITIONER

## 2023-10-11 PROCEDURE — 71046 X-RAY EXAM CHEST 2 VIEWS: CPT | Mod: 26,,, | Performed by: RADIOLOGY

## 2023-10-11 PROCEDURE — 87635 SARS-COV-2 COVID-19 AMP PRB: CPT | Performed by: NURSE PRACTITIONER

## 2023-10-11 PROCEDURE — 71046 XR CHEST PA AND LATERAL: ICD-10-PCS | Mod: 26,,, | Performed by: RADIOLOGY

## 2023-10-11 RX ORDER — PREDNISONE 20 MG/1
40 TABLET ORAL DAILY
Qty: 10 TABLET | Refills: 0 | Status: SHIPPED | OUTPATIENT
Start: 2023-10-11 | End: 2023-10-16

## 2023-10-12 ENCOUNTER — TELEPHONE (OUTPATIENT)
Dept: INTERNAL MEDICINE | Facility: CLINIC | Age: 73
End: 2023-10-12
Payer: MEDICARE

## 2023-10-12 NOTE — TELEPHONE ENCOUNTER
----- Message from Jenise Wong NP sent at 10/12/2023  8:09 AM CDT -----  Please let patient know her Flu and COVID tests were negative. Her chest xray is negative.

## 2023-10-23 ENCOUNTER — OFFICE VISIT (OUTPATIENT)
Dept: DERMATOLOGY | Facility: CLINIC | Age: 73
End: 2023-10-23
Payer: MEDICARE

## 2023-10-23 ENCOUNTER — PATIENT MESSAGE (OUTPATIENT)
Dept: DERMATOLOGY | Facility: CLINIC | Age: 73
End: 2023-10-23

## 2023-10-23 ENCOUNTER — TELEPHONE (OUTPATIENT)
Dept: OTOLARYNGOLOGY | Facility: CLINIC | Age: 73
End: 2023-10-23
Payer: MEDICARE

## 2023-10-23 DIAGNOSIS — L21.9 SEBORRHEIC DERMATITIS, UNSPECIFIED: Primary | ICD-10-CM

## 2023-10-23 DIAGNOSIS — D22.9 NEVUS: Primary | ICD-10-CM

## 2023-10-23 DIAGNOSIS — L91.8 SKIN TAG: ICD-10-CM

## 2023-10-23 DIAGNOSIS — L81.4 LENTIGO: ICD-10-CM

## 2023-10-23 DIAGNOSIS — L82.1 SK (SEBORRHEIC KERATOSIS): ICD-10-CM

## 2023-10-23 DIAGNOSIS — D18.01 CHERRY ANGIOMA: ICD-10-CM

## 2023-10-23 PROCEDURE — 99999 PR PBB SHADOW E&M-EST. PATIENT-LVL III: ICD-10-PCS | Mod: PBBFAC,,, | Performed by: DERMATOLOGY

## 2023-10-23 PROCEDURE — 99999 PR PBB SHADOW E&M-EST. PATIENT-LVL III: CPT | Mod: PBBFAC,,, | Performed by: DERMATOLOGY

## 2023-10-23 PROCEDURE — 99213 OFFICE O/P EST LOW 20 MIN: CPT | Mod: S$PBB,,, | Performed by: DERMATOLOGY

## 2023-10-23 PROCEDURE — 99213 PR OFFICE/OUTPT VISIT, EST, LEVL III, 20-29 MIN: ICD-10-PCS | Mod: S$PBB,,, | Performed by: DERMATOLOGY

## 2023-10-23 PROCEDURE — 99213 OFFICE O/P EST LOW 20 MIN: CPT | Mod: PBBFAC,PO | Performed by: DERMATOLOGY

## 2023-10-23 NOTE — PROGRESS NOTES
"  Subjective:      Patient ID:  Sheila Zarco is a 73 y.o. female who presents for   Chief Complaint   Patient presents with    Skin Check     TBSE     Patient is here today for a "mole" check.   Pt has a history of  moderate sun exposure in the past.   Pt recalls several blistering sunburns in the past- no  Pt has history of tanning bed use- no  Pt has  had moles removed in the past- yes; benign per pt  Pt has history of melanoma in first degree relatives-  no    Pt denies any new changing, bleeding or growing lesions today    Pt has mole on left lateral eye.  Feels it is getting larger. No tx.   Pt has rash on R foot near toes. Itches. Present for months. No tx.     No h/o mm or nmsc.       Review of Systems   Skin:  Positive for itching, rash, dry skin and activity-related sunscreen use. Negative for daily sunscreen use and recent sunburn.   Hematologic/Lymphatic: Does not bruise/bleed easily.       Objective:   Physical Exam   Constitutional: She appears well-developed and well-nourished. No distress.   Neurological: She is alert and oriented to person, place, and time. She is not disoriented.   Psychiatric: She has a normal mood and affect.   Skin:   Areas Examined (abnormalities noted in diagram):   Scalp / Hair Palpated and Inspected  Head / Face Inspection Performed  Neck Inspection Performed  Chest / Axilla Inspection Performed  Abdomen Inspection Performed  Genitals / Buttocks / Groin Inspection Performed  Back Inspection Performed  RUE Inspected  LUE Inspection Performed  RLE Inspected  LLE Inspection Performed  Nails and Digits Inspection Performed                             Diagram Legend     Erythematous scaling macule/papule c/w actinic keratosis       Vascular papule c/w angioma      Pigmented verrucoid papule/plaque c/w seborrheic keratosis      Yellow umbilicated papule c/w sebaceous hyperplasia      Irregularly shaped tan macule c/w lentigo     1-2 mm smooth white papules consistent with " Milia      Movable subcutaneous cyst with punctum c/w epidermal inclusion cyst      Subcutaneous movable cyst c/w pilar cyst      Firm pink to brown papule c/w dermatofibroma      Pedunculated fleshy papule(s) c/w skin tag(s)      Evenly pigmented macule c/w junctional nevus     Mildly variegated pigmented, slightly irregular-bordered macule c/w mildly atypical nevus      Flesh colored to evenly pigmented papule c/w intradermal nevus       Pink pearly papule/plaque c/w basal cell carcinoma      Erythematous hyperkeratotic cursted plaque c/w SCC      Surgical scar with no sign of skin cancer recurrence      Open and closed comedones      Inflammatory papules and pustules      Verrucoid papule consistent consistent with wart     Erythematous eczematous patches and plaques     Dystrophic onycholytic nail with subungual debris c/w onychomycosis     Umbilicated papule    Erythematous-base heme-crusted tan verrucoid plaque consistent with inflamed seborrheic keratosis     Erythematous Silvery Scaling Plaque c/w Psoriasis     See annotation      Assessment / Plan:        Nevus  -     Ambulatory referral/consult to Plastic Surgery; Future; Expected date: 10/30/2023  Discussed ABCDE's of nevi.  Monitor for new mole or moles that are becoming bigger, darker, irritated, or developing irregular borders. Brochure provided. Instructed patient to observe lesion(s) for changes and follow up in clinic if changes are noted. Patient to monitor skin at home for new or changing lesions.     Skin tag  Reassurance given to patient. No treatment is necessary.   Treatment of benign, asymptomatic lesions may be considered cosmetic.    Cherry angioma  These are benign vascular lesions that are inherited.  Treatment is not necessary.    Lentigo  This is a benign hyperpigmented sun induced lesion. Recommend daily sun protection/avoidance and use of at least SPF 30, broad spectrum sunscreen (OTC drug) will reduce the number of new lesions.  Treatment of these benign lesions are considered cosmetic.  The nature of sun-induced photo-aging and skin cancers is discussed.  Sun avoidance, protective clothing, and the use of 30-SPF sunscreens is advised. Observe closely for skin damage/changes, and call if such occurs.    SK (seborrheic keratosis)  These are benign inherited growths without a malignant potential. Reassurance given to patient. No treatment is necessary.     Pt may want cosmetic removal of SK's on upper back. She will call to schedule.        Total body skin examination performed today including at least 12 points as noted in physical examination. No lesions suspicious for malignancy noted.    Recommend daily sun protection/avoidance, use of at least SPF 30, broad spectrum sunscreen (OTC drug), skin self examinations, and routine physician surveillance to optimize early detection             Follow up in about 1 year (around 10/23/2024) for TBSE.

## 2023-10-23 NOTE — TELEPHONE ENCOUNTER
----- Message from Anne Delvalle sent at 10/23/2023  1:39 PM CDT -----  Regarding: today's appt  Contact: [phone]  Patient left v/m requesting a call back to r/s her appt.

## 2023-10-25 RX ORDER — KETOCONAZOLE 20 MG/G
CREAM TOPICAL
Qty: 60 G | Refills: 3 | Status: SHIPPED | OUTPATIENT
Start: 2023-10-25

## 2023-11-02 ENCOUNTER — TELEPHONE (OUTPATIENT)
Dept: ENDOSCOPY | Facility: HOSPITAL | Age: 73
End: 2023-11-02
Payer: MEDICARE

## 2023-11-02 ENCOUNTER — TELEPHONE (OUTPATIENT)
Dept: INTERNAL MEDICINE | Facility: CLINIC | Age: 73
End: 2023-11-02
Payer: MEDICARE

## 2023-11-02 ENCOUNTER — PATIENT MESSAGE (OUTPATIENT)
Dept: ENDOSCOPY | Facility: HOSPITAL | Age: 73
End: 2023-11-02
Payer: MEDICARE

## 2023-11-02 NOTE — TELEPHONE ENCOUNTER
Returned patient's voicemail in regards to  scheduling an endoscopy procedure(s) EGD and Colonoscopy. The patient did not answer the call and left a voice message requesting a call back.

## 2023-11-02 NOTE — TELEPHONE ENCOUNTER
----- Message from Jamilahzakiya Shaw sent at 11/2/2023  1:38 PM CDT -----  Contact: Patient [phone]  [email or web address]     Patient is calling for Medical Advice regarding:Stomach cramps . Patient need a strong Rx    How long has patient had these symptoms:    Pharmacy name and phone#:  [id number] - [place] - [id number] [facility] [place] [address]  Phone: [phone] Fax: [phone]        Would like response via [employer]:  call     Comments:Patient state she was taken  at a time of the dicyclomine 20 mg tablets and not working . Patient is having colon screen on 11/8 . Patient finally got an appointment for the [facility]

## 2023-11-02 NOTE — TELEPHONE ENCOUNTER
Pt states she is having strong stomach cramping and need a stronger Rx, the dicyclomin 20 mg is not working.

## 2023-11-03 RX ORDER — HYOSCYAMINE SULFATE 0.125 MG
125 TABLET ORAL EVERY 6 HOURS PRN
Qty: 40 TABLET | Refills: 3 | Status: SHIPPED | OUTPATIENT
Start: 2023-11-03

## 2023-11-03 RX ORDER — NORTRIPTYLINE HYDROCHLORIDE 10 MG/1
10 CAPSULE ORAL NIGHTLY
Qty: 30 CAPSULE | Refills: 5 | Status: SHIPPED | OUTPATIENT
Start: 2023-11-03 | End: 2024-11-02

## 2023-11-03 NOTE — TELEPHONE ENCOUNTER
A prescription for Levsin (hyoscyamine) will be ordered for the abdominal cramping to use every 6 hours as needed.     A low dose of Pamelor (nortriptyline) at bedtime is also being used for abdominal cramping in irritable bowel conditions. I will also order this medication.(This medication was originally developed for depression but there is good evidence now for use in irritable bowel conditions).

## 2023-11-06 ENCOUNTER — TELEPHONE (OUTPATIENT)
Dept: ENDOSCOPY | Facility: HOSPITAL | Age: 73
End: 2023-11-06
Payer: MEDICARE

## 2023-11-06 ENCOUNTER — PATIENT MESSAGE (OUTPATIENT)
Dept: INTERNAL MEDICINE | Facility: CLINIC | Age: 73
End: 2023-11-06
Payer: MEDICARE

## 2023-11-06 NOTE — TELEPHONE ENCOUNTER
----- Message from Anita Rodriguez sent at 11/6/2023 10:09 AM CST -----  Contact: [phone]  the patient is calling to get rescheduled for there appt.  the patient would like a call back at your earliest convenience.  the patient can be reached at.   [phone]

## 2023-11-09 ENCOUNTER — PATIENT MESSAGE (OUTPATIENT)
Dept: INTERNAL MEDICINE | Facility: CLINIC | Age: 73
End: 2023-11-09
Payer: MEDICARE

## 2023-11-09 ENCOUNTER — LAB VISIT (OUTPATIENT)
Dept: LAB | Facility: HOSPITAL | Age: 73
End: 2023-11-09
Attending: INTERNAL MEDICINE
Payer: MEDICARE

## 2023-11-09 DIAGNOSIS — K76.0 NAFLD (NONALCOHOLIC FATTY LIVER DISEASE): ICD-10-CM

## 2023-11-09 DIAGNOSIS — E78.49 OTHER HYPERLIPIDEMIA: ICD-10-CM

## 2023-11-09 LAB
ALBUMIN SERPL BCP-MCNC: 3.6 G/DL (ref 3.5–5.2)
ALP SERPL-CCNC: 113 U/L (ref 55–135)
ALT SERPL W/O P-5'-P-CCNC: 21 U/L (ref 10–44)
ANION GAP SERPL CALC-SCNC: 9 MMOL/L (ref 8–16)
AST SERPL-CCNC: 20 U/L (ref 10–40)
BASOPHILS # BLD AUTO: 0.04 K/UL (ref 0–0.2)
BASOPHILS NFR BLD: 0.7 % (ref 0–1.9)
BILIRUB SERPL-MCNC: 0.4 MG/DL (ref 0.1–1)
BUN SERPL-MCNC: 13 MG/DL (ref 8–23)
CALCIUM SERPL-MCNC: 10 MG/DL (ref 8.7–10.5)
CHLORIDE SERPL-SCNC: 110 MMOL/L (ref 95–110)
CHOLEST SERPL-MCNC: 173 MG/DL (ref 120–199)
CHOLEST/HDLC SERPL: 2.4 {RATIO} (ref 2–5)
CO2 SERPL-SCNC: 24 MMOL/L (ref 23–29)
CREAT SERPL-MCNC: 0.8 MG/DL (ref 0.5–1.4)
DIFFERENTIAL METHOD: ABNORMAL
EOSINOPHIL # BLD AUTO: 0.3 K/UL (ref 0–0.5)
EOSINOPHIL NFR BLD: 4.4 % (ref 0–8)
ERYTHROCYTE [DISTWIDTH] IN BLOOD BY AUTOMATED COUNT: 13.2 % (ref 11.5–14.5)
EST. GFR  (NO RACE VARIABLE): >60 ML/MIN/1.73 M^2
GLUCOSE SERPL-MCNC: 99 MG/DL (ref 70–110)
HCT VFR BLD AUTO: 37.7 % (ref 37–48.5)
HDLC SERPL-MCNC: 72 MG/DL (ref 40–75)
HDLC SERPL: 41.6 % (ref 20–50)
HGB BLD-MCNC: 11.6 G/DL (ref 12–16)
IMM GRANULOCYTES # BLD AUTO: 0.02 K/UL (ref 0–0.04)
IMM GRANULOCYTES NFR BLD AUTO: 0.3 % (ref 0–0.5)
LDLC SERPL CALC-MCNC: 85.6 MG/DL (ref 63–159)
LYMPHOCYTES # BLD AUTO: 1.6 K/UL (ref 1–4.8)
LYMPHOCYTES NFR BLD: 27.6 % (ref 18–48)
MCH RBC QN AUTO: 28 PG (ref 27–31)
MCHC RBC AUTO-ENTMCNC: 30.8 G/DL (ref 32–36)
MCV RBC AUTO: 91 FL (ref 82–98)
MONOCYTES # BLD AUTO: 0.4 K/UL (ref 0.3–1)
MONOCYTES NFR BLD: 7.2 % (ref 4–15)
NEUTROPHILS # BLD AUTO: 3.6 K/UL (ref 1.8–7.7)
NEUTROPHILS NFR BLD: 59.8 % (ref 38–73)
NONHDLC SERPL-MCNC: 101 MG/DL
NRBC BLD-RTO: 0 /100 WBC
PLATELET # BLD AUTO: 381 K/UL (ref 150–450)
PMV BLD AUTO: 9.7 FL (ref 9.2–12.9)
POTASSIUM SERPL-SCNC: 4.2 MMOL/L (ref 3.5–5.1)
PROT SERPL-MCNC: 6.9 G/DL (ref 6–8.4)
RBC # BLD AUTO: 4.14 M/UL (ref 4–5.4)
SODIUM SERPL-SCNC: 143 MMOL/L (ref 136–145)
TRIGL SERPL-MCNC: 77 MG/DL (ref 30–150)
WBC # BLD AUTO: 5.95 K/UL (ref 3.9–12.7)

## 2023-11-09 PROCEDURE — 80061 LIPID PANEL: CPT | Performed by: INTERNAL MEDICINE

## 2023-11-09 PROCEDURE — 80053 COMPREHEN METABOLIC PANEL: CPT | Performed by: INTERNAL MEDICINE

## 2023-11-09 PROCEDURE — 85025 COMPLETE CBC W/AUTO DIFF WBC: CPT | Performed by: INTERNAL MEDICINE

## 2023-11-09 PROCEDURE — 36415 COLL VENOUS BLD VENIPUNCTURE: CPT | Mod: PO | Performed by: INTERNAL MEDICINE

## 2023-11-09 NOTE — TELEPHONE ENCOUNTER
"2nd msg from her said " Dr. Matute was the dr that I had the appointment for my colonoscopy.  He is a gastroenterologist but I cant get an appointment with him "    See her question in email.  "

## 2023-11-09 NOTE — TELEPHONE ENCOUNTER
ER evaluation is recommended due to extreme pain and diarrhea. More urgent testing is needed (CT scan, lab tests).

## 2023-11-13 ENCOUNTER — CLINICAL SUPPORT (OUTPATIENT)
Dept: AUDIOLOGY | Facility: CLINIC | Age: 73
End: 2023-11-13
Payer: MEDICARE

## 2023-11-13 ENCOUNTER — OFFICE VISIT (OUTPATIENT)
Dept: OTOLARYNGOLOGY | Facility: CLINIC | Age: 73
End: 2023-11-13
Payer: MEDICARE

## 2023-11-13 DIAGNOSIS — R42 DIZZINESS: ICD-10-CM

## 2023-11-13 DIAGNOSIS — H90.3 SENSORINEURAL HEARING LOSS (SNHL), BILATERAL: Primary | ICD-10-CM

## 2023-11-13 DIAGNOSIS — J30.9 ALLERGIC RHINITIS, UNSPECIFIED SEASONALITY, UNSPECIFIED TRIGGER: ICD-10-CM

## 2023-11-13 DIAGNOSIS — H91.8X3 ASYMMETRICAL HEARING LOSS: ICD-10-CM

## 2023-11-13 DIAGNOSIS — H93.13 TINNITUS, BILATERAL: ICD-10-CM

## 2023-11-13 DIAGNOSIS — H69.93 DYSFUNCTION OF BOTH EUSTACHIAN TUBES: Primary | ICD-10-CM

## 2023-11-13 DIAGNOSIS — H93.8X9 SENSATION OF FULLNESS IN EAR, UNSPECIFIED LATERALITY: ICD-10-CM

## 2023-11-13 PROCEDURE — 99999 PR PBB SHADOW E&M-EST. PATIENT-LVL I: ICD-10-PCS | Mod: PBBFAC,,,

## 2023-11-13 PROCEDURE — 92567 TYMPANOMETRY: CPT | Mod: PBBFAC

## 2023-11-13 PROCEDURE — 99999 PR PBB SHADOW E&M-EST. PATIENT-LVL I: CPT | Mod: PBBFAC,,,

## 2023-11-13 PROCEDURE — 99213 OFFICE O/P EST LOW 20 MIN: CPT | Mod: PBBFAC | Performed by: OTOLARYNGOLOGY

## 2023-11-13 PROCEDURE — 99214 PR OFFICE/OUTPT VISIT, EST, LEVL IV, 30-39 MIN: ICD-10-PCS | Mod: S$PBB,,, | Performed by: OTOLARYNGOLOGY

## 2023-11-13 PROCEDURE — 92557 COMPREHENSIVE HEARING TEST: CPT | Mod: PBBFAC

## 2023-11-13 PROCEDURE — 99999 PR PBB SHADOW E&M-EST. PATIENT-LVL III: CPT | Mod: PBBFAC,,, | Performed by: OTOLARYNGOLOGY

## 2023-11-13 PROCEDURE — 99214 OFFICE O/P EST MOD 30 MIN: CPT | Mod: S$PBB,,, | Performed by: OTOLARYNGOLOGY

## 2023-11-13 PROCEDURE — 99999 PR PBB SHADOW E&M-EST. PATIENT-LVL III: ICD-10-PCS | Mod: PBBFAC,,, | Performed by: OTOLARYNGOLOGY

## 2023-11-13 PROCEDURE — 99211 OFF/OP EST MAY X REQ PHY/QHP: CPT | Mod: PBBFAC,27

## 2023-11-13 NOTE — PROGRESS NOTES
Subjective     Patient ID: Sheila Zarco is a 73 y.o. female.    Chief Complaint: Hearing Loss    HPI: Hx of Gold HL.    AS>AD.    Has chronic congestion/ PND/ Asthma.    Past Medical History: Patient has a past medical history of Asthma, Baker's cyst, Breast cancer (06/2020), Cataract, Glaucoma, and Thyroid disease.    Past Surgical History: Patient has a past surgical history that includes Hysterectomy; Gallbladder surgery; Knee cartilage surgery; Bunionectomy; narrow angles eye surgery ; Phacoemulsification of cataract (Right, 7/9/2020); Intraocular prosthesis insertion (Right, 7/9/2020); Mastectomy, partial (Left, 7/24/2020); Phacoemulsification of cataract (Left, 10/22/2020); Intraocular prosthesis insertion (Left, 10/22/2020); Breast biopsy (Left, 06/2020); and Breast lumpectomy (Left).    Social History: Patient reports that she has never smoked. She has never used smokeless tobacco. She reports current alcohol use of about 8.0 standard drinks of alcohol per week. She reports that she does not use drugs.    Family History: family history includes Breast cancer in her sister and another family member; Breast cancer (age of onset: 43) in her daughter; Heart disease in her father and mother; Liver disease in her brother; No Known Problems in her maternal aunt, maternal grandfather, maternal grandmother, maternal uncle, paternal aunt, paternal grandfather, paternal grandmother, paternal uncle, and sister.    Medications:   Current Outpatient Medications   Medication Sig    acetic acid (VOSOL) 2 % otic solution Apply 4 drops to affected ear tid prn flare    albuterol (ACCUNEB) 0.63 mg/3 mL Nebu Take 3 mLs (0.63 mg total) by nebulization every 6 (six) hours as needed (wheezing and congestion).    albuterol (PROVENTIL/VENTOLIN HFA) 90 mcg/actuation inhaler Inhale 2 puffs into the lungs every 6 (six) hours as needed for Wheezing.    albuterol (VENTOLIN HFA) 90 mcg/actuation inhaler Inhale 2 puffs into the lungs  every 6 (six) hours as needed for Wheezing. Rescue    ALPRAZolam (XANAX) 0.5 MG tablet TAKE 1 TABLET BY MOUTH TWICE A DAY AS NEEDED    amoxicillin-clavulanate 500-125mg (AUGMENTIN) 500-125 mg Tab Take 1 tablet (500 mg total) by mouth 3 (three) times daily.    aspirin (ECOTRIN) 81 MG EC tablet Take 81 mg by mouth every morning.    desoximetasone (TOPICORT) 0.25 % cream Apply topically 2 (two) times daily.    EScitalopram oxalate (LEXAPRO) 20 MG tablet TAKE 1 TABLET (20 MG TOTAL) BY MOUTH ONCE DAILY. FOR ANXIETY.    fluocinonide (LIDEX) 0.05 % external solution AAA scalp qday prn itching, scaling    hyoscyamine (ANASPAZ,LEVSIN) 0.125 mg Tab Take 1 tablet (125 mcg total) by mouth every 6 (six) hours as needed (abdominal cramping pain).    ketoconazole (NIZORAL) 2 % cream AAA twice daily on nose prn flare    ketoconazole (NIZORAL) 2 % cream aaa in ears qhs  prn flare    ketoconazole (NIZORAL) 2 % shampoo WASH HAIR W/ MEDICATED SHAMPOO AT LEAST 2X WEEK, LET SIT ON SCALP FOR ATLEAST 5MINUTES BEFORE RINSIN    levothyroxine (SYNTHROID) 88 MCG tablet TAKE 1 TABLET BY MOUTH EVERY MORNING.    meclizine (ANTIVERT) 25 mg tablet Take 1 tablet (25 mg total) by mouth 3 (three) times daily as needed for Dizziness.    nortriptyline (PAMELOR) 10 MG capsule Take 1 capsule (10 mg total) by mouth every evening.    pantoprazole (PROTONIX) 40 MG tablet TAKE 1 TABLET BY MOUTH EVERY DAY    triamcinolone acetonide 0.025% (KENALOG) 0.025 % cream AAA qd to face, ears prn flare    valACYclovir (VALTREX) 500 MG tablet TAKE ONE TABLET TWICE A DAY AS NEEDED FOR OUTBREAK    vitamin D (VITAMIN D3) 1000 units Tab Take by mouth once daily. Pt takes 50 mcg daily OTC    atorvastatin (LIPITOR) 20 MG tablet TAKE 1 TABLET (20 MG TOTAL) BY MOUTH ONCE DAILY. FOR CHOLESTEROL CONTROL.    fluticasone-salmeterol diskus inhaler 250-50 mcg Inhale 1 puff into the lungs 2 (two) times daily.    levocetirizine (XYZAL) 5 MG tablet TAKE 1 TABLET (5 MG TOTAL) BY MOUTH  EVERY EVENING. FOR SINUS ALLERGY SYMPTOMS     No current facility-administered medications for this visit.     Facility-Administered Medications Ordered in Other Visits   Medication    0.9%  NaCl infusion    cyclopentolate 1% ophthalmic solution 1 drop    phenylephrine HCL 2.5% ophthalmic solution 1 drop    tropicamide 1% ophthalmic solution 1 drop       Allergies: Patient has No Known Allergies.    Review of Systems   Constitutional:  Negative for appetite change, chills, fatigue and fever.   HENT:  Positive for nasal congestion, hearing loss, postnasal drip, rhinorrhea and sinus pressure/congestion. Negative for ear discharge, facial swelling, sore throat, tinnitus and trouble swallowing.    Eyes:  Negative for photophobia, pain, discharge, redness, itching and visual disturbance.   Respiratory:  Negative for apnea, cough, choking, chest tightness, shortness of breath, wheezing and stridor.    Cardiovascular:  Negative for chest pain and palpitations.   Gastrointestinal:  Negative for abdominal pain, constipation, diarrhea, nausea and vomiting.   Musculoskeletal:  Negative for arthralgias, gait problem, joint swelling, myalgias, neck pain and neck stiffness.   Integumentary:  Negative for color change, pallor, rash and wound.   Neurological:  Negative for dizziness, tremors, seizures, syncope, facial asymmetry, speech difficulty, weakness, light-headedness, numbness and headaches.   Hematological:  Negative for adenopathy. Does not bruise/bleed easily.   Psychiatric/Behavioral:  Negative for agitation, behavioral problems, confusion, decreased concentration, dysphoric mood, hallucinations, sleep disturbance and suicidal ideas. The patient is not nervous/anxious and is not hyperactive.           Objective     Physical Exam  Vitals and nursing note reviewed.   Constitutional:       General: She is not in acute distress.     Appearance: Normal appearance. She is well-developed. She is not ill-appearing,  toxic-appearing or diaphoretic.   HENT:      Head: Normocephalic and atraumatic. Not macrocephalic and not microcephalic. No raccoon eyes, Edwards's sign, abrasion, contusion, right periorbital erythema, left periorbital erythema or laceration. Hair is normal.      Right Ear: Ear canal normal. Decreased hearing noted. No laceration, drainage, swelling or tenderness. No middle ear effusion. No foreign body. No mastoid tenderness. No hemotympanum. Tympanic membrane is not injected, scarred, perforated, erythematous, retracted or bulging. Tympanic membrane has normal mobility.      Left Ear: Ear canal normal. Decreased hearing noted. No laceration, drainage, swelling or tenderness.  No middle ear effusion. No foreign body. No mastoid tenderness. No hemotympanum. Tympanic membrane is retracted. Tympanic membrane is not injected, scarred, perforated, erythematous or bulging. Tympanic membrane has normal mobility.      Nose: Mucosal edema and congestion present. No nasal deformity, septal deviation, laceration or rhinorrhea.      Right Sinus: No maxillary sinus tenderness.      Left Sinus: No maxillary sinus tenderness.   Eyes:      General: Lids are normal.      Conjunctiva/sclera: Conjunctivae normal.      Pupils: Pupils are equal, round, and reactive to light.   Neck:      Thyroid: No thyroid mass or thyromegaly.      Vascular: No JVD.      Trachea: No tracheal tenderness or tracheal deviation.   Cardiovascular:      Rate and Rhythm: Normal rate and regular rhythm.   Pulmonary:      Effort: Pulmonary effort is normal. No tachypnea, bradypnea, accessory muscle usage or respiratory distress.      Breath sounds: No stridor.   Abdominal:      Palpations: Abdomen is soft.   Musculoskeletal:         General: Normal range of motion.      Cervical back: Normal range of motion and neck supple. No edema, erythema or rigidity. No muscular tenderness. Normal range of motion.   Lymphadenopathy:      Head:      Right side of head: No  submental, submandibular, tonsillar, preauricular or posterior auricular adenopathy.      Left side of head: No submental, submandibular, tonsillar, preauricular or posterior auricular adenopathy.      Cervical: No cervical adenopathy.      Right cervical: No superficial, deep or posterior cervical adenopathy.     Left cervical: No superficial, deep or posterior cervical adenopathy.   Skin:     General: Skin is warm and dry.      Coloration: Skin is not pale.      Findings: No abrasion, bruising, burn, ecchymosis, erythema, laceration, lesion or rash.   Neurological:      Mental Status: She is alert and oriented to person, place, and time.      Cranial Nerves: No cranial nerve deficit.      Sensory: No sensory deficit.      Motor: No tremor, atrophy, abnormal muscle tone or seizure activity.   Psychiatric:         Speech: She is communicative. Speech is not rapid and pressured or slurred.         Behavior: Behavior normal. Behavior is cooperative.         Thought Content: Thought content normal.         Judgment: Judgment normal.            Assessment and Plan     1. Dysfunction of both eustachian tubes    2. Allergic rhinitis, unspecified seasonality, unspecified trigger    3. Asymmetrical hearing loss         OTC AR meds, Nasal inh.    Consider HAE.         No follow-ups on file.

## 2023-11-13 NOTE — PROGRESS NOTES
Sheila Zarco, a 73 y.o. female, was seen today in the clinic for an audiologic evaluation.  The patient has a history of a bilateral sensorineural hearing loss (SNHL).  Ms. Zarco reported that she has been congested and has a history of asthma.  She reported bilateral tinnitus that is constant when she pays attention to it.  She also reported aural fullness.  She noted she was told in the past by Dr. Dumont that her ear drum is caved in.  She reported she occasionally pops her ears as suggested by Dr. Dumont.  Ms. Zarco also reported she experiences a dizziness that feels as if her head is wobbly.  She denied otalgia.      Tympanometry revealed Type A in the right ear and Type C in the left ear.  Audiogram results revealed essentially normal hearing sensitivity/borderline mild SNHL from 250-4000 Hz sloping to a moderate hearing loss by 8000 Hz in the right ear and an essentially mild SNHL from 250-4000 Hz sloping to a moderately-severe hearing loss by 8000 Hz in the left ear.  Speech reception thresholds were noted at 20 dB in the right ear and 30 dB in the left ear.  Speech discrimination scores were 96% in the right ear and 100% in the left ear.    Recommendations:  Otologic evaluation  Hearing protection when in noise  Hearing aid consultation after medical clearance   Annual audiogram or sooner if change in hearing is perceived

## 2023-11-15 ENCOUNTER — OFFICE VISIT (OUTPATIENT)
Dept: SURGERY | Facility: CLINIC | Age: 73
End: 2023-11-15
Payer: MEDICARE

## 2023-11-15 VITALS
BODY MASS INDEX: 26.06 KG/M2 | SYSTOLIC BLOOD PRESSURE: 155 MMHG | HEART RATE: 69 BPM | DIASTOLIC BLOOD PRESSURE: 61 MMHG | WEIGHT: 166 LBS | HEIGHT: 67 IN

## 2023-11-15 DIAGNOSIS — Z12.39 SCREENING BREAST EXAMINATION: ICD-10-CM

## 2023-11-15 DIAGNOSIS — Z98.890 S/P LUMPECTOMY, LEFT BREAST: ICD-10-CM

## 2023-11-15 DIAGNOSIS — Z85.3 PERSONAL HISTORY OF BREAST CANCER: Primary | ICD-10-CM

## 2023-11-15 DIAGNOSIS — Z12.31 ENCOUNTER FOR SCREENING MAMMOGRAM FOR MALIGNANT NEOPLASM OF BREAST: ICD-10-CM

## 2023-11-15 PROCEDURE — 99213 PR OFFICE/OUTPT VISIT, EST, LEVL III, 20-29 MIN: ICD-10-PCS | Mod: S$PBB,,, | Performed by: PHYSICIAN ASSISTANT

## 2023-11-15 PROCEDURE — 99213 OFFICE O/P EST LOW 20 MIN: CPT | Mod: S$PBB,,, | Performed by: PHYSICIAN ASSISTANT

## 2023-11-15 PROCEDURE — 99214 OFFICE O/P EST MOD 30 MIN: CPT | Mod: PBBFAC | Performed by: PHYSICIAN ASSISTANT

## 2023-11-15 PROCEDURE — 99999 PR PBB SHADOW E&M-EST. PATIENT-LVL IV: CPT | Mod: PBBFAC,,, | Performed by: PHYSICIAN ASSISTANT

## 2023-11-15 PROCEDURE — 99999 PR PBB SHADOW E&M-EST. PATIENT-LVL IV: ICD-10-PCS | Mod: PBBFAC,,, | Performed by: PHYSICIAN ASSISTANT

## 2023-11-15 NOTE — PROGRESS NOTES
[facility]           Breast Surgery Surveillance    11/15/2023    DIAGNOSIS:   This is a 73 y.o. 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 Left partial mastectomy on 2020. Kayleen Alvarez M.D. Surgical Oncology. Final pathology revealed (9.5 mm) DCIS with clear margins.  2. Radiation therapy completed on 10/14/2020. Hanane Sahu M.D. Radiation Oncology     HISTORY OF PRESENT ILLNESS:   Sheila Zarco is a 73 y.o. female comes in for oncological follow up. She denies change in her breast self-exam specifically denying new masses, skin or nipple changes, or nipple discharge. Past medical and surgical history is updated no new changes. There have been no changes to family history. The patient denies constitutional symptoms of night sweats, weight loss, new headaches, visual changes, new back or bony pain, chest pain, or shortness of breath.      IMAGIN/16/23 Bilateral Screening Mammo:    Findings:  This procedure was performed using tomosynthesis. Computer-aided detection was utilized in the interpretation of this examination.  The breasts are heterogeneously dense, which may obscure small masses.      Left  There are post-surgical findings from a previous lumpectomy seen in the left breast.      Right  There is no evidence of suspicious masses, calcifications, or other abnormal findings in the right breast.     Impression:  Left  Post-Surgical Finding: Left breast post-surgical finding. Assessment: 0 - Incomplete.      Right  There is no mammographic evidence of malignancy in the right breast.     BI-RADS Category:   Overall: 0 - Incomplete: Needs Additional Imaging Evaluation     Recommendation:  Repeat imaging is recommended for technical reasons. Left MLO image is unsharp and should be repeated.     ADDENDED report BIRADS 2.      MEDICATIONS/ALLERGIES:     Review of patient's allergies indicates:  No Known  "Allergies    PHYSICAL EXAM:   BP (!) 155/61   Pulse 69   Ht 5' 6.5" (1.689 m)   Wt 75.3 kg (166 lb)   BMI 26.39 kg/m²     Physical Exam   Vitals reviewed.  Constitutional: She is oriented to person, place, and time.   HENT:   Head: Normocephalic and atraumatic.   Nose: Nose normal.   Eyes: Pupils are equal, round, and reactive to light. Right eye exhibits no discharge. Left eye exhibits no discharge.   Pulmonary/Chest: Effort normal and breath sounds normal. No stridor. No respiratory distress. She exhibits no mass, no tenderness and no edema. Right breast exhibits no inverted nipple, no mass, no nipple discharge, no skin change and no tenderness. Left breast exhibits no inverted nipple, no mass, no nipple discharge, no skin change and no tenderness. No breast swelling or bleeding. Breasts are symmetrical.   Abdominal: Normal appearance.   Genitourinary: No breast swelling or bleeding.   Neurological: She is alert and oriented to person, place, and time.   Skin: Skin is warm and dry.     Psychiatric: Her behavior is normal. Mood, judgment and thought content normal.        ASSESSMENT:   This is a 73 y.o. female without evidence of recurrence by exam, history or imaging.       PLAN:   We discussed there was nothing concerning on imaging or exam today. Reassurance given   Continue monthly self breast exams and call the clinic with any changes or problems.  Mammogram in June 2024  Return to clinic in June with mmg.    The patient is in agreement with the plan. Questions were encouraged and answered to patient's satisfaction. Sheila will call our office with any questions or concerns.     25 minutes were spent on this encounter, 15 of which was face to face counseling and 10 minutes were spent on chart review and coordination of care.     "

## 2023-11-16 ENCOUNTER — TELEPHONE (OUTPATIENT)
Dept: PLASTIC SURGERY | Facility: CLINIC | Age: 73
End: 2023-11-16
Payer: MEDICARE

## 2023-11-16 ENCOUNTER — OFFICE VISIT (OUTPATIENT)
Dept: INTERNAL MEDICINE | Facility: CLINIC | Age: 73
End: 2023-11-16
Payer: MEDICARE

## 2023-11-16 VITALS
SYSTOLIC BLOOD PRESSURE: 124 MMHG | BODY MASS INDEX: 26.22 KG/M2 | RESPIRATION RATE: 16 BRPM | HEIGHT: 66 IN | DIASTOLIC BLOOD PRESSURE: 60 MMHG | WEIGHT: 163.13 LBS | HEART RATE: 68 BPM | TEMPERATURE: 98 F

## 2023-11-16 DIAGNOSIS — R10.30 LOWER ABDOMINAL PAIN: ICD-10-CM

## 2023-11-16 DIAGNOSIS — J45.20 MILD INTERMITTENT ASTHMA WITHOUT COMPLICATION: Primary | ICD-10-CM

## 2023-11-16 DIAGNOSIS — K21.9 GASTROESOPHAGEAL REFLUX DISEASE, UNSPECIFIED WHETHER ESOPHAGITIS PRESENT: ICD-10-CM

## 2023-11-16 DIAGNOSIS — Z12.11 COLON CANCER SCREENING: ICD-10-CM

## 2023-11-16 DIAGNOSIS — R10.9 ABDOMINAL PAIN, UNSPECIFIED ABDOMINAL LOCATION: ICD-10-CM

## 2023-11-16 DIAGNOSIS — R79.9 ABNORMAL FINDING OF BLOOD CHEMISTRY, UNSPECIFIED: ICD-10-CM

## 2023-11-16 PROCEDURE — 99215 OFFICE O/P EST HI 40 MIN: CPT | Mod: 25,PBBFAC,PO | Performed by: INTERNAL MEDICINE

## 2023-11-16 PROCEDURE — 99999 PR PBB SHADOW E&M-EST. PATIENT-LVL V: ICD-10-PCS | Mod: PBBFAC,,, | Performed by: INTERNAL MEDICINE

## 2023-11-16 PROCEDURE — 99999PBSHW PR PBB SHADOW TECHNICAL ONLY FILED TO HB: Mod: PBBFAC,,,

## 2023-11-16 PROCEDURE — 96372 THER/PROPH/DIAG INJ SC/IM: CPT | Mod: PBBFAC,PO

## 2023-11-16 PROCEDURE — 99214 OFFICE O/P EST MOD 30 MIN: CPT | Mod: S$PBB,,, | Performed by: INTERNAL MEDICINE

## 2023-11-16 PROCEDURE — 99214 PR OFFICE/OUTPT VISIT, EST, LEVL IV, 30-39 MIN: ICD-10-PCS | Mod: S$PBB,,, | Performed by: INTERNAL MEDICINE

## 2023-11-16 PROCEDURE — 99999PBSHW PR PBB SHADOW TECHNICAL ONLY FILED TO HB: ICD-10-PCS | Mod: PBBFAC,,,

## 2023-11-16 PROCEDURE — 99999 PR PBB SHADOW E&M-EST. PATIENT-LVL V: CPT | Mod: PBBFAC,,, | Performed by: INTERNAL MEDICINE

## 2023-11-16 RX ORDER — PREDNISONE 20 MG/1
TABLET ORAL
Qty: 30 TABLET | Refills: 0 | Status: SHIPPED | OUTPATIENT
Start: 2023-11-16

## 2023-11-16 RX ORDER — TRIAMCINOLONE ACETONIDE 40 MG/ML
40 INJECTION, SUSPENSION INTRA-ARTICULAR; INTRAMUSCULAR
Status: COMPLETED | OUTPATIENT
Start: 2023-11-16 | End: 2023-11-16

## 2023-11-16 RX ORDER — ALBUTEROL SULFATE 0.63 MG/3ML
0.63 SOLUTION RESPIRATORY (INHALATION) EVERY 6 HOURS PRN
Qty: 50 EACH | Refills: 2 | Status: SHIPPED | OUTPATIENT
Start: 2023-11-16

## 2023-11-16 RX ADMIN — TRIAMCINOLONE ACETONIDE 40 MG: 40 INJECTION, SUSPENSION INTRA-ARTICULAR; INTRAMUSCULAR at 12:11

## 2023-11-16 NOTE — TELEPHONE ENCOUNTER
Contact call with patient, follow up referral.  Discussed with patient that provider is no longer completing Minor room procedures unless patient is already established.  Encouraged to speak with her referring provider for additional surgeons.

## 2023-11-28 ENCOUNTER — HOSPITAL ENCOUNTER (OUTPATIENT)
Dept: RADIOLOGY | Facility: HOSPITAL | Age: 73
Discharge: HOME | End: 2023-11-28
Attending: INTERNAL MEDICINE
Payer: MEDICARE

## 2023-11-28 DIAGNOSIS — R10.30 LOWER ABDOMINAL PAIN: ICD-10-CM

## 2023-11-28 PROCEDURE — 74176 CT ABDOMEN PELVIS WITHOUT CONTRAST: ICD-10-PCS | Mod: 26,,, | Performed by: RADIOLOGY

## 2023-11-28 PROCEDURE — 74176 CT ABD & PELVIS W/O CONTRAST: CPT | Mod: TC

## 2023-11-28 PROCEDURE — 74176 CT ABD & PELVIS W/O CONTRAST: CPT | Mod: 26,,, | Performed by: RADIOLOGY

## 2023-11-29 ENCOUNTER — PATIENT MESSAGE (OUTPATIENT)
Dept: ENDOSCOPY | Facility: HOSPITAL | Age: 73
End: 2023-11-29

## 2023-11-29 ENCOUNTER — TELEPHONE (OUTPATIENT)
Dept: ENDOSCOPY | Facility: HOSPITAL | Age: 73
End: 2023-11-29

## 2023-11-29 ENCOUNTER — CLINICAL SUPPORT (OUTPATIENT)
Dept: ENDOSCOPY | Facility: HOSPITAL | Age: 73
End: 2023-11-29
Attending: INTERNAL MEDICINE
Payer: MEDICARE

## 2023-11-29 DIAGNOSIS — R10.30 LOWER ABDOMINAL PAIN: ICD-10-CM

## 2023-11-29 DIAGNOSIS — K21.9 GASTROESOPHAGEAL REFLUX DISEASE, UNSPECIFIED WHETHER ESOPHAGITIS PRESENT: ICD-10-CM

## 2023-11-29 DIAGNOSIS — Z12.11 COLON CANCER SCREENING: ICD-10-CM

## 2023-11-29 NOTE — TELEPHONE ENCOUNTER
Spoke to patient to schedule procedure(s) Colonoscopy/EGD       Physician to perform procedure(s) Dr. MOUNIKA Patterson  Date of Procedure (s) 12/5/23  Arrival Time 9:30 AM  Time of Procedure(s) 10:30 AM   Location of Procedure(s) [place] [address]nd Floor  Type of Rx Prep sent to patient: PEG  Instructions provided to patient via [facility]    Patient was informed on the following information and verbalized understanding. Screening questionnaire reviewed with patient and complete. If procedure requires anesthesia, a responsible adult needs to be present to accompany the patient home, patient cannot drive after receiving anesthesia. Appointment details are tentative, especially check-in time. Patient will receive a prep-op call 7 days prior to confirm check-in time for procedure. If applicable the patient should contact their pharmacy to verify Rx for procedure prep is ready for pick-up. Patient was advised to call the scheduling department at [phone] if pharmacy states no Rx is available. Patient was advised to call the endoscopy scheduling department if any questions or concerns arise.      [facility] Scheduling Department

## 2023-12-01 ENCOUNTER — TELEPHONE (OUTPATIENT)
Dept: ENDOSCOPY | Facility: HOSPITAL | Age: 73
End: 2023-12-01
Payer: MEDICARE

## 2023-12-01 NOTE — TELEPHONE ENCOUNTER
Spoke with patient about arrival time @ 0930.   Colon/PEG prep    As soon as possible:   your prep from pharmacy and over the counter DULCOLAX LAXATIVE TABLETS             On the day before your procedure   What You CAN do:   You may have clear liquids ONLY-see below for list.      Liquids That Are OK to Drink:   Water  Sports drinks (Gatorade, Power-Aid)  Coffee or tea (no cream or nondairy creamer)  Clear juices without pulp (apple, white grape)  Gelatin desserts (no fruit or toppings)  Clear soda (sprite, coke, ginger ale)  Chicken broth (until 12 midnight the night before procedure)     What You CANNOT do:   Do not EAT solid food, drink milk or anything   colored red.  Do not drink alcohol.  Do not take oral medications within 1 hour of starting   each dose of prep.  No gum chewing or candy morning of procedure     How to take prep:    DOSE 1--Day Before Colonoscopy 12/4/23      Drink at least 6 to 8 glasses of clear liquids from time you wake up until you begin your prep and then continue until bedtime to avoid dehydration.      12:00 pm (NOON) Mix your entire container of prep with lukewarm water and refrigerate. Take four (4) Dulcolax (Bisacodyl) tablets with at least 8 ounces or more of clear liquids.        6:00 pm:     You must complete Steps 1 and 2 below before going to bed:     Step 1-Drink half the liquid in the container within one (1) hour.   Step 2-Refrigerate the remaining half of the liquid for dose 2. See below when to begin this step.                        DOSE 2--Day of the Colonoscopy 12/5/23 at 2-3 AM.     For this dose, repeat Step 1 shown above using the remaining half of the liquid prep.   You may continue drinking water/clear liquids until   4 hours before your colonoscopy or as directed by the scheduling nurse  6:30 AM.     Medications: Do not take Insulin or oral diabetic medications the day of the procedure.    Take as prescribed: heart, seizure and blood pressure medication  in the morning with a sip of water (less than an ounce).  Take any breathing medications and bring inhalers to hospital with you.     Leave all valuables and jewelry at home. Wear comfortable clothes to procedure to change into hospital gown.   You cannot drive for 24 hours after your procedure because you will receive sedation for your procedure to make you comfortable.    A ride must be provided at discharge.

## 2023-12-05 ENCOUNTER — ANESTHESIA EVENT (OUTPATIENT)
Dept: ENDOSCOPY | Facility: HOSPITAL | Age: 73
End: 2023-12-05
Payer: MEDICARE

## 2023-12-05 ENCOUNTER — ANESTHESIA (OUTPATIENT)
Dept: ENDOSCOPY | Facility: HOSPITAL | Age: 73
End: 2023-12-05
Payer: MEDICARE

## 2023-12-05 ENCOUNTER — HOSPITAL ENCOUNTER (OUTPATIENT)
Facility: HOSPITAL | Age: 73
Discharge: HOME | End: 2023-12-05
Attending: INTERNAL MEDICINE | Admitting: INTERNAL MEDICINE
Payer: MEDICARE

## 2023-12-05 VITALS
SYSTOLIC BLOOD PRESSURE: 135 MMHG | BODY MASS INDEX: 25.88 KG/M2 | TEMPERATURE: 98 F | HEIGHT: 66 IN | OXYGEN SATURATION: 100 % | HEART RATE: 64 BPM | WEIGHT: 161 LBS | DIASTOLIC BLOOD PRESSURE: 54 MMHG | RESPIRATION RATE: 17 BRPM

## 2023-12-05 DIAGNOSIS — Z12.11 SCREENING FOR COLON CANCER: ICD-10-CM

## 2023-12-05 DIAGNOSIS — R10.13 DYSPEPSIA: Primary | ICD-10-CM

## 2023-12-05 PROCEDURE — D9220A PRA ANESTHESIA: Mod: ANES,,, | Performed by: ANESTHESIOLOGY

## 2023-12-05 PROCEDURE — D9220A PRA ANESTHESIA: Mod: CRNA,,, | Performed by: NURSE ANESTHETIST, CERTIFIED REGISTERED

## 2023-12-05 PROCEDURE — G0121 COLON CA SCRN NOT HI RSK IND: HCPCS | Performed by: INTERNAL MEDICINE

## 2023-12-05 PROCEDURE — G0121 COLON CA SCRN NOT HI RSK IND: ICD-10-PCS | Mod: ,,, | Performed by: INTERNAL MEDICINE

## 2023-12-05 PROCEDURE — 37000009 HC ANESTHESIA EA ADD 15 MINS: Performed by: INTERNAL MEDICINE

## 2023-12-05 PROCEDURE — D9220A PRA ANESTHESIA: ICD-10-PCS | Mod: CRNA,,, | Performed by: NURSE ANESTHETIST, CERTIFIED REGISTERED

## 2023-12-05 PROCEDURE — 25000003 PHARM REV CODE 250: Performed by: INTERNAL MEDICINE

## 2023-12-05 PROCEDURE — 63600175 PHARM REV CODE 636 W HCPCS: Performed by: NURSE ANESTHETIST, CERTIFIED REGISTERED

## 2023-12-05 PROCEDURE — D9220A PRA ANESTHESIA: ICD-10-PCS | Mod: ANES,,, | Performed by: ANESTHESIOLOGY

## 2023-12-05 PROCEDURE — 25000003 PHARM REV CODE 250: Performed by: NURSE ANESTHETIST, CERTIFIED REGISTERED

## 2023-12-05 PROCEDURE — 37000008 HC ANESTHESIA 1ST 15 MINUTES: Performed by: INTERNAL MEDICINE

## 2023-12-05 PROCEDURE — 43235 PR EGD, FLEX, DIAGNOSTIC: ICD-10-PCS | Mod: 51,,, | Performed by: INTERNAL MEDICINE

## 2023-12-05 PROCEDURE — 43235 EGD DIAGNOSTIC BRUSH WASH: CPT | Performed by: INTERNAL MEDICINE

## 2023-12-05 PROCEDURE — G0121 COLON CA SCRN NOT HI RSK IND: HCPCS | Mod: ,,, | Performed by: INTERNAL MEDICINE

## 2023-12-05 PROCEDURE — 43235 EGD DIAGNOSTIC BRUSH WASH: CPT | Mod: 51,,, | Performed by: INTERNAL MEDICINE

## 2023-12-05 RX ORDER — ONDANSETRON 2 MG/ML
INJECTION INTRAMUSCULAR; INTRAVENOUS
Status: DISCONTINUED | OUTPATIENT
Start: 2023-12-05 | End: 2023-12-05

## 2023-12-05 RX ORDER — DEXAMETHASONE SODIUM PHOSPHATE 4 MG/ML
INJECTION, SOLUTION INTRA-ARTICULAR; INTRALESIONAL; INTRAMUSCULAR; INTRAVENOUS; SOFT TISSUE
Status: DISCONTINUED | OUTPATIENT
Start: 2023-12-05 | End: 2023-12-05

## 2023-12-05 RX ORDER — SODIUM CHLORIDE 0.9 % (FLUSH) 0.9 %
10 SYRINGE (ML) INJECTION
Status: DISCONTINUED | OUTPATIENT
Start: 2023-12-05 | End: 2023-12-05 | Stop reason: HOSPADM

## 2023-12-05 RX ORDER — PROPOFOL 10 MG/ML
VIAL (ML) INTRAVENOUS
Status: DISCONTINUED | OUTPATIENT
Start: 2023-12-05 | End: 2023-12-05

## 2023-12-05 RX ORDER — SODIUM CHLORIDE 9 MG/ML
INJECTION, SOLUTION INTRAVENOUS CONTINUOUS
Status: DISCONTINUED | OUTPATIENT
Start: 2023-12-05 | End: 2023-12-05 | Stop reason: HOSPADM

## 2023-12-05 RX ORDER — LIDOCAINE HYDROCHLORIDE 20 MG/ML
INJECTION INTRAVENOUS
Status: DISCONTINUED | OUTPATIENT
Start: 2023-12-05 | End: 2023-12-05

## 2023-12-05 RX ORDER — PROPOFOL 10 MG/ML
VIAL (ML) INTRAVENOUS CONTINUOUS PRN
Status: DISCONTINUED | OUTPATIENT
Start: 2023-12-05 | End: 2023-12-05

## 2023-12-05 RX ORDER — DEXTROMETHORPHAN/PSEUDOEPHED 2.5-7.5/.8
DROPS ORAL
Status: COMPLETED | OUTPATIENT
Start: 2023-12-05 | End: 2023-12-05

## 2023-12-05 RX ADMIN — PROPOFOL 150 MCG/KG/MIN: 10 INJECTION, EMULSION INTRAVENOUS at 10:12

## 2023-12-05 RX ADMIN — GLYCOPYRROLATE 0.1 MG: 0.2 INJECTION, SOLUTION INTRAMUSCULAR; INTRAVITREAL at 10:12

## 2023-12-05 RX ADMIN — DEXAMETHASONE SODIUM PHOSPHATE 4 MG: 4 INJECTION, SOLUTION INTRAMUSCULAR; INTRAVENOUS at 10:12

## 2023-12-05 RX ADMIN — TOPICAL ANESTHETIC 1 EACH: 200 SPRAY DENTAL; PERIODONTAL at 10:12

## 2023-12-05 RX ADMIN — LIDOCAINE HYDROCHLORIDE 50 MG: 20 INJECTION, SOLUTION INTRAVENOUS at 10:12

## 2023-12-05 RX ADMIN — SODIUM CHLORIDE: 9 INJECTION, SOLUTION INTRAVENOUS at 09:12

## 2023-12-05 RX ADMIN — ONDANSETRON 4 MG: 2 INJECTION, SOLUTION INTRAMUSCULAR; INTRAVENOUS at 10:12

## 2023-12-05 RX ADMIN — PROPOFOL 25 MG: 10 INJECTION, EMULSION INTRAVENOUS at 10:12

## 2023-12-05 RX ADMIN — PROPOFOL 75 MG: 10 INJECTION, EMULSION INTRAVENOUS at 10:12

## 2023-12-05 NOTE — TRANSFER OF CARE
"Anesthesia Transfer of Care Note    Patient: Sheila Zarco    Procedure(s) Performed: Procedure(s) (LRB):  EGD (ESOPHAGOGASTRODUODENOSCOPY) (N/A)  COLONOSCOPY (N/A)    Patient location: GI    Anesthesia Type: general    Transport from OR: Transported from OR on room air with adequate spontaneous ventilation    Post pain: adequate analgesia    Post assessment: no apparent anesthetic complications and tolerated procedure well    Post vital signs: stable    Level of consciousness: awake and alert    Nausea/Vomiting: no nausea/vomiting    Complications: none    Transfer of care protocol was followed      Last vitals: Visit Vitals  BP (!) 161/75 (BP Location: Left arm, Patient Position: Lying)   Pulse (!) 58   Temp 36.4 °C (97.5 °F) (Temporal)   Resp 18   Ht 5' 6" (1.676 m)   Wt 73 kg (161 lb)   SpO2 97%   Breastfeeding No   BMI 25.99 kg/m²     "

## 2023-12-05 NOTE — ANESTHESIA POSTPROCEDURE EVALUATION
Anesthesia Post Evaluation    Patient: Sheila Zarco    Procedure(s) Performed: Procedure(s) (LRB):  EGD (ESOPHAGOGASTRODUODENOSCOPY) (N/A)  COLONOSCOPY (N/A)    Final Anesthesia Type: general      Patient location during evaluation: GI PACU  Patient participation: Yes- Able to Participate  Level of consciousness: awake and alert  Post-procedure vital signs: reviewed and stable  Pain management: adequate  Airway patency: patent    PONV status at discharge: No PONV  Anesthetic complications: no      Cardiovascular status: blood pressure returned to baseline and hemodynamically stable  Respiratory status: unassisted  Hydration status: euvolemic  Follow-up not needed.              Vitals Value Taken Time   /54 12/05/23 1142   Temp 36.8 °C (98.2 °F) 12/05/23 1112   Pulse 64 12/05/23 1142   Resp 17 12/05/23 1142   SpO2 100 % 12/05/23 1142         Event Time   Out of Recovery 11:46:00         Pain/Paddy Score: Paddy Score: 10 (12/5/2023 11:42 AM)

## 2023-12-05 NOTE — ANESTHESIA PREPROCEDURE EVALUATION
12/05/2023  Sheila Zarco is a 73 y.o., female.      Pre-op Assessment     I have reviewed the Nursing Notes.    I have reviewed the Medications.     Review of Systems  Anesthesia Hx:  No problems with previous Anesthesia             Denies Family Hx of Anesthesia complications.     Social:  Non-Smoker, No Alcohol Use       Hematology/Oncology:  Hematology Normal   Oncology Normal                                   EENT/Dental:  EENT/Dental Normal           Cardiovascular:  Cardiovascular Normal Exercise tolerance: good                                           Pulmonary:    Asthma       Asthma:               Renal/:  Renal/ Normal                 Hepatic/GI:     GERD Liver Disease,     Gerd       Liver Disease        Musculoskeletal:  Musculoskeletal Normal                Neurological:    Neuromuscular Disease,                                 Neuromuscular Disease   Endocrine:   Hypothyroidism       Hypothyroidism                 Anesthesia Plan  Type of Anesthesia, risks & benefits discussed:    Anesthesia Type: Gen Natural Airway  Intra-op Monitoring Plan: Standard ASA Monitors  Post Op Pain Control Plan: multimodal analgesia  Induction:  IV  Airway Plan: Direct, Post-Induction  Informed Consent: Informed consent signed with the Patient and all parties understand the risks and agree with anesthesia plan.  All questions answered.   ASA Score: 2    Ready For Surgery From Anesthesia Perspective.     .

## 2023-12-05 NOTE — [employer]OV[employer] PATIENT INSTRUCTIONS
Discharge Summary/Instructions after an Endoscopic Procedure  Patient Name: Sheila Zarco  Patient MRN: [id number]  Patient YOB: 1950 Tuesday, December 5, 2023  Adam Patterson MD  Dear patient,  As a result of recent federal legislation (The Federal Cures Act), you may   receive lab or pathology results from your procedure in your [facility]   account before your physician is able to contact you. Your physician or   their representative will relay the results to you with their   recommendations at their soonest availability.  Thank you,  Your health is very important to us during the Covid Crisis. Following your   procedure today, you will receive a daily text for 2 weeks asking about   signs or symptoms of Covid 19.  Please respond to this text when you   receive it so we can follow up and keep you as safe as possible.   RESTRICTIONS:  During your procedure today, you received medications for sedation.  These   medications may affect your judgment, balance and coordination.  Therefore,   for 24 hours, you have the following restrictions:   - DO NOT drive a car, operate machinery, make legal/financial decisions,   sign important papers or drink alcohol.    ACTIVITY:  Today: no heavy lifting, straining or running due to procedural   sedation/anesthesia.  The following day: return to full activity including work.  DIET:  Eat and drink normally unless instructed otherwise.     TREATMENT FOR COMMON SIDE EFFECTS:  - Mild abdominal pain, nausea, belching, bloating or excessive gas:  rest,   eat lightly and use a heating pad.  - Sore Throat: treat with throat lozenges and/or gargle with warm salt   water.  - Because air was used during the procedure, expelling large amounts of air   from your rectum or belching is normal.  - If a bowel prep was taken, you may not have a bowel movement for 1-3 days.    This is normal.  SYMPTOMS TO WATCH FOR AND REPORT TO YOUR PHYSICIAN:  1. Abdominal pain or bloating, other than  gas cramps.  2. Chest pain.  3. Back pain.  4. Signs of infection such as: chills or fever occurring within 24 hours   after the procedure.  5. Rectal bleeding, which would show as bright red, maroon, or black stools.   (A tablespoon of blood from the rectum is not serious, especially if   hemorrhoids are present.)  6. Vomiting.  7. Weakness or dizziness.  GO DIRECTLY TO THE NEAREST EMERGENCY ROOM IF YOU HAVE ANY OF THE FOLLOWING:      Difficulty breathing              Chills and/or fever over 101 F   Persistent vomiting and/or vomiting blood   Severe abdominal pain   Severe chest pain   Black, tarry stools   Bleeding- more than one tablespoon   Any other symptom or condition that you feel may need urgent attention  Your doctor recommends these additional instructions:  If any biopsies were taken, your doctors clinic will contact you in 1 to 2   weeks with any results.  - Discharge patient to home (ambulatory).   - Patient has a contact number available for emergencies.  The signs and   symptoms of potential delayed complications were discussed with the   patient.  Return to normal activities tomorrow.  Written discharge   instructions were provided to the patient.   - Resume previous diet.   - Continue present medications.   - Return to primary care physician as previously scheduled.  For questions, problems or results please call your physician - Adam Patterson MD.  EMERGENCY PHONE NUMBER: [phone],  LAB RESULTS: [phone]  IF A COMPLICATION OR EMERGENCY SITUATION ARISES AND YOU ARE UNABLE TO REACH   YOUR PHYSICIAN - GO DIRECTLY TO THE EMERGENCY ROOM.  Adam Patterson MD  12/5/2023 11:07:41 AM  This report has been verified and signed electronically.  Dear patient,  As a result of recent federal legislation (The Federal Cures Act), you may   receive lab or pathology results from your procedure in your [facility]   account before your physician is able to contact you. Your physician or   their  representative will relay the results to you with their   recommendations at their soonest availability.  Thank you,  PROVATION

## 2023-12-05 NOTE — H&P
Short Stay Endoscopy History and Physical    PCP - Farhat Correa MD  Referring Physician - Farhat Correa MD  [address] [facility] [place] [address]    Procedure - EGD/colonoscopy  ASA - per anesthesia  Mallampati - per anesthesia  History of Anesthesia problems - no  Family history Anesthesia problems -  no   Plan of anesthesia - General    HPI:  This is a 73 y.o. female here for evaluation of: epigastric pain / screening    Reflux - no  Dysphagia - no  Abdominal pain - POS  Diarrhea - no    ROS:  Constitutional: No fevers, chills, No weight loss  CV: No chest pain  Pulm: No cough, No shortness of breath  GI: see HPI    Medical History:  has a past medical history of Asthma, Baker's cyst, Breast cancer (06/2020), Cataract, Glaucoma, and Thyroid disease.    Surgical History:  has a past surgical history that includes Hysterectomy; Gallbladder surgery; Knee cartilage surgery; Bunionectomy; narrow angles eye surgery ; Phacoemulsification of cataract (Right, 7/9/2020); Intraocular prosthesis insertion (Right, 7/9/2020); Mastectomy, partial (Left, 7/24/2020); Phacoemulsification of cataract (Left, 10/22/2020); Intraocular prosthesis insertion (Left, 10/22/2020); Breast biopsy (Left, 06/2020); and Breast lumpectomy (Left).    Family History: family history includes Breast cancer in her sister and another family member; Breast cancer (age of onset: 43) in her daughter; Heart disease in her father and mother; Liver disease in her brother; No Known Problems in her maternal aunt, maternal grandfather, maternal grandmother, maternal uncle, paternal aunt, paternal grandfather, paternal grandmother, paternal uncle, and sister..    Social History:  reports that she has never smoked. She has never used smokeless tobacco. She reports current alcohol use of about 8.0 standard drinks of alcohol per week. She reports that she does not use drugs.    Review of patient's allergies indicates:  No Known  Allergies    Medications:   Medications Prior to Admission   Medication Sig Dispense Refill Last Dose    albuterol (PROVENTIL/VENTOLIN HFA) 90 mcg/actuation inhaler Inhale 2 puffs into the lungs every 6 (six) hours as needed for Wheezing.   Past Week    albuterol (VENTOLIN HFA) 90 mcg/actuation inhaler Inhale 2 puffs into the lungs every 6 (six) hours as needed for Wheezing. Rescue 18 g 0 Past Week    ALPRAZolam (XANAX) 0.5 MG tablet TAKE 1 TABLET BY MOUTH TWICE A DAY AS NEEDED 60 tablet 2 12/4/2023    aspirin (ECOTRIN) 81 MG EC tablet Take 81 mg by mouth every morning.   12/4/2023    EScitalopram oxalate (LEXAPRO) 20 MG tablet TAKE 1 TABLET (20 MG TOTAL) BY MOUTH ONCE DAILY. FOR ANXIETY. 90 tablet 2 12/4/2023    hyoscyamine (ANASPAZ,LEVSIN) 0.125 mg Tab Take 1 tablet (125 mcg total) by mouth every 6 (six) hours as needed (abdominal cramping pain). 40 tablet 3 Past Week    levothyroxine (SYNTHROID) 88 MCG tablet TAKE 1 TABLET BY MOUTH EVERY MORNING. 90 tablet 3 12/4/2023    pantoprazole (PROTONIX) 40 MG tablet TAKE 1 TABLET BY MOUTH EVERY DAY 90 tablet 2 12/4/2023    predniSONE (DELTASONE) 20 MG tablet Take 3 tabs daily x 5 days; 2 tabs daily x 5 days; then 1 tab daily until completed for asthma flare. 30 tablet 0 Past Month    vitamin D (VITAMIN D3) 1000 units Tab Take by mouth once daily. Pt takes 50 mcg daily OTC   12/4/2023    acetic acid (VOSOL) 2 % otic solution Apply 4 drops to affected ear tid prn flare 6 mL 2 Unknown    albuterol (ACCUNEB) 0.63 mg/3 mL Nebu Take 3 mLs (0.63 mg total) by nebulization every 6 (six) hours as needed (wheezing and congestion). 50 each 2 More than a month    atorvastatin (LIPITOR) 20 MG tablet TAKE 1 TABLET (20 MG TOTAL) BY MOUTH ONCE DAILY. FOR CHOLESTEROL CONTROL. 90 tablet 1     desoximetasone (TOPICORT) 0.25 % cream Apply topically 2 (two) times daily.   Unknown    fluocinonide (LIDEX) 0.05 % external solution AAA scalp qday prn itching, scaling 60 mL 3 Unknown     fluticasone-salmeterol diskus inhaler 250-50 mcg Inhale 1 puff into the lungs 2 (two) times daily. 60 each 5     ketoconazole (NIZORAL) 2 % cream AAA twice daily on nose prn flare 60 g 3 Unknown    ketoconazole (NIZORAL) 2 % cream aaa in ears qhs  prn flare 60 g 3 Unknown    ketoconazole (NIZORAL) 2 % shampoo WASH HAIR W/ MEDICATED SHAMPOO AT LEAST 2X WEEK, LET SIT ON SCALP FOR ATLEAST 5MINUTES BEFORE RINSIN 120 mL 1 Unknown    levocetirizine (XYZAL) 5 MG tablet TAKE 1 TABLET (5 MG TOTAL) BY MOUTH EVERY EVENING. FOR SINUS ALLERGY SYMPTOMS 90 tablet 1     meclizine (ANTIVERT) 25 mg tablet Take 1 tablet (25 mg total) by mouth 3 (three) times daily as needed for Dizziness. 15 tablet 0 More than a month    nortriptyline (PAMELOR) 10 MG capsule Take 1 capsule (10 mg total) by mouth every evening. 30 capsule 5 Unknown    triamcinolone acetonide 0.025% (KENALOG) 0.025 % cream AAA qd to face, ears prn flare (Patient not taking: Reported on 11/16/2023) 45 g 1 More than a month    valACYclovir (VALTREX) 500 MG tablet TAKE ONE TABLET TWICE A DAY AS NEEDED FOR OUTBREAK 40 tablet 3 More than a month       Physical Exam:    Vital Signs:   Vitals:    12/05/23 0948   BP: (!) 161/75   Pulse: (!) 58   Resp: 18   Temp: 97.5 °F (36.4 °C)       General Appearance: Well appearing in no acute distress  Lungs: no labored breathing  CVS:  regular rate  Abdomen: non tender    Labs:  Lab Results   Component Value Date    WBC 5.95 11/09/2023    HGB 11.6 (L) 11/09/2023    HCT 37.7 11/09/2023     11/09/2023    CHOL 173 11/09/2023    TRIG 77 11/09/2023    HDL 72 11/09/2023    ALT 21 11/09/2023    AST 20 11/09/2023     11/09/2023    K 4.2 11/09/2023     11/09/2023    CREATININE 0.8 11/09/2023    BUN 13 11/09/2023    CO2 24 11/09/2023    TSH 1.383 05/04/2023    INR 0.9 09/08/2010    GLUF 116 (H) 01/15/2008    HGBA1C 5.3 03/22/2022       I have explained the risks and benefits of this endoscopic procedure to the patient including  but not limited to bleeding, inflammation, infection, perforation, and death.      Adam Patterson MD

## 2023-12-05 NOTE — PROVATION PATIENT INSTRUCTIONS
Discharge Summary/Instructions after an Endoscopic Procedure  Patient Name: Sheila Zarco  Patient MRN: [id number]  Patient YOB: 1950 Tuesday, December 5, 2023  Adam Patterson MD  Dear patient,  As a result of recent federal legislation (The Federal Cures Act), you may   receive lab or pathology results from your procedure in your [facility]   account before your physician is able to contact you. Your physician or   their representative will relay the results to you with their   recommendations at their soonest availability.  Thank you,  Your health is very important to us during the Covid Crisis. Following your   procedure today, you will receive a daily text for 2 weeks asking about   signs or symptoms of Covid 19.  Please respond to this text when you   receive it so we can follow up and keep you as safe as possible.   RESTRICTIONS:  During your procedure today, you received medications for sedation.  These   medications may affect your judgment, balance and coordination.  Therefore,   for 24 hours, you have the following restrictions:   - DO NOT drive a car, operate machinery, make legal/financial decisions,   sign important papers or drink alcohol.    ACTIVITY:  Today: no heavy lifting, straining or running due to procedural   sedation/anesthesia.  The following day: return to full activity including work.  DIET:  Eat and drink normally unless instructed otherwise.     TREATMENT FOR COMMON SIDE EFFECTS:  - Mild abdominal pain, nausea, belching, bloating or excessive gas:  rest,   eat lightly and use a heating pad.  - Sore Throat: treat with throat lozenges and/or gargle with warm salt   water.  - Because air was used during the procedure, expelling large amounts of air   from your rectum or belching is normal.  - If a bowel prep was taken, you may not have a bowel movement for 1-3 days.    This is normal.  SYMPTOMS TO WATCH FOR AND REPORT TO YOUR PHYSICIAN:  1. Abdominal pain or bloating, other than  gas cramps.  2. Chest pain.  3. Back pain.  4. Signs of infection such as: chills or fever occurring within 24 hours   after the procedure.  5. Rectal bleeding, which would show as bright red, maroon, or black stools.   (A tablespoon of blood from the rectum is not serious, especially if   hemorrhoids are present.)  6. Vomiting.  7. Weakness or dizziness.  GO DIRECTLY TO THE NEAREST EMERGENCY ROOM IF YOU HAVE ANY OF THE FOLLOWING:      Difficulty breathing              Chills and/or fever over 101 F   Persistent vomiting and/or vomiting blood   Severe abdominal pain   Severe chest pain   Black, tarry stools   Bleeding- more than one tablespoon   Any other symptom or condition that you feel may need urgent attention  Your doctor recommends these additional instructions:  If any biopsies were taken, your doctors clinic will contact you in 1 to 2   weeks with any results.  - Discharge patient to home (ambulatory).   - Patient has a contact number available for emergencies.  The signs and   symptoms of potential delayed complications were discussed with the   patient.  Return to normal activities tomorrow.  Written discharge   instructions were provided to the patient.   - Resume previous diet.   - Continue present medications.   - Return to primary care physician as previously scheduled.   - Repeat colonoscopy in 10 years for screening purposes.  For questions, problems or results please call your physician - Adam Patterson MD.  EMERGENCY PHONE NUMBER: [phone],  LAB RESULTS: [phone]  IF A COMPLICATION OR EMERGENCY SITUATION ARISES AND YOU ARE UNABLE TO REACH   YOUR PHYSICIAN - GO DIRECTLY TO THE EMERGENCY ROOM.  Adam Patterson MD  12/5/2023 11:10:31 AM  This report has been verified and signed electronically.  Dear patient,  As a result of recent federal legislation (The Federal Cures Act), you may   receive lab or pathology results from your procedure in your [facility]   account before your physician  is able to contact you. Your physician or   their representative will relay the results to you with their   recommendations at their soonest availability.  Thank you,  [employer]ATION

## 2023-12-06 RX ORDER — ALPRAZOLAM 0.5 MG/1
TABLET ORAL
Qty: 60 TABLET | Refills: 1 | Status: SHIPPED | OUTPATIENT
Start: 2023-12-06

## 2023-12-18 NOTE — PROGRESS NOTES
No acute intra-abdominal abnormality was present on CT scan to account for abdominal pain.    Incidental note was made of mild degenerative changes in the lower lumbar spine.

## 2023-12-18 NOTE — PROGRESS NOTES
Subjective:       Patient ID: Sheila Zarco is a 73 y.o. female.    Chief Complaint: Hyperlipidemia (6 mos wlabs), Hypothyroidism, and Cough (For at least a month.  Phlegm now yellow.  Was green in beginning. )    HPI  The patient presents for follow-up of medical conditions which include asthma, GERD, and abdominal pain.  The patient has been experiencing a recent exacerbation of her asthma.  She has been noting a persistent productive cough and chest congestion.  She has been using her nebulizer and inhalers as prescribed.  She denies having any chills or fever.    Intermittent burning sensation has been noted in her stomach.  She is on daily Protonix.    Lower abdominal cramping has also been noted.  No changes in bowel habits have been noted.  No melena or gross rectal bleeding has been noted.    Review of Systems   Constitutional:  Negative for activity change, appetite change, fatigue and unexpected weight change.   Eyes:  Negative for visual disturbance.   Respiratory:  Positive for cough, chest tightness and wheezing. Negative for shortness of breath.    Cardiovascular:  Negative for chest pain, palpitations and leg swelling.   Gastrointestinal:  Positive for abdominal pain and reflux. Negative for blood in stool, change in bowel habit, constipation, diarrhea, nausea and vomiting.   Genitourinary:  Negative for dysuria and hematuria.   Musculoskeletal:  Negative for arthralgias, neck pain and neck stiffness.   Integumentary:  Negative for rash.   Neurological:  Positive for dizziness and light-headedness. Negative for syncope and headaches.   Psychiatric/Behavioral:  Negative for sleep disturbance.             Physical Exam  Vitals and nursing note reviewed.   Constitutional:       General: She is not in acute distress.     Appearance: Normal appearance. She is well-developed.      Comments: The patient has lost 2 lb since 05/11/2023.   HENT:      Head: Normocephalic and atraumatic.   Eyes:       General: No scleral icterus.     Extraocular Movements: Extraocular movements intact.      Conjunctiva/sclera: Conjunctivae normal.   Neck:      Thyroid: No thyromegaly.      Vascular: No JVD.   Cardiovascular:      Rate and Rhythm: Normal rate and regular rhythm.      Heart sounds: Normal heart sounds. No murmur heard.     No friction rub. No gallop.   Pulmonary:      Effort: Pulmonary effort is normal. No respiratory distress.      Breath sounds: Normal breath sounds. No wheezing or rales.   Abdominal:      General: Bowel sounds are normal. There is no distension.      Palpations: Abdomen is soft. There is no mass.      Tenderness: There is abdominal tenderness (In the epigastric and hypogastric areas on palpation.). There is no right CVA tenderness, left CVA tenderness, guarding or rebound.   Musculoskeletal:         General: No tenderness. Normal range of motion.      Cervical back: Normal range of motion and neck supple.      Right lower leg: No edema.      Left lower leg: No edema.   Lymphadenopathy:      Cervical: No cervical adenopathy.   Skin:     General: Skin is warm and dry.      Findings: No rash.   Neurological:      Mental Status: She is alert and oriented to person, place, and time.   Psychiatric:         Mood and Affect: Mood normal.         Behavior: Behavior normal.           Lab Visit on 11/09/2023   Component Date Value Ref Range Status    Sodium 11/09/2023 143  136 - 145 mmol/L Final    Potassium 11/09/2023 4.2  3.5 - 5.1 mmol/L Final    Chloride 11/09/2023 110  95 - 110 mmol/L Final    CO2 11/09/2023 24  23 - 29 mmol/L Final    Glucose 11/09/2023 99  70 - 110 mg/dL Final    BUN 11/09/2023 13  8 - 23 mg/dL Final    Creatinine 11/09/2023 0.8  0.5 - 1.4 mg/dL Final    Calcium 11/09/2023 10.0  8.7 - 10.5 mg/dL Final    Total Protein 11/09/2023 6.9  6.0 - 8.4 g/dL Final    Albumin 11/09/2023 3.6  3.5 - 5.2 g/dL Final    Total Bilirubin 11/09/2023 0.4  0.1 - 1.0 mg/dL Final    Comment: For infants  and newborns, interpretation of results should be based  on gestational age, weight and in agreement with clinical  observations.    Premature Infant recommended reference ranges:  Up to 24 hours.............<8.0 mg/dL  Up to 48 hours............<12.0 mg/dL  3-5 days..................<15.0 mg/dL  6-29 days.................<15.0 mg/dL      Alkaline Phosphatase 11/09/2023 113  55 - 135 U/L Final    AST 11/09/2023 20  10 - 40 U/L Final    ALT 11/09/2023 21  10 - 44 U/L Final    eGFR 11/09/2023 >60.0  >60 mL/min/1.73 m^2 Final    Anion Gap 11/09/2023 9  8 - 16 mmol/L Final    Cholesterol 11/09/2023 173  120 - 199 mg/dL Final    Comment: The National Cholesterol Education Program (NCEP) has set the  following guidelines (reference ranges) for Cholesterol:  Optimal.....................<200 mg/dL  Borderline High.............200-239 mg/dL  High........................> or = 240 mg/dL      Triglycerides 11/09/2023 77  30 - 150 mg/dL Final    Comment: The National Cholesterol Education Program (NCEP) has set the  following guidelines (reference values) for triglycerides:  Normal......................<150 mg/dL  Borderline High.............150-199 mg/dL  High........................200-499 mg/dL      HDL 11/09/2023 72  40 - 75 mg/dL Final    Comment: The National Cholesterol Education Program (NCEP) has set the  following guidelines (reference values) for HDL Cholesterol:  Low...............<40 mg/dL  Optimal...........>60 mg/dL      LDL Cholesterol 11/09/2023 85.6  63.0 - 159.0 mg/dL Final    Comment: The National Cholesterol Education Program (NCEP) has set the  following guidelines (reference values) for LDL Cholesterol:  Optimal.......................<130 mg/dL  Borderline High...............130-159 mg/dL  High..........................160-189 mg/dL  Very High.....................>190 mg/dL      HDL/Cholesterol Ratio 11/09/2023 41.6  20.0 - 50.0 % Final    Total Cholesterol/HDL Ratio 11/09/2023 2.4  2.0 - 5.0 Final     Non-HDL Cholesterol 11/09/2023 101  mg/dL Final    Comment: Risk category and Non-HDL cholesterol goals:  Coronary heart disease (CHD)or equivalent (10-year risk of CHD >20%):  Non-HDL cholesterol goal     <130 mg/dL  Two or more CHD risk factors and 10-year risk of CHD <= 20%:  Non-HDL cholesterol goal     <160 mg/dL  0 to 1 CHD risk factor:  Non-HDL cholesterol goal     <190 mg/dL      WBC 11/09/2023 5.95  3.90 - 12.70 K/uL Final    RBC 11/09/2023 4.14  4.00 - 5.40 M/uL Final    Hemoglobin 11/09/2023 11.6 (L)  12.0 - 16.0 g/dL Final    Hematocrit 11/09/2023 37.7  37.0 - 48.5 % Final    MCV 11/09/2023 91  82 - 98 fL Final    MCH 11/09/2023 28.0  27.0 - 31.0 pg Final    MCHC 11/09/2023 30.8 (L)  32.0 - 36.0 g/dL Final    RDW 11/09/2023 13.2  11.5 - 14.5 % Final    Platelets 11/09/2023 381  150 - 450 K/uL Final    MPV 11/09/2023 9.7  9.2 - 12.9 fL Final    Immature Granulocytes 11/09/2023 0.3  0.0 - 0.5 % Final    Gran # (ANC) 11/09/2023 3.6  1.8 - 7.7 K/uL Final    Immature Grans (Abs) 11/09/2023 0.02  0.00 - 0.04 K/uL Final    Comment: Mild elevation in immature granulocytes is non specific and   can be seen in a variety of conditions including stress response,   acute inflammation, trauma and pregnancy. Correlation with other   laboratory and clinical findings is essential.      Lymph # 11/09/2023 1.6  1.0 - 4.8 K/uL Final    Mono # 11/09/2023 0.4  0.3 - 1.0 K/uL Final    Eos # 11/09/2023 0.3  0.0 - 0.5 K/uL Final    Baso # 11/09/2023 0.04  0.00 - 0.20 K/uL Final    nRBC 11/09/2023 0  0 /100 WBC Final    Gran % 11/09/2023 59.8  38.0 - 73.0 % Final    Lymph % 11/09/2023 27.6  18.0 - 48.0 % Final    Mono % 11/09/2023 7.2  4.0 - 15.0 % Final    Eosinophil % 11/09/2023 4.4  0.0 - 8.0 % Final    Basophil % 11/09/2023 0.7  0.0 - 1.9 % Final    Differential Method 11/09/2023 Automated   Final   Office Visit on 10/11/2023   Component Date Value Ref Range Status    Influenza A, Molecular 10/11/2023 Negative  Negative Final     Influenza B, Molecular 10/11/2023 Negative  Negative Final    Flu A & B Source 10/11/2023 NP   Final    SARS-CoV2 (COVID-19) Qualitative P* 10/11/2023 Not Detected  Not Detected Final    Comment: This test utilizes a real-time reverse transcription  polymerase chain reaction procedure to amplify and   detect the SARS-CoV-2 and detect the SARS-CoV-2 N2 and E nucleic  acid targets. The analytical sensitivity (limit of detection) of   this assay is 250 copies/mL.    A Detected result implies that the patient is infected with the  SARS-CoV-2 virus and is presumed to be contagious.    A Not Detected result implies that the SARS-CoV-2 target nucleic  acids are not present above the limit of detection.  It does not  rule out the possibility of COVID-19 and should not be the sole  basis for treatment decisions. If COVID-19 is strongly suspected  based on clinical and epidemiological history, re-testing should  be considered.    This test is only for use under Food and Drug   Administration s Emergency Use Authorization (EUA).   Commercial reagents are provided by [employer].  Performance characteristics of the EUA have been   independently verified by [facility]   Department o                           f Pathology and Laboratory Medicine.       Office Visit on 10/02/2023   Component Date Value Ref Range Status    SARS Coronavirus 2 Antigen 10/02/2023 Negative  Negative Final     Acceptable 10/02/2023 Yes   Final       Assessment & Plan:      Sheila was seen today for hyperlipidemia, hypothyroidism and cough.  Steroid therapy will be ordered for the asthma exacerbation.    CT of abdomen and pelvis will be obtained.    An EGD and colonoscopy will be obtained.    Updated lab studies will be repeated in 4 months.    Diagnoses and all orders for this visit:    Mild intermittent asthma without complication  -     albuterol (ACCUNEB) 0.63 mg/3 mL Nebu; Take 3 mLs (0.63 mg total) by nebulization every  6 (six) hours as needed (wheezing and congestion).  -     Comprehensive Metabolic Panel; Future  -     Lipid Panel; Future  -     CBC Auto Differential; Future    Lower abdominal pain  -     CT Abdomen Pelvis  Without Contrast; Future  -     Ambulatory referral/consult to Endo Procedure ; Future  -     Comprehensive Metabolic Panel; Future  -     Lipid Panel; Future  -     CBC Auto Differential; Future    Abdominal pain, unspecified abdominal location  -     Comprehensive Metabolic Panel; Future  -     Lipid Panel; Future  -     CBC Auto Differential; Future    Gastroesophageal reflux disease, unspecified whether esophagitis present  -     Ambulatory referral/consult to Endo Procedure ; Future  -     Comprehensive Metabolic Panel; Future  -     Lipid Panel; Future  -     CBC Auto Differential; Future    Colon cancer screening  -     Ambulatory referral/consult to Endo Procedure ; Future  -     Comprehensive Metabolic Panel; Future  -     Lipid Panel; Future  -     CBC Auto Differential; Future    Abnormal finding of blood chemistry, unspecified  -     Lipid Panel; Future    Other orders  -     triamcinolone acetonide injection 40 mg  -     predniSONE (DELTASONE) 20 MG tablet; Take 3 tabs daily x 5 days; 2 tabs daily x 5 days; then 1 tab daily until completed for asthma flare.         Follow up in about 4 months (around 3/16/2024).     Farhat Correa MD

## 2024-02-21 ENCOUNTER — HOSPITAL ENCOUNTER (EMERGENCY)
Facility: HOSPITAL | Age: 74
Discharge: HOME | End: 2024-02-21
Attending: EMERGENCY MEDICINE
Payer: MEDICARE

## 2024-02-21 ENCOUNTER — TELEPHONE (OUTPATIENT)
Dept: INTERNAL MEDICINE | Facility: CLINIC | Age: 74
End: 2024-02-21
Payer: MEDICARE

## 2024-02-21 VITALS
TEMPERATURE: 98 F | BODY MASS INDEX: 25.88 KG/M2 | SYSTOLIC BLOOD PRESSURE: 122 MMHG | WEIGHT: 161 LBS | HEART RATE: 71 BPM | RESPIRATION RATE: 17 BRPM | DIASTOLIC BLOOD PRESSURE: 67 MMHG | HEIGHT: 66 IN | OXYGEN SATURATION: 99 %

## 2024-02-21 DIAGNOSIS — R00.2 PALPITATIONS: ICD-10-CM

## 2024-02-21 DIAGNOSIS — R07.9 CHEST PAIN: ICD-10-CM

## 2024-02-21 LAB
ALBUMIN SERPL BCP-MCNC: 3.3 G/DL (ref 3.5–5.2)
ALP SERPL-CCNC: 78 U/L (ref 55–135)
ALT SERPL W/O P-5'-P-CCNC: 15 U/L (ref 10–44)
ANION GAP SERPL CALC-SCNC: 6 MMOL/L (ref 8–16)
AST SERPL-CCNC: 15 U/L (ref 10–40)
BASOPHILS # BLD AUTO: 0.06 K/UL (ref 0–0.2)
BASOPHILS NFR BLD: 0.9 % (ref 0–1.9)
BILIRUB SERPL-MCNC: 0.5 MG/DL (ref 0.1–1)
BILIRUB UR QL STRIP: NEGATIVE
BNP SERPL-MCNC: 70 PG/ML (ref 0–99)
BUN SERPL-MCNC: 15 MG/DL (ref 8–23)
CALCIUM SERPL-MCNC: 9.3 MG/DL (ref 8.7–10.5)
CHLORIDE SERPL-SCNC: 111 MMOL/L (ref 95–110)
CLARITY UR REFRACT.AUTO: CLEAR
CO2 SERPL-SCNC: 23 MMOL/L (ref 23–29)
COLOR UR AUTO: YELLOW
CREAT SERPL-MCNC: 0.8 MG/DL (ref 0.5–1.4)
DIFFERENTIAL METHOD BLD: ABNORMAL
EOSINOPHIL # BLD AUTO: 0.2 K/UL (ref 0–0.5)
EOSINOPHIL NFR BLD: 3.3 % (ref 0–8)
ERYTHROCYTE [DISTWIDTH] IN BLOOD BY AUTOMATED COUNT: 14.4 % (ref 11.5–14.5)
EST. GFR  (NO RACE VARIABLE): >60 ML/MIN/1.73 M^2
GLUCOSE SERPL-MCNC: 97 MG/DL (ref 70–110)
GLUCOSE UR QL STRIP: NEGATIVE
HCT VFR BLD AUTO: 36.6 % (ref 37–48.5)
HCV AB SERPL QL IA: NORMAL
HGB BLD-MCNC: 12.1 G/DL (ref 12–16)
HGB UR QL STRIP: ABNORMAL
HIV 1+2 AB+HIV1 P24 AG SERPL QL IA: NORMAL
IMM GRANULOCYTES # BLD AUTO: 0.02 K/UL (ref 0–0.04)
IMM GRANULOCYTES NFR BLD AUTO: 0.3 % (ref 0–0.5)
KETONES UR QL STRIP: NEGATIVE
LEUKOCYTE ESTERASE UR QL STRIP: NEGATIVE
LYMPHOCYTES # BLD AUTO: 1.7 K/UL (ref 1–4.8)
LYMPHOCYTES NFR BLD: 26.8 % (ref 18–48)
MAGNESIUM SERPL-MCNC: 1.8 MG/DL (ref 1.6–2.6)
MCH RBC QN AUTO: 29 PG (ref 27–31)
MCHC RBC AUTO-ENTMCNC: 33.1 G/DL (ref 32–36)
MCV RBC AUTO: 88 FL (ref 82–98)
MONOCYTES # BLD AUTO: 0.5 K/UL (ref 0.3–1)
MONOCYTES NFR BLD: 7.4 % (ref 4–15)
NEUTROPHILS # BLD AUTO: 3.9 K/UL (ref 1.8–7.7)
NEUTROPHILS NFR BLD: 61.3 % (ref 38–73)
NITRITE UR QL STRIP: NEGATIVE
NRBC BLD-RTO: 0 /100 WBC
OHS QRS DURATION: 80 MS
OHS QTC CALCULATION: 441 MS
PH UR STRIP: 6 [PH] (ref 5–8)
PLATELET # BLD AUTO: 268 K/UL (ref 150–450)
PMV BLD AUTO: 9 FL (ref 9.2–12.9)
POTASSIUM SERPL-SCNC: 4.1 MMOL/L (ref 3.5–5.1)
PROT SERPL-MCNC: 6.5 G/DL (ref 6–8.4)
PROT UR QL STRIP: NEGATIVE
RBC # BLD AUTO: 4.17 M/UL (ref 4–5.4)
SODIUM SERPL-SCNC: 140 MMOL/L (ref 136–145)
SP GR UR STRIP: 1.02 (ref 1–1.03)
TROPONIN I SERPL DL<=0.01 NG/ML-MCNC: 0.01 NG/ML (ref 0–0.03)
TROPONIN I SERPL DL<=0.01 NG/ML-MCNC: <0.006 NG/ML (ref 0–0.03)
TSH SERPL DL<=0.005 MIU/L-ACNC: 1.82 UIU/ML (ref 0.4–4)
URN SPEC COLLECT METH UR: ABNORMAL
WBC # BLD AUTO: 6.39 K/UL (ref 3.9–12.7)

## 2024-02-21 PROCEDURE — 84443 ASSAY THYROID STIM HORMONE: CPT | Performed by: PHYSICIAN ASSISTANT

## 2024-02-21 PROCEDURE — 93010 ELECTROCARDIOGRAM REPORT: CPT | Mod: ,,, | Performed by: INTERNAL MEDICINE

## 2024-02-21 PROCEDURE — 99285 EMERGENCY DEPT VISIT HI MDM: CPT | Mod: 25

## 2024-02-21 PROCEDURE — 83880 ASSAY OF NATRIURETIC PEPTIDE: CPT | Performed by: PHYSICIAN ASSISTANT

## 2024-02-21 PROCEDURE — 93005 ELECTROCARDIOGRAM TRACING: CPT

## 2024-02-21 PROCEDURE — 85025 COMPLETE CBC W/AUTO DIFF WBC: CPT | Performed by: PHYSICIAN ASSISTANT

## 2024-02-21 PROCEDURE — 84484 ASSAY OF TROPONIN QUANT: CPT | Mod: 91 | Performed by: PHYSICIAN ASSISTANT

## 2024-02-21 PROCEDURE — 25000003 PHARM REV CODE 250: Performed by: PHYSICIAN ASSISTANT

## 2024-02-21 PROCEDURE — 86803 HEPATITIS C AB TEST: CPT | Performed by: PHYSICIAN ASSISTANT

## 2024-02-21 PROCEDURE — 83735 ASSAY OF MAGNESIUM: CPT | Performed by: PHYSICIAN ASSISTANT

## 2024-02-21 PROCEDURE — 81003 URINALYSIS AUTO W/O SCOPE: CPT | Performed by: PHYSICIAN ASSISTANT

## 2024-02-21 PROCEDURE — 80053 COMPREHEN METABOLIC PANEL: CPT | Performed by: PHYSICIAN ASSISTANT

## 2024-02-21 PROCEDURE — 87389 HIV-1 AG W/HIV-1&-2 AB AG IA: CPT | Performed by: PHYSICIAN ASSISTANT

## 2024-02-21 RX ADMIN — SODIUM CHLORIDE 500 ML: 9 INJECTION, SOLUTION INTRAVENOUS at 03:02

## 2024-02-21 NOTE — ED PROVIDER NOTES
Encounter Date: 2/21/2024       History     Chief Complaint   Patient presents with    Palpitations     3:16 PM  Patient is a 73-year-old female with a history of thyroid disease, asthma, who presents to [facility] ED via POV for emergent evaluation of palpitations, chest pain, and dizziness.    Patient states that she has had these symptoms for several months.  She states last night she had an episode where she woke up and had palpitations and chest pain around 01:30.  She states that she had to hold her chest and left breast.  It was shooting and radiated to her back and completely resolved after 2 hours.  Patient states that she has chest pain every couple of weeks.  She previously had it 1.5 weeks ago.  She denies any exertional chest pain or shortness a breath.  She has not had a cough.  In addition she has also had chronic dizziness when she gets up from a seated position like when she has at a .  She has not had any fever, nausea, vomiting, dysuria, hematuria, frequency, or diarrhea.  States that her grandparents has had MIs before the age of 65.    In addition she drinks 1-2 cups of coffee a day, ice tea throughout the day, and maybe a caffeinated soda.  She admits to very little water intake.  Denies tobacco use, , recent surgery, recent travel, hormone use, history of DVT/PE.  Drinks a glass of wine every few days.  Denies illicit drug use.        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    Cataract     Glaucoma     Thyroid disease      Past Surgical History:   Procedure Laterality Date    BREAST BIOPSY Left 06/2020    core bx, +    BREAST LUMPECTOMY Left     BUNIONECTOMY      COLONOSCOPY N/A 12/5/2023    Procedure: COLONOSCOPY;  Surgeon: Adam Patterson MD;  Location: [facility];  Service: Endoscopy;  Laterality: N/A;    ESOPHAGOGASTRODUODENOSCOPY N/A 12/5/2023     Procedure: EGD (ESOPHAGOGASTRODUODENOSCOPY);  Surgeon: Adam Patterson MD;  Location: [facility];  Service: Endoscopy;  Laterality: N/A;  11/29-pt r/s-has peg prep-instr email/portal-tb    GALLBLADDER SURGERY      HYSTERECTOMY      INTRAOCULAR PROSTHESES INSERTION Right 7/9/2020    Procedure: INSERTION, IOL PROSTHESIS;  Surgeon: Lucas Jefferson MD;  Location: [address];  Service: Ophthalmology;  Laterality: Right;    INTRAOCULAR PROSTHESES INSERTION Left 10/22/2020    Procedure: INSERTION, IOL PROSTHESIS;  Surgeon: Lucas Jefferson MD;  Location: [address];  Service: Ophthalmology;  Laterality: Left;    KNEE CARTILAGE SURGERY      MASTECTOMY, PARTIAL Left 7/24/2020    Procedure: MASTECTOMY, PARTIAL-Left with Radiological Marker;  Surgeon: Kayleen Alvarez MD;  Location: [facility];  Service: General;  Laterality: Left;    narrow angles eye surgery       PHACOEMULSIFICATION OF CATARACT Right 7/9/2020    Procedure: PHACOEMULSIFICATION, CATARACT;  Surgeon: Lucas Jefferson MD;  Location: [address];  Service: Ophthalmology;  Laterality: Right;    PHACOEMULSIFICATION OF CATARACT Left 10/22/2020    Procedure: PHACOEMULSIFICATION, CATARACT;  Surgeon: Lucas Jefferson MD;  Location: [address];  Service: Ophthalmology;  Laterality: Left;     Family History   Problem Relation Age of Onset    Heart disease Mother     Heart disease Father     Liver disease Brother     Breast cancer Sister     Breast cancer Daughter 43        bilateral mastectomy    No Known Problems Sister     No Known Problems Maternal Grandmother     No Known Problems Maternal Grandfather     No Known Problems Paternal Grandmother     No Known Problems Paternal Grandfather     No Known Problems Maternal Aunt     No Known Problems Maternal Uncle     No Known Problems Paternal Aunt     No Known Problems Paternal Uncle     Breast cancer Other     Colon cancer Neg Hx     Ovarian cancer Neg Hx     Melanoma Neg Hx     Lupus Neg  Hx     Acne Neg Hx     Amblyopia Neg Hx     Blindness Neg Hx     Cancer Neg Hx     Cataracts Neg Hx     Diabetes Neg Hx     Glaucoma Neg Hx     Hypertension Neg Hx     Macular degeneration Neg Hx     Retinal detachment Neg Hx     Strabismus Neg Hx     Stroke Neg Hx     Thyroid disease Neg Hx      Social History     Tobacco Use    Smoking status: Never    Smokeless tobacco: Never   Substance Use Topics    Alcohol use: Yes     Alcohol/week: 8.0 standard drinks of alcohol     Types: 4 Glasses of wine, 4 Cans of beer per week     Comment: drinks 4 days a week- >1 drink    Drug use: No     Review of Systems   Constitutional:  Negative for activity change, appetite change, chills, diaphoresis and fever.   HENT:  Negative for sore throat.    Respiratory:  Positive for shortness of breath. Negative for cough.    Cardiovascular:  Positive for chest pain and palpitations.   Gastrointestinal:  Negative for nausea.   Genitourinary:  Negative for dysuria.   Musculoskeletal:  Negative for back pain.   Skin:  Negative for rash.   Neurological:  Negative for weakness.   Hematological:  Does not bruise/bleed easily.       Physical Exam     Initial Vitals [02/21/24 1417]   BP Pulse Resp Temp SpO2   (!) 143/62 75 20 97.8 °F (36.6 °C) 97 %      MAP       --         Physical Exam    Vitals reviewed.  Constitutional: She appears well-developed and well-nourished. She is not diaphoretic. She is cooperative.  Non-toxic appearance. She does not have a sickly appearance. She does not appear ill. No distress.   HENT:   Head: Normocephalic and atraumatic.   Nose: Nose normal.   Mouth/Throat: No trismus in the jaw.   Eyes: Conjunctivae and EOM are normal.   Neck:   Normal range of motion.  Cardiovascular:  Normal rate and regular rhythm.           Pulmonary/Chest: Breath sounds normal. No accessory muscle usage. No tachypnea. No respiratory distress. She has no wheezes. She has no rhonchi. She has no rales.   Abdominal: She exhibits no  distension.   Musculoskeletal:         General: Normal range of motion.      Cervical back: Normal range of motion.     Neurological: She is alert. She has normal strength.   Skin: Skin is warm and dry. No erythema. No pallor.         ED Course   Procedures  Labs Reviewed   CBC W/ AUTO DIFFERENTIAL - Abnormal; Notable for the following components:       Result Value    Hematocrit 36.6 (*)     MPV 9.0 (*)     All other components within normal limits   COMPREHENSIVE METABOLIC PANEL - Abnormal; Notable for the following components:    Chloride 111 (*)     Albumin 3.3 (*)     Anion Gap 6 (*)     All other components within normal limits   URINALYSIS, REFLEX TO URINE CULTURE - Abnormal; Notable for the following components:    Occult Blood UA Trace (*)     All other components within normal limits    Narrative:     Specimen Source->Urine   HIV 1 / 2 ANTIBODY    Narrative:     Release to patient->Immediate   HEPATITIS C ANTIBODY    Narrative:     Release to patient->Immediate   B-TYPE NATRIURETIC PEPTIDE   MAGNESIUM   TROPONIN I   TSH   TROPONIN I        ECG Results              EKG 12-lead (Final result)        Collection Time Result Time QRS Duration OHS QTC Calculation    02/21/24 14:20:33 02/21/24 16:34:21 80 441                     Final result by Interface, Lab In [facility] (02/21/24 16:34:25)                   Narrative:    Test Reason : R00.2,    Vent. Rate : 071 BPM     Atrial Rate : 071 BPM     P-R Int : 156 ms          QRS Dur : 080 ms      QT Int : 406 ms       P-R-T Axes : 056 049 053 degrees     QTc Int : 441 ms    Normal sinus rhythm  Nonspecific T wave abnormality  Abnormal ECG  When compared with ECG of 11-FEB-2020 07:57,  No significant change was found  Confirmed by Melanie Lane MD ([id number]) on 2/21/2024 4:34:18 PM    Referred By: [email or web address]   SELF           Confirmed By:Melanie Lane MD                                  Imaging Results              X-Ray Chest PA And Lateral (Final result)  Result time  02/21/24 16:44:04      Final result by Gregory Marks MD (02/21/24 16:44:04)                   Impression:      No detrimental change or radiographic acute intrathoracic process seen.      Electronically signed by: Gregory Marks MD  Date:    02/21/2024  Time:    16:44               Narrative:    EXAMINATION:  XR CHEST PA AND LATERAL    CLINICAL HISTORY:  Chest pain, unspecified    TECHNIQUE:  PA and lateral views of the chest were performed.    COMPARISON:  Chest radiograph 10/11/2023, CT abdomen and pelvis 11/28/2023    FINDINGS:  No detrimental change.  Trachea is midline and patent.Heart size is normal.  Similar calcification with slight tortuosity of the aorta.  Pulmonary vasculature and hilar contours are within normal limits.    Bibasilar minimal platelike scarring versus atelectasis.  The lungs are otherwise symmetrically well expanded without consolidation, pleural effusion or pneumothorax.    Osseous structures appear intact.                                       Medications   sodium chloride 0.9% bolus 500 mL 500 mL (0 mLs Intravenous Stopped 2/21/24 1602)     Medical Decision Making  Patient is a 73-year-old female with a history of thyroid disease, asthma, who presents to [facility] ED via POV for emergent evaluation of palpitations, chest pain, and dizziness.    Includes but is not limited to non life-threatening palpitations such as extra beats, electrolyte abnormalities, dehydration, GAURI, UTI, ACS, anemia, thyroid dysfunction.    Amount and/or Complexity of Data Reviewed  External Data Reviewed: labs, radiology and ECG.  Labs: ordered. Decision-making details documented in ED Course.  Radiology: ordered.  ECG/medicine tests:  Decision-making details documented in ED Course.      Additional MDM:   Heart Score:    History:          Slightly suspicious.  ECG:             Normal  Age:               >65 years  Risk factors: 1-2 risk factors  Troponin:       Less than or equal to normal limit  Heart Score =  3                ED Course as of 02/22/24 1304   Wed Feb 21, 2024   1423 EKG 12-lead  No STEMI. [LP]   1513 BP(!): 143/62 [CL]   1513 Temp: 97.8 °F (36.6 °C) [CL]   1513 Pulse: 75 [CL]   1513 Resp: 20 [CL]   1513 SpO2: 97 % [CL]   1533 On my independent interpretation, ECG with NSR at 71 bpm. T wave flattening in V3. No ST changes. Normal intervals. No STEMI.  [CL]   1542 WBC: 6.39 [CL]   1542 Hemoglobin: 12.1 [CL]   1633 Sodium: 140 [CL]   1633 Potassium: 4.1 [CL]   1633 Chloride(!): 111 [CL]   1633 CO2: 23 [CL]   1633 Glucose: 97 [CL]   1633 Creatinine: 0.8 [CL]   1633 BILIRUBIN TOTAL: 0.5 [CL]   1633 AST: 15 [CL]   1633 ALT: 15 [CL]   1633 BNP: 70 [CL]   1633 Magnesium : 1.8 [CL]   1633 Troponin I: <0.006 [CL]   1634 TSH: 1.819 [CL]   2001 Troponin I: 0.009  2nd troponin flat and still negative.  [CL]   2001 Patient of with results.  No acute problems while here.  Her lab work is normal.  She does not have signs of a heart attack, heart failure, electrolyte abnormalities, GAURI, thyroid dysfunction.  I recommend discontinuing caffeine intake or limiting to 1-2 cups daily.  Stay hydrated by drinking 2-3 L on a daily basis.  Follow up closely with PCP and Cardiology.  She may benefit from a Holter monitor if her symptoms do not improve.  All of her questions were answered.  Patient comfortable with plan and stable for discharge.  Return to ED precautions were given and patient and her family member voiced their understanding.  [CL]      ED Course User Index  [CL] Shilpi Gupta PA-C  [LP] William Banerjee III, MD                           Clinical Impression:  Final diagnoses:  [R00.2] Palpitations  [R07.9] Chest pain          ED Disposition Condition    Discharge Stable          ED Prescriptions    None       Follow-up Information       Follow up With Specialties Details Why Contact Info Additional Information    [facility] - Cardiology - 3rd Fl Cardiology Schedule an appointment as soon as possible for a visit   [address]  [facility] [id number] [phone] Cardiology Services Clinics - 3rd floor    Farhat Correa MD Internal Medicine Schedule an appointment as soon as possible for a visit   [address] [facility]  [place] [address]  [phone]       [facility] - Emergency Dept Emergency Medicine  If symptoms worsen [address]  [facility] [id number] [phone]           Future Appointments   Date Time Provider Department Center   4/16/2024 10:00 AM [facility] [place]   4/23/2024  2:30 PM Farhat Correa MD [facility] Shilpi Orellana PA-C  02/22/24 0618     Thalidomide Pregnancy And Lactation Text: This medication is Pregnancy Category X and is absolutely contraindicated during pregnancy. It is unknown if it is excreted in breast milk.

## 2024-02-21 NOTE — ED TRIAGE NOTES
Pt arrives to ED via POV stating she has had heart flutters, palpations, and cp with dizziness. Pt states this has been going on for several months. Pt states last night she had shooting pains radiating to her left arm. Pt endorses SOB and CP. Pt states she has asthma and has been using her inhaler and nebulizer more than usual.

## 2024-02-21 NOTE — TELEPHONE ENCOUNTER
Has been happening almost daily.  Not happening right now.    Last nite had her very concerned.  Chest hurts where heart located.    Advised to get to er to get this checked out.   We have no openings at [facility] this week.    Told her to keep appt in April to follow up with  Her.

## 2024-02-22 ENCOUNTER — PATIENT OUTREACH (OUTPATIENT)
Dept: EMERGENCY MEDICINE | Facility: HOSPITAL | Age: 74
End: 2024-02-22
Payer: MEDICARE

## 2024-02-22 ENCOUNTER — PATIENT MESSAGE (OUTPATIENT)
Dept: INTERNAL MEDICINE | Facility: CLINIC | Age: 74
End: 2024-02-22
Payer: MEDICARE

## 2024-02-22 DIAGNOSIS — R42 DIZZINESS: ICD-10-CM

## 2024-02-22 DIAGNOSIS — R00.2 PALPITATIONS: Primary | ICD-10-CM

## 2024-02-22 DIAGNOSIS — R07.9 CHEST PAIN, UNSPECIFIED TYPE: ICD-10-CM

## 2024-02-22 NOTE — DISCHARGE INSTRUCTIONS
You ECG and 2 cardiac enzymes are normal. You do not have signs of heart failure, heart attack.   Follow up with Cardiology. You may need a holter monitor.   DECREASE CAFFEINE INTAKE. This will help with your palpitations.   Drink 2-3 liters of water, which is about 4-6 water bottles daily.   Return to the ER for new or worsening symptoms.  Future Appointments   Date Time Provider Department Center   4/16/2024 10:00 AM LAB, [facility] [place]   4/23/2024  2:30 PM Farhat Correa MD [facility] [place]

## 2024-02-22 NOTE — TELEPHONE ENCOUNTER
See er note.      Refer to cardiology ok?   Er didn't enter order.    She has appt to see you next in April.    Should I book her with jane since you are  Booked or just see cardiology first?

## 2024-02-23 NOTE — PROGRESS NOTES
ED navigator was unable to reach patient for post ED discharge navigation after 2 attempts. Message was sent to PCP for appt.

## 2024-03-06 ENCOUNTER — LAB VISIT (OUTPATIENT)
Dept: LAB | Facility: OTHER | Age: 74
End: 2024-03-06
Attending: STUDENT IN AN ORGANIZED HEALTH CARE EDUCATION/TRAINING PROGRAM
Payer: MEDICARE

## 2024-03-06 ENCOUNTER — OFFICE VISIT (OUTPATIENT)
Dept: NEUROLOGY | Facility: CLINIC | Age: 74
End: 2024-03-06
Payer: MEDICARE

## 2024-03-06 VITALS
WEIGHT: 160 LBS | BODY MASS INDEX: 25.71 KG/M2 | SYSTOLIC BLOOD PRESSURE: 144 MMHG | HEART RATE: 66 BPM | DIASTOLIC BLOOD PRESSURE: 66 MMHG | HEIGHT: 66 IN

## 2024-03-06 DIAGNOSIS — R26.89 IMBALANCE: ICD-10-CM

## 2024-03-06 DIAGNOSIS — R42 DIZZINESS: Primary | ICD-10-CM

## 2024-03-06 DIAGNOSIS — R51.9 NEW ONSET HEADACHE: ICD-10-CM

## 2024-03-06 DIAGNOSIS — H81.4 VERTIGO OF CENTRAL ORIGIN: ICD-10-CM

## 2024-03-06 DIAGNOSIS — R20.8 VIBRATION SENSORY LOSS: ICD-10-CM

## 2024-03-06 LAB — VIT B12 SERPL-MCNC: 425 PG/ML (ref 210–950)

## 2024-03-06 PROCEDURE — 84630 ASSAY OF ZINC: CPT | Performed by: STUDENT IN AN ORGANIZED HEALTH CARE EDUCATION/TRAINING PROGRAM

## 2024-03-06 PROCEDURE — 82607 VITAMIN B-12: CPT | Performed by: STUDENT IN AN ORGANIZED HEALTH CARE EDUCATION/TRAINING PROGRAM

## 2024-03-06 PROCEDURE — 84446 ASSAY OF VITAMIN E: CPT | Performed by: STUDENT IN AN ORGANIZED HEALTH CARE EDUCATION/TRAINING PROGRAM

## 2024-03-06 PROCEDURE — 99215 OFFICE O/P EST HI 40 MIN: CPT | Mod: PBBFAC | Performed by: STUDENT IN AN ORGANIZED HEALTH CARE EDUCATION/TRAINING PROGRAM

## 2024-03-06 PROCEDURE — 99204 OFFICE O/P NEW MOD 45 MIN: CPT | Mod: S$PBB,,, | Performed by: STUDENT IN AN ORGANIZED HEALTH CARE EDUCATION/TRAINING PROGRAM

## 2024-03-06 PROCEDURE — 84165 PROTEIN E-PHORESIS SERUM: CPT | Performed by: STUDENT IN AN ORGANIZED HEALTH CARE EDUCATION/TRAINING PROGRAM

## 2024-03-06 PROCEDURE — 86334 IMMUNOFIX E-PHORESIS SERUM: CPT | Performed by: STUDENT IN AN ORGANIZED HEALTH CARE EDUCATION/TRAINING PROGRAM

## 2024-03-06 PROCEDURE — 36415 COLL VENOUS BLD VENIPUNCTURE: CPT | Performed by: STUDENT IN AN ORGANIZED HEALTH CARE EDUCATION/TRAINING PROGRAM

## 2024-03-06 PROCEDURE — 99999 PR PBB SHADOW E&M-EST. PATIENT-LVL V: CPT | Mod: PBBFAC,,, | Performed by: STUDENT IN AN ORGANIZED HEALTH CARE EDUCATION/TRAINING PROGRAM

## 2024-03-06 PROCEDURE — 84165 PROTEIN E-PHORESIS SERUM: CPT | Mod: 26,,, | Performed by: PATHOLOGY

## 2024-03-06 PROCEDURE — 82525 ASSAY OF COPPER: CPT | Performed by: STUDENT IN AN ORGANIZED HEALTH CARE EDUCATION/TRAINING PROGRAM

## 2024-03-06 PROCEDURE — 86334 IMMUNOFIX E-PHORESIS SERUM: CPT | Mod: 26,,, | Performed by: PATHOLOGY

## 2024-03-06 RX ORDER — LEVOTHYROXINE SODIUM 88 UG/1
88 TABLET ORAL EVERY MORNING
Qty: 90 TABLET | Refills: 0 | Status: SHIPPED | OUTPATIENT
Start: 2024-03-06

## 2024-03-06 RX ORDER — ATORVASTATIN CALCIUM 20 MG/1
20 TABLET, FILM COATED ORAL DAILY
Qty: 90 TABLET | Refills: 1 | Status: SHIPPED | OUTPATIENT
Start: 2024-03-06 | End: 2024-03-07 | Stop reason: SDUPTHER

## 2024-03-06 NOTE — TELEPHONE ENCOUNTER
No care due was identified.  [facility] Embedded Care Due Messages. Reference number: [id number].   3/06/2024 2:30:09 PM CST

## 2024-03-06 NOTE — PROGRESS NOTES
Patient ID: [id number]  Referring Physician: Farhat Correa MD    Chief Complaint/Reason for Consult: dizziness     Subjective:     HPI:  Sheila Zarco is a 73 y.o. RH female with GERD, hyperlipidemia, and hypothyroisdism. she is presenting today as a new patient for evaluation of dizziness.     Symptoms started 3 years ago but they are becoming more frequent. Room is not spinning but she feels she is moving, lasts few seconds.  Always occurs when getting up from a sitting position or lifting her head up from her iPad or phone. This has led to near falls. Unsure if meclizine helped.   The episode passes after a few seconds. No nausea but occasional palpitation. She had one episode of chest pain that required ER visit.  She can't tell if she has SOB with the episodes since she has asthma and SOB at baseline.   She also has new headaches, bifrontal and bitemporal, more pressure like. Almost daily, takes aspirin for relief, can last several hours    Review of Systems:  Review of Systems   HENT:  Positive for tinnitus.    Eyes:  Negative for photophobia.   Respiratory:  Positive for shortness of breath.    Cardiovascular:  Positive for chest pain, palpitations and leg swelling.   Gastrointestinal:  Negative for nausea and vomiting.   Musculoskeletal:  Positive for falls.   Neurological:  Positive for dizziness and headaches. Negative for sensory change and weakness.   All other systems reviewed and are negative.     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    Status post cataract extraction and insertion of intraocular lens 10/23/2020    Open angle with borderline findings and low glaucoma risk  in both eyes 10/30/2020    Vertigo 12/07/2021    Mild intermittent asthma without complication 05/11/2023     Resolved Ambulatory Problems     Diagnosis Date Noted    Radiculopathy of arm 08/27/2012    Shoulder pain 10/12/2012    Senile nuclear sclerosis - Both Eyes 10/30/2012    Special screening for malignant neoplasms, colon 11/14/2013    Bronchial asthma 07/25/2014    Abnormal glucose 09/12/2014    Posterior subcapsular age-related cataract of right eye 06/24/2020    Senile nuclear sclerosis 10/22/2020    Malignant neoplasm of lower-outer quadrant of left female breast 02/15/2021    Subclavian artery stenosis 02/15/2021     Past Medical History:   Diagnosis Date    Asthma     Baker's cyst     Breast cancer 06/2020    Cataract     Glaucoma     Thyroid disease      Allergies:  Review of patient's allergies indicates:  No Known Allergies    Pertinent Family History:  Family History   Problem Relation Age of Onset    Heart disease Mother     Heart disease Father     Liver disease Brother     Breast cancer Sister     Breast cancer Daughter 43        bilateral mastectomy    No Known Problems Sister     No Known Problems Maternal Grandmother     No Known Problems Maternal Grandfather     No Known Problems Paternal Grandmother     No Known Problems Paternal Grandfather     No Known Problems Maternal Aunt     No Known Problems Maternal Uncle     No Known Problems Paternal Aunt     No Known Problems Paternal Uncle     Breast cancer Other     Colon cancer Neg Hx     Ovarian cancer Neg Hx     Melanoma Neg Hx     Lupus Neg Hx     Acne Neg Hx     Amblyopia Neg Hx     Blindness Neg Hx     Cancer Neg Hx     Cataracts Neg Hx     Diabetes Neg Hx     Glaucoma Neg Hx     Hypertension Neg Hx     Macular degeneration Neg Hx     Retinal detachment Neg Hx     Strabismus Neg Hx     Stroke Neg Hx     Thyroid disease Neg Hx        Pertinent Social History:  Social History     Tobacco Use    Smoking status: Never    Smokeless tobacco:  Never   Substance Use Topics    Alcohol use: Yes     Alcohol/week: 8.0 standard drinks of alcohol     Types: 4 Glasses of wine, 4 Cans of beer per week     Comment: drinks 4 days a week- >1 drink    Drug use: No       Medications:  Current Outpatient Medications   Medication Instructions    acetic acid (VOSOL) 2 % otic solution Apply 4 drops to affected ear tid prn flare    albuterol (ACCUNEB) 0.63 mg, Nebulization, Every 6 hours PRN    albuterol (PROVENTIL/VENTOLIN HFA) 90 mcg/actuation inhaler 2 puffs, Inhalation, Every 6 hours PRN    albuterol (VENTOLIN HFA) 90 mcg/actuation inhaler 2 puffs, Inhalation, Every 6 hours PRN, Rescue    ALPRAZolam (XANAX) 0.5 MG tablet TAKE 1 TABLET BY MOUTH TWICE A DAY AS NEEDED    aspirin (ECOTRIN) 81 mg, Oral, Every morning    atorvastatin (LIPITOR) 20 mg, Oral, Daily, For cholesterol control.    desoximetasone (TOPICORT) 0.25 % cream Topical, 2 times daily,      EScitalopram oxalate (LEXAPRO) 20 mg, Oral, Daily, For anxiety.    fluocinonide (LIDEX) 0.05 % external solution AAA scalp qday prn itching, scaling    fluticasone-salmeterol diskus inhaler 250-50 mcg 1 puff, Inhalation, 2 times daily    hyoscyamine (ANASPAZ,LEVSIN) 125 mcg, Oral, Every 6 hours PRN    ketoconazole (NIZORAL) 2 % cream AAA twice daily on nose prn flare    ketoconazole (NIZORAL) 2 % cream aaa in ears qhs  prn flare    ketoconazole (NIZORAL) 2 % shampoo WASH HAIR W/ MEDICATED SHAMPOO AT LEAST 2X WEEK, LET SIT ON SCALP FOR ATLEAST 5MINUTES BEFORE RINSIN    levocetirizine (XYZAL) 5 mg, Oral, Nightly, For sinus allergy symptoms    levothyroxine (SYNTHROID) 88 MCG tablet TAKE 1 TABLET BY MOUTH EVERY MORNING.    meclizine (ANTIVERT) 25 mg, Oral, 3 times daily PRN    nortriptyline (PAMELOR) 10 mg, Oral, Nightly    pantoprazole (PROTONIX) 40 MG tablet TAKE 1 TABLET BY MOUTH EVERY DAY    predniSONE (DELTASONE) 20 MG tablet Take 3 tabs daily x 5 days; 2 tabs daily x 5 days; then 1 tab daily until completed for asthma  flare.    triamcinolone acetonide 0.025% (KENALOG) 0.025 % cream AAA qd to face, ears prn flare    valACYclovir (VALTREX) 500 MG tablet TAKE ONE TABLET TWICE A DAY AS NEEDED FOR OUTBREAK    vitamin D (VITAMIN D3) 1000 units Tab Oral, Daily, Pt takes 50 mcg daily OTC       Objective:     Vitals:    03/06/24 1107   BP: (!) 144/66   Pulse: 66        General:  Well-appearing, well-nourished, NAD, cooperative    Neurologic Exam:   Awake, alert and oriented x3  Speech spontaneous and fluent, intact comprehension.   Adequate fund of knowledge, vocabulary.    CN II - CN XII:  PERRLA. EOM intact. No Nystagmus. No ophthalmoplegia.   Facial sensation is normal to light touch.   Facial expression is full and symmetric.   Hearing is intact bilaterally.   Palate elevates symmetrically.   SCM and Trapezius full strength bilaterally.   Tongue is midline.     Motor:  Normal bulk in all four limbs. Paratonia in BUE.   There are no atrophy or fasciculations. Postural and action tremor in BUE.     Shoulder  Abd Shoulder Add Elbow   Flex Elbow  Ext Wrist   Flex Wrist  Ext Finger  Flex Finger  Ext Finger  Abd Finger   Add IO Opposition   Right 5 5 5 5       5    Left 5 5 5 5       5       Hip  Flex Hip  Ext Thigh   Abd Thigh  Add Knee  Flex Knee  Ext Plantar  Flex Dorsiflex   Right 5 5   5 5 5 5   Left 5 5   5 5 5 5     Sensory:  Light touch: normal tactile sense throughout  Proprioception: proprioceptive sense present on RUE and on LUE  Romberg is Exam: positive    DTRs:   Biceps Brachioradialis Triceps Billy Patellar Ankle Plantar   Right 2+ 2+  - 2+ 2+    Left 2+ 2+  - 2+ 2+      Coordination:  Finger to nose with endpoint tremor bilaterally.  Normal fine finger movements and rapid alternating movements.    Gait:  Normal casual gait.  Difficulty with tandem gait.    Pertinent lab results  Lab Results   Component Value Date    OTFBCQNU17 362 05/11/2023    JFNRESRD96QE 33 03/22/2022     Lab Results   Component Value Date    SEDRATE 9  03/02/2020     Lab Results   Component Value Date    [id number] Non-reactive 02/21/2024     Lab Results   Component Value Date    TSH 1.819 02/21/2024    FREET4 1.00 05/12/2020    WBC 6.39 02/21/2024    LYMPH 1.7 02/21/2024    LYMPH 26.8 02/21/2024    RBC 4.17 02/21/2024    HGB 12.1 02/21/2024    HCT 36.6 (L) 02/21/2024    MCV 88 02/21/2024     02/21/2024     02/21/2024    K 4.1 02/21/2024    CO2 23 02/21/2024    BUN 15 02/21/2024    CREATININE 0.8 02/21/2024    CALCIUM 9.3 02/21/2024    AST 15 02/21/2024    ALT 15 02/21/2024     Pertinent imaging results    *Images personally reviewed and interpreted:    3/5/2020  CTA Head & Nec:  CTA head: Unremarkable CTA of the head specifically without evidence for proximal significant stenosis or occlusion.  CTA neck: Atherosclerotic plaquing of the arch and origin the great vessels.  There is mild irregularity and narrowing the left vertebral artery at the origin with subclavian artery concerning for mild stenosis.  Otherwise unremarkable CTA neck.  There is less than 50% proximal ICA stenosis by NASCET criteria.  Degenerative change cervical spine  Few subcentimeter nodules left lung apex may represent scarring atelectasis with large measuring nodule 0.5 cm.  Clinical correlation and follow-up CT thorax as detailed above.  CT head: No evidence for acute intracranial hemorrhage or definite abnormal parenchymal enhancement.    2/11/2020  MRI Brain wo contrast:  No acute intracranial abnormality.   Mild microvascular ischemic changes and generalized volume loss.     Other pertinent studies  None    Assessment:   Sheila Zarco is a 73 y.o. right-handed female with GERD, hyperlipidemia, and hypothyroidism among other co morbidities who presents fo revaluation of episodic and positional dizziness that has been going on for almost 3 years. Neurological exam is notable for unsteady gait (Romberg and tandem), diminished vibratory sensation, and tremors/paratonia.  Overall suggestive of vestibulopathy overlapping with polyneuropathy. Would like to rule out central etiologies and serum work up for neuropathy. She would benefit from vestibular PT and gait training.     1. Vibration sensory loss    2. Dizziness    3. Imbalance    4. Vertigo of central origin      Plan:     - MRI IAC/Temporal Bones W W/O Contrast; Future  - Ambulatory referral/consult to Physical/Occupational Therapy; Future  - Copper, Serum; Future  - Heavy Metals Screen, Blood (Quantitative); Future  - Immunofixation Electrophoresis; Future  - Protein Electrophoresis, Serum; Future  - Zinc; Future  - Vitamin B12; Future  - Vitamin E; Future  - Follow up: RTC in 2 months after PT to reassess      Time spent on this encounter: 48 minutes. This includes face to face time (obtaining history, documenting clinical information in the EMR, physical exam, discussing the plan with patient) and non-face to face time (such as preparing to see the patient (ie. Chart review, reviewing and interpreting previous labs and imaging), further EMR documentation, ordering tests, independently interpreting results and communicating results to the patient/family/caregiver, or care coordinator).     Disclaimer: This note was partly generated using dictation software which may occasionally result in transcription errors that are missed on review.      Mana Gerard MD    [facility]  03/06/2024

## 2024-03-06 NOTE — TELEPHONE ENCOUNTER
Refill Routing Note   Refill Routing Note   Medication(s) are not appropriate for processing by [facility] for the following reason(s):        ED/Hospital Visit since last OV with provider    [facility] action(s):  Defer      Medication Therapy Plan: Palpitations      Appointments  past 12m or future 3m with PCP    Date Provider   Last Visit   11/16/2023 Farhat Correa MD   Next Visit   4/23/2024 Farhat Correa MD   ED visits in past 90 days: 1        Note composed:8:11 AM 03/06/2024

## 2024-03-06 NOTE — TELEPHONE ENCOUNTER
No care due was identified.  [facility] Embedded Care Due Messages. Reference number: [id number].   3/06/2024 12:34:03 AM CST

## 2024-03-07 ENCOUNTER — OFFICE VISIT (OUTPATIENT)
Dept: CARDIOLOGY | Facility: CLINIC | Age: 74
End: 2024-03-07
Payer: MEDICARE

## 2024-03-07 VITALS
HEIGHT: 66 IN | DIASTOLIC BLOOD PRESSURE: 70 MMHG | WEIGHT: 165.81 LBS | RESPIRATION RATE: 20 BRPM | HEART RATE: 64 BPM | SYSTOLIC BLOOD PRESSURE: 138 MMHG | BODY MASS INDEX: 26.65 KG/M2

## 2024-03-07 DIAGNOSIS — R00.2 PALPITATIONS: ICD-10-CM

## 2024-03-07 DIAGNOSIS — R07.9 CHEST PAIN, UNSPECIFIED TYPE: ICD-10-CM

## 2024-03-07 LAB
ALBUMIN SERPL ELPH-MCNC: 3.93 G/DL (ref 3.35–5.55)
ALPHA1 GLOB SERPL ELPH-MCNC: 0.31 G/DL (ref 0.17–0.41)
ALPHA2 GLOB SERPL ELPH-MCNC: 0.77 G/DL (ref 0.43–0.99)
B-GLOBULIN SERPL ELPH-MCNC: 0.96 G/DL (ref 0.5–1.1)
GAMMA GLOB SERPL ELPH-MCNC: 0.72 G/DL (ref 0.67–1.58)
INTERPRETATION SERPL IFE-IMP: NORMAL
PATHOLOGIST INTERPRETATION IFE: NORMAL
PATHOLOGIST INTERPRETATION SPE: NORMAL
PROT SERPL-MCNC: 6.7 G/DL (ref 6–8.4)

## 2024-03-07 PROCEDURE — 99999 PR PBB SHADOW E&M-EST. PATIENT-LVL V: CPT | Mod: PBBFAC,,, | Performed by: INTERNAL MEDICINE

## 2024-03-07 PROCEDURE — 99215 OFFICE O/P EST HI 40 MIN: CPT | Mod: PBBFAC | Performed by: INTERNAL MEDICINE

## 2024-03-07 PROCEDURE — 99204 OFFICE O/P NEW MOD 45 MIN: CPT | Mod: S$PBB,,, | Performed by: INTERNAL MEDICINE

## 2024-03-07 RX ORDER — ATORVASTATIN CALCIUM 40 MG/1
40 TABLET, FILM COATED ORAL DAILY
Qty: 90 TABLET | Refills: 3 | Status: SHIPPED | OUTPATIENT
Start: 2024-03-07 | End: 2024-06-14 | Stop reason: SDUPTHER

## 2024-03-07 NOTE — TELEPHONE ENCOUNTER
Refill Decision Note   Sheila Carolee  is requesting a refill authorization.  Brief Assessment and Rationale for Refill:  Approve     Medication Therapy Plan:   TSH WNL 02/21/2024   ED documentation reviewed. No changes to therapy noted. FOVS 4/23/24.      Extended chart review required: Yes   Comments:     Note composed:6:39 PM 03/06/2024

## 2024-03-07 NOTE — PROGRESS NOTES
HISTORY:    73-year-old female with a history of hyperlipidemia and hypothyroidism presenting for initial evaluation by me.    Comes in for evaluation of palpitations. Has had symptoms on and off for 2 years. Episodes are intermittent last minutes to an hour. Variable frequency. Anxiety medicine seems to help.  Had one episode last month that was severe with some chest discomfort. Went to the ED with a negative evaluation.     Has asthma and some chronic aspect of AUGUSTINE.     Tolerates aspirin 81 x 1, atorvastatin 20 x 1.    PHYSICAL EXAM:    Vitals:    03/07/24 1354   BP: 138/70   Pulse: 64   Resp: 20       NAD, A+Ox3.  No jvd, no bruit.  RRR nml s1,s2. No murmurs.  CTA B no wheezes or crackles.  No edema.    LABS/STUDIES (imaging reviewed during clinic visit):    February 2024 CBC and CMP normal.  BNP and troponin normal.  2023 LDL 85 and HDL 72.  Triglycerides 77.  TSH normal.  A1c 5.3 in 2022.    ECG February 2024 sinus rhythm with no Q-waves or ST changes.    Carotid 2021 no evidence of significant ICA disease bilaterally. Antegrade vertebral flow bilaterally.   CTA Head and Neck 2020 aortic atherosclerosis. No signigficant stenosis.     ASSESSMENT & PLAN:    1. Palpitations    2. Chest pain, unspecified type        Orders Placed This Encounter    Cardiac Monitor - 3-15 Day Adult (Cupid Only)    Stress Echo Which stress agent will be used? Treadmill Exercise; Color Flow Doppler? No    atorvastatin (LIPITOR) 40 MG tablet        Palpitations of unclear origin. Check event monitor.     Chest pain. Check CHRISTOS.    No indication for asa, okay to cont if she likes or stop.     Would increase atorvastatin to 40 x 1.    Has some, mild RLE edema worse later in the days. Likely venous insufficiency.       Follow up in about 3 months (around 6/7/2024).      Lloyd Pires MD

## 2024-03-08 LAB
ARSENIC BLD-MCNC: <1 NG/ML
CADMIUM BLD-MCNC: <0.2 NG/ML
CITY: ABNORMAL
COUNTY: ABNORMAL
GUARDIAN FIRST NAME: ABNORMAL
GUARDIAN LAST NAME: ABNORMAL
HOME PHONE: ABNORMAL
LEAD BLD-MCNC: 5.1 MCG/DL
MERCURY BLD-MCNC: <1 NG/ML
RACE: ABNORMAL
STATE: ABNORMAL
STREET ADDRESS: ABNORMAL
VENOUS/CAPILLARY: ABNORMAL
ZIP: ABNORMAL

## 2024-03-11 ENCOUNTER — CLINICAL SUPPORT (OUTPATIENT)
Dept: CARDIOLOGY | Facility: HOSPITAL | Age: 74
End: 2024-03-11
Attending: INTERNAL MEDICINE
Payer: MEDICARE

## 2024-03-11 DIAGNOSIS — R00.2 PALPITATIONS: ICD-10-CM

## 2024-03-11 PROCEDURE — 93246 EXT ECG>7D<15D RECORDING: CPT

## 2024-03-11 PROCEDURE — 93248 EXT ECG>7D<15D REV&INTERPJ: CPT | Mod: ,,, | Performed by: INTERNAL MEDICINE

## 2024-03-12 LAB
A-TOCOPHEROL VIT E SERPL-MCNC: 1197 UG/DL (ref 500–1800)
ZINC SERPL-MCNC: 66 UG/DL (ref 60–130)

## 2024-03-13 ENCOUNTER — PATIENT MESSAGE (OUTPATIENT)
Dept: NEUROLOGY | Facility: CLINIC | Age: 74
End: 2024-03-13
Payer: MEDICARE

## 2024-03-13 ENCOUNTER — PATIENT MESSAGE (OUTPATIENT)
Dept: INTERNAL MEDICINE | Facility: CLINIC | Age: 74
End: 2024-03-13
Payer: MEDICARE

## 2024-03-13 DIAGNOSIS — R78.71 ELEVATED BLOOD LEAD LEVEL: Primary | ICD-10-CM

## 2024-03-13 LAB — COPPER SERPL-MCNC: 1286 UG/L (ref 810–1990)

## 2024-03-13 NOTE — TELEPHONE ENCOUNTER
Order for Paxlovid has been sent to the patient's pharmacy.  The patient should discontinue atorvastatin while on Paxlovid.  The medication can be restarted 1 week after completing therapy.

## 2024-03-13 NOTE — TELEPHONE ENCOUNTER
LOV 11-16-23    Pt did home covid test-results positive.  She's reporting congestion and sinus issues. Last night temp 101.    She's requesting Rx.to be sent to her pharmacy ([facility] [address]  Please advise  Thanks

## 2024-03-22 ENCOUNTER — PATIENT MESSAGE (OUTPATIENT)
Dept: CARDIOLOGY | Facility: CLINIC | Age: 74
End: 2024-03-22
Payer: MEDICARE

## 2024-04-03 ENCOUNTER — HOSPITAL ENCOUNTER (OUTPATIENT)
Dept: RADIOLOGY | Facility: HOSPITAL | Age: 74
Discharge: HOME | End: 2024-04-03
Attending: STUDENT IN AN ORGANIZED HEALTH CARE EDUCATION/TRAINING PROGRAM
Payer: MEDICARE

## 2024-04-03 DIAGNOSIS — H81.4 VERTIGO OF CENTRAL ORIGIN: ICD-10-CM

## 2024-04-03 DIAGNOSIS — R51.9 NEW ONSET HEADACHE: ICD-10-CM

## 2024-04-03 DIAGNOSIS — R42 DIZZINESS: ICD-10-CM

## 2024-04-03 LAB
CREAT SERPL-MCNC: 0.8 MG/DL (ref 0.5–1.4)
SAMPLE: NORMAL

## 2024-04-03 PROCEDURE — 70553 MRI BRAIN STEM W/O & W/DYE: CPT | Mod: 26,,, | Performed by: RADIOLOGY

## 2024-04-03 PROCEDURE — 70553 MRI BRAIN STEM W/O & W/DYE: CPT | Mod: TC

## 2024-04-03 PROCEDURE — 25500020 PHARM REV CODE 255: Performed by: STUDENT IN AN ORGANIZED HEALTH CARE EDUCATION/TRAINING PROGRAM

## 2024-04-03 PROCEDURE — A9585 GADOBUTROL INJECTION: HCPCS | Performed by: STUDENT IN AN ORGANIZED HEALTH CARE EDUCATION/TRAINING PROGRAM

## 2024-04-03 RX ORDER — GADOBUTROL 604.72 MG/ML
6 INJECTION INTRAVENOUS
Status: COMPLETED | OUTPATIENT
Start: 2024-04-03 | End: 2024-04-03

## 2024-04-03 RX ADMIN — GADOBUTROL 6 ML: 604.72 INJECTION INTRAVENOUS at 03:04

## 2024-04-04 ENCOUNTER — PATIENT MESSAGE (OUTPATIENT)
Dept: NEUROLOGY | Facility: CLINIC | Age: 74
End: 2024-04-04
Payer: MEDICARE

## 2024-04-16 ENCOUNTER — LAB VISIT (OUTPATIENT)
Dept: LAB | Facility: HOSPITAL | Age: 74
End: 2024-04-16
Attending: INTERNAL MEDICINE
Payer: MEDICARE

## 2024-04-16 ENCOUNTER — HOSPITAL ENCOUNTER (OUTPATIENT)
Dept: CARDIOLOGY | Facility: HOSPITAL | Age: 74
Discharge: HOME | End: 2024-04-16
Attending: INTERNAL MEDICINE
Payer: MEDICARE

## 2024-04-16 DIAGNOSIS — R79.9 ABNORMAL FINDING OF BLOOD CHEMISTRY, UNSPECIFIED: ICD-10-CM

## 2024-04-16 DIAGNOSIS — R10.30 LOWER ABDOMINAL PAIN: ICD-10-CM

## 2024-04-16 DIAGNOSIS — Z12.11 COLON CANCER SCREENING: ICD-10-CM

## 2024-04-16 DIAGNOSIS — R10.9 ABDOMINAL PAIN, UNSPECIFIED ABDOMINAL LOCATION: ICD-10-CM

## 2024-04-16 DIAGNOSIS — J45.20 MILD INTERMITTENT ASTHMA WITHOUT COMPLICATION: ICD-10-CM

## 2024-04-16 DIAGNOSIS — R07.9 CHEST PAIN, UNSPECIFIED TYPE: ICD-10-CM

## 2024-04-16 DIAGNOSIS — K21.9 GASTROESOPHAGEAL REFLUX DISEASE, UNSPECIFIED WHETHER ESOPHAGITIS PRESENT: ICD-10-CM

## 2024-04-16 LAB
ALBUMIN SERPL BCP-MCNC: 3.6 G/DL (ref 3.5–5.2)
ALP SERPL-CCNC: 73 U/L (ref 55–135)
ALT SERPL W/O P-5'-P-CCNC: 13 U/L (ref 10–44)
ANION GAP SERPL CALC-SCNC: 9 MMOL/L (ref 8–16)
AST SERPL-CCNC: 17 U/L (ref 10–40)
BASOPHILS # BLD AUTO: 0.08 K/UL (ref 0–0.2)
BASOPHILS NFR BLD: 1.3 % (ref 0–1.9)
BILIRUB SERPL-MCNC: 0.6 MG/DL (ref 0.1–1)
BUN SERPL-MCNC: 18 MG/DL (ref 8–23)
CALCIUM SERPL-MCNC: 9.2 MG/DL (ref 8.7–10.5)
CHLORIDE SERPL-SCNC: 109 MMOL/L (ref 95–110)
CHOLEST SERPL-MCNC: 186 MG/DL (ref 120–199)
CHOLEST/HDLC SERPL: 2.1 {RATIO} (ref 2–5)
CO2 SERPL-SCNC: 25 MMOL/L (ref 23–29)
CREAT SERPL-MCNC: 0.8 MG/DL (ref 0.5–1.4)
DIFFERENTIAL METHOD BLD: ABNORMAL
EOSINOPHIL # BLD AUTO: 0.3 K/UL (ref 0–0.5)
EOSINOPHIL NFR BLD: 4.3 % (ref 0–8)
ERYTHROCYTE [DISTWIDTH] IN BLOOD BY AUTOMATED COUNT: 13.9 % (ref 11.5–14.5)
EST. GFR  (NO RACE VARIABLE): >60 ML/MIN/1.73 M^2
GLUCOSE SERPL-MCNC: 95 MG/DL (ref 70–110)
HCT VFR BLD AUTO: 38 % (ref 37–48.5)
HDLC SERPL-MCNC: 90 MG/DL (ref 40–75)
HDLC SERPL: 48.4 % (ref 20–50)
HGB BLD-MCNC: 12 G/DL (ref 12–16)
IMM GRANULOCYTES # BLD AUTO: 0.02 K/UL (ref 0–0.04)
IMM GRANULOCYTES NFR BLD AUTO: 0.3 % (ref 0–0.5)
LDLC SERPL CALC-MCNC: 79.8 MG/DL (ref 63–159)
LYMPHOCYTES # BLD AUTO: 2.1 K/UL (ref 1–4.8)
LYMPHOCYTES NFR BLD: 34.9 % (ref 18–48)
MCH RBC QN AUTO: 28.8 PG (ref 27–31)
MCHC RBC AUTO-ENTMCNC: 31.6 G/DL (ref 32–36)
MCV RBC AUTO: 91 FL (ref 82–98)
MONOCYTES # BLD AUTO: 0.4 K/UL (ref 0.3–1)
MONOCYTES NFR BLD: 6.2 % (ref 4–15)
NEUTROPHILS # BLD AUTO: 3.2 K/UL (ref 1.8–7.7)
NEUTROPHILS NFR BLD: 53 % (ref 38–73)
NONHDLC SERPL-MCNC: 96 MG/DL
NRBC BLD-RTO: 0 /100 WBC
PLATELET # BLD AUTO: 335 K/UL (ref 150–450)
PMV BLD AUTO: 9.9 FL (ref 9.2–12.9)
POTASSIUM SERPL-SCNC: 4.3 MMOL/L (ref 3.5–5.1)
PROT SERPL-MCNC: 6.7 G/DL (ref 6–8.4)
RBC # BLD AUTO: 4.17 M/UL (ref 4–5.4)
SODIUM SERPL-SCNC: 143 MMOL/L (ref 136–145)
TRIGL SERPL-MCNC: 81 MG/DL (ref 30–150)
WBC # BLD AUTO: 6.01 K/UL (ref 3.9–12.7)

## 2024-04-16 PROCEDURE — 80053 COMPREHEN METABOLIC PANEL: CPT | Performed by: INTERNAL MEDICINE

## 2024-04-16 PROCEDURE — 85025 COMPLETE CBC W/AUTO DIFF WBC: CPT | Performed by: INTERNAL MEDICINE

## 2024-04-16 PROCEDURE — 80061 LIPID PANEL: CPT | Performed by: INTERNAL MEDICINE

## 2024-04-16 PROCEDURE — 36415 COLL VENOUS BLD VENIPUNCTURE: CPT | Mod: PO | Performed by: INTERNAL MEDICINE

## 2024-04-18 ENCOUNTER — PATIENT MESSAGE (OUTPATIENT)
Dept: NEUROLOGY | Facility: CLINIC | Age: 74
End: 2024-04-18
Payer: MEDICARE

## 2024-04-22 ENCOUNTER — CLINICAL SUPPORT (OUTPATIENT)
Dept: REHABILITATION | Facility: HOSPITAL | Age: 74
End: 2024-04-22
Attending: STUDENT IN AN ORGANIZED HEALTH CARE EDUCATION/TRAINING PROGRAM
Payer: MEDICARE

## 2024-04-22 DIAGNOSIS — R26.89 IMBALANCE: ICD-10-CM

## 2024-04-22 DIAGNOSIS — R26.89 IMPAIRMENT OF BALANCE: ICD-10-CM

## 2024-04-22 DIAGNOSIS — R42 DIZZINESS: Primary | ICD-10-CM

## 2024-04-22 PROCEDURE — 97165 OT EVAL LOW COMPLEX 30 MIN: CPT | Mod: PO

## 2024-04-23 ENCOUNTER — OFFICE VISIT (OUTPATIENT)
Dept: INTERNAL MEDICINE | Facility: CLINIC | Age: 74
End: 2024-04-23
Payer: MEDICARE

## 2024-04-23 VITALS
SYSTOLIC BLOOD PRESSURE: 132 MMHG | HEART RATE: 72 BPM | HEIGHT: 66 IN | TEMPERATURE: 97 F | BODY MASS INDEX: 25.86 KG/M2 | RESPIRATION RATE: 16 BRPM | WEIGHT: 160.94 LBS | DIASTOLIC BLOOD PRESSURE: 54 MMHG

## 2024-04-23 DIAGNOSIS — E03.9 ACQUIRED HYPOTHYROIDISM: ICD-10-CM

## 2024-04-23 DIAGNOSIS — J45.20 MILD INTERMITTENT ASTHMA WITHOUT COMPLICATION: Primary | ICD-10-CM

## 2024-04-23 DIAGNOSIS — E78.49 OTHER HYPERLIPIDEMIA: ICD-10-CM

## 2024-04-23 DIAGNOSIS — R03.0 BLOOD PRESSURE ELEVATED WITHOUT HISTORY OF HTN: ICD-10-CM

## 2024-04-23 DIAGNOSIS — F41.9 ANXIETY: ICD-10-CM

## 2024-04-23 DIAGNOSIS — E55.9 VITAMIN D DEFICIENCY: ICD-10-CM

## 2024-04-23 DIAGNOSIS — K21.9 GASTROESOPHAGEAL REFLUX DISEASE, UNSPECIFIED WHETHER ESOPHAGITIS PRESENT: ICD-10-CM

## 2024-04-23 PROBLEM — R42 DIZZINESS: Status: ACTIVE | Noted: 2024-04-23

## 2024-04-23 PROBLEM — R26.89 IMPAIRMENT OF BALANCE: Status: ACTIVE | Noted: 2024-04-23

## 2024-04-23 PROCEDURE — 99215 OFFICE O/P EST HI 40 MIN: CPT | Mod: PBBFAC,PO | Performed by: INTERNAL MEDICINE

## 2024-04-23 PROCEDURE — 99214 OFFICE O/P EST MOD 30 MIN: CPT | Mod: S$PBB,,, | Performed by: INTERNAL MEDICINE

## 2024-04-23 PROCEDURE — 99999 PR PBB SHADOW E&M-EST. PATIENT-LVL V: CPT | Mod: PBBFAC,,, | Performed by: INTERNAL MEDICINE

## 2024-04-24 NOTE — PLAN OF CARE
"[facility] Occupational Therapy  Initial Neurological Evaluation     Date: 4/22/2024  Patient: Sheila Zarco  Chart Number: [id number]    Therapy Diagnosis:   Encounter Diagnoses   Name Primary?    Dizziness Yes    Imbalance     Impairment of balance      Physician: Mana Gerard MD    Physician Orders: Eval and Treat - Vestibular Therapy   Medical Diagnosis:   R42 (ICD-10-CM) - Dizziness   R26.89 (ICD-10-CM) - Imbalance   Evaluation Date: 4/22/2024  Plan of Care Expiration Period: 5 visits; to around 5/31/2024 but possibly later secondary to scheduling constraints    Insurance Authorization period Expiration: 3/6/2025  Date of Return to MD: 5/14/2024 stress test; 6/10/2024 neurology; 6/14/2024 cardiology   Visit # / Visits Authorized: 1 / 1  FOTO: 1/2    Time In: 1532  Time Out: 1617  Total Billable (one on one) Time: 45 minutes    Precautions: Standard    Subjective     History of Current Condition: Sheila Zarco is a 73 y.o. female who presents to [facility] for evaluation and treatment secondary to dizziness and imbalance. Pt reported symptoms began 3 years ago but are becoming more frequent. Dizziness is described "like the blood leaves my head" and a wooziness (no spinning), and it lasts for a few seconds. Dizziness is mainly triggered when getting up from a sitting position or lifting her head up from her iPad or phone. She has SOB due to asthma at baseline. She was having new onset of bifrontal and bitemporal pressure headaches but these have pretty much resolved. She has been seen by neurology and cardiology. 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    Triggers: Sit>stand    Alleviating Factors: Staying still, holding on to something, and taking deep breaths    Description of symptoms: Wooziness (sensation of spinning vs lightheadedness)   Onset: 3 yrs ago " "   Frequency: A few times per week    Duration: A few seconds   Positional changes: See 'triggers'   Limitations due to symptoms: None  Visual changes: None   Hearing Changes (hearing loss or tinnitus): None   Recent history of upper respiratory infection or GI infection: COVID-19 ~2 months ago    Currently taking Meclizine: No     History of migraines: No     Systems screening  Cardiovascular indicators: Heart palpitations of unknown etiology    Is patient currently taking medication for stated indicators: No  Neurological: None     Falls: 0    Involved Side: N/A  Dominant Side: Right  Date of Onset: 3 yrs ago   Surgical Procedure: N/A  Imaging: MRI studies; see full results in imaging   MRI IAC/Temporal Bone: "IMPRESSION - No acute intracranial process.  Stable appearance of the brain when compared to the prior study. No enhancing lesion with attention to the CP angle/IAC/labyrinth."  Vestibular Function Testing/Audiogram:   Audiogram 11/13/2023: "Audiogram results revealed essentially normal hearing sensitivity/borderline mild SNHL from 250-4000 Hz sloping to a moderate hearing loss by 8000 Hz in the right ear and an essentially mild SNHL from 250-4000 Hz sloping to a moderately-severe hearing loss by 8000 Hz in the left ear."  Previous Therapy: Ortho PT     Patient's Goals for Therapy: "to not have this dizziness"     Pain:  Pain Related Behaviors Observed: No   Functional Pain Scale Rating 0-10:   n/a/10 on average  n/a/10 at best  n/a/10 at worst  Location: N/A  Description: N/A  Aggravating Factors: N/A  Easing Factors: N/A    Occupation:    Working presently: Retired   Duties: Basic self and home care     Functional Limitations/Social History:    Prior Level of Function: Independent with all ADLs, IADLs, and functional mobility   Current Level of Function: Independent with all ADLs, IADLs, and functional mobility     Home/Living environment : lives alone  Home Access: 1 story home; 1 NEVILLE no handrails; pt " reported no issues with taking step inside   DME: none     Driving: Yes     Past Medical History/Physical Systems Review:     Past Medical History:  Sheila Zarco  has a past medical history of Asthma, Baker's cyst, Breast cancer, Cataract, Glaucoma, and Thyroid disease.    Past Surgical History:  Sheila Zarco  has a past surgical history that includes Hysterectomy; Gallbladder surgery; Knee cartilage surgery; Bunionectomy; narrow angles eye surgery ; Phacoemulsification of cataract (Right, 7/9/2020); Intraocular prosthesis insertion (Right, 7/9/2020); Mastectomy, partial (Left, 7/24/2020); Phacoemulsification of cataract (Left, 10/22/2020); Intraocular prosthesis insertion (Left, 10/22/2020); Breast biopsy (Left, 06/2020); Breast lumpectomy (Left); Esophagogastroduodenoscopy (N/A, 12/5/2023); and Colonoscopy (N/A, 12/5/2023).    Current Medications:  Sheila has a current medication list which includes the following prescription(s): acetic acid, albuterol, albuterol, albuterol, alprazolam, aspirin, atorvastatin, desoximetasone, escitalopram oxalate, fluocinonide, fluticasone-salmeterol 250-50 mcg/dose, hyoscyamine, ketoconazole, ketoconazole, ketoconazole, levocetirizine, levothyroxine, meclizine, nortriptyline, pantoprazole, prednisone, triamcinolone acetonide 0.025%, valacyclovir, and vitamin d, and the following Facility-Administered Medications: sodium chloride 0.9%, cyclopentolate 1%, phenylephrine hcl 2.5%, and tropicamide 1%.    Allergies:  Review of patient's allergies indicates:  No Known Allergies     Objective     Cognitive Exam:  Oriented: Person, Place, Time, and Situation  Behaviors: normal, cooperative  Follows Commands/attention: Follows multistep  commands  Communication: clear/fluent  Memory: No Deficits noted but not formally assessed   Safety awareness/insight to disability: aware of diagnosis, treatment, and prognosis  Coping skills/emotional control: Appropriate to  situation    Oculomotor Exam:  Oculomotor ROM: WNL  Eye Alignment: WNL  Visual Field: NT  Acuity: Wears readers; bilateral cataracts repaired     Spontaneous Nystagmus: None  Gaze Holding Nystagmus: None  Gaze Holding (No Fixation): NT  Smooth Pursuits:     Horizontal: Grossly intact; occasional saccadic intrusion noted when tracking right of midline; no increase in symptoms    Vertical: Eye movements intact; no increase in symptoms   Saccades:    Horizontal: Eye movements intact; no increase in symptoms    Vertical: Grossly intact; extra beat occasionally needed to gaze shift from low>high target; no increase in symptoms   Near Point Convergence (cm): WNL  VOR Cancellation: Intact; no visual slippage or dizziness     VOR Slow Head Movement: Intact  Head Thrust: Unable to complete accurately due to neck guarding   Dynamic Visual Acuity (DVA) (using ETDRS chart): 3 line difference (9, 6)     Physical Exam: WNL posture, cervical, and BUE range of motion. Strength and coordination not tested but no complaints of deficits.     Balance/Functional Mobility Assessment:     Postural control: MCTSIB: Evaluation 04/23/2024 (Average of 3 trials)   1. Eyes Open/feet together/Firm: 30 seconds   2. Eyes Closed/feet together/Firm:  30 seconds   3. Eyes Open/feet together/Foam:  30 seconds   4. Eyes Closed/feet together/Foam:  5 seconds  (Avg 3 trials; 3, 3, 10 sec)   TOTAL 95/120     Fukuda (measure distance varied from center starting position, > 30 deg deviation to either side indicates hypofunction of biased side): F; 35* deviation left    Functional Gait Assessment:   1. Gait on level surface =  3   (3) Normal: less than 5.5 sec, no A.D., no imbalance, normal gait pattern, deviates< 6in   (2) Mild impairment: 7-5.6 sec, uses A.D., mild gait deviations, or deviates 6-10 in   (1) Moderate impairment: > 7 sec, slow speed, imbalance, deviates 10-15 in.   (0) Severe impairment: needs assist, deviates >15 in, reach/touch wall  2.  Change in Gait Speed = 3   (3) Normal: smooth change w/o loss of balance or gait deviation, deviates < 6 in, significant difference between speeds   (2) Mild impairment: changes speed, but demonstrates mild gait deviations, deviates 6-10 in, OR no deviations but unable to significantly speed, OR uses A.D.   (1) Moderate impairment: minor changes to speed, OR changes speed w/ significant deviations, deviates 10-15 in, OR  Changes speed , but loses balance & recovers   (0) Severe impairment: cannot change speed, deviates >15 in, or loses balance & needs assist  3. Gait with horizontal head turns  = 3   (3) Normal: no change in gait, deviates <6 in   (2) Mild impairment: slight change in speed, deviates 6-10 in, OR uses A.D.   (1) Moderate impairment: moderate change in speed, deviates 10-15 in   (0) Severe impairment: severe disruption of gait, deviates >15in  4. Gait with vertical head turns = 3; mild dizziness reported    (3) Normal: no change in gait, deviates <6 in   (2) Mild impairment: slight change in speed, deviates 6-10 in OR uses A.D.   (1) Moderate impairment: moderate change in speed, deviates 10-15 in   (0) Severe impairment: severe disruption of gait, deviates >15 in  5. Gait with pivot turns = 3   (3) Normal: performs safely in 3 sec, no LOB   (2) Mild impairment: performs in >3 sec & no LOB, OR turns safely & requires several steps to regain LOB   (1) Moderate impairment: turns slow, OR requires several small steps for balance following turn & stop   (0) Severe impairment: cannot turn safely, needs assist  6. Step over obstacle = 3   (3) Normal: steps over 2 stacked boxes w/o change in speed or LOB   (2) Mild impairment: able to step over 1 box w/o change in speed or LOB   (1) Moderate impairment: steps over 1 box but must slow down, may require VC   (0) Severe impairment: cannot perform w/o assist  7. Gait with Narrow PEYTON = 1; 4 steps    (3) Normal: 10 steps no staggering   (2) Mild impairment: 7-9  steps   (1) Moderate impairment: 4-7 steps   (0) Severe impairment: < 4 steps or cannot perform w/o assist  8. Gait with eyes closed = 3   (3) Normal: < 7 sec, no A.D., no LOB, normal gait pattern, deviates <6 in   (2) Mild impairment: 7.1-9 sec, mild gait deviations, deviates 6-10 in   (1) Moderate impairment: > 9 sec, abnormal pattern, LOB, deviates 10-15 in   (0) Severe impairment: cannot perform w/o assist, LOB, deviates >15in  9. Ambulating Backwards = 2; no deviations but pt took short steps    (3) Normal: no A.D., no LOB, normal gait pattern, deviates <6in   (2) Mild impairment: uses A.D., slower speed, mild gait deviations, deviates 6-10 in   (1) Moderate impairment: slow speed, abnormal gait pattern, LOB, deviates 10-15 in   (0) Severe impairment: severe gait deviations or LOB, deviates >15in  10. Steps = 3   (3) Normal: alternating feet, no rail   (2) Mild Impairment: alternating feet, uses rail   (1) Moderate impairment: step-to, uses rail   (0) Severe impairment: cannot perform safely    Score 27/30     Score:   </=22/30 fall risk   <20/30 fall risk in older adults   <18/30 fall risk in Parkinsons     Modified VAS (Vertebral Artery Screen), in sitting (rotation, then extension):  R: Negative   L: Negative     Positional Canal Testing:   Looking for nystagmus (slow phase followed by quick phase to the affected side for BPPV)    Sabine Hallpike (posterior / CL anterior)   Right : Negative nystagmus, Negative dizziness   Left: Negative nystagmus, Negative dizziness  Horizontal Canals   Right: Negative nystagmus, Negative dizziness   Left: Negative nystagmus, Negative dizziness    Functional Status      Functional Mobility: Independent     ADL's: Independent     IADL's: Independent     Intake Outcome Measure for FOTO Vestibular Survey    Therapist reviewed FOTO scores for Sheila Zarco on 4/22/2024.   FOTO documents entered into [employer] - see Media section.    Intake Score: 64%       - Dizziness Handicap  Inventory (DHI): 14/100  16-34= mild   36-52= moderate   >/= 54= severe    Treatment     Eval only secondary to time constraints.     Home Exercises and Patient Education Provided    Education provided:   -role of OT, goals for OT, scheduling/cancellations, insurance limitations with patient.  -Additional Education provided: realistic goal expectations set    Assessment     Sheila Zarco is a 73 y.o. female referred to outpatient occupational therapy and presents with a medical diagnosis of dizziness and imbalance described as a wooziness sensation and is mainly triggered by sit>stands and lifting head from computer/phone. Main deficits included mildly impaired gaze stabilization and static balance impairment with vestibular biased conditions. Ocular coordination was WFL, and central signs did not elicit symptoms. VOR was just outside of normal limits as suggested by a 3 line difference between static and dynamic head movements on the DVA (normal = 1-2 line difference). Pt passed conditions 1-3 of the MCTSIB, but had an average of 5 seconds on condition 4 (static stance EC on compliant surface). Score of 27/30 on the FGA does not place pt in elevated risk for falls category, but some dizziness was reported with walking with vertical HT, and pt demonstrated difficulty with tandem and backwards walking. Pt was clear for BPPV in all canals. After completing objective measures, pt's subjective symptoms/complaints appear more so consistent with cardiac rather than inner ear related dizziness. Pt is scheduled for further cardiac testing in the upcoming weeks. Realistic goal expectations were set. However, following medical record review it is determined that patient will benefit from vestibular occupational therapy services for balance re-training, to improve gaze stabilization, and to trial motion tolerance interventions to address subjective complaints of dizziness with positional changes.     Patient prognosis is  Fair due to unknown etiology of dizziness symptoms   Patient will benefit from skilled outpatient Occupational Therapy to address the deficits stated above and in the chart below, provide patient/family education, and to maximize patient's level of independence.     Plan of care discussed with patient: Yes  Patient's spiritual, cultural and educational needs considered and patient is agreeable to the plan of care and goals as stated below:     Anticipated Barriers for therapy: none noted     Medical Necessity is demonstrated by the following  Occupational Profile/History  Co-morbidities and personal factors that may impact the plan of care [x] LOW: Brief chart review  [] MODERATE: Expanded chart review   [] HIGH: Extensive chart review    Moderate / High Support Documentation: N/A     Examination  Performance deficits relating to physical, cognitive or psychosocial skills that result in activity limitations and/or participation restrictions  [x] LOW: addressing 1-3 Performance deficits  [] MODERATE: 3-5 Performance deficits  [] HIGH: 5+ Performance deficits (please support below)    Moderate / High Support Documentation:    Physical:  Balance, gaze stabilization, motion tolerance     Cognitive:  No Deficits    Psychosocial:    No Deficits     Treatment Options [x] LOW: Limited options  [] MODERATE: Several options  [] HIGH: Multiple options      Decision Making/ Complexity Score: low       The following goals were discussed with the patient and patient is in agreement with them as to be addressed in the treatment plan.     Goals:  Short Term Goals=Long Term Goals: 5 weeks   1) Pt will be independent with oculomotor and/or habituation home exercise/activity program.  2) Patient to perform VORx1 at 130 bpm WFL for improved gaze stabilization.  3) Patient will improve dynamic visual acuity (DVA) to 1-2 line difference to demonstrate improved gaze stabilization needed for driving, work, and leisure tasks.  4) Pt to  improve MCTSIB condition 4 to at least 20 seconds for improved static postural balance in low vision environments and/or on uneven surfaces   5) Pt to perform transitional movements, safely retrieve items from floor, load/unload washing machine/, and scan environment during functional mobility with little to no onset of dizziness or LOB    Plan     Certification Period/Plan of care expiration: 5 visits; 4/22/2024 to around 5/31/2024 but possibly later secondary to scheduling constraints.    Outpatient Occupational Therapy 1 times weekly for 5 weeks to include the following interventions: Neuromuscular Re-ed, Patient Education, Self Care, Therapeutic Activities, and Therapeutic Exercise.    Janet Shaw, OT      I certify the need for these services furnished under this plan of treatment and while under my care.  ____________________________________ Physician/Referring Practitioner   Date of Signature

## 2024-04-28 PROBLEM — E55.9 VITAMIN D DEFICIENCY: Status: ACTIVE | Noted: 2024-04-28

## 2024-04-28 NOTE — PROGRESS NOTES
Subjective:       Patient ID: Sheila Zarco is a 73 y.o. female.    Chief Complaint: Hypothyroidism, Hyperlipidemia (5 mos wlabs.  ), and Dizziness (Saw therapy. Yesterday first visit. )    HPI  The patient presents for follow-up of medical conditions which include asthma, GERD, hypothyroidism vitamin-D deficiency.  The patient admits she has periods of anxiety.  She has been helping her son to recover from alcoholism.  He has been getting outpatient therapy.    Her asthma has been stable with no recent exacerbations.    Reflux symptoms have been controlled on current therapy omeprazole.    The patient's feels her weight has remained stable.  She does not exercise regularly.  We discussed performing home blood pressure monitoring.  She has not been diagnosed with hypertension.    Review of Systems   Constitutional:  Negative for activity change, appetite change, fatigue and unexpected weight change.   Eyes:  Negative for visual disturbance.   Respiratory:  Negative for cough and shortness of breath.    Cardiovascular:  Negative for chest pain, palpitations and leg swelling.   Gastrointestinal:  Negative for abdominal pain, blood in stool, constipation and diarrhea.   Genitourinary:  Negative for dysuria and hematuria.   Musculoskeletal:  Negative for arthralgias, neck pain and neck stiffness.   Integumentary:  Negative for rash.   Neurological:  Negative for dizziness, syncope and headaches.   Psychiatric/Behavioral:  Negative for sleep disturbance. The patient is nervous/anxious (situational anxiety was discussed.).             Physical Exam  Vitals and nursing note reviewed.   Constitutional:       General: She is not in acute distress.     Appearance: Normal appearance. She is well-developed.   HENT:      Head: Normocephalic and atraumatic.   Eyes:      General: No scleral icterus.     Extraocular Movements: Extraocular movements intact.      Conjunctiva/sclera: Conjunctivae normal.   Neck:      Thyroid: No  thyromegaly.      Vascular: No JVD.   Cardiovascular:      Rate and Rhythm: Normal rate and regular rhythm.      Heart sounds: Normal heart sounds. No murmur heard.     No friction rub. No gallop.   Pulmonary:      Effort: Pulmonary effort is normal. No respiratory distress.      Breath sounds: Normal breath sounds. No wheezing or rales.   Abdominal:      General: Bowel sounds are normal.      Palpations: Abdomen is soft. There is no mass.      Tenderness: There is no abdominal tenderness.   Musculoskeletal:         General: No tenderness. Normal range of motion.      Cervical back: Normal range of motion and neck supple.      Right lower leg: No edema.      Left lower leg: No edema.   Lymphadenopathy:      Cervical: No cervical adenopathy.   Skin:     General: Skin is warm and dry.      Findings: No rash.   Neurological:      Mental Status: She is alert and oriented to person, place, and time.   Psychiatric:         Mood and Affect: Mood normal.         Behavior: Behavior normal.               Assessment & Plan:      Sheila was seen today for follow-up.  Elevated blood pressure reading was noted today.  The patient has been encouraged to perform pressure monitoring.  She should also reduce her sodium intake.  A blood pressure recheck is recommended in 1 month particularly if her home blood pressure values are elevated.    Annual follow-up with lab studies is recommended.    Diagnoses and all orders for this visit:    Mild intermittent asthma without complication    Other hyperlipidemia    Anxiety    Acquired hypothyroidism    Gastroesophageal reflux disease, unspecified whether esophagitis present    Blood pressure elevated without history of HTN    Vitamin D deficiency         Follow up in about 1 year (around 4/23/2025).     Farhat Correa MD

## 2024-05-08 NOTE — PROGRESS NOTES
"Occupational Therapy Treatment Note     Date: 5/9/2024  Name: Sheila Zarco  Clinic Number: [id number]    Therapy Diagnosis:   Encounter Diagnoses   Name Primary?    Dizziness Yes    Impairment of balance      Physician: Mana Gerard MD    Physician Orders: Eval and Treat - Vestibular Therapy   Medical Diagnosis:   R42 (ICD-10-CM) - Dizziness   R26.89 (ICD-10-CM) - Imbalance   Evaluation Date: 4/22/2024  Insurance Authorization Period Expiration: 12/31/2024  Plan of Care Certification Period: 5 visits; to around 5/31/2024 but possibly later secondary to scheduling constraints   Date of Return to MD: 5/14/2024 stress test; 6/10/2024 neurology; 6/14/2024 cardiology     Visit # / Visits authorized: 1 / 20 (+eval)  Time In: 0852  Time Out: 0937  Total Billable (one on one) Time: 45 minutes    Precautions: Standard    Subjective     Pt reports: she feels dizziness has been a little better. She reported the latest significant episode was a week and a half ago. [place] It passed after about 20 seconds.   Response to previous treatment: first f/u   Functional change: first f/u     Date of Onset: 3 yrs ago     Pain: 0/10  Location: N/A    Patient's Goals for Therapy: "to not have this dizziness"     Objective      Sheila participated in neuromuscular re-education activities to improve: Balance and gaze stabilization for 37 minutes. The following activities were included:  Oculomotor:  - VORx1, horizontal/vertical directions, 2 x 30 seconds at 120 bpm     Balance:  - Static stance on firm surface, arms at side EC, 2 x 30 seconds; SBA  - Static stance on cushion, arms at side EO, 1 x 30 seconds; SBA  - Static stance on cushion, arms at side EC, 3 x 30 seconds; SBA & CGA 1x  - Horizontal/vertical/diagonal HT standing on cushion EO, 1 x 30 seconds each; DONALD    Sheila participated in dynamic functional therapeutic activities to improve functional performance for 8 minutes, " including:  Motion Tolerance:  - Sit<>stands combined with reciprocal ball toss. 2 x 1 minute  - Seated in chair, reciprocal ball roll<>independent overhead ball toss, 1 x 1 minute     Home Exercises and Education Provided      Education provided:   - HEP  - Progress towards goals    Written Home Exercises Provided: yes.  Exercises were reviewed and Sheila was able to demonstrate them prior to the end of the session.    Sheila demonstrated good  understanding of the education provided.     See EMR under Patient Instructions for exercises provided  5/9/2024 . (VORx1, EC balance, habituation)     Assessment      Pt tolerated first follow up session well. Dizziness symptoms do still appear to be more so cardiac related. She had to reschedule her stress test, which is now scheduled for 5/14/2024. However, interventions focused on gaze stabilization and EC balance, which were impaired on initial evaluation. Also incorporated habituation interventions to trial benefit. Pt had difficulty coordinating HT during VOR, but no dizziness was reported. Slight sway with EC conditions on firm and compliant surfaces, but EC balance on cushion was better today as compared to evaluation. 3/3 trials completed with SBA and CGA only once. Of note, better shoes were worn today (tennis shoes vs flip flops). Dizziness that pt is typically used to, described as feeling like the blood leaves her head with accompanied wooziness, was not triggered. Pt only felt occasional imbalance. Pt would continue to benefit from skilled vestibular occupational therapy services for balance re-training, to improve gaze stabilization, and to trial motion tolerance interventions to address subjective complaints of dizziness with positional changes.      Sheila is progressing well towards her goals and there are no updates to goals at this time. Pt prognosis is Fair.     Pt will continue to benefit from skilled outpatient occupational therapy to address  the deficits listed in the problem list on initial evaluation provide pt/family education and to maximize pt's level of independence in the home and community environment.     Pt's spiritual, cultural and educational needs considered and pt agreeable to plan of care and goals.    Anticipated barriers to occupational therapy: none noted    Goals:  Short Term Goals=Long Term Goals: 5 weeks   1) Pt will be independent with oculomotor and/or habituation home exercise/activity program. ongoing  2) Patient to perform VORx1 at 130 bpm WFL for improved gaze stabilization. ongoing  3) Patient will improve dynamic visual acuity (DVA) to 1-2 line difference to demonstrate improved gaze stabilization needed for driving, work, and leisure tasks. ongoing  4) Pt to improve MCTSIB condition 4 to at least 20 seconds for improved static postural balance in low vision environments and/or on uneven surfaces ongoing  5) Pt to perform transitional movements, safely retrieve items from floor, load/unload washing machine/, and scan environment during functional mobility with little to no onset of dizziness or LOB ongoing    Plan     Certification Period/Plan of care expiration: 5 visits; 4/22/2024 to around 5/31/2024 but possibly later secondary to scheduling constraints.     Outpatient Occupational Therapy 1 times weekly for 5 weeks to include the following interventions: Neuromuscular Re-ed, Patient Education, Self Care, Therapeutic Activities, and Therapeutic Exercise.    Updates/Grading for next session: progress/cont VOR, EC balance - on cushion and with HT (add EC HT on firm surface), walking with HT, tandem stance/walking, backwards walk, motion tolerance including sit>stands, bending, and looking up      Janet Shaw, OT

## 2024-05-09 ENCOUNTER — CLINICAL SUPPORT (OUTPATIENT)
Dept: REHABILITATION | Facility: HOSPITAL | Age: 74
End: 2024-05-09
Payer: MEDICARE

## 2024-05-09 DIAGNOSIS — R26.89 IMPAIRMENT OF BALANCE: ICD-10-CM

## 2024-05-09 DIAGNOSIS — R42 DIZZINESS: Primary | ICD-10-CM

## 2024-05-09 PROCEDURE — 97530 THERAPEUTIC ACTIVITIES: CPT | Mod: PO

## 2024-05-09 PROCEDURE — 97112 NEUROMUSCULAR REEDUCATION: CPT | Mod: PO

## 2024-05-09 NOTE — PATIENT INSTRUCTIONS
Gaze Stabilization: Tip Card    1.Target must remain in focus, not blurry, and appear stationary while head is in motion.  2.Perform exercises with small head movements (45° to either side of midline).  3.Increase speed of head motion so long as target is in focus.  4.If you wear eyeglasses, be sure you can see target through lens (therapist will give specific instructions for bifocal / progressive lenses).  5.These exercises may provoke dizziness or nausea. Work through these symptoms. If too dizzy, slow head movement slightly. Rest between each exercise.  6.Exercises demand concentration; avoid distractions.  7.For safety, perform standing exercises close to a counter, wall, corner, or next to someone.    Copyright © [employer]. All rights reserved.       Gaze Stabilization (VORx1): Sitting        Keeping eyes on target on wall \_10N feet away, tilt head down 15-30° and move head side to side for 30 seconds. Repeat while moving head up and down for 30 seconds.  Do 2 sessions per day.    Copyright © [employer]. All rights reserved.     To pull up metronome on phone:   - Go to internet or [employer]  - Search metronome  - It will pop up and look like this     - Set the speed to 120 and press play. Make sure your volume is up  - Press the pause button to stop it.     BALANCE - PERFORM ALL STANDING IN THE CORNER OF A ROOM WITH A STABLE CHAIR IN FRONT OF YOU FOR SAFETY         1) Standing on floor, eyes closed. 30 seconds.   2) Standing on soft surface (pillow or cushion), eyes open. 30 seconds  3) Standing on soft surface, eyes closed. 30 seconds         1) Stand on soft surface and perform side/side, up and down, and diagonal head turns with eyes open. 30 seconds each direction. Perform 1 time a day.     Retrieved from:

## 2024-05-14 ENCOUNTER — HOSPITAL ENCOUNTER (OUTPATIENT)
Dept: CARDIOLOGY | Facility: HOSPITAL | Age: 74
Discharge: HOME | End: 2024-05-14
Attending: INTERNAL MEDICINE
Payer: MEDICARE

## 2024-05-14 LAB
ASCENDING AORTA: 3.32 CM
CV ECHO LV RWT: 0.46 CM
CV STRESS BASE HR: 74 BPM
DIASTOLIC BLOOD PRESSURE: 62 MMHG
DOP CALC LVOT AREA: 4.2 CM2
DOP CALC LVOT DIAMETER: 2.32 CM
DOP CALC LVOT PEAK VEL: 1.47 M/S
DOP CALC LVOT STROKE VOLUME: 125.53 CM3
DOP CALCLVOT PEAK VEL VTI: 29.71 CM
E WAVE DECELERATION TIME: 182.45 MSEC
E/A RATIO: 1.37
E/E' RATIO: 10.25 M/S
ECHO LV POSTERIOR WALL: 1.02 CM (ref 0.6–1.1)
FRACTIONAL SHORTENING: 31 % (ref 28–44)
INTERVENTRICULAR SEPTUM: 1.03 CM (ref 0.6–1.1)
LA MAJOR: 3.93 CM
LA MINOR: 4.31 CM
LA WIDTH: 2.58 CM
LEFT ATRIUM SIZE: 2.98 CM
LEFT ATRIUM VOLUME MOD: 28.48 CM3
LEFT ATRIUM VOLUME: 26.87 CM3
LEFT INTERNAL DIMENSION IN SYSTOLE: 3.09 CM (ref 2.1–4)
LEFT VENTRICLE DIASTOLIC VOLUME: 89.86 ML
LEFT VENTRICLE SYSTOLIC VOLUME: 37.48 ML
LEFT VENTRICULAR INTERNAL DIMENSION IN DIASTOLE: 4.45 CM (ref 3.5–6)
LEFT VENTRICULAR MASS: 155.77 G
LV LATERAL E/E' RATIO: 10.25 M/S
LV SEPTAL E/E' RATIO: 10.25 M/S
MV A" WAVE DURATION": 12.84 MSEC
MV PEAK A VEL: 0.6 M/S
MV PEAK E VEL: 0.82 M/S
MV STENOSIS PRESSURE HALF TIME: 52.91 MS
MV VALVE AREA P 1/2 METHOD: 4.16 CM2
OHS CV CPX 1 MINUTE RECOVERY HEART RATE: 106 BPM
OHS CV CPX 85 PERCENT MAX PREDICTED HEART RATE MALE: 125
OHS CV CPX ESTIMATED METS: 5
OHS CV CPX MAX PREDICTED HEART RATE: 147
OHS CV CPX PATIENT IS FEMALE: 1
OHS CV CPX PATIENT IS MALE: 0
OHS CV CPX PEAK DIASTOLIC BLOOD PRESSURE: 129 MMHG
OHS CV CPX PEAK HEAR RATE: 129 BPM
OHS CV CPX PEAK RATE PRESSURE PRODUCT: NORMAL
OHS CV CPX PEAK SYSTOLIC BLOOD PRESSURE: 224 MMHG
OHS CV CPX PERCENT MAX PREDICTED HEART RATE ACHIEVED: 91
OHS CV CPX RATE PRESSURE PRODUCT PRESENTING: NORMAL
OHS CV RV/LV RATIO: 0.52 CM
OHS LV EJECTION FRACTION SIMPSONS BIPLANE MOD: 64 %
PISA TR MAX VEL: 1.82 M/S
PULM VEIN S/D RATIO: 0.72
PV PEAK D VEL: 0.47 M/S
PV PEAK S VEL: 0.34 M/S
RA MAJOR: 3.31 CM
RA PRESSURE ESTIMATED: 3 MMHG
RA WIDTH: 2.44 CM
RIGHT VENTRICULAR END-DIASTOLIC DIMENSION: 2.33 CM
RV TB RVSP: 5 MMHG
SINUS: 3.56 CM
STJ: 2.78 CM
STRESS ECHO POST EXERCISE DUR MIN: 4 MINUTES
STRESS ECHO POST EXERCISE DUR SEC: 43 SECONDS
SYSTOLIC BLOOD PRESSURE: 159 MMHG
TDI LATERAL: 0.08 M/S
TDI SEPTAL: 0.08 M/S
TDI: 0.08 M/S
TR MAX PG: 13 MMHG
TRICUSPID ANNULAR PLANE SYSTOLIC EXCURSION: 2.41 CM
TV REST PULMONARY ARTERY PRESSURE: 16 MMHG

## 2024-05-14 PROCEDURE — 93351 STRESS TTE COMPLETE: CPT

## 2024-05-14 PROCEDURE — 93351 STRESS TTE COMPLETE: CPT | Mod: 26,,, | Performed by: INTERNAL MEDICINE

## 2024-05-15 ENCOUNTER — TELEPHONE (OUTPATIENT)
Dept: SURGERY | Facility: CLINIC | Age: 74
End: 2024-05-15
Payer: MEDICARE

## 2024-05-15 NOTE — PROGRESS NOTES
"Occupational Therapy Treatment Note     Date: 5/16/2024  Name: Sheila Zarco  Clinic Number: [id number]    Therapy Diagnosis:   Encounter Diagnoses   Name Primary?    Dizziness Yes    Impairment of balance        Physician: Mana Gerard MD    Physician Orders: Eval and Treat - Vestibular Therapy   Medical Diagnosis:   R42 (ICD-10-CM) - Dizziness   R26.89 (ICD-10-CM) - Imbalance   Evaluation Date: 4/22/2024  Insurance Authorization Period Expiration: 12/31/2024  Plan of Care Certification Period: 5 visits; to around 5/31/2024 but possibly later secondary to scheduling constraints   Date of Return to MD: 5/14/2024 stress test; 6/10/2024 neurology; 6/14/2024 cardiology     Visit # / Visits authorized: 2 / 20 (+eval)  Time In: 1121  Time Out: 1202  Total Billable (one on one) Time: 41 minutes    Precautions: Standard    Subjective     Pt reports: she had a dizzy spell when she stood up a day or two ago. She had a stress test yesterday. She has not been compliant with HEP. She did her VOR exercise 1 time and has not done balance exercises.   Response to previous treatment: ongoing  Functional change: ongoing    Date of Onset: 3 yrs ago     Pain: 0/10  Location: N/A    Patient's Goals for Therapy: "to not have this dizziness"     Objective      Sheila participated in neuromuscular re-education activities to improve: Balance and gaze stabilization for 31 minutes. The following activities were included:  Oculomotor:  - VORx1, horizontal/vertical directions, 2 x 30 seconds at 120 bpm     Balance:  - Static stance on firm surface, arms at side EC, 1 x 30 seconds; SBA  - Static stance on cushion, arms at side EC, 2 x 30 seconds; SBA   - Horizontal/vertical/diagonal HT standing on cushion EO/EC, 1 x 30 seconds each; SBA EO, SBA-Min A & occasional TD support EC    Sheila participated in dynamic functional therapeutic activities to improve functional performance for 10 minutes, including:  Motion Tolerance:  - " Sit<>stands combined with independent overhead ball toss. 1 x 1 minute  - Sit<>stands combined with vertical HT in stance, 1 x 1 minute  - Walking with horizontal/vertical HT, 2 laps x 80 ft each     Home Exercises and Education Provided      Education provided:   - HEP  - Progress towards goals    Written Home Exercises Provided: yes.  Exercises were reviewed and Sheila was able to demonstrate them prior to the end of the session.    Sheila demonstrated good  understanding of the education provided.     See EMR under Patient Instructions for exercises provided  5/9/2024 . (VORx1, EC balance, habituation)     Assessment      Pt tolerated session well but continues with dizziness episodes described as a lightheadedness sensation mainly with sit>stands. Pt has been non-compliant with VOR and balance exercises; therefore, no progressions were made to HEP. No visual slippage or dizziness with VORx1, but pt does have difficulty dissociating head/neck from trunk movement. Therapist held target, which was helpful. Overall, pt did well with all EC balance conditions. EC HT on cushion were most challenging, specifically in horizontal and LUQ<>RLQ diagonal directions. Dizziness that pt is typically used to, described as feeling like the blood leaves her head with accompanied wooziness, was again not triggered. Static balance has improved with therapy, but as stated previously, pt's symptoms do not appear inner ear related. Pt would continue to benefit from skilled vestibular occupational therapy services through the end of the POC for ongoing balance re-training, to improve gaze stabilization, and to continue trialing motion tolerance interventions to address subjective complaints of dizziness with positional changes.      Sheila is progressing well towards her goals and there are no updates to goals at this time. Pt prognosis is Fair.     Pt will continue to benefit from skilled outpatient occupational therapy to  address the deficits listed in the problem list on initial evaluation provide pt/family education and to maximize pt's level of independence in the home and community environment.     Pt's spiritual, cultural and educational needs considered and pt agreeable to plan of care and goals.    Anticipated barriers to occupational therapy: none noted    Goals:  Short Term Goals=Long Term Goals: 5 weeks   1) Pt will be independent with oculomotor and/or habituation home exercise/activity program. ongoing  2) Patient to perform VORx1 at 130 bpm WFL for improved gaze stabilization. ongoing  3) Patient will improve dynamic visual acuity (DVA) to 1-2 line difference to demonstrate improved gaze stabilization needed for driving, work, and leisure tasks. ongoing  4) Pt to improve MCTSIB condition 4 to at least 20 seconds for improved static postural balance in low vision environments and/or on uneven surfaces ongoing  5) Pt to perform transitional movements, safely retrieve items from floor, load/unload washing machine/, and scan environment during functional mobility with little to no onset of dizziness or LOB ongoing    Plan     Certification Period/Plan of care expiration: 5 visits; 4/22/2024 to around 5/31/2024 but possibly later secondary to scheduling constraints.     Outpatient Occupational Therapy 1 times weekly for 5 weeks to include the following interventions: Neuromuscular Re-ed, Patient Education, Self Care, Therapeutic Activities, and Therapeutic Exercise.    Updates/Grading for next session: progress/cont VOR, EC balance - on cushion and with HT (add EC HT on firm surface), walking with HT, tandem stance/walking, backwards walk, motion tolerance including sit>stands, bending, and looking up      Janet Shaw, OT

## 2024-05-15 NOTE — TELEPHONE ENCOUNTER
I will have to talk to my provider first before confirming to schedule this procedure at this time , I will follow up with a solution, thank you

## 2024-05-16 ENCOUNTER — CLINICAL SUPPORT (OUTPATIENT)
Dept: REHABILITATION | Facility: HOSPITAL | Age: 74
End: 2024-05-16
Payer: MEDICARE

## 2024-05-16 DIAGNOSIS — R26.89 IMPAIRMENT OF BALANCE: ICD-10-CM

## 2024-05-16 DIAGNOSIS — R42 DIZZINESS: Primary | ICD-10-CM

## 2024-05-16 PROCEDURE — 97530 THERAPEUTIC ACTIVITIES: CPT | Mod: PO

## 2024-05-16 PROCEDURE — 97112 NEUROMUSCULAR REEDUCATION: CPT | Mod: PO

## 2024-05-24 RX ORDER — ALPRAZOLAM 0.5 MG/1
TABLET ORAL
Qty: 60 TABLET | Refills: 1 | Status: SHIPPED | OUTPATIENT
Start: 2024-05-24

## 2024-05-24 NOTE — TELEPHONE ENCOUNTER
No care due was identified.  [facility] Embedded Care Due Messages. Reference number: [id number].   5/24/2024 3:30:02 PM CDT

## 2024-06-03 ENCOUNTER — LAB VISIT (OUTPATIENT)
Dept: LAB | Facility: HOSPITAL | Age: 74
End: 2024-06-03
Attending: STUDENT IN AN ORGANIZED HEALTH CARE EDUCATION/TRAINING PROGRAM
Payer: MEDICARE

## 2024-06-03 DIAGNOSIS — R78.71 ELEVATED BLOOD LEAD LEVEL: ICD-10-CM

## 2024-06-03 PROCEDURE — 36415 COLL VENOUS BLD VENIPUNCTURE: CPT | Mod: PO | Performed by: STUDENT IN AN ORGANIZED HEALTH CARE EDUCATION/TRAINING PROGRAM

## 2024-06-04 LAB
CITY: ABNORMAL
COUNTY: ABNORMAL
GUARDIAN FIRST NAME: ABNORMAL
GUARDIAN LAST NAME: ABNORMAL
LEAD BLD-MCNC: 4.8 MCG/DL
PHONE #: ABNORMAL
POSTAL CODE: ABNORMAL
RACE: ABNORMAL
STATE OF RESIDENCE: ABNORMAL
STREET ADDRESS: ABNORMAL

## 2024-06-10 ENCOUNTER — PATIENT MESSAGE (OUTPATIENT)
Dept: DERMATOLOGY | Facility: CLINIC | Age: 74
End: 2024-06-10
Payer: MEDICARE

## 2024-06-10 ENCOUNTER — OFFICE VISIT (OUTPATIENT)
Dept: NEUROLOGY | Facility: CLINIC | Age: 74
End: 2024-06-10
Payer: MEDICARE

## 2024-06-10 ENCOUNTER — LAB VISIT (OUTPATIENT)
Dept: LAB | Facility: OTHER | Age: 74
End: 2024-06-10
Attending: STUDENT IN AN ORGANIZED HEALTH CARE EDUCATION/TRAINING PROGRAM
Payer: MEDICARE

## 2024-06-10 VITALS
HEART RATE: 82 BPM | BODY MASS INDEX: 25.86 KG/M2 | SYSTOLIC BLOOD PRESSURE: 159 MMHG | DIASTOLIC BLOOD PRESSURE: 70 MMHG | HEIGHT: 66 IN | WEIGHT: 160.94 LBS

## 2024-06-10 DIAGNOSIS — R51.9 NEW ONSET HEADACHE: ICD-10-CM

## 2024-06-10 DIAGNOSIS — R20.8 VIBRATION SENSORY LOSS: ICD-10-CM

## 2024-06-10 DIAGNOSIS — R42 DIZZINESS AND GIDDINESS: ICD-10-CM

## 2024-06-10 DIAGNOSIS — R42 DIZZINESS AND GIDDINESS: Primary | ICD-10-CM

## 2024-06-10 DIAGNOSIS — R26.89 IMBALANCE: ICD-10-CM

## 2024-06-10 LAB
CREAT SERPL-MCNC: 0.8 MG/DL (ref 0.5–1.4)
EST. GFR  (NO RACE VARIABLE): >60 ML/MIN/1.73 M^2

## 2024-06-10 PROCEDURE — 99999 PR PBB SHADOW E&M-EST. PATIENT-LVL III: CPT | Mod: PBBFAC,,, | Performed by: STUDENT IN AN ORGANIZED HEALTH CARE EDUCATION/TRAINING PROGRAM

## 2024-06-10 PROCEDURE — 99214 OFFICE O/P EST MOD 30 MIN: CPT | Mod: S$PBB,,, | Performed by: STUDENT IN AN ORGANIZED HEALTH CARE EDUCATION/TRAINING PROGRAM

## 2024-06-10 PROCEDURE — 99213 OFFICE O/P EST LOW 20 MIN: CPT | Mod: PBBFAC | Performed by: STUDENT IN AN ORGANIZED HEALTH CARE EDUCATION/TRAINING PROGRAM

## 2024-06-10 PROCEDURE — 36415 COLL VENOUS BLD VENIPUNCTURE: CPT | Performed by: STUDENT IN AN ORGANIZED HEALTH CARE EDUCATION/TRAINING PROGRAM

## 2024-06-10 PROCEDURE — 82565 ASSAY OF CREATININE: CPT | Performed by: STUDENT IN AN ORGANIZED HEALTH CARE EDUCATION/TRAINING PROGRAM

## 2024-06-10 NOTE — PROGRESS NOTES
Patient ID: [id number]  Chief Complaint/Reason for Consult: f/u on dizziness     Subjective:     HPI:  Sheila Zarco is a 73 y.o. RH female with GERD, hyperlipidemia, and hypothyroisdism. she is presenting today for f/u on dizziness.     Interval history (06/10/2024):  Slightly feeling better. Did not complete PT course.  MRI was unremarkable, labs showed elevated lead levels, repeat levels are trending down.    Initial HPI (3/6/2024):  Symptoms started 3 years ago but they are becoming more frequent. Room is not spinning but she feels she is moving, lasts few seconds.  Always occurs when getting up from a sitting position or lifting her head up from her iPad or phone. This has led to near falls. Unsure if meclizine helped.   The episode passes after a few seconds. No nausea but occasional palpitation. She had one episode of chest pain that required ER visit.  She can't tell if she has SOB with the episodes since she has asthma and SOB at baseline.   She also has new headaches, bifrontal and bitemporal, more pressure like. Almost daily, takes aspirin for relief, can last several hours    Review of Systems:  Review of Systems   HENT:  Positive for tinnitus.    Eyes:  Negative for photophobia.   Respiratory:  Positive for shortness of breath.    Cardiovascular:  Positive for chest pain, palpitations and leg swelling.   Gastrointestinal:  Negative for nausea and vomiting.   Musculoskeletal:  Negative for falls.   Neurological:  Positive for dizziness and headaches. Negative for sensory change and weakness.   All other systems reviewed and are negative.     Past Medical History:  -------------------------------------    Asthma    childhood    Baker's cyst    Breast cancer    DCIS left    Cataract    Glaucoma    Thyroid disease     Allergies:  Review of patient's allergies indicates:  No Known Allergies    Pertinent Family History:  Family History   Problem Relation Name Age of Onset    Heart disease Mother       Heart disease Father      Liver disease Brother      Breast cancer Sister      Breast cancer Daughter  43        bilateral mastectomy    No Known Problems Sister      No Known Problems Maternal Grandmother      No Known Problems Maternal Grandfather      No Known Problems Paternal Grandmother      No Known Problems Paternal Grandfather      No Known Problems Maternal Aunt      No Known Problems Maternal Uncle      No Known Problems Paternal Aunt      No Known Problems Paternal Uncle      Breast cancer Other niece     Colon cancer Neg Hx      Ovarian cancer Neg Hx      Melanoma Neg Hx      Lupus Neg Hx      Acne Neg Hx      Amblyopia Neg Hx      Blindness Neg Hx      Cancer Neg Hx      Cataracts Neg Hx      Diabetes Neg Hx      Glaucoma Neg Hx      Hypertension Neg Hx      Macular degeneration Neg Hx      Retinal detachment Neg Hx      Strabismus Neg Hx      Stroke Neg Hx      Thyroid disease Neg Hx         Pertinent Social History:  Social History     Tobacco Use    Smoking status: Never    Smokeless tobacco: Never   Substance Use Topics    Alcohol use: Yes     Alcohol/week: 8.0 standard drinks of alcohol     Types: 4 Glasses of wine, 4 Cans of beer per week     Comment: drinks 4 days a week- >1 drink    Drug use: No       Medications:  Current Outpatient Medications   Medication Instructions    acetic acid (VOSOL) 2 % otic solution Apply 4 drops to affected ear tid prn flare    albuterol (ACCUNEB) 0.63 mg, Nebulization, Every 6 hours PRN    albuterol (PROVENTIL/VENTOLIN HFA) 90 mcg/actuation inhaler 2 puffs, Inhalation, Every 6 hours PRN    albuterol (VENTOLIN HFA) 90 mcg/actuation inhaler 2 puffs, Inhalation, Every 6 hours PRN, Rescue    ALPRAZolam (XANAX) 0.5 MG tablet TAKE 1 TABLET BY MOUTH TWICE A DAY AS NEEDED    aspirin (ECOTRIN) 81 mg, Oral, Every morning    atorvastatin (LIPITOR) 40 mg, Oral, Daily, For cholesterol control.    desoximetasone (TOPICORT) 0.25 % cream Topical, 2 times daily,       EScitalopram oxalate (LEXAPRO) 20 mg, Oral, Daily, For anxiety.    fluocinonide (LIDEX) 0.05 % external solution AAA scalp qday prn itching, scaling    fluticasone-salmeterol diskus inhaler 250-50 mcg 1 puff, Inhalation, 2 times daily    hyoscyamine (ANASPAZ,LEVSIN) 125 mcg, Oral, Every 6 hours PRN    ketoconazole (NIZORAL) 2 % cream AAA twice daily on nose prn flare    ketoconazole (NIZORAL) 2 % cream aaa in ears qhs  prn flare    ketoconazole (NIZORAL) 2 % shampoo WASH HAIR W/ MEDICATED SHAMPOO AT LEAST 2X WEEK, LET SIT ON SCALP FOR ATLEAST 5MINUTES BEFORE RINSIN    levocetirizine (XYZAL) 5 mg, Oral, Nightly, For sinus allergy symptoms    levothyroxine (SYNTHROID) 88 mcg, Oral, Every morning    meclizine (ANTIVERT) 25 mg, Oral, 3 times daily PRN    nortriptyline (PAMELOR) 10 mg, Oral, Nightly    pantoprazole (PROTONIX) 40 MG tablet TAKE 1 TABLET BY MOUTH EVERY DAY    predniSONE (DELTASONE) 20 MG tablet Take 3 tabs daily x 5 days; 2 tabs daily x 5 days; then 1 tab daily until completed for asthma flare.    triamcinolone acetonide 0.025% (KENALOG) 0.025 % cream AAA qd to face, ears prn flare    valACYclovir (VALTREX) 500 MG tablet TAKE ONE TABLET TWICE A DAY AS NEEDED FOR OUTBREAK    vitamin D (VITAMIN D3) 1000 units Tab Oral, Daily, Pt takes 50 mcg daily OTC       Objective:     Vitals:    06/10/24 1149   BP: (!) 159/70   Pulse: 82     General:  Well-appearing, well-nourished, NAD, cooperative    Neurologic Exam:   Awake, alert and oriented x3  Speech spontaneous and fluent, intact comprehension.   Adequate fund of knowledge, vocabulary.  EOM intact. No ophthalmoplegia.   Facial expression is full and symmetric.   Hearing is intact.    Motor:  Paratonia in BUE.   Minimal postural and action tremor in BUE.  Antigravity in all 4 extremities.     Coordination:  Finger to nose with endpoint tremor bilaterally.  Normal fine finger movements and rapid alternating movements.    Gait:  Normal casual gait. No shuffling, no  freezing on turns.  Difficulty with tandem gait.  Romberg with swaying but no fall.     Pertinent lab results  Lab Results   Component Value Date    ALPHATOCOPHE 1197 03/06/2024    [id number] 425 03/06/2024    [id number] 33 03/22/2022    ZINC 66 03/06/2024    COPPER 1286 03/06/2024     Lab Results   Component Value Date    ARSENICBLD <1 03/06/2024    LEADBLOOD 4.8 (H) 06/03/2024    CADMIUM <0.2 03/06/2024    MERCURYBLOOD <1 03/06/2024     Lab Results   Component Value Date    PATHINTPSPE REVIEWED 03/06/2024    PATHINTPSIF REVIEWED 03/06/2024     Lab Results   Component Value Date    SEDRATE 9 03/02/2020     Lab Results   Component Value Date    [id number] Non-reactive 02/21/2024     Lab Results   Component Value Date    TSH 1.819 02/21/2024    FREET4 1.00 05/12/2020    WBC 6.01 04/16/2024    LYMPH 2.1 04/16/2024    LYMPH 34.9 04/16/2024    RBC 4.17 04/16/2024    HGB 12.0 04/16/2024    HCT 38.0 04/16/2024    MCV 91 04/16/2024     04/16/2024     04/16/2024    K 4.3 04/16/2024    CO2 25 04/16/2024    BUN 18 04/16/2024    CREATININE 0.8 04/16/2024    CALCIUM 9.2 04/16/2024    AST 17 04/16/2024    ALT 13 04/16/2024     Pertinent imaging results    *Images personally reviewed and interpreted:  4/3/2024  MRI IAC/Temporal bone w/wo contrast:  No acute intracranial pathologies    3/5/2020  CTA Head & Nec:  CTA head: Unremarkable CTA of the head specifically without evidence for proximal significant stenosis or occlusion.  CTA neck: Atherosclerotic plaquing of the arch and origin the great vessels.  There is mild irregularity and narrowing the left vertebral artery at the origin with subclavian artery concerning for mild stenosis.  Otherwise unremarkable CTA neck.  There is less than 50% proximal ICA stenosis by NASCET criteria.  Degenerative change cervical spine  Few subcentimeter nodules left lung apex may represent scarring atelectasis with large measuring nodule 0.5 cm.  Clinical correlation and follow-up  CT thorax as detailed above.  CT head: No evidence for acute intracranial hemorrhage or definite abnormal parenchymal enhancement.    Other pertinent studies  None    Assessment:   Sheila Zarco is a 73 y.o. right-handed female with GERD, hyperlipidemia, and hypothyroidism among other co morbidities who had initially presented with episodic positional dizziness/disequilibrium x 3 years. She was evaluated by PT who ruled out vestibulopathy as the etiology, I'd still believe she would benefit from gait and balance training. MRI of brain was unremarkable, orthostatic vital signs were unremarkable today. We will obtain CTA to assess the vertebrobasilar system as a potential etiology to the positional dizziness.     1. Dizziness and giddiness    2. New onset headache      Plan:     - CTA Head and Neck (xpd); Future  - encouraged to resume PT to work on balance and gait  - continue to monitor lead levels with PCP  - Creatinine, serum; Future        Disclaimer: This note was partly generated using dictation software which may occasionally result in transcription errors that are missed on review.    Based on our encounter today, my overall Medical Decision Making is a Level 4     Complexity of Problem: Moderate (1 or more chronic illnesses with exacerbation, progression or treatment side effects)  Complexity of Data: Moderate (Review of >3 unique test results, Ordering each unique test, and Independent interpretation of tests)  Risk of Complications and/or morbidity/mortality of Management: Low risk of morbidity from additional diagnostic testing or treatment        Mana Gerard MD    [facility]  06/10/2024

## 2024-06-10 NOTE — Clinical Note
Chago Gonzales,  Thanks for seeing Ms Zarco. It seems that you ruled out a vestibular etiology which is great, I'd still think she would benefit from some gait/balance training since she has mild ataxia likely due to diminished vibratory sensation in her feet. Will you need a new order or the remaining sessions from previous order would work?  Thank you, GL

## 2024-06-10 NOTE — Clinical Note
Hi Dr. Correa, Just letting you know that Ms Zarco lead levels were mildly elevated in the neurology work up (around 5). Not to the extent that would cause her neurological symptoms but I didn't know if she would need additional work up from your standpoint for this mild elevation.  Thank you, GL

## 2024-06-12 ENCOUNTER — TELEPHONE (OUTPATIENT)
Dept: REHABILITATION | Facility: HOSPITAL | Age: 74
End: 2024-06-12
Payer: MEDICARE

## 2024-06-12 DIAGNOSIS — Z78.0 MENOPAUSE: ICD-10-CM

## 2024-06-12 NOTE — TELEPHONE ENCOUNTER
Attempted to call pt in regards to plan for vestibular occupational therapy. Objective measures along with subjective complaints during initial evaluation were more so consistent with cardiac rather than inner ear related dizziness. However, balance re-training was initiated due to pt difficulty with static stance EC. Habituation interventions for desensitization to movements that elicit symptoms were also trialed but were not beneficial.     After speaking with pt's neurologist, it was decided that pt's balance deficits as a result of vibration sensory loss would be better addressed by a neuro PT. Pt has an initial evaluation scheduled with neuro PT on 7/9/24. For this reason, pt should be discharged from vestibular occupational therapy services.     A voicemail was left for Sheila with the above information along with a call back number. Allowing time for pt to return call prior to formal discharge in the event she wants to be discharged in person. However, if she does not return call by the end of the week she will be discharged.     Janet Shaw, MOT, LOTR  06/12/2024

## 2024-06-13 ENCOUNTER — HOSPITAL ENCOUNTER (OUTPATIENT)
Dept: RADIOLOGY | Facility: HOSPITAL | Age: 74
Discharge: HOME | End: 2024-06-13
Attending: STUDENT IN AN ORGANIZED HEALTH CARE EDUCATION/TRAINING PROGRAM
Payer: MEDICARE

## 2024-06-13 ENCOUNTER — OFFICE VISIT (OUTPATIENT)
Dept: DERMATOLOGY | Facility: CLINIC | Age: 74
End: 2024-06-13
Payer: MEDICARE

## 2024-06-13 DIAGNOSIS — R42 DIZZINESS AND GIDDINESS: ICD-10-CM

## 2024-06-13 DIAGNOSIS — L30.9 DERMATITIS: Primary | ICD-10-CM

## 2024-06-13 DIAGNOSIS — R51.9 NEW ONSET HEADACHE: ICD-10-CM

## 2024-06-13 DIAGNOSIS — L21.9 SEBORRHEIC DERMATITIS, UNSPECIFIED: ICD-10-CM

## 2024-06-13 DIAGNOSIS — L82.0 INFLAMED SEBORRHEIC KERATOSIS: ICD-10-CM

## 2024-06-13 PROCEDURE — 70496 CT ANGIOGRAPHY HEAD: CPT | Mod: TC

## 2024-06-13 PROCEDURE — 70498 CT ANGIOGRAPHY NECK: CPT | Mod: 26,,, | Performed by: STUDENT IN AN ORGANIZED HEALTH CARE EDUCATION/TRAINING PROGRAM

## 2024-06-13 PROCEDURE — 70496 CT ANGIOGRAPHY HEAD: CPT | Mod: 26,,, | Performed by: STUDENT IN AN ORGANIZED HEALTH CARE EDUCATION/TRAINING PROGRAM

## 2024-06-13 PROCEDURE — 99214 OFFICE O/P EST MOD 30 MIN: CPT | Mod: PBBFAC,25,PO | Performed by: DERMATOLOGY

## 2024-06-13 PROCEDURE — 17110 DESTRUCTION B9 LES UP TO 14: CPT | Mod: S$PBB,,, | Performed by: DERMATOLOGY

## 2024-06-13 PROCEDURE — 17110 DESTRUCTION B9 LES UP TO 14: CPT | Mod: PBBFAC,PO | Performed by: DERMATOLOGY

## 2024-06-13 PROCEDURE — 25500020 PHARM REV CODE 255: Performed by: STUDENT IN AN ORGANIZED HEALTH CARE EDUCATION/TRAINING PROGRAM

## 2024-06-13 PROCEDURE — 99999 PR PBB SHADOW E&M-EST. PATIENT-LVL IV: CPT | Mod: PBBFAC,,, | Performed by: DERMATOLOGY

## 2024-06-13 PROCEDURE — 99214 OFFICE O/P EST MOD 30 MIN: CPT | Mod: 25,S$PBB,, | Performed by: DERMATOLOGY

## 2024-06-13 RX ORDER — FLUOCINONIDE TOPICAL SOLUTION USP, 0.05% 0.5 MG/ML
SOLUTION TOPICAL
Qty: 60 ML | Refills: 3 | Status: SHIPPED | OUTPATIENT
Start: 2024-06-13

## 2024-06-13 RX ADMIN — IOHEXOL 100 ML: 350 INJECTION, SOLUTION INTRAVENOUS at 09:06

## 2024-06-13 NOTE — PROGRESS NOTES
Subjective:      Patient ID:  Sheila Zarco is a 73 y.o. female who presents for   Chief Complaint   Patient presents with    Rash     Inner thighs     Patient with new are of concern: rash  Location: inner thighs  Present x 3wks  Red in color  Itching   Previous treatments: keto cream, TAC, clobetasol    Pt also c/o lesion to R flank. Gets caught on clothing.      Also has constant scaling in ears for over a year. Itches.  Uses ketoconazole cream but recus    Rash      Review of Systems   Skin:  Positive for itching and rash.       Objective:   Physical Exam   Constitutional: She appears well-developed and well-nourished. No distress.   Neurological: She is alert and oriented to person, place, and time. She is not disoriented.   Psychiatric: She has a normal mood and affect.   Skin:   Areas Examined (abnormalities noted in diagram):   Head / Face Inspection Performed  Abdomen Inspection Performed  RLE Inspected  LLE Inspection Performed                 Diagram Legend     Erythematous scaling macule/papule c/w actinic keratosis       Vascular papule c/w angioma      Pigmented verrucoid papule/plaque c/w seborrheic keratosis      Yellow umbilicated papule c/w sebaceous hyperplasia      Irregularly shaped tan macule c/w lentigo     1-2 mm smooth white papules consistent with Milia      Movable subcutaneous cyst with punctum c/w epidermal inclusion cyst      Subcutaneous movable cyst c/w pilar cyst      Firm pink to brown papule c/w dermatofibroma      Pedunculated fleshy papule(s) c/w skin tag(s)      Evenly pigmented macule c/w junctional nevus     Mildly variegated pigmented, slightly irregular-bordered macule c/w mildly atypical nevus      Flesh colored to evenly pigmented papule c/w intradermal nevus       Pink pearly papule/plaque c/w basal cell carcinoma      Erythematous hyperkeratotic cursted plaque c/w SCC      Surgical scar with no sign of skin cancer recurrence      Open and closed comedones       Inflammatory papules and pustules      Verrucoid papule consistent consistent with wart     Erythematous eczematous patches and plaques     Dystrophic onycholytic nail with subungual debris c/w onychomycosis     Umbilicated papule    Erythematous-base heme-crusted tan verrucoid plaque consistent with inflamed seborrheic keratosis     Erythematous Silvery Scaling Plaque c/w Psoriasis     See annotation      Assessment / Plan:        Dermatitis- granulomatous/interstitial dermatitis v other  -     fluocinonide (LIDEX) 0.05 % external solution; Mix with lotion and aaa on legs qd- bid  Dispense: 60 mL; Refill: 3  Xyzal /levocitirizine 1x per day orally   Mix  drops with lotion and apply to legs 1-2 x per day     Inflamed seborrheic keratosis  Cryosurgery procedure note:    Verbal consent from the patient is obtained including, but not limited to, risk of hypopigmentation/hyperpigmentation, scar, recurrence of lesion. Liquid nitrogen cryosurgery is applied to 1 lesions to produce a freeze injury. The patient is aware that blisters may form and is instructed on wound care with gentle cleansing and use of vaseline ointment to keep moist until healed. The patient is supplied a handout on cryosurgery and is instructed to call if lesions do not completely resolve.    Seborrheic dermatitis, unspecified  Can put a few drops on finger and apply to ear as needed  Use ketoconazole cream to ears daily for maintenance           Follow up in about 2 months (around 8/13/2024) for TBSE.

## 2024-06-13 NOTE — PATIENT INSTRUCTIONS
Xyzal /levocitirizine 1x per day orally   Mix  drops with lotion and apply to legs 1-2 x per day     Can put a few drops on finger and apply to ear as needed  Use ketoconazole cream to ears daily for maintenance

## 2024-06-14 ENCOUNTER — OFFICE VISIT (OUTPATIENT)
Dept: CARDIOLOGY | Facility: CLINIC | Age: 74
End: 2024-06-14
Payer: MEDICARE

## 2024-06-14 VITALS
RESPIRATION RATE: 20 BRPM | HEIGHT: 66 IN | BODY MASS INDEX: 26.52 KG/M2 | DIASTOLIC BLOOD PRESSURE: 73 MMHG | SYSTOLIC BLOOD PRESSURE: 145 MMHG | HEART RATE: 78 BPM | WEIGHT: 165 LBS

## 2024-06-14 DIAGNOSIS — E78.49 OTHER HYPERLIPIDEMIA: ICD-10-CM

## 2024-06-14 DIAGNOSIS — I70.0 AORTIC ATHEROSCLEROSIS: ICD-10-CM

## 2024-06-14 DIAGNOSIS — R00.2 PALPITATIONS: Primary | ICD-10-CM

## 2024-06-14 DIAGNOSIS — R07.9 CHEST PAIN, UNSPECIFIED TYPE: ICD-10-CM

## 2024-06-14 DIAGNOSIS — I10 HYPERTENSION, UNSPECIFIED TYPE: ICD-10-CM

## 2024-06-14 PROCEDURE — 99999 PR PBB SHADOW E&M-EST. PATIENT-LVL IV: CPT | Mod: PBBFAC,,, | Performed by: INTERNAL MEDICINE

## 2024-06-14 PROCEDURE — 99214 OFFICE O/P EST MOD 30 MIN: CPT | Mod: PBBFAC | Performed by: INTERNAL MEDICINE

## 2024-06-14 RX ORDER — ATORVASTATIN CALCIUM 80 MG/1
80 TABLET, FILM COATED ORAL DAILY
Qty: 90 TABLET | Refills: 3 | Status: SHIPPED | OUTPATIENT
Start: 2024-06-14 | End: 2025-06-09

## 2024-06-14 NOTE — PROGRESS NOTES
HISTORY:    73-year-old female with a history of hyperlipidemia, aortic atherosclerosis, and hypothyroidism presenting for follow-up.    Initially evaluated in March 2024 for intermittent palpitations for 2 years. Episodes are intermittent last minutes to an hour. Variable frequency. Anxiety medicine seems to help.  Had one episode February that was severe with some chest discomfort. Went to the ED with a negative evaluation.     We did a 7 day cardiac monitor and a treadmill stress echocardiogram in the interim that were normal.    Has asthma and some chronic aspect of AUGUSTINE. Following with neurology for evaluation of chronic dizziness,     Tolerates atorvastatin 40 x 1.    PHYSICAL EXAM:    Vitals:    06/14/24 1125   BP: (!) 145/73   Pulse: 78   Resp: 20       NAD, A+Ox3.  No jvd, no bruit.  RRR nml s1,s2. No murmurs.  CTA B no wheezes or crackles.  No edema.    LABS/STUDIES (imaging reviewed during clinic visit):    April 2024 CBC and CMP normal.  /HDL 90/LDL 80/TG 81.  February  BNP and troponin normal.  2023TSH normal.  A1c 5.3 in 2022.    ECG February 2024 sinus rhythm with no Q-waves or ST changes.    Extended Holter March 2024 sinus rhythm with average heart rate 77.  Pac burden less than 0.1%.  9 episodes of short bursts of atrial tachycardia up to 9 beats.  PVC burden 0.3%.  Greater than 20 button activations corresponded with normal sinus rhythm without ectopy.  1 button activations corresponding with a PAC.  TST May 2024 for minutes 43 seconds on a high ramp protocol.  Normal LV size and function at baseline with EF 64%.  No evidence of ischemia with stress.  Carotid 2021 no evidence of significant ICA disease bilaterally. Antegrade vertebral flow bilaterally.   CTA Head and Neck June 2024 mild carotid calcification and aortic atherosclerosis doses.  No evidence of high-grade stenosis.  2020 aortic atherosclerosis. No signigficant stenosis.   MRI brain April 2024 normal study.      ASSESSMENT &  PLAN:    1. Palpitations    2. Aortic atherosclerosis    3. Other hyperlipidemia    4. Chest pain, unspecified type    5. Hypertension, unspecified type          Orders Placed This Encounter    atorvastatin (LIPITOR) 80 MG tablet          Palpitations, non-cardiac. Unremarkable holter.    Chest pain, resolved, and chronic prescott with a normal CHRISTOS.     No indication for asa, okay to cont if she likes or stop.     Would increase atorvastatin to 80 x 1 given atherosclerosis.    Has some, mild RLE edema worse later in the days. Likely venous insufficiency. Not limiting. Expectant management.     Bps just above goal. Defers therapy at this time.     Follow up in about 1 year (around 6/14/2025).      Lloyd Pires MD

## 2024-06-17 ENCOUNTER — PATIENT OUTREACH (OUTPATIENT)
Dept: ADMINISTRATIVE | Facility: HOSPITAL | Age: 74
End: 2024-06-17
Payer: MEDICARE

## 2024-06-18 ENCOUNTER — HOSPITAL ENCOUNTER (OUTPATIENT)
Dept: RADIOLOGY | Facility: HOSPITAL | Age: 74
Discharge: HOME | End: 2024-06-18
Attending: PHYSICIAN ASSISTANT
Payer: MEDICARE

## 2024-06-18 VITALS — BODY MASS INDEX: 26.52 KG/M2 | HEIGHT: 66 IN | WEIGHT: 165 LBS

## 2024-06-18 DIAGNOSIS — Z12.31 ENCOUNTER FOR SCREENING MAMMOGRAM FOR MALIGNANT NEOPLASM OF BREAST: ICD-10-CM

## 2024-06-18 DIAGNOSIS — Z85.3 PERSONAL HISTORY OF BREAST CANCER: ICD-10-CM

## 2024-06-18 PROCEDURE — 77063 BREAST TOMOSYNTHESIS BI: CPT | Mod: TC

## 2024-06-18 PROCEDURE — 77067 SCR MAMMO BI INCL CAD: CPT | Mod: TC

## 2024-06-19 ENCOUNTER — DOCUMENTATION ONLY (OUTPATIENT)
Dept: REHABILITATION | Facility: HOSPITAL | Age: 74
End: 2024-06-19
Payer: MEDICARE

## 2024-06-19 NOTE — PROGRESS NOTES
[facility]  Discharge Note    Name: Sheila Zarco  Clinic Number: [id number]    Therapy Diagnosis:        Encounter Diagnoses   Name Primary?    Dizziness Yes    Impairment of balance      Physician: Maan Gerard MD     Physician Orders: Eval and Treat - Vestibular Therapy   Medical Diagnosis:   R42 (ICD-10-CM) - Dizziness   R26.89 (ICD-10-CM) - Imbalance   Evaluation Date: 4/22/2024    Date of Last visit: 5/16/2024  Total Visits Received: 2 + eval    ASSESSMENT      Pt is being discharged from vestibular occupational therapy for the following reasons:   1) Cancellation of appointments; has not attended therapy since 5/16/2024  2) Attempted to call pt on 6/12/2024 to discuss OT POC and potentially schedule formal d/c visit; pt did not return call  3) Objective measures along with subjective complaints during initial evaluation were more so consistent with cardiac rather than inner ear related dizziness. However, balance re-training was initiated due to pt difficulty with static stance EC. Habituation interventions for desensitization to movements that elicit symptoms were also trialed but were not beneficial.   4) After speaking with pt's neurologist, it was decided that pt's balance deficits as a result of vibration sensory loss would be better addressed by a neuro PT. Pt has an initial evaluation scheduled with neuro PT on 7/9/24.     Discharge reason: Patient has not attended therapy since 5/16/2024; maximized benefit from vestibular PT; t/f to neuro PT    Discharge FOTO Score: Intake only    Goals: No goals met; goal status below reflects that of last in person session    Short Term Goals=Long Term Goals: 5 weeks   1) Pt will be independent with oculomotor and/or habituation home exercise/activity program. ongoing  2) Patient to perform VORx1 at 130 bpm WFL for improved gaze stabilization. ongoing  3) Patient will improve dynamic visual acuity (DVA) to 1-2 line difference to  demonstrate improved gaze stabilization needed for driving, work, and leisure tasks. ongoing  4) Pt to improve MCTSIB condition 4 to at least 20 seconds for improved static postural balance in low vision environments and/or on uneven surfaces ongoing  5) Pt to perform transitional movements, safely retrieve items from floor, load/unload washing machine/, and scan environment during functional mobility with little to no onset of dizziness or LOB ongoing    PLAN     This patient is discharged from Occupational Therapy      Janet Shaw, OT

## 2024-06-25 ENCOUNTER — PATIENT MESSAGE (OUTPATIENT)
Dept: DERMATOLOGY | Facility: CLINIC | Age: 74
End: 2024-06-25
Payer: MEDICARE

## 2024-07-07 ENCOUNTER — PATIENT MESSAGE (OUTPATIENT)
Dept: DERMATOLOGY | Facility: CLINIC | Age: 74
End: 2024-07-07
Payer: MEDICARE

## 2024-07-07 NOTE — TELEPHONE ENCOUNTER
No care due was identified.  [facility] Embedded Care Due Messages. Reference number: [id number].   7/07/2024 7:31:14 AM CDT

## 2024-07-08 ENCOUNTER — PATIENT MESSAGE (OUTPATIENT)
Dept: DERMATOLOGY | Facility: CLINIC | Age: 74
End: 2024-07-08
Payer: MEDICARE

## 2024-07-08 RX ORDER — LEVOTHYROXINE SODIUM 88 UG/1
88 TABLET ORAL EVERY MORNING
Qty: 90 TABLET | Refills: 2 | Status: SHIPPED | OUTPATIENT
Start: 2024-07-08

## 2024-07-08 NOTE — TELEPHONE ENCOUNTER
Refill Decision Note   Sheila Zarco  is requesting a refill authorization.  Brief Assessment and Rationale for Refill:  Approve     Medication Therapy Plan:         Comments:     Note composed:11:05 AM 07/08/2024

## 2024-07-10 RX ORDER — PANTOPRAZOLE SODIUM 40 MG/1
TABLET, DELAYED RELEASE ORAL
Qty: 90 TABLET | Refills: 2 | Status: SHIPPED | OUTPATIENT
Start: 2024-07-10

## 2024-07-10 NOTE — TELEPHONE ENCOUNTER
Refill Decision Note   Sheila Zarco  is requesting a refill authorization.  Brief Assessment and Rationale for Refill:  Approve     Medication Therapy Plan:         Comments:     Note composed:3:35 AM 07/10/2024

## 2024-07-10 NOTE — TELEPHONE ENCOUNTER
No care due was identified.  [facility] Embedded Care Due Messages. Reference number: [id number].   7/10/2024 1:10:51 AM CDT

## 2024-07-11 ENCOUNTER — PATIENT OUTREACH (OUTPATIENT)
Dept: ADMINISTRATIVE | Facility: HOSPITAL | Age: 74
End: 2024-07-11
Payer: MEDICARE

## 2024-07-17 ENCOUNTER — OFFICE VISIT (OUTPATIENT)
Dept: DERMATOLOGY | Facility: CLINIC | Age: 74
End: 2024-07-17
Payer: MEDICARE

## 2024-07-17 DIAGNOSIS — L30.9 DERMATITIS: Primary | ICD-10-CM

## 2024-07-17 PROCEDURE — 99214 OFFICE O/P EST MOD 30 MIN: CPT | Mod: PBBFAC,PO | Performed by: DERMATOLOGY

## 2024-07-17 PROCEDURE — 99214 OFFICE O/P EST MOD 30 MIN: CPT | Mod: 25,S$PBB,, | Performed by: DERMATOLOGY

## 2024-07-17 PROCEDURE — 88305 TISSUE EXAM BY PATHOLOGIST: CPT | Performed by: PATHOLOGY

## 2024-07-17 PROCEDURE — 88342 IMHCHEM/IMCYTCHM 1ST ANTB: CPT | Performed by: PATHOLOGY

## 2024-07-17 PROCEDURE — 11104 PUNCH BX SKIN SINGLE LESION: CPT | Mod: PBBFAC,PO | Performed by: DERMATOLOGY

## 2024-07-17 PROCEDURE — 88341 IMHCHEM/IMCYTCHM EA ADD ANTB: CPT | Performed by: PATHOLOGY

## 2024-07-17 PROCEDURE — 88313 SPECIAL STAINS GROUP 2: CPT | Performed by: PATHOLOGY

## 2024-07-17 PROCEDURE — 99999 PR PBB SHADOW E&M-EST. PATIENT-LVL IV: CPT | Mod: PBBFAC,,, | Performed by: DERMATOLOGY

## 2024-07-17 PROCEDURE — 11104 PUNCH BX SKIN SINGLE LESION: CPT | Mod: S$PBB,,, | Performed by: DERMATOLOGY

## 2024-07-17 RX ORDER — BETAMETHASONE DIPROPIONATE 0.5 MG/G
LOTION TOPICAL
Qty: 120 ML | Refills: 3 | Status: SHIPPED | OUTPATIENT
Start: 2024-07-17

## 2024-07-17 NOTE — PROGRESS NOTES
Subjective:      Patient ID:  Sheila Zarco is a 73 y.o. female who presents for   Chief Complaint   Patient presents with    Rash     F/U - same      Pt here today for rash f/u - unchanged. Itching. Prev tx with lidex sol mixed in cerave cream.  Rash has been present about 2months.  Focused on groin and buttock and under arms. Also has scaly plaques on feet  Only medication change is that pt went from lipitor 20 mg to 40 mg ; spreading to under arms and in groin.        Rash - Follow-up  Symptom course: unchanged  Currently using: lidex sol.  Affected locations: right axilla, left axilla, right buttock and left buttock (inner thighs)  Signs / symptoms: itching      Review of Systems   Constitutional:  Negative for fever and chills.   HENT:  Negative for trouble swallowing, lip swelling and tongue swelling.    Musculoskeletal:  Negative for joint swelling and arthralgias.   Skin:  Positive for itching and rash.   Hematologic/Lymphatic: Negative for adenopathy.       Objective:   Physical Exam   Constitutional: She appears well-developed and well-nourished. No distress.   Neurological: She is alert and oriented to person, place, and time. She is not disoriented.   Psychiatric: She has a normal mood and affect.   Skin:   Areas Examined (abnormalities noted in diagram):   Scalp / Hair Palpated and Inspected  Head / Face Inspection Performed  Neck Inspection Performed  Chest / Axilla Inspection Performed  Abdomen Inspection Performed  Genitals / Buttocks / Groin Inspection Performed  Back Inspection Performed  RUE Inspected  LUE Inspection Performed  RLE Inspected  LLE Inspection Performed  Nails and Digits Inspection Performed                                          Diagram Legend     Erythematous scaling macule/papule c/w actinic keratosis       Vascular papule c/w angioma      Pigmented verrucoid papule/plaque c/w seborrheic keratosis      Yellow umbilicated papule c/w sebaceous hyperplasia      Irregularly  shaped tan macule c/w lentigo     1-2 mm smooth white papules consistent with Milia      Movable subcutaneous cyst with punctum c/w epidermal inclusion cyst      Subcutaneous movable cyst c/w pilar cyst      Firm pink to brown papule c/w dermatofibroma      Pedunculated fleshy papule(s) c/w skin tag(s)      Evenly pigmented macule c/w junctional nevus     Mildly variegated pigmented, slightly irregular-bordered macule c/w mildly atypical nevus      Flesh colored to evenly pigmented papule c/w intradermal nevus       Pink pearly papule/plaque c/w basal cell carcinoma      Erythematous hyperkeratotic cursted plaque c/w SCC      Surgical scar with no sign of skin cancer recurrence      Open and closed comedones      Inflammatory papules and pustules      Verrucoid papule consistent consistent with wart     Erythematous eczematous patches and plaques     Dystrophic onycholytic nail with subungual debris c/w onychomycosis     Umbilicated papule    Erythematous-base heme-crusted tan verrucoid plaque consistent with inflamed seborrheic keratosis     Erythematous Silvery Scaling Plaque c/w Psoriasis     See annotation      Assessment / Plan:      Pathology Orders:       Normal Orders This Visit    Specimen to Pathology, Dermatology     Comments:    Number of Specimens:->1  ------------------------->-------------------------  Spec 1 Procedure:->Biopsy  Spec 1 Clinical Impression:->r/o drug reaction v interstitial  GA v CTCL v contact dermatitis v other  Spec 1 Source:->left thigh    Questions:    Procedure Type: Dermatology and skin neoplasms    Number of Specimens: 1    ------------------------: -------------------------    Spec 1 Procedure: Biopsy    Spec 1 Clinical Impression: r/o drug reaction v interstitial GA v CTCL v contact dermatitis v other    Spec 1 Source: left thigh    Release to patient:           Dermatitis nos   -     Specimen to Pathology, Dermatology  -     betamethasone dipropionate (DIPROLENE) 0.05 %  lotion; Aaa bid ; not more than 2 weeks straight in the same location  Dispense: 120 mL; Refill: 3  Punch biopsy procedure note:  Punch biopsy performed after verbal consent obtained. Area marked and prepped with alcohol. Approximately 1cc of 1% lidocaine with epinephrine injected. 5 mm disposable punch used to remove lesion. Hemostasis obtained and biopsy site closed with 1 - 2 Prolene sutures. Wound care instructions reviewed with patient and handout given.    Consider labs and further work up if needed based on path            Follow up in about 2 weeks (around 7/31/2024) for EP.

## 2024-07-23 ENCOUNTER — PATIENT OUTREACH (OUTPATIENT)
Dept: ADMINISTRATIVE | Facility: HOSPITAL | Age: 74
End: 2024-07-23
Payer: MEDICARE

## 2024-07-23 VITALS — DIASTOLIC BLOOD PRESSURE: 64 MMHG | SYSTOLIC BLOOD PRESSURE: 131 MMHG

## 2024-07-23 LAB
FINAL PATHOLOGIC DIAGNOSIS: NORMAL
GROSS: NORMAL
Lab: NORMAL
MICROSCOPIC EXAM: NORMAL

## 2024-07-31 ENCOUNTER — OFFICE VISIT (OUTPATIENT)
Dept: DERMATOLOGY | Facility: CLINIC | Age: 74
End: 2024-07-31
Payer: MEDICARE

## 2024-07-31 DIAGNOSIS — Z48.02 VISIT FOR SUTURE REMOVAL: ICD-10-CM

## 2024-07-31 DIAGNOSIS — L30.8 INTERSTITIAL GRANULOMATOUS DERMATITIS: Primary | ICD-10-CM

## 2024-07-31 PROCEDURE — 99999 PR PBB SHADOW E&M-EST. PATIENT-LVL IV: CPT | Mod: PBBFAC,,, | Performed by: DERMATOLOGY

## 2024-07-31 PROCEDURE — 99214 OFFICE O/P EST MOD 30 MIN: CPT | Mod: S$PBB,,, | Performed by: DERMATOLOGY

## 2024-07-31 PROCEDURE — 99214 OFFICE O/P EST MOD 30 MIN: CPT | Mod: PBBFAC,PO | Performed by: DERMATOLOGY

## 2024-07-31 NOTE — Clinical Note
Hi- please see note.  Unsure if lipitor cause of pts rash.  She stopped and it is improving and I am checking her back in a few weeks.  May need alternative if continues to prove off the med. Thanks Rose

## 2024-07-31 NOTE — PROGRESS NOTES
Subjective:      Patient ID:  Sheila Zarco is a 73 y.o. female who presents for No chief complaint on file.    Suture Removal note:  CC: 73 y.o. female patient is here for suture removal.         HPI: Patient is s/p excision of r/o drug reaction v interstitial from the L thigh  on 0717/2024.  Patient reports no problems.    WOUND PE:  Sutures intact.  Wound healing well.  Good approximation of skin edges.  No signs or symptoms of infection.    IMPRESSION:  0 - margins clear.    PLAN:  Sutures removed today.  Continue wound care.    RTC: In  months.       Review of Systems    Objective:   Physical Exam     Diagram Legend     Erythematous scaling macule/papule c/w actinic keratosis       Vascular papule c/w angioma      Pigmented verrucoid papule/plaque c/w seborrheic keratosis      Yellow umbilicated papule c/w sebaceous hyperplasia      Irregularly shaped tan macule c/w lentigo     1-2 mm smooth white papules consistent with Milia      Movable subcutaneous cyst with punctum c/w epidermal inclusion cyst      Subcutaneous movable cyst c/w pilar cyst      Firm pink to brown papule c/w dermatofibroma      Pedunculated fleshy papule(s) c/w skin tag(s)      Evenly pigmented macule c/w junctional nevus     Mildly variegated pigmented, slightly irregular-bordered macule c/w mildly atypical nevus      Flesh colored to evenly pigmented papule c/w intradermal nevus       Pink pearly papule/plaque c/w basal cell carcinoma      Erythematous hyperkeratotic cursted plaque c/w SCC      Surgical scar with no sign of skin cancer recurrence      Open and closed comedones      Inflammatory papules and pustules      Verrucoid papule consistent consistent with wart     Erythematous eczematous patches and plaques     Dystrophic onycholytic nail with subungual debris c/w onychomycosis     Umbilicated papule    Erythematous-base heme-crusted tan verrucoid plaque consistent with inflamed seborrheic keratosis     Erythematous Silvery  Scaling Plaque c/w Psoriasis     See annotation      Assessment / Plan:        Interstitial granulomatous dermatitis             Follow up in about 4 weeks (around 8/28/2024).

## 2024-07-31 NOTE — PATIENT INSTRUCTIONS
Feet:   After bath apply betamethasone or triamcinolone then vaseline and a sock    Wear loose underwear as often as possible   Can use betamethasone on left thigh now  Discuss with Dr Sunny nance

## 2024-07-31 NOTE — PROGRESS NOTES
Subjective:      Patient ID:  Sheila Zarco is a 73 y.o. female who presents for No chief complaint on file.    Pt here for rash follow up  Pt feels she did not have the rash on 20 mg of lipitor.  Then she went to 40 mg and rash started. When she increased to 80 mg she feels is when she got worse. She tried to go back down to 40 gm since bx and she could not cut the pills so she stopped them and she feels the rash is better.    She denies any associated symptoms of new symptoms        Review of Systems   Constitutional:  Negative for fever and night sweats.   HENT:  Negative for mouth sores.    Musculoskeletal:  Negative for joint swelling and arthralgias.   Skin:  Positive for itching and rash.   Hematologic/Lymphatic: Negative for adenopathy.       Objective:   Physical Exam   Skin:   Areas Examined (abnormalities noted in diagram):   Chest / Axilla Inspection Performed  Abdomen Inspection Performed  Genitals / Buttocks / Groin Inspection Performed  Back Inspection Performed  RUE Inspected  LUE Inspection Performed  RLE Inspected  LLE Inspection Performed                Diagram Legend     Erythematous scaling macule/papule c/w actinic keratosis       Vascular papule c/w angioma      Pigmented verrucoid papule/plaque c/w seborrheic keratosis      Yellow umbilicated papule c/w sebaceous hyperplasia      Irregularly shaped tan macule c/w lentigo     1-2 mm smooth white papules consistent with Milia      Movable subcutaneous cyst with punctum c/w epidermal inclusion cyst      Subcutaneous movable cyst c/w pilar cyst      Firm pink to brown papule c/w dermatofibroma      Pedunculated fleshy papule(s) c/w skin tag(s)      Evenly pigmented macule c/w junctional nevus     Mildly variegated pigmented, slightly irregular-bordered macule c/w mildly atypical nevus      Flesh colored to evenly pigmented papule c/w intradermal nevus       Pink pearly papule/plaque c/w basal cell carcinoma      Erythematous  hyperkeratotic cursted plaque c/w SCC      Surgical scar with no sign of skin cancer recurrence      Open and closed comedones      Inflammatory papules and pustules      Verrucoid papule consistent consistent with wart     Erythematous eczematous patches and plaques     Dystrophic onycholytic nail with subungual debris c/w onychomycosis     Umbilicated papule    Erythematous-base heme-crusted tan verrucoid plaque consistent with inflamed seborrheic keratosis     Erythematous Silvery Scaling Plaque c/w Psoriasis     See annotation      Assessment / Plan:        Interstitial granulomatous dermatitis- lipitor v other   Pt improved since d/c lipitor. Had dose increase and feels rash progressed after that.  She has self d/c and will see if she continues to improve. I have notified PCP.   Can continue betamethasone to inflamed areas only       Feet:   After bath apply betamethasone or triamcinolone then vaseline and a sock    Wear loose underwear as often as possible   Can use betamethasone on left thigh now  Discuss with Dr Correa - lipitor       Labs in past 3 months reviewed and all wnl aside from elevated Lead and unclear etiology      Final Pathologic Diagnosis   Date Value Ref Range Status   07/17/2024   Corrected    1. Skin, left thigh, punch biopsy:  - INTERSTITIAL GRANULOMATOUS DERMATITIS (SEE COMMENT).    COMMENT: The differential diagnosis includes interstitial granulomatous dermatitis or an interstitial granulomatous drug eruption, as a rare eosinophil is noted within the dermal infiltrate.  Interstitial granuloma annulare cannot be excluded, though   this entity is usually associated with more prominent collagen necrobiosis and mucin deposition than is appreciated in this specimen.  Clinical correlation is essential.       Comment:     Interp By Dane Durant M.D., Signed on 07/24/2024 at 11:42                  Follow up in about 4 weeks (around 8/28/2024).

## 2024-08-22 ENCOUNTER — APPOINTMENT (OUTPATIENT)
Dept: RADIOLOGY | Facility: CLINIC | Age: 74
End: 2024-08-22
Attending: INTERNAL MEDICINE
Payer: MEDICARE

## 2024-08-22 DIAGNOSIS — Z78.0 MENOPAUSE: ICD-10-CM

## 2024-08-22 PROCEDURE — 77080 DXA BONE DENSITY AXIAL: CPT | Mod: TC,PO

## 2024-08-22 PROCEDURE — 77080 DXA BONE DENSITY AXIAL: CPT | Mod: 26,,, | Performed by: INTERNAL MEDICINE

## 2024-08-28 RX ORDER — ESCITALOPRAM OXALATE 20 MG/1
20 TABLET ORAL DAILY
Qty: 90 TABLET | Refills: 2 | Status: SHIPPED | OUTPATIENT
Start: 2024-08-28 | End: 2025-05-25

## 2024-08-28 NOTE — TELEPHONE ENCOUNTER
Refill Decision Note   Sheila Zarco  is requesting a refill authorization.  Brief Assessment and Rationale for Refill:  Approve     Medication Therapy Plan:        Comments:     Note composed:9:31 AM 08/28/2024

## 2024-08-28 NOTE — TELEPHONE ENCOUNTER
No care due was identified.  [facility] Embedded Care Due Messages. Reference number: [id number].   8/28/2024 12:36:02 AM CDT

## 2024-08-29 ENCOUNTER — PATIENT MESSAGE (OUTPATIENT)
Dept: DERMATOLOGY | Facility: CLINIC | Age: 74
End: 2024-08-29
Payer: MEDICARE

## 2024-09-12 ENCOUNTER — OFFICE VISIT (OUTPATIENT)
Dept: DERMATOLOGY | Facility: CLINIC | Age: 74
End: 2024-09-12
Payer: MEDICARE

## 2024-09-12 ENCOUNTER — TELEPHONE (OUTPATIENT)
Dept: OTOLARYNGOLOGY | Facility: CLINIC | Age: 74
End: 2024-09-12
Payer: MEDICARE

## 2024-09-12 ENCOUNTER — TELEPHONE (OUTPATIENT)
Dept: DERMATOLOGY | Facility: CLINIC | Age: 74
End: 2024-09-12
Payer: MEDICARE

## 2024-09-12 DIAGNOSIS — L91.8 SKIN TAG: ICD-10-CM

## 2024-09-12 DIAGNOSIS — B35.3 TINEA PEDIS, UNSPECIFIED LATERALITY: Primary | ICD-10-CM

## 2024-09-12 DIAGNOSIS — D22.9 NEVUS: ICD-10-CM

## 2024-09-12 DIAGNOSIS — D18.01 CHERRY ANGIOMA: ICD-10-CM

## 2024-09-12 DIAGNOSIS — L30.8 INTERSTITIAL GRANULOMATOUS DERMATITIS: ICD-10-CM

## 2024-09-12 DIAGNOSIS — L30.9 DERMATITIS: ICD-10-CM

## 2024-09-12 DIAGNOSIS — L82.1 SK (SEBORRHEIC KERATOSIS): ICD-10-CM

## 2024-09-12 PROCEDURE — 11102 TANGNTL BX SKIN SINGLE LES: CPT | Mod: S$PBB,,, | Performed by: DERMATOLOGY

## 2024-09-12 PROCEDURE — 99214 OFFICE O/P EST MOD 30 MIN: CPT | Mod: 25,S$PBB,, | Performed by: DERMATOLOGY

## 2024-09-12 PROCEDURE — 99999 PR PBB SHADOW E&M-EST. PATIENT-LVL V: CPT | Mod: PBBFAC,,, | Performed by: DERMATOLOGY

## 2024-09-12 PROCEDURE — 11102 TANGNTL BX SKIN SINGLE LES: CPT | Mod: PBBFAC,PO | Performed by: DERMATOLOGY

## 2024-09-12 PROCEDURE — 99215 OFFICE O/P EST HI 40 MIN: CPT | Mod: PBBFAC,PO,25 | Performed by: DERMATOLOGY

## 2024-09-12 RX ORDER — KETOCONAZOLE 20 MG/G
CREAM TOPICAL
Qty: 60 G | Refills: 3 | Status: SHIPPED | OUTPATIENT
Start: 2024-09-12

## 2024-09-12 RX ORDER — TACROLIMUS 1 MG/G
OINTMENT TOPICAL
Qty: 60 G | Refills: 3 | Status: SHIPPED | OUTPATIENT
Start: 2024-09-12

## 2024-09-12 NOTE — PATIENT INSTRUCTIONS
Shave Biopsy Wound Care    Your doctor has performed a shave biopsy today.  A band aid and vaseline ointment has been placed over the site.  This should remain in place for NO LONGER THAN 48 hours.  It is fine to remove the bandaid after 24 hours, if the area is no longer bleeding. It is recommended that you keep the area dry (do not wet)) for the first 24 hours.  After 24 hours, wash the area with warm soap and water and apply Vaseline jelly.  Many patients prefer to use Neosporin or Bacitracin ointment.  This is acceptable; however, know that you can develop an allergy to this medication even if you have used it safely for years.  It is important to keep the area moist.  Letting it dry out and get air slows healing time, and will worsen the scar.        If you notice increasing redness, tenderness, pain, or yellow drainage at the biopsy site, please notify your doctor.  These are signs of an infection.    If your biopsy site is bleeding, apply firm pressure for 15 minutes straight.  Repeat for another 15 minutes, if it is still bleeding.   If the surgical site continues to bleed, then please contact your doctor.      For [facility] users:   You will receive your biopsy results in [facility] as soon as they are available. Please be assured that your physician/provider will review your results and will then determine what further treatment, evaluation, or planning is required. You should be contacted by your physician's/provider's office within 5 business days of receiving your results; If not, please reach out to directly. This is one more way [facility] is putting you first.     [facility] [place] [address]/ [phone] FAX/ [email or web address]

## 2024-09-12 NOTE — Clinical Note
Hi, this pt would like this mole excised on left lateral orbital rim, see pic in chart. If this is something you can excise pls call pt.  Thanks, Rose

## 2024-09-12 NOTE — PROGRESS NOTES
Subjective:      Patient ID:  Sheila Zarco is a 74 y.o. female who presents for   Chief Complaint   Patient presents with    Rash     Inner thighs   Foot      Pt here today for rash f/u to inner thighs - better per pt. Pt states rash is still visible, but no itching. Prev tx with lidex sol, betamethasone.    Also here for feet f/u -worsening per pt. Itching and spreading. Prev tx with TAC, betamethasone with vaseline and socks - pt states she could not tolerate the socks on her feet. Pt states in the past she was told she had psoriasis and did a light treatment and medication and vaseline and saran wrap on her feet and this did not help.     Pt would also like a TBSE    Rash      Review of Systems   Skin:  Positive for rash.       Objective:   Physical Exam   Constitutional: She appears well-developed and well-nourished. No distress.   Neurological: She is alert and oriented to person, place, and time. She is not disoriented.   Psychiatric: She has a normal mood and affect.   Skin:   Areas Examined (abnormalities noted in diagram):   Scalp / Hair Palpated and Inspected  Head / Face Inspection Performed  Neck Inspection Performed  Chest / Axilla Inspection Performed  Abdomen Inspection Performed  Genitals / Buttocks / Groin Inspection Performed  Back Inspection Performed  RUE Inspected  LUE Inspection Performed  RLE Inspected  LLE Inspection Performed  Nails and Digits Inspection Performed                             Diagram Legend     Erythematous scaling macule/papule c/w actinic keratosis       Vascular papule c/w angioma      Pigmented verrucoid papule/plaque c/w seborrheic keratosis      Yellow umbilicated papule c/w sebaceous hyperplasia      Irregularly shaped tan macule c/w lentigo     1-2 mm smooth white papules consistent with Milia      Movable subcutaneous cyst with punctum c/w epidermal inclusion cyst      Subcutaneous movable cyst c/w pilar cyst      Firm pink to brown papule c/w dermatofibroma       Pedunculated fleshy papule(s) c/w skin tag(s)      Evenly pigmented macule c/w junctional nevus     Mildly variegated pigmented, slightly irregular-bordered macule c/w mildly atypical nevus      Flesh colored to evenly pigmented papule c/w intradermal nevus       Pink pearly papule/plaque c/w basal cell carcinoma      Erythematous hyperkeratotic cursted plaque c/w SCC      Surgical scar with no sign of skin cancer recurrence      Open and closed comedones      Inflammatory papules and pustules      Verrucoid papule consistent consistent with wart     Erythematous eczematous patches and plaques     Dystrophic onycholytic nail with subungual debris c/w onychomycosis     Umbilicated papule    Erythematous-base heme-crusted tan verrucoid plaque consistent with inflamed seborrheic keratosis     Erythematous Silvery Scaling Plaque c/w Psoriasis     See annotation            Assessment / Plan:      Pathology Orders:       Normal Orders This Visit    Specimen to Pathology, Dermatology     Comments:    Number of Specimens:->1  ------------------------->-------------------------  Spec 1 Procedure:->Biopsy  Spec 1 Clinical Impression:->r/p psoriasis v other; pls KOH  Spec 1 Source:->right heel    Questions:    Procedure Type: Dermatology and skin neoplasms    Number of Specimens: 1    ------------------------: -------------------------    Spec 1 Procedure: Biopsy    Spec 1 Clinical Impression: r/p psoriasis v other; pls KOH    Spec 1 Source: right heel    Release to patient:           Tinea pedis, unspecified laterality- feelt  -     ketoconazole (NIZORAL) 2 % cream; aaa bid on  feet  x 3 weeks  Dispense: 60 g; Refill: 3    Dermatitis- R heel; failed topical steroids  Shave biopsy procedure note:    Shave biopsy performed after verbal consent including risk of infection, scar, recurrence, need for additional treatment of site. Area prepped with alcohol, anesthetized with approximately 1.0cc of 1% lidocaine with  epinephrine. Lesional tissue shaved with razor blade. Hemostasis achieved with application of aluminum chloride followed by hyfrecation. No complications. Dressing applied. Wound care explained.    -     Specimen to Pathology, Dermatology    Interstitial granulomatous dermatitis  -     tacrolimus (PROTOPIC) 0.1 % ointment; Apply to affected areas of groin qhs prn flare  Dispense: 60 g; Refill: 3  Needs steroid sparing agent as pt getting atriophy from topical steroids.   Moisturize regularly when not flaring.     Cherry angioma  These are benign vascular lesions that are inherited.  Treatment is not necessary.    Nevus  Discussed ABCDE's of nevi.  Monitor for new mole or moles that are becoming bigger, darker, irritated, or developing irregular borders. Brochure provided. Instructed patient to observe lesion(s) for changes and follow up in clinic if changes are noted. Patient to monitor skin at home for new or changing lesions.   Will see if plastics can excise nevus on left lateral lid      Skin tag  Reassurance given to patient. No treatment is necessary.   Treatment of benign, asymptomatic lesions may be considered cosmetic.    SK (seborrheic keratosis)  These are benign inherited growths without a malignant potential. Reassurance given to patient. No treatment is necessary.       Total body skin examination performed today including at least 12 points as noted in physical examination. No lesions suspicious for malignancy noted.    Recommend daily sun protection/avoidance, use of at least SPF 30, broad spectrum sunscreen (OTC drug), skin self examinations, and routine physician surveillance to optimize early detection           Follow up for prn bx report.

## 2024-09-17 ENCOUNTER — OFFICE VISIT (OUTPATIENT)
Dept: OBSTETRICS AND GYNECOLOGY | Facility: CLINIC | Age: 74
End: 2024-09-17
Payer: MEDICARE

## 2024-09-17 VITALS
SYSTOLIC BLOOD PRESSURE: 116 MMHG | WEIGHT: 160.5 LBS | BODY MASS INDEX: 25.79 KG/M2 | HEIGHT: 66 IN | DIASTOLIC BLOOD PRESSURE: 60 MMHG

## 2024-09-17 DIAGNOSIS — Z78.0 POSTMENOPAUSAL: ICD-10-CM

## 2024-09-17 DIAGNOSIS — Z90.710 S/P HYSTERECTOMY: ICD-10-CM

## 2024-09-17 DIAGNOSIS — Z01.419 ENCOUNTER FOR GYNECOLOGICAL EXAMINATION WITHOUT ABNORMAL FINDING: Primary | ICD-10-CM

## 2024-09-17 DIAGNOSIS — F32.A DEPRESSION, UNSPECIFIED DEPRESSION TYPE: ICD-10-CM

## 2024-09-17 PROCEDURE — 99213 OFFICE O/P EST LOW 20 MIN: CPT | Mod: PBBFAC,PN | Performed by: OBSTETRICS & GYNECOLOGY

## 2024-09-17 PROCEDURE — 99999 PR PBB SHADOW E&M-EST. PATIENT-LVL III: CPT | Mod: PBBFAC,,, | Performed by: OBSTETRICS & GYNECOLOGY

## 2024-09-17 PROCEDURE — G0101 CA SCREEN;PELVIC/BREAST EXAM: HCPCS | Mod: GZ,S$PBB,, | Performed by: OBSTETRICS & GYNECOLOGY

## 2024-09-17 NOTE — PROGRESS NOTES
CC: Well woman exam    Sheila Zarco is a 74 y.o. female  presents for a well woman exam.  No GYN  issues, problems, or complaints.    She is dealing with depression and concerns over her son.   Prior HYST    Past Medical History:   Diagnosis Date    Asthma     childhood    Baker's cyst     Breast cancer 2020    DCIS left    Cataract     Glaucoma     Thyroid disease      Past Surgical History:   Procedure Laterality Date    BREAST BIOPSY Left 2020    core bx, +    BREAST LUMPECTOMY Left     BUNIONECTOMY      COLONOSCOPY N/A 2023    Procedure: COLONOSCOPY;  Surgeon: Adam Patterson MD;  Location: [facility];  Service: Endoscopy;  Laterality: N/A;    ESOPHAGOGASTRODUODENOSCOPY N/A 2023    Procedure: EGD (ESOPHAGOGASTRODUODENOSCOPY);  Surgeon: Adam Patterson MD;  Location: [facility];  Service: Endoscopy;  Laterality: N/A;  -pt r/s-has peg prep-instr email/portal-tb    GALLBLADDER SURGERY      HYSTERECTOMY      INTRAOCULAR PROSTHESES INSERTION Right 2020    Procedure: INSERTION, IOL PROSTHESIS;  Surgeon: Lucas Jefferson MD;  Location: [address];  Service: Ophthalmology;  Laterality: Right;    INTRAOCULAR PROSTHESES INSERTION Left 10/22/2020    Procedure: INSERTION, IOL PROSTHESIS;  Surgeon: Lucas Jefferson MD;  Location: [address];  Service: Ophthalmology;  Laterality: Left;    KNEE CARTILAGE SURGERY      MASTECTOMY, PARTIAL Left 2020    Procedure: MASTECTOMY, PARTIAL-Left with Radiological Marker;  Surgeon: Kayleen Alvarez MD;  Location: [facility];  Service: General;  Laterality: Left;    narrow angles eye surgery       PHACOEMULSIFICATION OF CATARACT Right 2020    Procedure: PHACOEMULSIFICATION, CATARACT;  Surgeon: Lucas Jefferson MD;  Location: [address];  Service: Ophthalmology;  Laterality: Right;    PHACOEMULSIFICATION OF CATARACT Left 10/22/2020    Procedure: PHACOEMULSIFICATION, CATARACT;  Surgeon: Lucas Jefferson MD;  Location:  "[facility];  Service: Ophthalmology;  Laterality: Left;     Family History   Problem Relation Name Age of Onset    Heart disease Mother      Heart disease Father      Liver disease Brother      Breast cancer Sister      Breast cancer Daughter  43        bilateral mastectomy    No Known Problems Sister      No Known Problems Maternal Grandmother      No Known Problems Maternal Grandfather      No Known Problems Paternal Grandmother      No Known Problems Paternal Grandfather      No Known Problems Maternal Aunt      No Known Problems Maternal Uncle      No Known Problems Paternal Aunt      No Known Problems Paternal Uncle      Breast cancer Other niece     Colon cancer Neg Hx      Ovarian cancer Neg Hx      Melanoma Neg Hx      Lupus Neg Hx      Acne Neg Hx      Amblyopia Neg Hx      Blindness Neg Hx      Cancer Neg Hx      Cataracts Neg Hx      Diabetes Neg Hx      Glaucoma Neg Hx      Hypertension Neg Hx      Macular degeneration Neg Hx      Retinal detachment Neg Hx      Strabismus Neg Hx      Stroke Neg Hx      Thyroid disease Neg Hx       Social History     Tobacco Use    Smoking status: Never    Smokeless tobacco: Never   Substance Use Topics    Alcohol use: Yes     Alcohol/week: 8.0 standard drinks of alcohol     Types: 4 Glasses of wine, 4 Cans of beer per week     Comment: drinks 4 days a week- >1 drink    Drug use: No     OB History          3    Para   3    Term   3            AB        Living             SAB        IAB        Ectopic        Multiple        Live Births                     /60   Ht 5' 6" (1.676 m)   Wt 72.8 kg (160 lb 7.9 oz)   BMI 25.90 kg/m²     ROS:  GENERAL: Denies weight gain or weight loss. Feeling well overall.   SKIN: Denies rash or lesions.   HEAD: Denies head injury or headache.   NODES: Denies enlarged lymph nodes.   CHEST: Denies chest pain or shortness of breath.   CARDIOVASCULAR: Denies palpitations or left sided chest pain.   ABDOMEN: No abdominal " pain, constipation, diarrhea, nausea, vomiting or rectal bleeding.   URINARY: No frequency, dysuria, hematuria, or burning on urination.  REPRODUCTIVE: See HPI.   BREASTS: The patient performs breast self-examination and denies pain, lumps, or nipple discharge.   HEMATOLOGIC: No easy bruisability or excessive bleeding.   MUSCULOSKELETAL: Denies joint pain or swelling.   NEUROLOGIC: Denies syncope or weakness.   PSYCHIATRIC: Denies depression, anxiety or mood swings.    PE:   APPEARANCE: Well nourished, well developed, in no acute distress.  AFFECT: WNL, alert and oriented x 3.  SKIN: No acne or hirsutism.  NECK: Neck symmetric without masses or thyromegaly.  NODES: No inguinal, cervical, axillary or femoral lymph node enlargement.  CHEST: Good respiratory effort.   ABDOMEN: Soft. No tenderness or masses. No hepatosplenomegaly. No hernias.  BREASTS: Symmetrical, no skin changes or visible lesions. No palpable masses, nipple discharge bilaterally.  PELVIC: Normal external female genitalia without lesions. Normal hair distribution. Adequate perineal body, normal urethral meatus. Vagina atrophic without lesions or discharge. No significant cystocele or rectocele. Bimanual exam shows uterus and cervix to be surgically absent. Adnexa without masses or tenderness.  RECTAL: Rectovaginal exam confirms above with normal sphincter tone, no masses.  EXTREMITIES: No edema.    Diagnosis      ICD-10-CM ICD-9-CM    1. Encounter for gynecological examination without abnormal finding  Z01.419 V72.31       2. Postmenopausal  Z78.0 V49.81 Ambulatory referral/consult to Psychiatry      3. Depression, unspecified depression type  F32.A 311 Ambulatory referral/consult to Psychiatry      4. S/P hysterectomy  Z90.710 V88.01         MMG up to date  Colonoscopy up to date  NL BMD    Patient was counseled today on A.C.S. Pap guidelines and recommendations for yearly pelvic exams, mammograms and monthly self breast exams; to see her PCP for  other health maintenance.       Follow up if symptoms worsen or fail to improve.

## 2024-09-18 ENCOUNTER — PATIENT MESSAGE (OUTPATIENT)
Dept: DERMATOLOGY | Facility: CLINIC | Age: 74
End: 2024-09-18
Payer: MEDICARE

## 2024-09-23 ENCOUNTER — OFFICE VISIT (OUTPATIENT)
Dept: OTOLARYNGOLOGY | Facility: CLINIC | Age: 74
End: 2024-09-23
Payer: MEDICARE

## 2024-09-23 VITALS
DIASTOLIC BLOOD PRESSURE: 52 MMHG | BODY MASS INDEX: 25.79 KG/M2 | HEART RATE: 75 BPM | SYSTOLIC BLOOD PRESSURE: 132 MMHG | WEIGHT: 160.5 LBS | HEIGHT: 66 IN

## 2024-09-23 DIAGNOSIS — H02.9 EYELID LESION, BENIGN: Primary | ICD-10-CM

## 2024-09-23 RX ORDER — DIAZEPAM 5 MG/1
TABLET ORAL
Qty: 2 TABLET | Refills: 0 | Status: CANCELLED | OUTPATIENT
Start: 2024-09-23

## 2024-09-23 NOTE — PROGRESS NOTES
OTOLARYNGOLOGY- FACIAL PLASTIC & RECONSTRUCTIVE SURGERY      Sheila Zarco  [id number]    CC:  Chief Complaint   Patient presents with    MOLE INCISION       HISTORY OF PRESENT ILLNESS: Sheila Zarco  is a 74 y.o. female who was referred to me by Dr. Sun for left upper eyelid mole excision.  The patient states she has had this lesion for a few years but noticed it has grown over the past few months.  She states it sometimes gets red and is pruritic.  She denies history of prior surgeries on the face.  She notes having multiple skin tags.    She is not on any blood thinners.  She does endorse history of anxiety and takes as-needed benzodiazepine.      Past Medical History  She has a past medical history of Asthma, Baker's cyst, Breast cancer, Cataract, Glaucoma, and Thyroid disease.    Past Surgical History  She has a past surgical history that includes Hysterectomy; Gallbladder surgery; Knee cartilage surgery; Bunionectomy; narrow angles eye surgery ; Phacoemulsification of cataract (Right, 7/9/2020); Intraocular prosthesis insertion (Right, 7/9/2020); Mastectomy, partial (Left, 7/24/2020); Phacoemulsification of cataract (Left, 10/22/2020); Intraocular prosthesis insertion (Left, 10/22/2020); Breast biopsy (Left, 06/2020); Breast lumpectomy (Left); Esophagogastroduodenoscopy (N/A, 12/5/2023); and Colonoscopy (N/A, 12/5/2023).    Family History  Her family history includes Breast cancer in her sister and another family member; Breast cancer (age of onset: 43) in her daughter; Heart disease in her father and mother; Liver disease in her brother; No Known Problems in her maternal aunt, maternal grandfather, maternal grandmother, maternal uncle, paternal aunt, paternal grandfather, paternal grandmother, paternal uncle, and sister.    Social History  She reports that she has never smoked. She has never used smokeless tobacco. She reports current alcohol use of about 8.0 standard drinks of alcohol per week. She  reports that she does not use drugs.    Allergies  She has No Known Allergies.    Medications  She has a current medication list which includes the following prescription(s): acetic acid, albuterol, albuterol, albuterol, alprazolam, aspirin, betamethasone dipropionate, desoximetasone, escitalopram oxalate, fluocinonide, fluocinonide, hyoscyamine, ketoconazole, ketoconazole, ketoconazole, ketoconazole, levothyroxine, meclizine, nortriptyline, pantoprazole, prednisone, tacrolimus, triamcinolone acetonide 0.025%, valacyclovir, vitamin d, atorvastatin, fluticasone-salmeterol 250-50 mcg/dose, and levocetirizine, and the following Facility-Administered Medications: 0.9% nacl, cyclopentolate 1%, phenylephrine hcl 2.5%, and tropicamide 1%.      Review of Systems     Constitutional: Negative for appetite change, chills, fatigue, fever and unexpected weight loss.      HENT: Negative for ear discharge, ear infection, ear pain, facial swelling, hearing loss, mouth sores, nosebleeds, postnasal drip, ringing in the ears, runny nose, sinus infection, sinus pressure, sore throat, stuffy nose, tonsil infection, dental problems, trouble swallowing and voice change.      Eyes:  Positive for eye itching.     Respiratory:  Negative for cough, shortness of breath, sleep apnea, snoring and wheezing.      Cardiovascular:  Negative for chest pain, foot swelling, irregular heartbeat and swollen veins.     Gastrointestinal:  Positive for abdominal pain, acid reflux and diarrhea.     Genitourinary: Negative for difficulty urinating, sexual problems and frequent urination.     Musc: Negative for aching joints, aching muscles, back pain and neck pain.     Skin: Positive for rash.     Allergy: Positive for seasonal allergies.     Endocrine: Negative for cold intolerance and heat intolerance.      Neurological: Positive for dizziness.     Hematologic: Positive for bruises/bleeds easily.     Psychiatric: Positive for nervous/anxious.  "            PHYSICAL EXAM:  BP (!) 132/52 (BP Location: Left arm, Patient Position: Sitting, BP Method: Medium (Automatic))   Pulse 75   Ht 5' 6" (1.676 m)   Wt 72.8 kg (160 lb 7.9 oz)   BMI 25.90 kg/m²     General: Alert and oriented in no acute distress  Head and Face: Normocephaic, atruamatic.  There is an approximate 7 mm raised circular lesion along the lateral aspect of the left upper eyelid adjacent to the lateral canthus.  Nose: Anterior rhinoscopy reveals overall normal appearing turbinates, septum midline, no obvious lesions or masses  Neurological: Cranial nerves are grossly intact  Skin: Skin texture/appearance is appropriate for age  Eyes:   EOMI, sclera clear  Ears: Pinna are normal in shape and position  EACs healthy appearing bilaterally  TMs clear without JALIL or perforation bilaterally  Oral cavity and Oropharynx: Mucous membranes moist without lesions present.  Tongue protrudes midline and palate elevates midline.  Neck: Supple without LAD.    Lungs:breathing comfortably  Psychiatric:  Mood and affect are normal    Imaging:      ASSESSMENT:   1. Eyelid lesion, benign            PLAN:       I had an elaborate discussion with the patient regarding her condition and the further workup and management options. They are in understanding of the above stated plan.    We discussed in detail the operative plan as outlined above. The risks, benefits, and alternatives of the procedure were discussed in detail including but not limited to bleeding, infection, pain, scar, failure to improve, asymmetry or irregularity, and need for repeat procedures.  We also discussed the expected post course and time frame for final healing. Sheila expressed understanding of the procedure, had all questions answered.  We will plan for in office excision under local anesthesia in November.      I have spent approximately 30 minutes on this patient encounter. This includes face to face time and non-face to face time " preparing to see the patient (eg, review of tests), obtaining and/or reviewing separately obtained history, documenting clinical information in the electronic or other health record, independently interpreting results and communicating results to the patient/family/caregiver, or care coordinator.      Chriss Smith MD  Facial Plastic and Reconstructive Surgeon  [facility]

## 2024-09-23 NOTE — LETTER
September 23, 2024        Rose Sun MD  [address]  [facility] [address]             [facility]  [address], [place] [address]  [facility] [address]  Phone: [phone]  Fax: [phone]   Patient: Sheila Zarco   MR Number: [id number]   YOB: 1950   Date of Visit: 9/23/2024       Dear Dr. Sun:    Thank you for referring Sheila Zarco to me for evaluation. Below are the relevant portions of my assessment and plan of care.            If you have questions, please do not hesitate to call me. I look forward to following Sheila along with you.    Sincerely,      Chriss Smith MD           CC    No Recipients

## 2024-09-24 RX ORDER — DIAZEPAM 5 MG/1
TABLET ORAL
Qty: 2 TABLET | Refills: 0 | Status: SHIPPED | OUTPATIENT
Start: 2024-09-24

## 2024-09-25 DIAGNOSIS — Z00.00 ENCOUNTER FOR MEDICARE ANNUAL WELLNESS EXAM: ICD-10-CM

## 2024-11-11 ENCOUNTER — OFFICE VISIT (OUTPATIENT)
Dept: OTOLARYNGOLOGY | Facility: CLINIC | Age: 74
End: 2024-11-11
Payer: MEDICARE

## 2024-11-11 VITALS
WEIGHT: 160.5 LBS | SYSTOLIC BLOOD PRESSURE: 122 MMHG | DIASTOLIC BLOOD PRESSURE: 58 MMHG | HEART RATE: 72 BPM | HEIGHT: 66 IN | BODY MASS INDEX: 25.79 KG/M2

## 2024-11-11 DIAGNOSIS — H02.9 EYELID LESION, BENIGN: Primary | ICD-10-CM

## 2024-11-11 NOTE — PROGRESS NOTES
Sheila Zarco    1950    [id number]    Service: Otolaryngology - Facial Plastic and Reconstructive Surgery     Date: 11/11/2024      Preoperative Diagnosis:      Procedure:   1. Excision of benign eyelid lesion 36839 0.6 - 1.0cm      Surgeon: Chriss Smith MD    Anesthesia: local    Specimens: permanent,     Complications: none    Estimated blood loss: minimal    Procedure:   The patient was identified in the clinic room area where informed written consent was obtained.  The patient was marked.    The patient's left eyelid was injected with 2 ml of 1% lidocaine with epinephrine 1:100,000. Adequate time was allowed for the anesthetic to take effect. The patient was prepped and draped in the normal sterile fashion.      A fusiform incision was designed over the lesion and was designed within the relaxed skin tension lines of the left lateral eyelid.  The 15 blade was used to incise along the premarked incisions.  This was carried down through the subcutaneous tissue.  The lesion was identified and dissection was performed sharply with a combination of scissors and the 15 blade around the capsule.  The lesion measured just 0.9cm cm.  Hemostasis was achieved with minimal use of the hyfrecator monopolar.  Wound was cleaned with moist gauze.    The wound was then closed with simple interrupted 6-0 Prolene and 7 0 nylon suture.      Patient tolerated the procedure well without complications.  she will follow-up in 5 days for suture removal. All questions answered and patient was encouraged to call with any questions or concerns.    Chriss Smith MD  Facial Plastic and Reconstructive Surgeon  [facility]

## 2024-11-11 NOTE — PATIENT INSTRUCTIONS
FACIAL SURGERY INSTRUCTIONS      Please clean the incision with a small amount of hydrogen peroxide on a qtip followed by aquaphor twice daily.   You can start showering tomorrow. Avoid direct water hitting your incision.   Please do not soak your incision underwater in a bath or pool for 4 weeks.  You may have some soreness, swelling and bruising after surgery. You can take extra strength tylenol over the counter as directed. Do not take more than 4000 mg in 24 hours  If you have any questions or concerns, you can call  ([facility]), [phone] ([place]), or if after hours call the  at [facility] on [address]:  (Ask to speak to the ENT resident or physician on call). If it is a medical emergency please call [phone].

## 2024-11-14 ENCOUNTER — PATIENT MESSAGE (OUTPATIENT)
Dept: OTOLARYNGOLOGY | Facility: CLINIC | Age: 74
End: 2024-11-14
Payer: MEDICARE

## 2024-11-14 LAB
FINAL PATHOLOGIC DIAGNOSIS: NORMAL
GROSS: NORMAL
Lab: NORMAL
MICROSCOPIC EXAM: NORMAL

## 2024-11-15 ENCOUNTER — OFFICE VISIT (OUTPATIENT)
Dept: OTOLARYNGOLOGY | Facility: CLINIC | Age: 74
End: 2024-11-15
Payer: MEDICARE

## 2024-11-15 VITALS
HEART RATE: 68 BPM | WEIGHT: 165.81 LBS | BODY MASS INDEX: 26.76 KG/M2 | SYSTOLIC BLOOD PRESSURE: 147 MMHG | DIASTOLIC BLOOD PRESSURE: 68 MMHG

## 2024-11-15 DIAGNOSIS — H02.9 EYELID LESION, BENIGN: Primary | ICD-10-CM

## 2024-11-15 PROCEDURE — 99999 PR PBB SHADOW E&M-EST. PATIENT-LVL II: CPT | Mod: PBBFAC,,, | Performed by: STUDENT IN AN ORGANIZED HEALTH CARE EDUCATION/TRAINING PROGRAM

## 2024-11-15 PROCEDURE — 99212 OFFICE O/P EST SF 10 MIN: CPT | Mod: PBBFAC | Performed by: STUDENT IN AN ORGANIZED HEALTH CARE EDUCATION/TRAINING PROGRAM

## 2024-11-15 NOTE — PROGRESS NOTES
OTOLARYNGOLOGY- FACIAL PLASTIC & RECONSTRUCTIVE SURGERY      Sheila Zarco  [id number]    CC:  Chief Complaint   Patient presents with    Post-op Evaluation       HISTORY OF PRESENT ILLNESS: Sheila Zarco  is a 74 y.o. female who was referred to me by Dr. Sun for left upper eyelid mole excision.  The patient states she has had this lesion for a few years but noticed it has grown over the past few months.  She states it sometimes gets red and is pruritic.  She denies history of prior surgeries on the face.  She notes having multiple skin tags.    She is not on any blood thinners.  She does endorse history of anxiety and takes as-needed benzodiazepine.    11/15/2024:     Patient presents 5 days after left eyelid mole excision.  Overall patient is healing well.  Her pathology came back as a benign nevus:        Component 3 d ago   Final Pathologic Diagnosis Skin, left eyelid, excision:  -MELANOCYTIC NEVUS, INTRADERMAL TYPE             She has been healing well and cleaning her wound appropriately.  She is not in any pain.    Past Medical History  She has a past medical history of Asthma, Baker's cyst, Breast cancer, Cataract, Glaucoma, and Thyroid disease.    Past Surgical History  She has a past surgical history that includes Hysterectomy; Gallbladder surgery; Knee cartilage surgery; Bunionectomy; narrow angles eye surgery ; Phacoemulsification of cataract (Right, 7/9/2020); Intraocular prosthesis insertion (Right, 7/9/2020); Mastectomy, partial (Left, 7/24/2020); Phacoemulsification of cataract (Left, 10/22/2020); Intraocular prosthesis insertion (Left, 10/22/2020); Breast biopsy (Left, 06/2020); Breast lumpectomy (Left); Esophagogastroduodenoscopy (N/A, 12/5/2023); and Colonoscopy (N/A, 12/5/2023).    Family History  Her family history includes Breast cancer in her sister and another family member; Breast cancer (age of onset: 43) in her daughter; Heart disease in her father and mother; Liver disease in her  brother; No Known Problems in her maternal aunt, maternal grandfather, maternal grandmother, maternal uncle, paternal aunt, paternal grandfather, paternal grandmother, paternal uncle, and sister.    Social History  She reports that she has never smoked. She has never used smokeless tobacco. She reports current alcohol use of about 8.0 standard drinks of alcohol per week. She reports that she does not use drugs.    Allergies  She has No Known Allergies.    Medications  She has a current medication list which includes the following prescription(s): acetic acid, albuterol, albuterol, albuterol, alprazolam, aspirin, atorvastatin, betamethasone dipropionate, desoximetasone, diazepam, escitalopram oxalate, fluocinonide, fluocinonide, hyoscyamine, ketoconazole, ketoconazole, ketoconazole, ketoconazole, levothyroxine, meclizine, pantoprazole, prednisone, tacrolimus, triamcinolone acetonide 0.025%, valacyclovir, vitamin d, fluticasone-salmeterol 250-50 mcg/dose, levocetirizine, and nortriptyline, and the following Facility-Administered Medications: 0.9% nacl, cyclopentolate 1%, phenylephrine hcl 2.5%, and tropicamide 1%.      Review of Systems     Constitutional: Negative for appetite change, chills, fatigue, fever and unexpected weight loss.      HENT: Negative for ear discharge, ear infection, ear pain, facial swelling, hearing loss, mouth sores, nosebleeds, postnasal drip, ringing in the ears, runny nose, sinus infection, sinus pressure, sore throat, stuffy nose, tonsil infection, dental problems, trouble swallowing and voice change.      Eyes:  Positive for eye itching.     Respiratory:  Negative for cough, shortness of breath, sleep apnea, snoring and wheezing.      Cardiovascular:  Negative for chest pain, foot swelling, irregular heartbeat and swollen veins.     Gastrointestinal:  Positive for abdominal pain, acid reflux and diarrhea.     Genitourinary: Negative for difficulty urinating, sexual problems and frequent  urination.     Musc: Negative for aching joints, aching muscles, back pain and neck pain.     Skin: Positive for rash.     Allergy: Positive for seasonal allergies.     Endocrine: Negative for cold intolerance and heat intolerance.      Neurological: Positive for dizziness.     Hematologic: Positive for bruises/bleeds easily.     Psychiatric: Positive for nervous/anxious.             PHYSICAL EXAM:  BP (!) 147/68 (BP Location: Right arm, Patient Position: Sitting)   Pulse 68   Wt 75.2 kg (165 lb 12.6 oz)   BMI 26.76 kg/m²     General: Alert and oriented in no acute distress  Head and Face: Normocephaic, atruamatic.  Well healed incision along her left lateral Crow's feet.   7-0 nylon sutures removed without issue    Imaging:      ASSESSMENT:   1. Eyelid lesion, benign              PLAN:     Patient has healed well from her excision.  She is interested in further eyelid lesion excisions and she points to numerous skin tags on her upper eyelid.  She will follow up with me in 3 months for further discussion.    Chriss Smith MD  Facial Plastic and Reconstructive Surgeon  [facility] - [facility]

## 2024-11-15 NOTE — LETTER
November 15, 2024        Rose Sun MD  [address]  [place] [address]             [facility]  [address] [facility] [address]  Phone: [phone]   Patient: Sheila Zarco   MR Number: [id number]   YOB: 1950   Date of Visit: 11/15/2024       Dear Dr. Sun:    Thank you for referring Sheila Zarco to me for evaluation. Below are the relevant portions of my assessment and plan of care.            If you have questions, please do not hesitate to call me. I look forward to following Sheila along with you.    Sincerely,      Chriss Smith MD           CC  No Recipients

## 2024-11-26 ENCOUNTER — PATIENT MESSAGE (OUTPATIENT)
Dept: ADMINISTRATIVE | Facility: HOSPITAL | Age: 74
End: 2024-11-26
Payer: MEDICARE

## 2024-12-11 ENCOUNTER — TELEPHONE (OUTPATIENT)
Dept: INTERNAL MEDICINE | Facility: CLINIC | Age: 74
End: 2024-12-11
Payer: MEDICARE

## 2024-12-11 DIAGNOSIS — E03.9 ACQUIRED HYPOTHYROIDISM: ICD-10-CM

## 2024-12-11 DIAGNOSIS — D64.9 ANEMIA, UNSPECIFIED TYPE: ICD-10-CM

## 2024-12-11 DIAGNOSIS — E78.49 OTHER HYPERLIPIDEMIA: Primary | ICD-10-CM

## 2024-12-11 DIAGNOSIS — R79.9 ABNORMAL FINDING OF BLOOD CHEMISTRY, UNSPECIFIED: ICD-10-CM

## 2025-01-31 NOTE — TELEPHONE ENCOUNTER
No care due was identified.  [facility] Embedded Care Due Messages. Reference number: [id number].   12/06/2023 12:52:38 PM CST   Impaired functional mobility due to reduced, strength, balance and pain

## 2025-02-05 ENCOUNTER — PATIENT MESSAGE (OUTPATIENT)
Dept: SURGERY | Facility: CLINIC | Age: 75
End: 2025-02-05
Payer: MEDICARE

## 2025-02-21 DIAGNOSIS — Z00.00 ENCOUNTER FOR MEDICARE ANNUAL WELLNESS EXAM: ICD-10-CM

## 2025-03-07 NOTE — PROGRESS NOTES
"[facility]  Department of Surgery      REFERRING PROVIDER: No referring provider defined for this encounter.    Chief Complaint: Follow-up (Additional Imaging Consult) and Breast Pain      DIAGNOSIS:   This is a 74 y.o. female with a history of stage 0 grade 3 DCIS ER -  [place] -   of the left breast     TREATMENT:   1. Left partial mastectomy on 2020. Kayleen Alvarez M.D. Surgical Oncology. Final pathology revealed (9.5 mm) DCIS with clear margins.  2. Radiation therapy completed on 10/14/2020. Hanane Sahu M.D. Radiation Oncology     HISTORY OF PRESENT ILLNESS:   Sheila Zarco is a 74 y.o. female comes in for oncological follow up. Notes new left breast pain. She denies change in her breast self-exam specifically denying new masses, skin or nipple changes, or nipple discharge. Past medical and surgical history is updated no new changes. There have been no changes to family history. The patient denies constitutional symptoms of night sweats, weight loss, new headaches, visual changes, new back or bony pain, chest pain, or shortness of breath.      IMAGIN/18/24 Bilateral Screening Mammo:    Findings:  This procedure was performed using tomosynthesis. Computer-aided detection was utilized in the interpretation of this examination.  The breasts are heterogeneously dense, which may obscure small masses. There is no evidence of suspicious masses, calcifications, or other abnormal findings.  There is postsurgical change of left lumpectomy.     Impression:  Bilateral  There is no mammographic evidence of malignancy.     BI-RADS Category:   Overall: 2 - Benign        Recommendation:  Routine screening mammogram in 1 year is recommended.        MEDICATIONS/ALLERGIES:     Review of patient's allergies indicates:  No Known Allergies    PHYSICAL EXAM:   BP (!) 152/72 (BP Location: Right arm, Patient Position: Sitting)   Pulse (!) 58   Ht 5' 6" (1.676 m)   Wt 73.5 kg (162 lb)   SpO2 98%   BMI " 26.15 kg/m²     Physical Exam   Vitals reviewed.  Constitutional: She is oriented to person, place, and time.   HENT:   Head: Normocephalic and atraumatic.   Nose: Nose normal.   Eyes: Pupils are equal, round, and reactive to light. Right eye exhibits no discharge. Left eye exhibits no discharge.   Pulmonary/Chest: Effort normal and breath sounds normal. No stridor. No respiratory distress. She exhibits no mass, no tenderness and no edema. Right breast exhibits no inverted nipple, no mass, no nipple discharge, no skin change and no tenderness. Left breast exhibits tenderness. Left breast exhibits no inverted nipple, no mass, no nipple discharge and no skin change. No breast swelling or bleeding. Breasts are symmetrical.       Abdominal: Normal appearance.   Genitourinary: No breast swelling or bleeding.   Neurological: She is alert and oriented to person, place, and time.   Skin: Skin is warm and dry.     Psychiatric: Her behavior is normal. Mood, judgment and thought content normal.        ASSESSMENT:   This is a 74 y.o. female without evidence of recurrence by exam, history or imaging. New focal pain, left breast.      PLAN:   New focal pain without corresponding mass of skin change. After discussion, will proceed with diagnostic imaging given history.   Continue monthly self breast exams and call the clinic with any changes or problems.  Radiology will coordinate imaging. Will be in touch with work up.     The patient is in agreement with the plan. Questions were encouraged and answered to patient's satisfaction. Sheila will call our office with any questions or concerns.     25 minutes were spent on this encounter, 15 of which was face to face counseling and 10 minutes were spent on chart review and coordination of care.

## 2025-03-10 ENCOUNTER — OFFICE VISIT (OUTPATIENT)
Dept: SURGERY | Facility: CLINIC | Age: 75
End: 2025-03-10
Payer: MEDICARE

## 2025-03-10 VITALS
WEIGHT: 162 LBS | OXYGEN SATURATION: 98 % | HEART RATE: 58 BPM | HEIGHT: 66 IN | DIASTOLIC BLOOD PRESSURE: 72 MMHG | SYSTOLIC BLOOD PRESSURE: 152 MMHG | BODY MASS INDEX: 26.03 KG/M2

## 2025-03-10 DIAGNOSIS — Z98.890 S/P LUMPECTOMY, LEFT BREAST: ICD-10-CM

## 2025-03-10 DIAGNOSIS — N64.4 BREAST PAIN, LEFT: ICD-10-CM

## 2025-03-10 DIAGNOSIS — Z12.31 ENCOUNTER FOR SCREENING MAMMOGRAM FOR MALIGNANT NEOPLASM OF BREAST: ICD-10-CM

## 2025-03-10 DIAGNOSIS — Z85.3 PERSONAL HISTORY OF BREAST CANCER: Primary | ICD-10-CM

## 2025-03-10 PROCEDURE — 99213 OFFICE O/P EST LOW 20 MIN: CPT | Mod: PBBFAC | Performed by: PHYSICIAN ASSISTANT

## 2025-03-10 PROCEDURE — 99214 OFFICE O/P EST MOD 30 MIN: CPT | Mod: S$PBB,,, | Performed by: PHYSICIAN ASSISTANT

## 2025-03-10 PROCEDURE — 99999 PR PBB SHADOW E&M-EST. PATIENT-LVL III: CPT | Mod: PBBFAC,,, | Performed by: PHYSICIAN ASSISTANT

## 2025-03-17 DIAGNOSIS — F41.9 ANXIETY: Primary | ICD-10-CM

## 2025-03-17 NOTE — TELEPHONE ENCOUNTER
Care Due:                  Date            Visit Type   Department     Provider  --------------------------------------------------------------------------------                                EP -                              PRIMARY      MET INTERNAL  Last Visit: 04-      [facility] ([facility])   MEDICINE       Farhatalejandra Correa                              EP -                              PRIMARY      [facility] INTERNAL  Next Visit: 04-      [facility] ([facility])   MEDICINE       Farhat CANDELARIA Correa                                                            Last  Test          Frequency    Reason                     Performed    Due Date  --------------------------------------------------------------------------------    TSH.........  12 months..  levothyroxine............  02-   02-    Health [facility] Embedded Care Due Messages. Reference number: [id number].   3/17/2025 6:50:55 PM CDT

## 2025-03-18 ENCOUNTER — TELEPHONE (OUTPATIENT)
Dept: RADIOLOGY | Facility: HOSPITAL | Age: 75
End: 2025-03-18
Payer: MEDICARE

## 2025-03-18 NOTE — TELEPHONE ENCOUNTER
----- Message from Mayelin Roman PA-C sent at 3/17/2025 11:10 AM CDT -----  Tez Hernandez,     This patient needs left diagnostic and US for new focal pain.     Thank you!

## 2025-03-19 RX ORDER — ALPRAZOLAM 0.5 MG/1
0.5 TABLET ORAL 2 TIMES DAILY PRN
Qty: 60 TABLET | Refills: 0 | Status: SHIPPED | OUTPATIENT
Start: 2025-03-19

## 2025-03-22 ENCOUNTER — PATIENT MESSAGE (OUTPATIENT)
Dept: DERMATOLOGY | Facility: CLINIC | Age: 75
End: 2025-03-22
Payer: MEDICARE

## 2025-03-25 ENCOUNTER — OFFICE VISIT (OUTPATIENT)
Dept: OPTOMETRY | Facility: CLINIC | Age: 75
End: 2025-03-25
Payer: MEDICARE

## 2025-03-25 DIAGNOSIS — Z13.5 GLAUCOMA SCREENING: ICD-10-CM

## 2025-03-25 DIAGNOSIS — H16.223 KERATOCONJUNCTIVITIS SICCA OF BOTH EYES NOT SPECIFIED AS SJOGREN'S: Primary | ICD-10-CM

## 2025-03-25 DIAGNOSIS — H52.4 PRESBYOPIA: ICD-10-CM

## 2025-03-25 PROCEDURE — 99999 PR PBB SHADOW E&M-EST. PATIENT-LVL I: CPT | Mod: PBBFAC,,, | Performed by: OPTOMETRIST

## 2025-03-25 PROCEDURE — 99211 OFF/OP EST MAY X REQ PHY/QHP: CPT | Mod: PBBFAC,PO | Performed by: OPTOMETRIST

## 2025-03-25 PROCEDURE — 92014 COMPRE OPH EXAM EST PT 1/>: CPT | Mod: S$PBB,,, | Performed by: OPTOMETRIST

## 2025-03-25 PROCEDURE — 92015 DETERMINE REFRACTIVE STATE: CPT | Mod: ,,, | Performed by: OPTOMETRIST

## 2025-03-25 NOTE — PROGRESS NOTES
HPI    73 Y/o female is here for routine eye exam with C/o pt say's eye's has   been having itching and burning eye's pt say's she's using drops but it   only soothes it for a minute. PT WEARS OTC READERS +1.50  Pt denies pain and discomfort   No f/f    Eye med: Systane OU PRN   Last edited by Sourav Lombardi MA on 3/25/2025  9:45 AM.            Assessment /Plan     For exam results, see Encounter Report.    Keratoconjunctivitis sicca of both eyes not specified as Sjogren's    Glaucoma screening    Presbyopia      1. Sp pciol OU--pt happy w otc readers  2. Sp PI OU--iop/CD wnl  3. FLORI--Only using ATs once or twice a day.  Advised SYSTANE COMPLETE Ats QID       PLAN:    Rtc 1 yr

## 2025-03-26 ENCOUNTER — HOSPITAL ENCOUNTER (OUTPATIENT)
Dept: RADIOLOGY | Facility: HOSPITAL | Age: 75
Discharge: HOME | End: 2025-03-26
Attending: PHYSICIAN ASSISTANT
Payer: MEDICARE

## 2025-03-26 DIAGNOSIS — Z12.31 ENCOUNTER FOR SCREENING MAMMOGRAM FOR MALIGNANT NEOPLASM OF BREAST: ICD-10-CM

## 2025-03-26 DIAGNOSIS — Z85.3 PERSONAL HISTORY OF BREAST CANCER: ICD-10-CM

## 2025-03-26 DIAGNOSIS — Z98.890 S/P LUMPECTOMY, LEFT BREAST: ICD-10-CM

## 2025-03-26 DIAGNOSIS — N64.4 BREAST PAIN, LEFT: ICD-10-CM

## 2025-03-26 PROCEDURE — 77065 DX MAMMO INCL CAD UNI: CPT | Mod: TC,LT

## 2025-03-26 PROCEDURE — 76642 ULTRASOUND BREAST LIMITED: CPT | Mod: TC,LT

## 2025-03-28 ENCOUNTER — PATIENT MESSAGE (OUTPATIENT)
Dept: INTERNAL MEDICINE | Facility: CLINIC | Age: 75
End: 2025-03-28
Payer: MEDICARE

## 2025-03-28 NOTE — TELEPHONE ENCOUNTER
No care due was identified.  [facility] Embedded Care Due Messages. Reference number: [id number].   3/28/2025 4:26:56 PM CDT

## 2025-03-31 RX ORDER — VALACYCLOVIR HYDROCHLORIDE 500 MG/1
TABLET, FILM COATED ORAL
Qty: 40 TABLET | Refills: 3 | Status: SHIPPED | OUTPATIENT
Start: 2025-03-31

## 2025-04-08 RX ORDER — ESCITALOPRAM OXALATE 20 MG/1
20 TABLET ORAL DAILY
Qty: 90 TABLET | Refills: 0 | Status: SHIPPED | OUTPATIENT
Start: 2025-04-08 | End: 2025-07-07

## 2025-04-08 NOTE — TELEPHONE ENCOUNTER
Care Due:                  Date            Visit Type   Department     Provider  --------------------------------------------------------------------------------                                EP -                              PRIMARY      [facility] INTERNAL  Last Visit: 04-      CARE ([facility])   MEDICINE       Farhatalejandra Correa                              EP -                              PRIMARY      [facility] INTERNAL  Next Visit: 04-      [facility] ([facility])   MEDICINE       Farhat CANDELARIA Correa                                                            Last  Test          Frequency    Reason                     Performed    Due Date  --------------------------------------------------------------------------------    CBC.........  12 months..  valACYclovir.............  04- 04-    Cr..........  12 months..  valACYclovir.............  06-   06-    [facility] Embedded Care Due Messages. Reference number: [id number].   4/08/2025 12:24:53 AM CDT

## 2025-04-08 NOTE — TELEPHONE ENCOUNTER
Refill Decision Note   Sheila Zarco  is requesting a refill authorization.  Brief Assessment and Rationale for Refill:  Approve     Medication Therapy Plan:  FOVS; FLOS      Comments:     Note composed:12:04 PM 04/08/2025

## 2025-04-10 ENCOUNTER — OFFICE VISIT (OUTPATIENT)
Dept: DERMATOLOGY | Facility: CLINIC | Age: 75
End: 2025-04-10
Payer: MEDICARE

## 2025-04-10 DIAGNOSIS — L85.3 XEROSIS CUTIS: Primary | ICD-10-CM

## 2025-04-10 DIAGNOSIS — D23.9 DERMATOFIBROMA: ICD-10-CM
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
## 2025-04-10 PROCEDURE — 99999 PR PBB SHADOW E&M-EST. PATIENT-LVL IV: CPT | Mod: PBBFAC,,, | Performed by: PHYSICIAN ASSISTANT

## 2025-04-10 PROCEDURE — 99213 OFFICE O/P EST LOW 20 MIN: CPT | Mod: S$PBB,,, | Performed by: PHYSICIAN ASSISTANT

## 2025-04-10 PROCEDURE — G2211 COMPLEX E/M VISIT ADD ON: HCPCS | Mod: S$PBB,,, | Performed by: PHYSICIAN ASSISTANT

## 2025-04-10 PROCEDURE — 99214 OFFICE O/P EST MOD 30 MIN: CPT | Mod: PBBFAC | Performed by: PHYSICIAN ASSISTANT

## 2025-04-10 NOTE — PATIENT INSTRUCTIONS
Xerosis cutis- right arm, no evidence of any lesion present today; resolving    Dermatofibroma  This is a benign scar-like lesion secondary to minor trauma. No treatment required.       Bug bite, initial encounter- resolving             Follow up for Dr. Sun for her skin check.

## 2025-04-10 NOTE — PROGRESS NOTES
Subjective:      Patient ID:  Sheila Zarco is a 74 y.o. female who presents for   Chief Complaint   Patient presents with    Lesion     Lesion    Pt c/o scaly lesion on right arm x 3 weeks. No bleeding pain or prev tx.    Reports having a bug bite on back. She is worried the stinger is present.    Also reports having a bump on left thigh that has been there for years.     Review of Systems   Skin:  Negative for tendency to form keloidal scars.   Hematologic/Lymphatic: Bruises/bleeds easily.       Objective:   Physical Exam   Constitutional: She appears well-developed and well-nourished. No distress.   Neurological: She is alert and oriented to person, place, and time. She is not disoriented.   Psychiatric: She has a normal mood and affect.   Skin:   Areas Examined (abnormalities noted in diagram):   Back Inspection Performed  RUE Inspected  LLE Inspection Performed                  Diagram Legend     Erythematous scaling macule/papule c/w actinic keratosis       Vascular papule c/w angioma      Pigmented verrucoid papule/plaque c/w seborrheic keratosis      Yellow umbilicated papule c/w sebaceous hyperplasia      Irregularly shaped tan macule c/w lentigo     1-2 mm smooth white papules consistent with Milia      Movable subcutaneous cyst with punctum c/w epidermal inclusion cyst      Subcutaneous movable cyst c/w pilar cyst      Firm pink to brown papule c/w dermatofibroma      Pedunculated fleshy papule(s) c/w skin tag(s)      Evenly pigmented macule c/w junctional nevus     Mildly variegated pigmented, slightly irregular-bordered macule c/w mildly atypical nevus      Flesh colored to evenly pigmented papule c/w intradermal nevus       Pink pearly papule/plaque c/w basal cell carcinoma      Erythematous hyperkeratotic cursted plaque c/w SCC      Surgical scar with no sign of skin cancer recurrence      Open and closed comedones      Inflammatory papules and pustules      Verrucoid papule consistent  consistent with wart     Erythematous eczematous patches and plaques     Dystrophic onycholytic nail with subungual debris c/w onychomycosis     Umbilicated papule    Erythematous-base heme-crusted tan verrucoid plaque consistent with inflamed seborrheic keratosis     Erythematous Silvery Scaling Plaque c/w Psoriasis     See annotation      Assessment / Plan:        Xerosis cutis- right arm, no evidence of any lesion present today; resolving    Dermatofibroma  This is a benign scar-like lesion secondary to minor trauma. No treatment required.       Bug bite, initial encounter- resolving             Follow up for Dr. Sun for her skin check.

## 2025-04-15 ENCOUNTER — TELEPHONE (OUTPATIENT)
Dept: DERMATOLOGY | Facility: CLINIC | Age: 75
End: 2025-04-15
Payer: MEDICARE

## 2025-04-15 NOTE — TELEPHONE ENCOUNTER
Spoke to pt. Pt verbally agreed and confirmed date and time given. Pt thanked me.     ----- Message from Nurse Karis sent at 4/15/2025  3:21 PM CDT -----  Contact: Pt  [phone]    ----- Message -----  From: Jazmin Steven  Sent: 4/15/2025  12:36 PM CDT  To: Corinne LEMUS Staff    Pt is returning a call back to Karis to schedule a appt for a skin check: pt is asking to be called back after 2:30 please call

## 2025-04-15 NOTE — TELEPHONE ENCOUNTER
----- Message from Rose Sun MD sent at 4/11/2025 11:22 AM CDT -----  Yes thanks  ----- Message -----  From: Karis Stratton LPN  Sent: 4/10/2025   3:08 PM CDT  To: Rose Sun MD    Skin check - next avail?  ----- Message -----  From: Ivan Winn, PAElaineC  Sent: 4/10/2025  11:51 AM CDT  To: Corinne LEMUS Staff    Hey! Can y'all find out when Dr. Sun wanted her back for her next skin check? Thanks,    Ivan

## 2025-04-22 ENCOUNTER — LAB VISIT (OUTPATIENT)
Dept: LAB | Facility: HOSPITAL | Age: 75
End: 2025-04-22
Attending: INTERNAL MEDICINE
Payer: MEDICARE

## 2025-04-22 DIAGNOSIS — R79.9 ABNORMAL FINDING OF BLOOD CHEMISTRY, UNSPECIFIED: ICD-10-CM

## 2025-04-22 DIAGNOSIS — D64.9 ANEMIA, UNSPECIFIED TYPE: ICD-10-CM

## 2025-04-22 DIAGNOSIS — E78.49 OTHER HYPERLIPIDEMIA: ICD-10-CM

## 2025-04-22 DIAGNOSIS — E03.9 ACQUIRED HYPOTHYROIDISM: ICD-10-CM

## 2025-04-22 LAB
ABSOLUTE EOSINOPHIL (OHS): 0.2 K/UL
ABSOLUTE MONOCYTE (OHS): 0.4 K/UL (ref 0.3–1)
ABSOLUTE NEUTROPHIL COUNT (OHS): 2.93 K/UL (ref 1.8–7.7)
ALBUMIN SERPL BCP-MCNC: 3.7 G/DL (ref 3.5–5.2)
ALP SERPL-CCNC: 75 UNIT/L (ref 40–150)
ALT SERPL W/O P-5'-P-CCNC: 13 UNIT/L (ref 10–44)
ANION GAP (OHS): 7 MMOL/L (ref 8–16)
AST SERPL-CCNC: 19 UNIT/L (ref 11–45)
BASOPHILS # BLD AUTO: 0.07 K/UL
BASOPHILS NFR BLD AUTO: 1.3 %
BILIRUB SERPL-MCNC: 0.7 MG/DL (ref 0.1–1)
BUN SERPL-MCNC: 14 MG/DL (ref 8–23)
CALCIUM SERPL-MCNC: 9.5 MG/DL (ref 8.7–10.5)
CHLORIDE SERPL-SCNC: 106 MMOL/L (ref 95–110)
CHOLEST SERPL-MCNC: 288 MG/DL (ref 120–199)
CHOLEST/HDLC SERPL: 2.8 {RATIO} (ref 2–5)
CO2 SERPL-SCNC: 27 MMOL/L (ref 23–29)
CREAT SERPL-MCNC: 0.8 MG/DL (ref 0.5–1.4)
EAG (OHS): 105 MG/DL (ref 68–131)
ERYTHROCYTE [DISTWIDTH] IN BLOOD BY AUTOMATED COUNT: 13.6 % (ref 11.5–14.5)
GFR SERPLBLD CREATININE-BSD FMLA CKD-EPI: >60 ML/MIN/1.73/M2
GLUCOSE SERPL-MCNC: 109 MG/DL (ref 70–110)
HBA1C MFR BLD: 5.3 % (ref 4–5.6)
HCT VFR BLD AUTO: 40.4 % (ref 37–48.5)
HDLC SERPL-MCNC: 102 MG/DL (ref 40–75)
HDLC SERPL: 35.4 % (ref 20–50)
HGB BLD-MCNC: 12.9 GM/DL (ref 12–16)
IMM GRANULOCYTES # BLD AUTO: 0.02 K/UL (ref 0–0.04)
IMM GRANULOCYTES NFR BLD AUTO: 0.4 % (ref 0–0.5)
IRON SATN MFR SERPL: 18 % (ref 20–50)
IRON SERPL-MCNC: 77 UG/DL (ref 30–160)
LDLC SERPL CALC-MCNC: 155 MG/DL (ref 63–159)
LYMPHOCYTES # BLD AUTO: 1.65 K/UL (ref 1–4.8)
MCH RBC QN AUTO: 28.4 PG (ref 27–31)
MCHC RBC AUTO-ENTMCNC: 31.9 G/DL (ref 32–36)
MCV RBC AUTO: 89 FL (ref 82–98)
NONHDLC SERPL-MCNC: 186 MG/DL
NUCLEATED RBC (/100WBC) (OHS): 0 /100 WBC
PLATELET # BLD AUTO: 319 K/UL (ref 150–450)
PMV BLD AUTO: 9.8 FL (ref 9.2–12.9)
POTASSIUM SERPL-SCNC: 3.9 MMOL/L (ref 3.5–5.1)
PROT SERPL-MCNC: 7 GM/DL (ref 6–8.4)
RBC # BLD AUTO: 4.54 M/UL (ref 4–5.4)
RELATIVE EOSINOPHIL (OHS): 3.8 %
RELATIVE LYMPHOCYTE (OHS): 31.3 % (ref 18–48)
RELATIVE MONOCYTE (OHS): 7.6 % (ref 4–15)
RELATIVE NEUTROPHIL (OHS): 55.6 % (ref 38–73)
SODIUM SERPL-SCNC: 140 MMOL/L (ref 136–145)
T4 FREE SERPL-MCNC: 0.93 NG/DL (ref 0.71–1.51)
TIBC SERPL-MCNC: 426 UG/DL (ref 250–450)
TRANSFERRIN SERPL-MCNC: 288 MG/DL (ref 200–375)
TRIGL SERPL-MCNC: 155 MG/DL (ref 30–150)
TSH SERPL-ACNC: 4.63 UIU/ML (ref 0.4–4)
WBC # BLD AUTO: 5.27 K/UL (ref 3.9–12.7)

## 2025-04-22 PROCEDURE — 83036 HEMOGLOBIN GLYCOSYLATED A1C: CPT

## 2025-04-22 PROCEDURE — 36415 COLL VENOUS BLD VENIPUNCTURE: CPT | Mod: PO

## 2025-04-22 PROCEDURE — 85025 COMPLETE CBC W/AUTO DIFF WBC: CPT

## 2025-04-22 PROCEDURE — 84443 ASSAY THYROID STIM HORMONE: CPT

## 2025-04-22 PROCEDURE — 81001 URINALYSIS AUTO W/SCOPE: CPT

## 2025-04-22 PROCEDURE — 82465 ASSAY BLD/SERUM CHOLESTEROL: CPT

## 2025-04-22 PROCEDURE — 82565 ASSAY OF CREATININE: CPT

## 2025-04-22 PROCEDURE — 84439 ASSAY OF FREE THYROXINE: CPT

## 2025-04-22 PROCEDURE — 83540 ASSAY OF IRON: CPT

## 2025-04-23 LAB
BACTERIA #/AREA URNS AUTO: ABNORMAL /HPF
BILIRUB UR QL STRIP.AUTO: NEGATIVE
CLARITY UR: CLEAR
COLOR UR AUTO: YELLOW
GLUCOSE UR QL STRIP: NEGATIVE
HGB UR QL STRIP: ABNORMAL
KETONES UR QL STRIP: NEGATIVE
LEUKOCYTE ESTERASE UR QL STRIP: ABNORMAL
MICROSCOPIC COMMENT: ABNORMAL
NITRITE UR QL STRIP: NEGATIVE
PH UR STRIP: 6 [PH]
PROT UR QL STRIP: NEGATIVE
RBC #/AREA URNS AUTO: 7 /HPF (ref 0–4)
SP GR UR STRIP: 1.02
SQUAMOUS #/AREA URNS AUTO: 13 /HPF
UROBILINOGEN UR STRIP-ACNC: NEGATIVE EU/DL
WBC #/AREA URNS AUTO: 64 /HPF (ref 0–5)

## 2025-04-29 ENCOUNTER — OFFICE VISIT (OUTPATIENT)
Dept: INTERNAL MEDICINE | Facility: CLINIC | Age: 75
End: 2025-04-29
Payer: MEDICARE

## 2025-04-29 ENCOUNTER — TELEPHONE (OUTPATIENT)
Dept: INTERNAL MEDICINE | Facility: CLINIC | Age: 75
End: 2025-04-29

## 2025-04-29 VITALS
DIASTOLIC BLOOD PRESSURE: 60 MMHG | RESPIRATION RATE: 16 BRPM | BODY MASS INDEX: 25.71 KG/M2 | OXYGEN SATURATION: 99 % | HEIGHT: 66 IN | SYSTOLIC BLOOD PRESSURE: 124 MMHG | HEART RATE: 58 BPM | WEIGHT: 160 LBS | TEMPERATURE: 97 F

## 2025-04-29 DIAGNOSIS — K21.9 GASTROESOPHAGEAL REFLUX DISEASE, UNSPECIFIED WHETHER ESOPHAGITIS PRESENT: Chronic | ICD-10-CM

## 2025-04-29 DIAGNOSIS — E03.9 ACQUIRED HYPOTHYROIDISM: ICD-10-CM

## 2025-04-29 DIAGNOSIS — R10.9 RIGHT FLANK PAIN: ICD-10-CM

## 2025-04-29 DIAGNOSIS — E55.9 VITAMIN D DEFICIENCY: ICD-10-CM

## 2025-04-29 DIAGNOSIS — R82.90 ABNORMAL URINALYSIS: ICD-10-CM

## 2025-04-29 DIAGNOSIS — E78.49 OTHER HYPERLIPIDEMIA: Primary | ICD-10-CM

## 2025-04-29 DIAGNOSIS — J45.20 MILD INTERMITTENT ASTHMA WITHOUT COMPLICATION: ICD-10-CM

## 2025-04-29 PROCEDURE — 99214 OFFICE O/P EST MOD 30 MIN: CPT | Mod: S$PBB,,, | Performed by: INTERNAL MEDICINE

## 2025-04-29 PROCEDURE — 87086 URINE CULTURE/COLONY COUNT: CPT | Performed by: INTERNAL MEDICINE

## 2025-04-29 PROCEDURE — 99999 PR PBB SHADOW E&M-EST. PATIENT-LVL V: CPT | Mod: PBBFAC,,, | Performed by: INTERNAL MEDICINE

## 2025-04-29 PROCEDURE — 99215 OFFICE O/P EST HI 40 MIN: CPT | Mod: PBBFAC,PO | Performed by: INTERNAL MEDICINE

## 2025-04-29 RX ORDER — ESOMEPRAZOLE MAGNESIUM 40 MG/1
40 CAPSULE, DELAYED RELEASE ORAL
Qty: 30 CAPSULE | Refills: 11 | OUTPATIENT
Start: 2025-04-29

## 2025-04-29 RX ORDER — ESOMEPRAZOLE MAGNESIUM 40 MG/1
40 CAPSULE, DELAYED RELEASE ORAL
Qty: 30 CAPSULE | Refills: 11 | Status: SHIPPED | OUTPATIENT
Start: 2025-04-29 | End: 2026-04-29

## 2025-04-29 NOTE — PROGRESS NOTES
Subjective:       Patient ID: Sheila Zarco is a 74 y.o. female.    Chief Complaint: Low-back Pain (Booked as annual but has medicare.  /New problem.  Notices when she wakes up in am.  0 pain now. ) and Hyperlipidemia (Off cholesterol meds since about nov-December, cause of rash?  Discuss. Restart? )    History of Present Illness    Sheila presents today for annual check-up and follow-up of medical problems.  Active medical conditions include hyperlipidemia, hypothyroidism, bronchial asthma, vitamin-D deficiency, and gastroesophageal reflux disease.  The patient has been experiencing right flank pain of unclear duration.    BACK PAIN:  She experiences back pain, primarily in the right flank area, most noticeable upon waking. The pain improves with movement and after showering. Pain is present while lying down but not severe.  The pain is nonradiating.  No left-sided symptoms are noted.    GASTROINTESTINAL:  She experiences occasional abdominal cramps similar to menstrual cramps. She recently had severe indigestion after consuming excessive barbecue and champagne during Easter celebrations, resulting in significant abdominal discomfort lasting two days. She self-treated with Pepto Bismol and Denise-Page, and avoided eating for one day due to symptom severity. She takes Protonix daily for reflux, with an additional dose at night when experiencing severe symptoms.  She is requesting a substitute for Protonix.    SKIN:  She has a history of rash affecting bilateral inner thighs and axillae. The rash remains visible but is no longer itching.  She has been treated by Dermatology.    MEDICATIONS:  She discontinued cholesterol medication after 3 weeks due to concerns about possible rash. She acknowledges occasionally missing thyroid medication, approximately once every 2 weeks, including the morning of the lab visit.    LABS:  Cholesterol increased from 186 to 288, with LDL increasing from 79 to 155. Blood sugar  was 109 and hemoglobin A1C was 5.3%. TSH elevated at 4.63, increased from previous value of 1.81, with normal free T4.    FAMILY HISTORY:  Father had renal cancer requiring nephrectomy.      ROS:  ENT: -sore throat  Cardiovascular: +lower extremity edema  Gastrointestinal: +heartburn, +menstrual pain or symptoms, +indigestion  Musculoskeletal: +back pain  Integumentary: +rash  Psychiatric: +anxiety              Physical Exam  Vitals and nursing note reviewed.   Constitutional:       General: She is not in acute distress.     Appearance: Normal appearance. She is well-developed.      Comments: The patient's weight has remained stable since 04/23/2024.   HENT:      Head: Normocephalic and atraumatic.      Right Ear: External ear normal.      Left Ear: External ear normal.      Nose: Nose normal.      Mouth/Throat:      Mouth: Mucous membranes are moist.      Pharynx: Oropharynx is clear. No oropharyngeal exudate.   Eyes:      General: No scleral icterus.     Extraocular Movements: Extraocular movements intact.      Conjunctiva/sclera: Conjunctivae normal.   Neck:      Thyroid: No thyromegaly.      Vascular: No carotid bruit or JVD.   Cardiovascular:      Rate and Rhythm: Normal rate and regular rhythm.      Pulses: Normal pulses.      Heart sounds: Normal heart sounds. No murmur heard.     No friction rub. No gallop.   Pulmonary:      Effort: Pulmonary effort is normal. No respiratory distress.      Breath sounds: Normal breath sounds. No wheezing or rales.   Abdominal:      General: Bowel sounds are normal. There is no abdominal bruit.      Palpations: Abdomen is soft. There is no hepatomegaly, splenomegaly or mass.      Tenderness: There is no abdominal tenderness. There is right CVA tenderness.      Hernia: No hernia is present.   Musculoskeletal:         General: No tenderness. Normal range of motion.      Right shoulder: No deformity or effusion.      Cervical back: Normal range of motion and neck supple.       Right lower leg: No edema.      Left lower leg: No edema.   Lymphadenopathy:      Cervical: No cervical adenopathy.      Upper Body:      Right upper body: No supraclavicular adenopathy.      Left upper body: No supraclavicular adenopathy.   Skin:     General: Skin is warm and dry.      Findings: Rash (faint proximal thigh rash noted) present.   Neurological:      Mental Status: She is alert and oriented to person, place, and time.      Cranial Nerves: No cranial nerve deficit.   Psychiatric:         Mood and Affect: Mood normal.         Speech: Speech normal.         Behavior: Behavior normal.           Lab Visit on 04/22/2025   Component Date Value Ref Range Status    Sodium 04/22/2025 140  136 - 145 mmol/L Final    Potassium 04/22/2025 3.9  3.5 - 5.1 mmol/L Final    Chloride 04/22/2025 106  95 - 110 mmol/L Final    CO2 04/22/2025 27  23 - 29 mmol/L Final    Glucose 04/22/2025 109  70 - 110 mg/dL Final    BUN 04/22/2025 14  8 - 23 mg/dL Final    Creatinine 04/22/2025 0.8  0.5 - 1.4 mg/dL Final    Calcium 04/22/2025 9.5  8.7 - 10.5 mg/dL Final    Protein Total 04/22/2025 7.0  6.0 - 8.4 gm/dL Final    Albumin 04/22/2025 3.7  3.5 - 5.2 g/dL Final    Bilirubin Total 04/22/2025 0.7  0.1 - 1.0 mg/dL Final    For infants and newborns, interpretation of results should be based   on gestational age, weight and in agreement with clinical   observations.    Premature Infant recommended reference ranges:   0-24 hours:  <8.0 mg/dL   24-48 hours: <12.0 mg/dL   3-5 days:    <15.0 mg/dL   6-29 days:   <15.0 mg/dL    ALP 04/22/2025 75  40 - 150 unit/L Final    AST 04/22/2025 19  11 - 45 unit/L Final    ALT 04/22/2025 13  10 - 44 unit/L Final    Anion Gap 04/22/2025 7 (L)  8 - 16 mmol/L Final    eGFR 04/22/2025 >60  >60 mL/min/1.73/m2 Final    Estimated GFR calculated using the CKD-EPI creatinine (2021) equation.    Cholesterol Total 04/22/2025 288 (H)  120 - 199 mg/dL Final    The National Cholesterol Education Program (NCEP) has  set the  following guidelines (reference ranges) for Cholesterol:  Optimal.....................<200 mg/dL  Borderline High.............200-239 mg/dL  High........................> or = 240 mg/dL    Triglyceride 04/22/2025 155 (H)  30 - 150 mg/dL Final    The National Cholesterol Education Program (NCEP) has set the  following guidelines (reference values) for triglycerides:  Normal......................<150 mg/dL  Borderline High.............150-199 mg/dL  High........................200-499 mg/dL    HDL Cholesterol 04/22/2025 102 (H)  40 - 75 mg/dL Final    The National Cholesterol Education Program (NCEP) has set the   following guidelines (reference values) for HDL Cholesterol:   Low...............<40 mg/dL   Optimal...........>60 mg/dL    LDL Cholesterol 04/22/2025 155.0  63.0 - 159.0 mg/dL Final    The National Cholesterol Education Program (NCEP) has set the  following guidelines (reference values) for LDL Cholesterol:  Optimal.......................<130 mg/dL  Borderline High...............130-159 mg/dL  High..........................160-189 mg/dL  Very High.....................>190 mg/dL  LDL calculated using the Friedewald equation.    HDL/Cholesterol Ratio 04/22/2025 35.4  20.0 - 50.0 % Final    Cholesterol/HDL Ratio 04/22/2025 2.8  2.0 - 5.0 Final    Non HDL Cholesterol 04/22/2025 186  mg/dL Final    Risk category and Non-HDL cholesterol goals:  Coronary heart disease (CHD)or equivalent (10-year risk of CHD >20%):  Non-HDL cholesterol goal     <130 mg/dL  Two or more CHD risk factors and 10-year risk of CHD <= 20%:  Non-HDL cholesterol goal     <160 mg/dL  0 to 1 CHD risk factor:  Non-HDL cholesterol goal     <190 mg/dL    TSH 04/22/2025 4.631 (H)  0.400 - 4.000 uIU/mL Final    Iron Level 04/22/2025 77  30 - 160 ug/dL Final    Transferrin 04/22/2025 288  200 - 375 mg/dL Final    Iron Binding Capacity Total 04/22/2025 426  250 - 450 ug/dL Final    Iron Saturation 04/22/2025 18 (L)  20 - 50 % Final     Hemoglobin A1c 04/22/2025 5.3  4.0 - 5.6 % Final    ADA Screening Guidelines:  5.7-6.4%  Consistent with prediabetes  >=6.5%  Consistent with diabetes    High levels of fetal hemoglobin interfere with the HbA1C  assay. Heterozygous hemoglobin variants (HbS, HgC, etc)do  not significantly interfere with this assay.   However, presence of multiple variants may affect accuracy.    Estimated Average Glucose 04/22/2025 105  68 - 131 mg/dL Final    WBC 04/22/2025 5.27  3.90 - 12.70 K/uL Final    RBC 04/22/2025 4.54  4.00 - 5.40 M/uL Final    HGB 04/22/2025 12.9  12.0 - 16.0 gm/dL Final    HCT 04/22/2025 40.4  37.0 - 48.5 % Final    MCV 04/22/2025 89  82 - 98 fL Final    MCH 04/22/2025 28.4  27.0 - 31.0 pg Final    MCHC 04/22/2025 31.9 (L)  32.0 - 36.0 g/dL Final    RDW 04/22/2025 13.6  11.5 - 14.5 % Final    Platelet Count 04/22/2025 319  150 - 450 K/uL Final    MPV 04/22/2025 9.8  9.2 - 12.9 fL Final    Nucleated RBC 04/22/2025 0  <=0 /100 WBC Final    Neut % 04/22/2025 55.6  38 - 73 % Final    Lymph % 04/22/2025 31.3  18 - 48 % Final    Mono % 04/22/2025 7.6  4 - 15 % Final    Eos % 04/22/2025 3.8  <=8 % Final    Basophil % 04/22/2025 1.3  <=1.9 % Final    Imm Grans % 04/22/2025 0.4  0.0 - 0.5 % Final    Neut # 04/22/2025 2.93  1.8 - 7.7 K/uL Final    Lymph # 04/22/2025 1.65  1 - 4.8 K/uL Final    Mono # 04/22/2025 0.40  0.3 - 1 K/uL Final    Eos # 04/22/2025 0.20  <=0.5 K/uL Final    Baso # 04/22/2025 0.07  <=0.2 K/uL Final    Imm Grans # 04/22/2025 0.02  0.00 - 0.04 K/uL Final    Mild elevation in immature granulocytes is non specific and can be seen in a variety of conditions including stress response, acute inflammation, trauma and pregnancy. Correlation with other laboratory and clinical findings is essential.    Color, UA 04/22/2025 Yellow  Straw, Hilda, Yellow, Light-Orange Final    Appearance, UA 04/22/2025 Clear  Clear Final    pH, UA 04/22/2025 6.0  5.0 - 8.0 Final    Spec Grav UA 04/22/2025 1.020  1.005 -  1.030 Final    Protein, UA 04/22/2025 Negative  Negative Final    Recommend a 24 hour urine protein or a urine protein/creatinine ratio if globulin induced proteinuria is clinically suspected.    Glucose, UA 04/22/2025 Negative  Negative Final    Ketones, UA 04/22/2025 Negative  Negative Final    Bilirubin, UA 04/22/2025 Negative  Negative Final    Blood, UA 04/22/2025 Trace (A)  Negative Final    Nitrites, UA 04/22/2025 Negative  Negative Final    Urobilinogen, UA 04/22/2025 Negative  <2.0 EU/dL Final    Leukocyte Esterase, UA 04/22/2025 3+ (A)  Negative Final    Free T4 04/22/2025 0.93  0.71 - 1.51 ng/dL Final    RBC, UA 04/22/2025 7 (H)  0 - 4 /HPF Final    WBC, UA 04/22/2025 64 (H)  0 - 5 /HPF Final    Bacteria, UA 04/22/2025 Rare  None, Rare, Occasional /HPF Final    Squamous Epithelial Cells, UA 04/22/2025 13  /HPF Final    Microscopic Comment 04/22/2025    Final    Other formed elements not mentioned in the report are not present in the microscopic examination.       Assessment & Plan:     Assessment & Plan     Elevated TSH (4.63) with normal free T4 suggests insufficient thyroid hormone in circulation.   Cholesterol levels significantly increased (total 288, ) after discontinuing atorvastatin; considering alternative lipid-lowering therapy due to previous rash.   Renal function normal based on creatinine and GFR.   Few white cells noted in urinalysis.   Liver function tests and complete blood count WNL.   Clarified that glucose and A1C level are WNL, indicating no diabetes or prediabetes.    HYPOTHYROIDISM:   Occasional medication non-adherence likely contributing to elevated TSH levels.   Instructed patient to take thyroid medication on an empty stomach and separate from other medications (especially vitamins) by at least 1 hour to ensure proper absorption.   Will order TSH in 2-3 months to allow for stabilization, with follow-up visit scheduled to review results.    HYPERLIPIDEMIA:   Cholesterol  levels have increased significantly: total cholesterol from 186 to 288, LDL from 79 to 155, and triglycerides from 80 to 155.   These values exceed target ranges (total below 200, LDL below 100).   Sheila requires medication adjustment but previously experienced rash reaction to atorvastatin.   Referred back to her cardiologist Dr. Pires for cholesterol management and potential medication changes.    GASTROESOPHAGEAL REFLUX DISEASE:   Sheila reports using Pepto Bismol for recent digestive issues after overeating and continues to experience persistent reflux symptoms despite current medication.   Advised to elevate head with pillows when sleeping.   Prescribed Nexium (esomeprazole) 40 mg daily in the morning on empty stomach, followed by food in 30-45 minutes.   Discontinued Protonix.    DERMATITIS:   Sheila reports previous rash on thighs and under arms, diagnosed as dermatitis.   The rash has faded but remains visible, though no longer itching.   Sheila was previously prescribed steroid creams for treatment.    ABDOMINAL PAIN:   Sheila reports intermittent abdominal cramps possibly related to constipation or diarrhea.   Ordered abdominal ultrasound to evaluate kidneys and liver due to flank pain and abdominal issues.   Ordered urine culture to check for infection despite lack of typical UTI symptoms.    FAMILY HISTORY OF KIDNEY CANCER:   Sheila reports family history of renal cancer in father.   Abdominal ultrasound ordered (as noted above) will also evaluate kidneys due to this family history combined with patient's reported flank pain.    MEDICATION NONCOMPLIANCE:   Sheila occasionally forgets thyroid medication and discontinued cholesterol medication without medical consultation.   Emphasized importance of medication adherence, particularly for thyroid medication with specific administration instructions as detailed in Hypothyroidism section.   TSH monitoring in 2-3 months will help assess  if compliance has improved.         Follow up in about 1 year (around 4/29/2026).     Farhat Correa MD

## 2025-04-30 NOTE — TELEPHONE ENCOUNTER
Pharmacy is requesting that a PRIOR AUTHORIZATION be completed for the Omeprazole. Please forward this request to the staff member handling PAs for your clinic. Thank you.      Note composed:8:01 PM 04/29/2025

## 2025-04-30 NOTE — TELEPHONE ENCOUNTER
No care due was identified.  [facility] Embedded Care Due Messages. Reference number: [id number].   4/29/2025 7:54:40 PM CDT

## 2025-05-01 LAB — BACTERIA UR CULT: NO GROWTH

## 2025-05-02 ENCOUNTER — PATIENT MESSAGE (OUTPATIENT)
Dept: CARDIOLOGY | Facility: CLINIC | Age: 75
End: 2025-05-02
Payer: MEDICARE

## 2025-05-14 ENCOUNTER — HOSPITAL ENCOUNTER (OUTPATIENT)
Dept: RADIOLOGY | Facility: HOSPITAL | Age: 75
Discharge: HOME | End: 2025-05-14
Attending: INTERNAL MEDICINE
Payer: MEDICARE

## 2025-05-14 DIAGNOSIS — R10.9 RIGHT FLANK PAIN: ICD-10-CM

## 2025-05-14 PROCEDURE — 76700 US EXAM ABDOM COMPLETE: CPT | Mod: 26,,, | Performed by: RADIOLOGY

## 2025-05-14 PROCEDURE — 76700 US EXAM ABDOM COMPLETE: CPT | Mod: TC

## 2025-05-28 ENCOUNTER — PATIENT MESSAGE (OUTPATIENT)
Dept: SURGERY | Facility: CLINIC | Age: 75
End: 2025-05-28
Payer: MEDICARE

## 2025-06-05 ENCOUNTER — OFFICE VISIT (OUTPATIENT)
Dept: CARDIOLOGY | Facility: CLINIC | Age: 75
End: 2025-06-05
Payer: MEDICARE

## 2025-06-05 VITALS
HEART RATE: 64 BPM | DIASTOLIC BLOOD PRESSURE: 74 MMHG | BODY MASS INDEX: 26.65 KG/M2 | WEIGHT: 165.13 LBS | SYSTOLIC BLOOD PRESSURE: 154 MMHG

## 2025-06-05 DIAGNOSIS — E78.2 MIXED HYPERLIPIDEMIA: Primary | ICD-10-CM

## 2025-06-05 DIAGNOSIS — F43.9 STRESS: ICD-10-CM

## 2025-06-05 DIAGNOSIS — I70.0 AORTIC ATHEROSCLEROSIS: ICD-10-CM

## 2025-06-05 LAB
OHS QRS DURATION: 82 MS
OHS QTC CALCULATION: 458 MS

## 2025-06-05 PROCEDURE — 99214 OFFICE O/P EST MOD 30 MIN: CPT | Mod: PBBFAC | Performed by: INTERNAL MEDICINE

## 2025-06-05 PROCEDURE — 99999 PR PBB SHADOW E&M-EST. PATIENT-LVL IV: CPT | Mod: PBBFAC,,, | Performed by: INTERNAL MEDICINE

## 2025-06-05 RX ORDER — ROSUVASTATIN CALCIUM 20 MG/1
20 TABLET, COATED ORAL DAILY
Qty: 90 TABLET | Refills: 3 | Status: SHIPPED | OUTPATIENT
Start: 2025-06-05

## 2025-06-21 DIAGNOSIS — F41.9 ANXIETY: ICD-10-CM

## 2025-06-21 NOTE — TELEPHONE ENCOUNTER
No care due was identified.  [facility] Embedded Care Due Messages. Reference number: [id number].   6/21/2025 10:55:56 AM CDT

## 2025-06-23 ENCOUNTER — PATIENT MESSAGE (OUTPATIENT)
Dept: SURGERY | Facility: CLINIC | Age: 75
End: 2025-06-23
Payer: MEDICARE

## 2025-06-23 ENCOUNTER — PATIENT MESSAGE (OUTPATIENT)
Dept: ADMINISTRATIVE | Facility: HOSPITAL | Age: 75
End: 2025-06-23
Payer: MEDICARE

## 2025-06-23 NOTE — TELEPHONE ENCOUNTER
Last filled 60 pills 3/19/25  Lov  4/29/25    Levocetizine [facility]ed request from [facility] - usually gets otc.  From [facility]- rx pended

## 2025-06-24 ENCOUNTER — HOSPITAL ENCOUNTER (OUTPATIENT)
Dept: RADIOLOGY | Facility: HOSPITAL | Age: 75
Discharge: HOME | End: 2025-06-24
Attending: PHYSICIAN ASSISTANT
Payer: MEDICARE

## 2025-06-24 VITALS — WEIGHT: 165 LBS | HEIGHT: 66 IN | BODY MASS INDEX: 26.52 KG/M2

## 2025-06-24 DIAGNOSIS — Z12.31 SCREENING MAMMOGRAM, ENCOUNTER FOR: ICD-10-CM

## 2025-06-24 PROCEDURE — 77067 SCR MAMMO BI INCL CAD: CPT | Mod: TC

## 2025-06-24 RX ORDER — ALPRAZOLAM 0.5 MG/1
0.5 TABLET ORAL 2 TIMES DAILY PRN
Qty: 60 TABLET | Refills: 0 | Status: SHIPPED | OUTPATIENT
Start: 2025-06-24

## 2025-06-24 RX ORDER — LEVOCETIRIZINE DIHYDROCHLORIDE 5 MG/1
5 TABLET, FILM COATED ORAL NIGHTLY
Qty: 90 TABLET | Refills: 1 | Status: SHIPPED | OUTPATIENT
Start: 2025-06-24 | End: 2026-06-24

## 2025-06-30 ENCOUNTER — PATIENT MESSAGE (OUTPATIENT)
Dept: SURGERY | Facility: CLINIC | Age: 75
End: 2025-06-30
Payer: MEDICARE

## 2025-06-30 ENCOUNTER — RESULTS FOLLOW-UP (OUTPATIENT)
Dept: SURGERY | Facility: CLINIC | Age: 75
End: 2025-06-30

## 2025-07-01 ENCOUNTER — OFFICE VISIT (OUTPATIENT)
Dept: SURGERY | Facility: CLINIC | Age: 75
End: 2025-07-01
Payer: MEDICARE

## 2025-07-01 VITALS
DIASTOLIC BLOOD PRESSURE: 63 MMHG | HEART RATE: 61 BPM | WEIGHT: 164.88 LBS | HEIGHT: 66 IN | SYSTOLIC BLOOD PRESSURE: 143 MMHG | OXYGEN SATURATION: 96 % | BODY MASS INDEX: 26.5 KG/M2

## 2025-07-01 DIAGNOSIS — Z98.890 S/P LUMPECTOMY, LEFT BREAST: ICD-10-CM

## 2025-07-01 DIAGNOSIS — Z12.31 SCREENING MAMMOGRAM FOR BREAST CANCER: ICD-10-CM

## 2025-07-01 DIAGNOSIS — Z85.3 PERSONAL HISTORY OF BREAST CANCER: Primary | ICD-10-CM

## 2025-07-01 DIAGNOSIS — Z12.39 ENCOUNTER FOR BREAST CANCER SCREENING OTHER THAN MAMMOGRAM: ICD-10-CM

## 2025-07-01 PROCEDURE — 99999 PR PBB SHADOW E&M-EST. PATIENT-LVL III: CPT | Mod: PBBFAC,,, | Performed by: PHYSICIAN ASSISTANT

## 2025-07-01 PROCEDURE — 99213 OFFICE O/P EST LOW 20 MIN: CPT | Mod: PBBFAC | Performed by: PHYSICIAN ASSISTANT

## 2025-07-01 NOTE — PROGRESS NOTES
"[facility]  Department of Surgery      REFERRING PROVIDER: No referring provider defined for this encounter.    Chief Complaint: Follow-up      DIAGNOSIS:   This is a 74 y.o. female with a history of stage 0 grade 3 DCIS ER -  [place] -   of the left breast     TREATMENT:   1. Left partial mastectomy on 2020. Kayleen Alvarez M.D. Surgical Oncology. Final pathology revealed (9.5 mm) DCIS with clear margins.  2. Radiation therapy completed on 10/14/2020. Hanane Sahu M.D. Radiation Oncology     HISTORY OF PRESENT ILLNESS:   Sheila Zarco is a 74 y.o. female comes in for oncological follow up. Patient admits to stress at home but otherwise health is doing well. No new complaints. She denies change in her breast self-exam specifically denying new masses, skin or nipple changes, or nipple discharge. Past medical and surgical history is updated no new changes. There have been no changes to family history. The patient denies constitutional symptoms of night sweats, weight loss, new headaches, visual changes, new back or bony pain, chest pain, or shortness of breath.      IMAGIN/18/24 Bilateral Screening Mammo:    Findings:  This procedure was performed using tomosynthesis. Computer-aided detection was utilized in the interpretation of this examination.  The breasts are heterogeneously dense, which may obscure small masses. There is no evidence of suspicious masses, calcifications, or other abnormal findings.  There is postsurgical change of left lumpectomy.     Impression:  Bilateral  There is no mammographic evidence of malignancy.     BI-RADS Category:   Overall: 2 - Benign        Recommendation:  Routine screening mammogram in 1 year is recommended.        MEDICATIONS/ALLERGIES:     Review of patient's allergies indicates:  No Known Allergies    PHYSICAL EXAM:   BP (!) 143/63 (BP Location: Right arm, Patient Position: Sitting)   Pulse 61   Ht 5' 6" (1.676 m)   Wt 74.8 kg (164 lb 14.5 oz)   " SpO2 96%   BMI 26.62 kg/m²     Physical Exam   Vitals reviewed.  Constitutional: She is oriented to person, place, and time.   HENT:   Head: Normocephalic and atraumatic.   Nose: Nose normal.   Eyes: Pupils are equal, round, and reactive to light. Right eye exhibits no discharge. Left eye exhibits no discharge.   Pulmonary/Chest: Effort normal and breath sounds normal. No stridor. No respiratory distress. She exhibits no mass, no tenderness and no edema. Right breast exhibits no inverted nipple, no mass, no nipple discharge, no skin change and no tenderness. Left breast exhibits tenderness. Left breast exhibits no inverted nipple, no mass, no nipple discharge and no skin change. No breast swelling or bleeding. Breasts are symmetrical.       Abdominal: Normal appearance.   Genitourinary: No breast swelling or bleeding.   Neurological: She is alert and oriented to person, place, and time.   Skin: Skin is warm and dry.     Psychiatric: Her behavior is normal. Mood, judgment and thought content normal.        ASSESSMENT:   This is a 74 y.o. female without evidence of recurrence by exam, history or imaging.      PLAN:   CBE without concerning findings. Patient reassured.   Continue monthly self breast exams and call the clinic with any changes or problems.  Mammogram due in 1 year. Will coordinate with my visit.   Also discussed additional imaging, patient is interested in increased surveillance due to family history with her sister and daughter (dx at 43) with history breast cancer. Will proceed with MRI.     The patient is in agreement with the plan. Questions were encouraged and answered to patient's satisfaction. Sheila will call our office with any questions or concerns.     25 minutes were spent on this encounter, 15 of which was face to face counseling and 10 minutes were spent on chart review and coordination of care.

## 2025-07-02 RX ORDER — LEVOTHYROXINE SODIUM 88 UG/1
88 TABLET ORAL EVERY MORNING
Qty: 90 TABLET | Refills: 3 | Status: SHIPPED | OUTPATIENT
Start: 2025-07-02

## 2025-07-02 NOTE — TELEPHONE ENCOUNTER
Refill Routing Note   Medication(s) are not appropriate for processing by [facility] for the following reason(s):        Required labs abnormal    [facility] action(s):  Defer               Appointments  past 12m or future 3m with PCP    Date Provider   Last Visit   4/29/2025 Farhat Correa MD   Next Visit   Visit date not found Farhat Correa MD   ED visits in past 90 days: 0        Note composed:12:39 PM 07/02/2025

## 2025-07-02 NOTE — TELEPHONE ENCOUNTER
No care due was identified.  [facility] Embedded Care Due Messages. Reference number: [id number].   7/02/2025 12:26:01 AM CDT

## 2025-07-29 ENCOUNTER — LAB VISIT (OUTPATIENT)
Dept: LAB | Facility: HOSPITAL | Age: 75
End: 2025-07-29
Attending: INTERNAL MEDICINE
Payer: MEDICARE

## 2025-07-29 DIAGNOSIS — E03.9 ACQUIRED HYPOTHYROIDISM: ICD-10-CM

## 2025-07-29 LAB
T4 FREE SERPL-MCNC: 0.95 NG/DL (ref 0.71–1.51)
TSH SERPL-ACNC: 2 UIU/ML (ref 0.4–4)

## 2025-07-29 PROCEDURE — 36415 COLL VENOUS BLD VENIPUNCTURE: CPT | Mod: PO

## 2025-07-29 PROCEDURE — 84439 ASSAY OF FREE THYROXINE: CPT

## 2025-07-29 PROCEDURE — 84443 ASSAY THYROID STIM HORMONE: CPT

## 2025-08-19 DIAGNOSIS — F41.9 ANXIETY: ICD-10-CM

## 2025-08-20 RX ORDER — ALPRAZOLAM 0.5 MG/1
0.5 TABLET ORAL 2 TIMES DAILY
Qty: 60 TABLET | Refills: 0 | Status: SHIPPED | OUTPATIENT
Start: 2025-08-20

## (undated) DEVICE — SEE MEDLINE ITEM 157131

## (undated) DEVICE — SUT 3/0 27IN COATED VICRYL

## (undated) DEVICE — ELECTRODE REM PLYHSV RETURN 9

## (undated) DEVICE — PAD EYE OVAL CNTOUR 1.62X2.62

## (undated) DEVICE — SHIELD EYE PLASTIC 3100G

## (undated) DEVICE — SOL WATER STRL IRR 1000ML

## (undated) DEVICE — SHEATH GUIDE SCOUT SURG RADAR

## (undated) DEVICE — KIT GREY EYE

## (undated) DEVICE — ADHESIVE DERMABOND ADVANCED

## (undated) DEVICE — GOWN SURGICAL X-LARGE

## (undated) DEVICE — UNDERGLOVE BIOGEL PI SZ 6.5 LF

## (undated) DEVICE — ELECTRODE BLADE INSULATED 1 IN

## (undated) DEVICE — PACK UNIV PROCEDURE

## (undated) DEVICE — SOL BETADINE 5%

## (undated) DEVICE — NDL SAFETY HYPO BVL 22G 1IN

## (undated) DEVICE — GLOVE BIOGEL SKINSENSE PI 6.5

## (undated) DEVICE — NDL FLTR 5MCRN BLNT TIP 18GX1

## (undated) DEVICE — GLOVE BIOGEL SKINSENSE PI 6.0

## (undated) DEVICE — SHIELD COLLAGEN 12HR CORNEAL

## (undated) DEVICE — CHLORAPREP 10.5 ML APPLICATOR

## (undated) DEVICE — SPONGE SUPER KERLIX 6X6.75IN

## (undated) DEVICE — MARKER SKIN STND TIP BLUE BARR

## (undated) DEVICE — DRAPE STERI INCISE MED 130X130

## (undated) DEVICE — CONTAINER SPECIMEN 4OZ

## (undated) DEVICE — SEE MEDLINE ITEM 152622

## (undated) DEVICE — SUT MCRYL PLUS 4-0 PS2 27IN

## (undated) DEVICE — SYR 20ML BLUE LUER LOCK

## (undated) DEVICE — SUT 2/0 30IN SILK BLK BRAI

## (undated) DEVICE — SKIN MARKER DEVON 160